# Patient Record
Sex: MALE | Race: WHITE | NOT HISPANIC OR LATINO | Employment: FULL TIME | URBAN - METROPOLITAN AREA
[De-identification: names, ages, dates, MRNs, and addresses within clinical notes are randomized per-mention and may not be internally consistent; named-entity substitution may affect disease eponyms.]

---

## 2021-05-10 ENCOUNTER — TELEPHONE (OUTPATIENT)
Dept: INTERNAL MEDICINE | Facility: CLINIC | Age: 61
End: 2021-05-10

## 2021-05-10 ENCOUNTER — OFFICE VISIT (OUTPATIENT)
Dept: INTERNAL MEDICINE | Facility: CLINIC | Age: 61
End: 2021-05-10
Payer: COMMERCIAL

## 2021-05-10 VITALS
RESPIRATION RATE: 16 BRPM | HEART RATE: 68 BPM | HEIGHT: 66 IN | SYSTOLIC BLOOD PRESSURE: 110 MMHG | BODY MASS INDEX: 35.2 KG/M2 | TEMPERATURE: 98.3 F | OXYGEN SATURATION: 98 % | WEIGHT: 219 LBS | DIASTOLIC BLOOD PRESSURE: 66 MMHG

## 2021-05-10 DIAGNOSIS — I25.10 CORONARY ARTERY DISEASE INVOLVING NATIVE HEART WITHOUT ANGINA PECTORIS, UNSPECIFIED VESSEL OR LESION TYPE: ICD-10-CM

## 2021-05-10 DIAGNOSIS — E11.9 TYPE 2 DIABETES MELLITUS WITHOUT COMPLICATION, WITHOUT LONG-TERM CURRENT USE OF INSULIN (H): Primary | ICD-10-CM

## 2021-05-10 DIAGNOSIS — Z95.0 CARDIAC PACEMAKER IN SITU: ICD-10-CM

## 2021-05-10 DIAGNOSIS — E78.5 HYPERLIPIDEMIA LDL GOAL <100: ICD-10-CM

## 2021-05-10 DIAGNOSIS — I44.2 COMPLETE HEART BLOCK (H): ICD-10-CM

## 2021-05-10 DIAGNOSIS — Z12.5 SCREENING FOR PROSTATE CANCER: ICD-10-CM

## 2021-05-10 DIAGNOSIS — E66.01 MORBID OBESITY (H): ICD-10-CM

## 2021-05-10 DIAGNOSIS — G47.33 OSA (OBSTRUCTIVE SLEEP APNEA): ICD-10-CM

## 2021-05-10 DIAGNOSIS — I10 HTN, GOAL BELOW 140/80: ICD-10-CM

## 2021-05-10 DIAGNOSIS — Z98.84 GASTRIC BYPASS STATUS FOR OBESITY: ICD-10-CM

## 2021-05-10 DIAGNOSIS — K90.0 CELIAC DISEASE: ICD-10-CM

## 2021-05-10 LAB
ALBUMIN SERPL-MCNC: 3.9 G/DL (ref 3.4–5)
ALP SERPL-CCNC: 90 U/L (ref 40–150)
ALT SERPL W P-5'-P-CCNC: 27 U/L (ref 0–70)
ANION GAP SERPL CALCULATED.3IONS-SCNC: 1 MMOL/L (ref 3–14)
AST SERPL W P-5'-P-CCNC: 18 U/L (ref 0–45)
BILIRUB SERPL-MCNC: 1.6 MG/DL (ref 0.2–1.3)
BUN SERPL-MCNC: 15 MG/DL (ref 7–30)
CALCIUM SERPL-MCNC: 8.5 MG/DL (ref 8.5–10.1)
CHLORIDE SERPL-SCNC: 103 MMOL/L (ref 94–109)
CHOLEST SERPL-MCNC: 150 MG/DL
CO2 SERPL-SCNC: 34 MMOL/L (ref 20–32)
CREAT SERPL-MCNC: 0.76 MG/DL (ref 0.66–1.25)
CREAT UR-MCNC: 44 MG/DL
ERYTHROCYTE [DISTWIDTH] IN BLOOD BY AUTOMATED COUNT: 13.9 % (ref 10–15)
FERRITIN SERPL-MCNC: 48 NG/ML (ref 26–388)
FOLATE SERPL-MCNC: 89.4 NG/ML
GFR SERPL CREATININE-BSD FRML MDRD: >90 ML/MIN/{1.73_M2}
GLUCOSE SERPL-MCNC: 190 MG/DL (ref 70–99)
HBA1C MFR BLD: 7.8 % (ref 0–5.6)
HCT VFR BLD AUTO: 45.2 % (ref 40–53)
HDLC SERPL-MCNC: 35 MG/DL
HGB BLD-MCNC: 14.7 G/DL (ref 13.3–17.7)
IRON SATN MFR SERPL: 35 % (ref 15–46)
IRON SERPL-MCNC: 124 UG/DL (ref 35–180)
LDLC SERPL CALC-MCNC: 92 MG/DL
MCH RBC QN AUTO: 29.6 PG (ref 26.5–33)
MCHC RBC AUTO-ENTMCNC: 32.5 G/DL (ref 31.5–36.5)
MCV RBC AUTO: 91 FL (ref 78–100)
MICROALBUMIN UR-MCNC: <5 MG/L
MICROALBUMIN/CREAT UR: NORMAL MG/G CR (ref 0–17)
NONHDLC SERPL-MCNC: 115 MG/DL
PLATELET # BLD AUTO: 167 10E9/L (ref 150–450)
POTASSIUM SERPL-SCNC: 4.4 MMOL/L (ref 3.4–5.3)
PROT SERPL-MCNC: 7.5 G/DL (ref 6.8–8.8)
PSA SERPL-ACNC: 0.56 UG/L (ref 0–4)
PTH-INTACT SERPL-MCNC: 123 PG/ML (ref 18–80)
RBC # BLD AUTO: 4.96 10E12/L (ref 4.4–5.9)
SODIUM SERPL-SCNC: 138 MMOL/L (ref 133–144)
TIBC SERPL-MCNC: 356 UG/DL (ref 240–430)
TRIGL SERPL-MCNC: 114 MG/DL
TSH SERPL DL<=0.005 MIU/L-ACNC: 3.97 MU/L (ref 0.4–4)
VIT B12 SERPL-MCNC: 386 PG/ML (ref 193–986)
WBC # BLD AUTO: 7 10E9/L (ref 4–11)

## 2021-05-10 PROCEDURE — 82607 VITAMIN B-12: CPT | Performed by: INTERNAL MEDICINE

## 2021-05-10 PROCEDURE — 83540 ASSAY OF IRON: CPT | Performed by: INTERNAL MEDICINE

## 2021-05-10 PROCEDURE — 99204 OFFICE O/P NEW MOD 45 MIN: CPT | Performed by: INTERNAL MEDICINE

## 2021-05-10 PROCEDURE — 82043 UR ALBUMIN QUANTITATIVE: CPT | Performed by: INTERNAL MEDICINE

## 2021-05-10 PROCEDURE — 82306 VITAMIN D 25 HYDROXY: CPT | Performed by: INTERNAL MEDICINE

## 2021-05-10 PROCEDURE — 83970 ASSAY OF PARATHORMONE: CPT | Performed by: INTERNAL MEDICINE

## 2021-05-10 PROCEDURE — 84425 ASSAY OF VITAMIN B-1: CPT | Mod: 90 | Performed by: INTERNAL MEDICINE

## 2021-05-10 PROCEDURE — 80053 COMPREHEN METABOLIC PANEL: CPT | Performed by: INTERNAL MEDICINE

## 2021-05-10 PROCEDURE — 82746 ASSAY OF FOLIC ACID SERUM: CPT | Performed by: INTERNAL MEDICINE

## 2021-05-10 PROCEDURE — 83550 IRON BINDING TEST: CPT | Performed by: INTERNAL MEDICINE

## 2021-05-10 PROCEDURE — 82728 ASSAY OF FERRITIN: CPT | Performed by: INTERNAL MEDICINE

## 2021-05-10 PROCEDURE — 80061 LIPID PANEL: CPT | Performed by: INTERNAL MEDICINE

## 2021-05-10 PROCEDURE — 85027 COMPLETE CBC AUTOMATED: CPT | Performed by: INTERNAL MEDICINE

## 2021-05-10 PROCEDURE — G0103 PSA SCREENING: HCPCS | Performed by: INTERNAL MEDICINE

## 2021-05-10 PROCEDURE — 83036 HEMOGLOBIN GLYCOSYLATED A1C: CPT | Performed by: INTERNAL MEDICINE

## 2021-05-10 PROCEDURE — 99000 SPECIMEN HANDLING OFFICE-LAB: CPT | Performed by: INTERNAL MEDICINE

## 2021-05-10 PROCEDURE — 84443 ASSAY THYROID STIM HORMONE: CPT | Performed by: INTERNAL MEDICINE

## 2021-05-10 PROCEDURE — 36415 COLL VENOUS BLD VENIPUNCTURE: CPT | Performed by: INTERNAL MEDICINE

## 2021-05-10 RX ORDER — METOPROLOL SUCCINATE 25 MG/1
50 TABLET, EXTENDED RELEASE ORAL DAILY
Qty: 45 TABLET | Refills: 0 | Status: SHIPPED | OUTPATIENT
Start: 2021-05-10 | End: 2021-05-10

## 2021-05-10 RX ORDER — ATORVASTATIN CALCIUM 20 MG/1
20 TABLET, FILM COATED ORAL DAILY
COMMUNITY
Start: 2021-02-07 | End: 2021-05-10

## 2021-05-10 RX ORDER — LISINOPRIL 20 MG/1
20 TABLET ORAL DAILY
COMMUNITY
Start: 2021-01-10 | End: 2021-05-17

## 2021-05-10 RX ORDER — METOPROLOL SUCCINATE 25 MG/1
12.5 TABLET, EXTENDED RELEASE ORAL DAILY
Qty: 45 TABLET | Refills: 0 | Status: SHIPPED | OUTPATIENT
Start: 2021-05-10 | End: 2021-07-28

## 2021-05-10 RX ORDER — ATORVASTATIN CALCIUM 20 MG/1
20 TABLET, FILM COATED ORAL DAILY
Qty: 90 TABLET | Refills: 0 | Status: SHIPPED | OUTPATIENT
Start: 2021-05-10 | End: 2021-05-17

## 2021-05-10 RX ORDER — FERROUS SULFATE 325(65) MG
TABLET ORAL
COMMUNITY
Start: 2021-04-28 | End: 2021-07-28

## 2021-05-10 RX ORDER — METOPROLOL SUCCINATE 25 MG/1
50 TABLET, EXTENDED RELEASE ORAL DAILY
COMMUNITY
Start: 2019-09-23 | End: 2021-05-10

## 2021-05-10 RX ORDER — FUROSEMIDE 20 MG
20 TABLET ORAL
COMMUNITY
Start: 2020-09-18 | End: 2021-05-10

## 2021-05-10 RX ORDER — FOLIC ACID 1 MG/1
TABLET ORAL
COMMUNITY
Start: 2020-11-15 | End: 2021-05-25

## 2021-05-10 RX ORDER — LISINOPRIL 10 MG/1
10 TABLET ORAL DAILY
Qty: 90 TABLET | Refills: 0 | Status: SHIPPED | OUTPATIENT
Start: 2021-05-10 | End: 2021-08-06

## 2021-05-10 RX ORDER — FUROSEMIDE 20 MG
20 TABLET ORAL DAILY
Qty: 90 TABLET | Refills: 0 | Status: SHIPPED | OUTPATIENT
Start: 2021-05-10 | End: 2021-08-06

## 2021-05-10 SDOH — HEALTH STABILITY: MENTAL HEALTH: HOW OFTEN DO YOU HAVE 6 OR MORE DRINKS ON ONE OCCASION?: NEVER

## 2021-05-10 SDOH — HEALTH STABILITY: MENTAL HEALTH: HOW MANY STANDARD DRINKS CONTAINING ALCOHOL DO YOU HAVE ON A TYPICAL DAY?: NOT ASKED

## 2021-05-10 SDOH — HEALTH STABILITY: MENTAL HEALTH: HOW OFTEN DO YOU HAVE A DRINK CONTAINING ALCOHOL?: NEVER

## 2021-05-10 ASSESSMENT — ANXIETY QUESTIONNAIRES
2. NOT BEING ABLE TO STOP OR CONTROL WORRYING: NOT AT ALL
3. WORRYING TOO MUCH ABOUT DIFFERENT THINGS: NOT AT ALL
7. FEELING AFRAID AS IF SOMETHING AWFUL MIGHT HAPPEN: NOT AT ALL
1. FEELING NERVOUS, ANXIOUS, OR ON EDGE: NOT AT ALL
6. BECOMING EASILY ANNOYED OR IRRITABLE: SEVERAL DAYS
5. BEING SO RESTLESS THAT IT IS HARD TO SIT STILL: NOT AT ALL
GAD7 TOTAL SCORE: 1

## 2021-05-10 ASSESSMENT — PATIENT HEALTH QUESTIONNAIRE - PHQ9
5. POOR APPETITE OR OVEREATING: NOT AT ALL
SUM OF ALL RESPONSES TO PHQ QUESTIONS 1-9: 0

## 2021-05-10 ASSESSMENT — MIFFLIN-ST. JEOR: SCORE: 1750.1

## 2021-05-10 NOTE — TELEPHONE ENCOUNTER
Pharmacy calls for clarification on the directions for the Metoprolol. The RX sent over has two different sets of directions. Please correct and send new RX to the pharmacy.

## 2021-05-10 NOTE — PROGRESS NOTES
Patient's instructions / PLAN:                                                        Plan:  1.  Labs today - [address]   2. [facility] --  referral   3. Cardiology referral   4. Continue same meds, same doses for now   5. Hold the furosemide for 2 weeks before your appointment with cardiology and discuss then need for it  6. Please make a lab appointment for non fasting labs in 5 months for diabetes recheck ( or sooner, depending on the results)  7. Please make an appointment few days after the labs to discuss about the results.       ASSESSMENT & PLAN:                                                      (E11.9) DM 2, no insulin (H)  (primary encounter diagnosis)  Comment: Controlled    Plan: Comprehensive metabolic panel, CBC with         platelets, Lipid panel reflex to direct LDL         Fasting, Albumin Random Urine Quantitative with        Creat Ratio, TSH with free T4 reflex,         Hemoglobin A1c, Hemoglobin A1c, Comprehensive         metabolic panel            (Z98.84) Gastric bypass status for obesity - 2015  Comment:   Plan: Ferritin, Folate, Iron and iron binding         capacity, Parathyroid Hormone Intact, Vitamin         B1 whole blood, Vitamin B12, Vitamin D         Deficiency            (E78.5) Hyperlipidemia LDL goal <100  Comment: Controlled    Plan: Comprehensive metabolic panel, CBC with         platelets, Lipid panel reflex to direct LDL         Fasting, TSH with free T4 reflex, atorvastatin         (LIPITOR) 20 MG tablet            (I25.10) CAD, 2 stents 1999, 2018  Comment: stable   Plan: Comprehensive metabolic panel, CBC with         platelets, Lipid panel reflex to direct LDL         Fasting, TSH with free T4 reflex, Hemoglobin         A1c, furosemide (LASIX) 20 MG tablet,         metoprolol succinate ER (TOPROL-XL) 25 MG 24 hr        tablet, lisinopril (ZESTRIL) 10 MG tablet,         CARDIOLOGY EVAL ADULT REFERRAL            (Z95.0) Cardiac pacemaker in situ  Comment:   Plan:  "    (K90.0) Celiac disease  Comment:   Plan:     (G47.33) ZULEIKA (obstructive sleep apnea)  Comment:   Plan: SLEEP EVALUATION & MANAGEMENT REFERRAL - [facility]          [phone] (Age 18 and up)            (I44.2) Complete heart block (H) -- s/p PPM 2018  Comment:   Plan:     (I10) HTN, goal below 140/80  Comment: Controlled    Plan: Comprehensive metabolic panel, CBC with         platelets, Lipid panel reflex to direct LDL         Fasting, Albumin Random Urine Quantitative with        Creat Ratio, TSH with free T4 reflex,         lisinopril (ZESTRIL) 10 MG tablet               Chief Complaint:                                                      estab care      SUBJECTIVE:                                                    History of present illness     Nurse note:             ROS:                                                      ROS: negative for fever, chills, cough, wheezes, chest pain, shortness of breath, vomiting, abdominal pain, leg swelling       OBJECTIVE:                                                    Physical Exam :    Blood pressure 110/66, pulse 68, temperature 98.3  F (36.8  C), temperature source Oral, resp. rate 16, height 1.683 m (5' 6.25\"), weight 99.3 kg (219 lb), SpO2 98 %.   NAD, appears comfortable  Skin: no rashes   Neck: supple, no JVD, No thyroidmegaly. Lymph nodes nonpalpable cervical and supraclavicular.  Chest: clear to auscultation bilaterally, good respiratory effort  Heart: S1 S2, RRR, no mgr appreciated  Abdomen: soft, not tender, no hepatosplenomegaly or masses appreciated, no abdominal bruit, present bowel sounds  Extremities: no edema,   Neurologic: A, Ox3, no focal signs appreciated    Past Medical History:   Diagnosis Date     CAD, 2 stents 1999, 2018 5/10/2021     Cardiac pacemaker in situ 5/10/2021     Celiac disease 5/10/2021     Complete heart block (H) -- s/p PPM 2018 5/10/2021     DM 2, no insulin (H) 5/10/2021     Gastric " bypass status for obesity - 2015 5/10/2021     HTN, goal below 140/80 5/10/2021     Hyperlipidemia LDL goal <100 5/10/2021     ZULEIKA (obstructive sleep apnea) 5/10/2021     Past Surgical History:   Procedure Laterality Date     DISTAL BICEPS TENDON REPAIR Left      LAPAROSCOPIC GASTRIC BYPASS N/A 2015     LEG SURGERY Right     muscle surgery     UMBILICAL HERNIA REPAIR N/A      WRIST SURGERY Right         Meds: reviewed  Current Outpatient Medications   Medication Sig Dispense Refill     aspirin (ASA) 81 MG EC tablet TAKE ONE TABLET BY MOUTH EVERY DAY FOR HEART DISEASE  **DO NOT CHEW** FOR HEART DISEASE  **DO NOT CHEW**       atorvastatin (LIPITOR) 20 MG tablet Take 20 mg by mouth daily       ferrous sulfate (FEROSUL) 325 (65 Fe) MG tablet TAKE ONE TABLET BY MOUTH EVERY DAY       folic acid (FOLVITE) 1 MG tablet TAKE ONE TABLET BY MOUTH EVERY DAY       furosemide (LASIX) 20 MG tablet Take 20 mg by mouth       lisinopril (ZESTRIL) 20 MG tablet Take 20 mg by mouth daily Taking 1/2 tab daily =10mg       metoprolol succinate ER (TOPROL-XL) 25 MG 24 hr tablet Take 50 mg by mouth daily Taking 1/2 tab daily =25mg         Soc Hx: reviewed  Fam Hx: reviewed          Candis Jose MD  Internal Medicine

## 2021-05-10 NOTE — PATIENT INSTRUCTIONS
Plan:  1.  Labs today - [address]   2. [facility] Ctr --  referral   3. Cardiology referral   4. Continue same meds, same doses for now   5. Hold the furosemide for 2 weeks before your appointment with cardiology and discuss then need for it  6. Please make a lab appointment for non fasting labs in 5 months for diabetes recheck ( or sooner, depending on the results)  7. Please make an appointment few days after the labs to discuss about the results.

## 2021-05-11 LAB — DEPRECATED CALCIDIOL+CALCIFEROL SERPL-MC: 27 UG/L (ref 20–75)

## 2021-05-11 ASSESSMENT — ANXIETY QUESTIONNAIRES: GAD7 TOTAL SCORE: 1

## 2021-05-13 LAB — VIT B1 BLD-MCNC: 93 NMOL/L (ref 70–180)

## 2021-05-17 ENCOUNTER — TELEPHONE (OUTPATIENT)
Dept: CARDIOLOGY | Facility: CLINIC | Age: 61
End: 2021-05-17

## 2021-05-17 ENCOUNTER — OFFICE VISIT (OUTPATIENT)
Dept: CARDIOLOGY | Facility: CLINIC | Age: 61
End: 2021-05-17
Attending: INTERNAL MEDICINE
Payer: COMMERCIAL

## 2021-05-17 VITALS
HEART RATE: 58 BPM | DIASTOLIC BLOOD PRESSURE: 70 MMHG | WEIGHT: 224 LBS | HEIGHT: 66 IN | OXYGEN SATURATION: 98 % | BODY MASS INDEX: 36 KG/M2 | SYSTOLIC BLOOD PRESSURE: 119 MMHG

## 2021-05-17 DIAGNOSIS — I25.10 CORONARY ARTERY DISEASE INVOLVING NATIVE HEART WITHOUT ANGINA PECTORIS, UNSPECIFIED VESSEL OR LESION TYPE: ICD-10-CM

## 2021-05-17 DIAGNOSIS — E78.5 HYPERLIPIDEMIA LDL GOAL <100: ICD-10-CM

## 2021-05-17 PROCEDURE — 93000 ELECTROCARDIOGRAM COMPLETE: CPT | Performed by: INTERNAL MEDICINE

## 2021-05-17 PROCEDURE — 99205 OFFICE O/P NEW HI 60 MIN: CPT | Performed by: INTERNAL MEDICINE

## 2021-05-17 RX ORDER — NITROGLYCERIN 0.4 MG/1
TABLET SUBLINGUAL
Qty: 10 TABLET | Refills: 3 | Status: SHIPPED | OUTPATIENT
Start: 2021-05-17 | End: 2022-09-22

## 2021-05-17 RX ORDER — ATORVASTATIN CALCIUM 40 MG/1
40 TABLET, FILM COATED ORAL DAILY
Qty: 90 TABLET | Refills: 3 | Status: SHIPPED | OUTPATIENT
Start: 2021-05-17 | End: 2021-08-26

## 2021-05-17 ASSESSMENT — MIFFLIN-ST. JEOR: SCORE: 1772.78

## 2021-05-17 NOTE — PROGRESS NOTES
CARDIOLOGY CLINIC CONSULTATION    REASON FOR CONSULT: Coronary artery disease    PRIMARY CARE PHYSICIAN:  Candis Altamirano    HISTORY OF PRESENT ILLNESS:  This a very pleasant 60-year-old gentleman who used to follow-up with [facility] and is now is transferring care to therapy.  This is the first time he see me.  He has following several medical problems    1. Coronary artery disease with stenting in 1999  2. Presentation in January 2018 at [place] with Holter monitor showing high-grade AV block status post dual-chamber pacemaker  3. Presentation in July 2018 with non-ST elevation MI noted to have distal circumflex lesion that was stented and a moderate LAD lesion left to medical management and mild left main disease  4. Mild heart failure with preserved EF last echocardiogram in August 2020 showing mild LVH now takes as needed Lasix  5. Severe sleep apnea sees pulmonary  6. Obesity hypertension diabetes hyperlipidemia and family history of coronary disease    The patient wants to transfer care as he moved to [place].  He is seeing me at [facility] today.  Denies any anginal symptoms no syncope presyncope.  NYHA class I.  Overall feels well.  Blood pressure heart rate are stable.  Complains of mild stiffness in his bilateral fingers but no muscle aches.    PAST MEDICAL HISTORY:  Past Medical History:   Diagnosis Date     CAD, 2 stents 1999, 2018 5/10/2021     Cardiac pacemaker in situ 5/10/2021     Celiac disease 5/10/2021     Complete heart block (H) -- s/p PPM 2018 5/10/2021     DM 2, no insulin (H) 5/10/2021     Gastric bypass status for obesity - 2015 5/10/2021     HTN, goal below 140/80 5/10/2021     Hyperlipidemia LDL goal <100 5/10/2021     ZULEIKA (obstructive sleep apnea) 5/10/2021       MEDICATIONS:  Current Outpatient Medications   Medication     aspirin (ASA) 81 MG EC tablet     atorvastatin (LIPITOR) 40 MG tablet     ferrous sulfate (FEROSUL) 325 (65 Fe) MG tablet     folic acid (FOLVITE) 1  MG tablet     furosemide (LASIX) 20 MG tablet     lisinopril (ZESTRIL) 10 MG tablet     metoprolol succinate ER (TOPROL-XL) 25 MG 24 hr tablet     nitroGLYcerin (NITROSTAT) 0.4 MG sublingual tablet     No current facility-administered medications for this visit.        ALLERGIES:  No Known Allergies    SOCIAL HISTORY:  I have reviewed this patient's social history and updated it with pertinent information if needed. Sumit Park  reports that he has never smoked. He has never used smokeless tobacco. He reports that he does not drink alcohol or use drugs.    FAMILY HISTORY:  I have reviewed this patient's family history and updated it with pertinent information if needed.   Family History   Problem Relation Age of Onset     Breast Cancer Mother 58     Heart Disease Father 71     Lymphoma Brother        REVIEW OF SYSTEMS:  Skin:  Positive for rash   Eyes:  Positive for glasses  ENT:  Negative    Respiratory:  Positive for sleep apnea  Cardiovascular:  Negative    Gastroenterology: Negative    Genitourinary:  Negative    Musculoskeletal:  Positive for arthritis  Neurologic:  Negative    Psychiatric:  Negative    Heme/Lymph/Imm:  Negative    Endocrine:  Positive for diabetes      PHYSICAL EXAM:      BP: 119/70 Pulse: 58     SpO2: 98 %      Vital Signs with Ranges  Pulse:  [58] 58  BP: (119)/(70) 119/70  SpO2:  [98 %] 98 %  224 lbs 0 oz    Constitutional: awake, alert, no distress  Respiratory: Clear to auscultation  Cardiovascular: Normal heart sounds  GI: nondistended obese  Neuropsychiatric: appropriate affact    DATA:  Labs: Pertinent cardiac labs reviewed    Echocardiogram: From August 2020 showing normal LV and RV function.  Mild left ventricular hypertrophy    EKG: Ventricular paced rhythm    ASSESSMENT:  Stable coronary disease with stents in 1999 to unclear territory and to the distal circumflex and July 2018  Pacemaker implantation for heart block  Mild heart failure with preserved  EF    RECOMMENDATIONS:  Overall patient is doing well from a cardiovascular standpoint without any symptoms.  I recommend the following  1. In regards to coronary disease, he is without any symptoms.  His last LDL was 92.  Recommend increasing atorvastatin to 40 mg daily and repeat lipid panel in the next 1 year.  He is without angina.  If he has new symptoms, further work-up could be recommended but at this time strong secondary prevention medical management.  2. Recommend discussing with PCP and considering Jardiance.  3. In regards to his mild heart failure with preserved EF, volume status exam today is normal.  He has not taken Lasix in the last 10 days.  I told him to discontinue and take Lasix as needed.  Last echocardiogram in July 2020 showed normal EF.  4. Enroll in device clinic.  5. Weight loss healthy diet exercise.    See me back in 1 year with an echocardiogram.  Call us with any change in clinical status or new symptoms.    MORIS Mitchell, [facility]  Cardiology - [facility]  May 17, 2021

## 2021-05-17 NOTE — LETTER
5/17/2021    Candis Altamirano MD  [address] [facility] [address]    RE: Sumit Park       Dear Colleague,    I had the pleasure of seeing Sumit Park in the [facility].    CARDIOLOGY CLINIC CONSULTATION    REASON FOR CONSULT: Coronary artery disease    PRIMARY CARE PHYSICIAN:  Candis Altamirano    HISTORY OF PRESENT ILLNESS:  This a very pleasant 60-year-old gentleman who used to follow-up with [facility] and is now is transferring care to therapy.  This is the first time he see me.  He has following several medical problems    1. Coronary artery disease with stenting in 1999  2. Presentation in January 2018 at [place] with Holter monitor showing high-grade AV block status post dual-chamber pacemaker  3. Presentation in July 2018 with non-ST elevation MI noted to have distal circumflex lesion that was stented and a moderate LAD lesion left to medical management and mild left main disease  4. Mild heart failure with preserved EF last echocardiogram in August 2020 showing mild LVH now takes as needed Lasix  5. Severe sleep apnea sees pulmonary  6. Obesity hypertension diabetes hyperlipidemia and family history of coronary disease    The patient wants to transfer care as he moved to [place].  He is seeing me at [facility] today.  Denies any anginal symptoms no syncope presyncope.  NYHA class I.  Overall feels well.  Blood pressure heart rate are stable.  Complains of mild stiffness in his bilateral fingers but no muscle aches.    PAST MEDICAL HISTORY:  Past Medical History:   Diagnosis Date     CAD, 2 stents 1999, 2018 5/10/2021     Cardiac pacemaker in situ 5/10/2021     Celiac disease 5/10/2021     Complete heart block (H) -- s/p PPM 2018 5/10/2021     DM 2, no insulin (H) 5/10/2021     Gastric bypass status for obesity - 2015 5/10/2021     HTN, goal below 140/80 5/10/2021     Hyperlipidemia LDL goal <100  5/10/2021     ZULEIKA (obstructive sleep apnea) 5/10/2021       MEDICATIONS:  Current Outpatient Medications   Medication     aspirin (ASA) 81 MG EC tablet     atorvastatin (LIPITOR) 40 MG tablet     ferrous sulfate (FEROSUL) 325 (65 Fe) MG tablet     folic acid (FOLVITE) 1 MG tablet     furosemide (LASIX) 20 MG tablet     lisinopril (ZESTRIL) 10 MG tablet     metoprolol succinate ER (TOPROL-XL) 25 MG 24 hr tablet     nitroGLYcerin (NITROSTAT) 0.4 MG sublingual tablet     No current facility-administered medications for this visit.        ALLERGIES:  No Known Allergies    SOCIAL HISTORY:  I have reviewed this patient's social history and updated it with pertinent information if needed. Sumit Park  reports that he has never smoked. He has never used smokeless tobacco. He reports that he does not drink alcohol or use drugs.    FAMILY HISTORY:  I have reviewed this patient's family history and updated it with pertinent information if needed.   Family History   Problem Relation Age of Onset     Breast Cancer Mother 58     Heart Disease Father 71     Lymphoma Brother        REVIEW OF SYSTEMS:  Skin:  Positive for rash   Eyes:  Positive for glasses  ENT:  Negative    Respiratory:  Positive for sleep apnea  Cardiovascular:  Negative    Gastroenterology: Negative    Genitourinary:  Negative    Musculoskeletal:  Positive for arthritis  Neurologic:  Negative    Psychiatric:  Negative    Heme/Lymph/Imm:  Negative    Endocrine:  Positive for diabetes      PHYSICAL EXAM:      BP: 119/70 Pulse: 58     SpO2: 98 %      Vital Signs with Ranges  Pulse:  [58] 58  BP: (119)/(70) 119/70  SpO2:  [98 %] 98 %  224 lbs 0 oz    Constitutional: awake, alert, no distress  Respiratory: Clear to auscultation  Cardiovascular: Normal heart sounds  GI: nondistended obese  Neuropsychiatric: appropriate affact    DATA:  Labs: Pertinent cardiac labs reviewed    Echocardiogram: From August 2020 showing normal LV and RV function.  Mild left  ventricular hypertrophy    EKG: Ventricular paced rhythm    ASSESSMENT:  Stable coronary disease with stents in 1999 to unclear territory and to the distal circumflex and July 2018  Pacemaker implantation for heart block  Mild heart failure with preserved EF    RECOMMENDATIONS:  Overall patient is doing well from a cardiovascular standpoint without any symptoms.  I recommend the following  1. In regards to coronary disease, he is without any symptoms.  His last LDL was 92.  Recommend increasing atorvastatin to 40 mg daily and repeat lipid panel in the next 1 year.  He is without angina.  If he has new symptoms, further work-up could be recommended but at this time strong secondary prevention medical management.  2. Recommend discussing with PCP and considering Jardiance.  3. In regards to his mild heart failure with preserved EF, volume status exam today is normal.  He has not taken Lasix in the last 10 days.  I told him to discontinue and take Lasix as needed.  Last echocardiogram in July 2020 showed normal EF.  4. Enroll in device clinic.  5. Weight loss healthy diet exercise.    See me back in 1 year with an echocardiogram.  Call us with any change in clinical status or new symptoms.    MORIS Mitchell, [facility]  Cardiology - [facility]  May 17, 2021    cc:   Candis Altamirano MD  [address] [place] [address]

## 2021-05-17 NOTE — TELEPHONE ENCOUNTER
Patient called with request to transfer device care from [facility]. Patient had appointment with Dr. Rolle today.     Patient has a dual chamber [employer] pacemaker. Will start transfer process with patients current device clinic. Left patient a detailed message at ask that he return call with any further questions or concerns.

## 2021-05-18 ENCOUNTER — MYC MEDICAL ADVICE (OUTPATIENT)
Dept: INTERNAL MEDICINE | Facility: CLINIC | Age: 61
End: 2021-05-18

## 2021-05-18 DIAGNOSIS — E21.3 HYPERPARATHYROIDISM (H): Primary | ICD-10-CM

## 2021-05-25 ENCOUNTER — MYC MEDICAL ADVICE (OUTPATIENT)
Dept: INTERNAL MEDICINE | Facility: CLINIC | Age: 61
End: 2021-05-25

## 2021-05-25 DIAGNOSIS — Z98.84 GASTRIC BYPASS STATUS FOR OBESITY: Primary | ICD-10-CM

## 2021-05-25 NOTE — TELEPHONE ENCOUNTER
Patient requesting refill of Folic Acid, listed as historical in Epic.   Routing refill request to provider for review/approval because:  Medication is reported/historical

## 2021-05-26 RX ORDER — FOLIC ACID 1 MG/1
1000 TABLET ORAL DAILY
Qty: 30 TABLET | Refills: 11 | Status: SHIPPED | OUTPATIENT
Start: 2021-05-26 | End: 2022-05-31

## 2021-06-02 ENCOUNTER — ANCILLARY PROCEDURE (OUTPATIENT)
Dept: CARDIOLOGY | Facility: CLINIC | Age: 61
End: 2021-06-02
Attending: INTERNAL MEDICINE
Payer: COMMERCIAL

## 2021-06-02 DIAGNOSIS — I25.10 CORONARY ARTERY DISEASE INVOLVING NATIVE HEART WITHOUT ANGINA PECTORIS, UNSPECIFIED VESSEL OR LESION TYPE: ICD-10-CM

## 2021-06-02 DIAGNOSIS — E78.5 HYPERLIPIDEMIA LDL GOAL <100: ICD-10-CM

## 2021-06-08 ENCOUNTER — VIRTUAL VISIT (OUTPATIENT)
Dept: SLEEP MEDICINE | Facility: CLINIC | Age: 61
End: 2021-06-08
Attending: INTERNAL MEDICINE
Payer: COMMERCIAL

## 2021-06-08 DIAGNOSIS — G47.33 OSA (OBSTRUCTIVE SLEEP APNEA): Primary | ICD-10-CM

## 2021-06-08 DIAGNOSIS — I10 HTN, GOAL BELOW 140/80: ICD-10-CM

## 2021-06-08 DIAGNOSIS — E66.01 MORBID OBESITY (H): ICD-10-CM

## 2021-06-08 PROCEDURE — 99204 OFFICE O/P NEW MOD 45 MIN: CPT | Mod: TEL | Performed by: PHYSICIAN ASSISTANT

## 2021-06-08 NOTE — PROGRESS NOTES
"Sumit Park is a 60 year old male being evaluated via a billable telephone visit.     \"This telephone visit will be conducted via a call between you and your physician/provider. We have found that certain health care needs can be provided without the need for an in-person visit or physical exam.  This service lets us provide the care you need with a telephone conversation.  If a prescription is necessary we can send it directly to your pharmacy.  If lab work is needed we can place an order for that and you can then stop by our lab to have the test done at a later time.\"    Telephone visits are billed at different rates depending on your insurance coverage.  Please reach out to your insurance provider with any questions.    Patient has given verbal consent for a Telephone visit? Yes    What telephone number would you like your provider to contact at at:  [phone]     How would you like to obtain your AVS? MyChart     Telephone Visit Details:     Telephone Visit Start Time: 12:44PM    [facility]   Outpatient Sleep Medicine Consultation  Jun 8, 2021       Name: Sumit Park MRN# [id number]   Age: 60 year old YOB: 1960     Date of Consultation: Jun 8, 2021   Consultation is requested by: Candis Altamirano MD  [address] [place] [address] Candis Jose-*  Primary care provider: Candis Altamirano         Assessment and Plan:   1. ZULEIKA (obstructive sleep apnea)  2. HTN, goal below 140/80  3. Morbid obesity (H)    Patient presents to clinic today for evaluation of obstructive sleep apnea.  Patient was originally diagnosed with obstructive sleep apnea in 2008 with an AHI of 122/h.  He was treated with CPAP for a number years but ultimately discontinued ~2 years ago because he did not keep up with the cleaning of his CPAP and found it to be cumbersome.  He does not believe that he needs to be on CPAP anymore following 135 pound " weight loss status post gastric bypass in 2015 and almost complete resolution of symptoms.  However, if he does still have significant sleep apnea he would be interested in re-starting treatment for the cardiovascular benefits.  We have agreed to pursue an updated polysomnogram today to determine current sleep apnea severity following his significant weight loss to see if treatment is still required.  Orders were placed today.  - Comprehensive Sleep Study; Future    Follow up 1-2 weeks following his study for review of results and to expedite care. Educational materials provided in instructions.       Chief Complaint / Reason for Sleep Consult:     Chief Complaint   Patient presents with     Sleep Apnea          History of Present Illness:     Sumit Park is a 60 year old male who presents to the clinic for evaluation of his previously diagnosed severe obstructive sleep apnea. Other past medical history significant for type 2 diabetes mellitus, obesity status post gastric bypass 2015, hyperlipidemia, CAD, celiac disease, heart block w/pacemaker, hypertension, mild heart failure with preserved ejection fraction.    Per care everywhere previously seen at [facility]. Visit note 1/17/2019 mentions sleep study 2/12/2008 at [facility] showed /hr. At that time was on autoCPAP [id number]. He stopped using CPAP approximately 2 years ago because he didn't like to keep up with the cleaning. Reports he never cleaned his machine and got bronchitis 6 times in one year. Once he stopped CPAP has not had any additional episodes of bronchitis.     Lost 135lbs following gastric bypass in 2015. Interested in repeat sleep study to determine of sleep apnea has resolved. If sleep apnea is still present would re-start treatment for cardiovascular benefits but doesn't recall feeling any more energized/better on PAP therapy in comparison to off of it.     Does still snore but not as loud as it used to be.  "Snoring \"slightly\" bothers wife. No witnessed apneas. No gasp/choking arousals. Sleeps on right side primarily. Denies nocturia.  Denies nocturnal GERD. Denies morning headaches.  Does admit to morning dry mouth.  Occasional morning nasal/sinus congestion.     On weekdays, goes to bed at 10:00PM and awakens for the day at 5:00AM. On weekends, goes to bed at 10-11:00PM and awakens at 6-7:00AM. Estimates sleep latency to be \"not very long at all\", denies difficulty falling asleep. Awakens 2 times per night for unknown reasons, returns to sleep easily.    No planned daytime napping. Rare inadvertent dozing with TV in a dark room. Denies any history of falling asleep or dozing while driving. No accidents or near accidents. Patient was counseled on the importance of driving while alert, to pull over if drowsy, or nap before getting into the vehicle if sleepy.    No other sleep concerns today. Patient denies typical restless legs syndrome symptoms. Denies kicking/leg movements in sleep. No dream enactment behavior. No somniloquy.  No somnambulism.  No nightmares or night terrors. Does report bruxism, no guard.     SCALES       INSOMNIA:  Insomnia severity score: 4/28   absence of insomnia (0-7); sub-threshold insomnia (8-14); moderate insomnia (15-21); and severe insomnia (22-28)       SLEEPINESS: [place] sleepiness scale: 7/24 [normal < 11]          Medications:     Current Outpatient Medications   Medication Sig     aspirin (ASA) 81 MG EC tablet TAKE ONE TABLET BY MOUTH EVERY DAY FOR HEART DISEASE  **DO NOT CHEW** FOR HEART DISEASE  **DO NOT CHEW**     atorvastatin (LIPITOR) 40 MG tablet Take 1 tablet (40 mg) by mouth daily     ferrous sulfate (FEROSUL) 325 (65 Fe) MG tablet TAKE ONE TABLET BY MOUTH EVERY DAY     folic acid (FOLVITE) 1 MG tablet Take 1 tablet (1,000 mcg) by mouth daily     lisinopril (ZESTRIL) 10 MG tablet Take 1 tablet (10 mg) by mouth daily     metFORMIN (GLUCOPHAGE) 500 MG tablet Take 500 mg by mouth " 2 times daily (with meals)     metoprolol succinate ER (TOPROL-XL) 25 MG 24 hr tablet Take 0.5 tablets (12.5 mg) by mouth daily     nitroGLYcerin (NITROSTAT) 0.4 MG sublingual tablet For chest pain place 1 tablet under the tongue every 5 minutes for 3 doses. If symptoms persist 5 minutes after 1st dose call 911.     furosemide (LASIX) 20 MG tablet Take 1 tablet (20 mg) by mouth daily (Patient not taking: Reported on 6/8/2021)     No current facility-administered medications for this visit.              Allergies:     No Known Allergies         Past Medical History:     Past Medical History:   Diagnosis Date     CAD, 2 stents 1999, 2018 5/10/2021     Cardiac pacemaker in situ 5/10/2021     Celiac disease 5/10/2021     Complete heart block (H) -- s/p PPM 2018 5/10/2021     DM 2, no insulin (H) 5/10/2021     Gastric bypass status for obesity - 2015 5/10/2021     HTN, goal below 140/80 5/10/2021     Hyperlipidemia LDL goal <100 5/10/2021     ZULEIKA (obstructive sleep apnea) 5/10/2021             Past Surgical History:    Previous upper airway surgery : denies   Past Surgical History:   Procedure Laterality Date     DISTAL BICEPS TENDON REPAIR Left      LAPAROSCOPIC GASTRIC BYPASS N/A 2015     LEG SURGERY Right     muscle surgery     UMBILICAL HERNIA REPAIR N/A      WRIST SURGERY Right             Social History:     Social History     Tobacco Use     Smoking status: Never Smoker     Smokeless tobacco: Never Used   Substance Use Topics     Alcohol use: Never     Frequency: Never     Binge frequency: Never     Chemical History:  Alcohol use: Denies      Tobacco use: Denies   Illicit substances: Denies   Caffeine intake: Drinks 1 large cup of coffee per day and iced tea, can drink iced tea before bed.         Family History:     Family History   Problem Relation Age of Onset     Breast Cancer Mother 58     Heart Disease Father 71     Lymphoma Brother       Sleep Family Hx: Patient denies any known family history of sleep  "apnea, restless legs syndrome, narcolepsy or other sleep disorders.          Review of Systems:   A complete 10 point review of systems was negative other than HPI          Physical Examination:   There were no vitals taken for this visit.  Vitals - Patient Reported 6/8/2021   Height (Patient Reported) 5' 6\"   Weight (Patient Reported) 220 lb   BMI (Based on Pt Reported Ht/Wt) 35.51 kg/m2   General: Cooperative and pleasant. In no apparent distress.  Pulmonary:  Able to speak easily in full sentences. No cough or wheeze.   Neurologic: Alert, oriented x3.   Psychiatric: Mood euthymic. Affect congruent with full range and intensity.          Data: All pertinent previous laboratory data reviewed     No results found for: PH, PHARTERIAL, PO2, [id number], SAT, PCO2, HCO3, BASEEXCESS, MARLY, BEB  Lab Results   Component Value Date    TSH 3.97 05/10/2021     Lab Results   Component Value Date     (H) 05/10/2021     Lab Results   Component Value Date    HGB 14.7 05/10/2021     Lab Results   Component Value Date    BUN 15 05/10/2021    CR 0.76 05/10/2021     Lab Results   Component Value Date    AST 18 05/10/2021    ALT 27 05/10/2021    ALKPHOS 90 05/10/2021    BILITOTAL 1.6 (H) 05/10/2021     No results found for: UAMP, UBARB, BENZODIAZEUR, UCANN, UCOC, OPIT, UPCP    Echocardiogram 8/17/2020:  Technically difficult study.  Normal left ventricular chamber size. Mildly increased left ventricular wall thickness. Estimated left ventricular ejection fraction is 55%.  Regional wall motion analysis has reduced accuracy due to suboptimal endocardial border definition.  Normal right ventricular chamber size. Normal right ventricular systolic function. Normal TAPSE consistent with normal right  ventricular longitudinal systolic function. Right ventricular systolic pressure cannot be estimated due to inability to detect peak  tricuspid regurgitation Doppler velocity.  Estimated EF: 55%    Copy to: Candis Altamirano " Verito Salas PA-C  Jun 8, 2021     [facility]  [address] , [place] [address]     [facility]  [address] [place] [id number]    Chart documentation was completed, in part, with [employer] voice-recognition software. Even though reviewed, some grammatical, spelling, and word errors may remain.    Telephone Visit End Time:1:10PM    52 minutes spent on day of encounter doing chart review, history and exam, documentation, and further activities as noted above

## 2021-06-08 NOTE — PATIENT INSTRUCTIONS
"MY TREATMENT INFORMATION FOR SLEEP APNEA-  [place]    PROVIDER: Pamela Salas PA-C    Am I having a sleep study at a sleep center?  --->Due to insurance clearance delays, you will be contacted within 2-4 weeks to schedule      Frequently asked questions:  1. What is Obstructive Sleep Apnea (ZULEIKA)? ZULEIKA is the most common type of sleep apnea. Apnea means, \"without breath.\"  Apnea is most often caused by narrowing or collapse of the upper airway as muscles relax during sleep.   Almost everyone has occasional apneas. Most people with sleep apnea have had brief interruptions at night frequently for many years.  The severity of sleep apnea is related to how frequent and severe the events are.   2. What are the consequences of ZULEIKA? Symptoms include: feeling sleepy during the day, snoring loudly, gasping or stopping of breathing, trouble sleeping, and occasionally morning headaches or heartburn at night.  Sleepiness can be serious and even increase the risk of falling asleep while driving. Other health consequences may include development of high blood pressure and other cardiovascular disease in persons who are susceptible. Untreated ZULEIKA  can contribute to heart disease, stroke and diabetes.   3. What are the treatment options? In most situations, sleep apnea is a lifelong disease that must be managed with daily therapy. Medications are not effective for sleep apnea and surgery is generally not considered until other therapies have been tried. Your treatment is your choice . Continuous Positive Airway (CPAP) works right away and is the therapy that is effective in nearly everyone. An oral device to hold your jaw forward is usually the next most reliable option. Other options include postioning devices (to keep you off your back), weight loss, and surgery including a tongue pacing device. There is more detail about some of these options below.  4. Are my sleep studies covered by insurance? Although we will " request verification of coverage, we advise you also check in advance of the study to ensure there is coverage.    Important tips for those choosing CPAP and similar devices   Know your equipment:  CPAP is continuous positive airway pressure that prevents obstructive sleep apnea by keeping the throat from collapsing while you are sleeping. In most cases, the device is  smart  and can slowly self-adjusts if your throat collapses and keeps a record every day of how well you are treated-this information is available to you and your care team.  BPAP is bilevel positive airway pressure that keeps your throat open and also assists each breath with a pressure boost to maintain adequate breathing.  Special kinds of BPAP are used in patients who have inadequate breathing from lung or heart disease. In most cases, the device is  smart  and can slowly self-adjusts to assist breathing. Like CPAP, the device keeps a record of how well you are treated.  Your mask is your connection to the device. You get to choose what feels most comfortable and the staff will help to make sure if fits. Here: are some examples of the different masks that are available:       Key points to remember on your journey with sleep apnea:  1. Sleep study.  PAP devices often need to be adjusted during a sleep study to show that they are effective and adjusted right.  2. Good tips to remember: Try wearing just the mask during a quiet time during the day so your body adapts to wearing it. A humidifier is recommended for comfort in most cases to prevent drying of your nose and throat. Allergy medication from your provider may help you if you are having nasal congestion.  3. Getting settled-in. It takes more than one night for most of us to get used to wearing a mask. Try wearing just the mask during a quiet time during the day so your body adapts to wearing it. A humidifier is recommended for comfort in most cases. Our team will work with you carefully on  the first day and will be in contact within 4 days and again at 2 and 4 weeks for advice and remote device adjustments. Your therapy is evaluated by the device each day.   4. Use it every night. The more you are able to sleep naturally for 7-8 hours, the more likely you will have good sleep and to prevent health risks or symptoms from sleep apnea. Even if you use it 4 hours it helps. Occasionally all of us are unable to use a medical therapy, in sleep apnea, it is not dangerous to miss one night.   5. Communicate. Call our skilled team on the number provided on the first day if your visit for problems that make it difficult to wear the device. Over 2 out of 3 patients can learn to wear the device long-term with help from our team. Remember to call our team or your sleep providers if you are unable to wear the device as we may have other solutions for those who cannot adapt to mask CPAP therapy. It is recommended that you sleep your sleep provider within the first 3 months and yearly after that if you are not having problems.   6. Use it for your health. We encourage use of CPAP masks during daytime quiet periods to allow your face and brain to adapt to the sensation of CPAP so that it will be a more natural sensation to awaken to at night or during naps. This can be very useful during the first few weeks or months of adapting to CPAP though it does not help medically to wear CPAP during wakefulness and  should not be used as a strategy just to meet guidelines.  7. Take care of your equipment. Make sure you clean your mask and tubing using directions every day and that your filter and mask are replaced as recommended or if they are not working.     BESIDES CPAP, WHAT OTHER THERAPIES ARE THERE?    Positioning Device  Positioning devices are generally used when sleep apnea is mild and only occurs on your back.This example shows a pillow that straps around the waist. It may be appropriate for those whose sleep study  shows milder sleep apnea that occurs primarily when lying flat on one's back. Preliminary studies have shown benefit but effectiveness at home may need to be verified by a home sleep test. These devices are generally not covered by medical insurance.  Examples of devices that maintain sleeping on the back to prevent snoring and mild sleep apnea.    Belt type body positioner  http://Serus.Booker/    Electronic reminder  http://nightshifttherapy.com/  http://www.IMayGou.Booker.au/      Oral Appliance  What is oral appliance therapy?  An oral appliance device fits on your teeth at night like a retainer used after having braces. The device is made by a specialized dentist and requires several visits over 1-2 months before a manufactured device is made to fit your teeth and is adjusted to prevent your sleep apnea. Once an oral device is working properly, snoring should be improved. A home sleep test may be recommended at that time if to determine whether the sleep apnea is adequately treated.       Some things to remember:  -Oral devices are often, but not always, covered by your medical insurance. Be sure to check with your insurance provider.   -If you are referred for oral therapy, you will be given a list of specialized dentists to consider or you may choose to visit the Web site of the American Academy of Dental Sleep Medicine  -Oral devices are less likely to work if you have severe sleep apnea or are extremely overweight.     More detailed information  An oral appliance is a small acrylic device that fits over the upper and lower teeth  (similar to a retainer or a mouth guard). This device slightly moves jaw forward, which moves the base of the tongue forward, opens the airway, improves breathing for effective treat snoring and obstructive sleep apnea in perhaps 7 out of 10 people .  The best working devices are custom-made by a dental device  after a mold is made of the teeth 1, 2, 3.  When is an oral  appliance indicated?  Oral appliance therapy is recommended as a first-line treatment for patients with primary snoring, mild sleep apnea, and for patients with moderate sleep apnea who prefer appliance therapy to use of CPAP4, 5. Severity of sleep apnea is determined by sleep testing and is based on the number of respiratory events per hour of sleep.   How successful is oral appliance therapy?  The success rate of oral appliance therapy in patients with mild sleep apnea is 75-80% while in patients with moderate sleep apnea it is 50-70%. The chance of success in patients with severe sleep apnea is 40-50%. The research also shows that oral appliances have a beneficial effect on the cardiovascular health of ZULEIKA patients at the same magnitude as CPAP therapy7.  Oral appliances should be a second-line treatment in cases of severe sleep apnea, but if not completely successful then a combination therapy utilizing CPAP plus oral appliance therapy may be effective. Oral appliances tend to be effective in a broad range of patients although studies show that the patients who have the highest success are females, younger patients, those with milder disease, and less severe obesity. 3, 6.   Finding a dentist that practices dental sleep medicine  Specific training is available through the American Academy of Dental Sleep Medicine for dentists interested in working in the field of sleep. To find a dentist who is educated in the field of sleep and the use of oral appliances, near you, visit the Web site of the American Academy of Dental Sleep Medicine.    References  1. Solange et al. Objectively measured vs self-reported compliance during oral appliance therapy for sleep-disordered breathing. Chest 2013; 144(5): 3666-5677.  2. Gretchen et al. Objective measurement of compliance during oral appliance therapy for sleep-disordered breathing. Thorax 2013; 68(1): 91-96.  3. Himanshu et al. Mandibular advancement devices in  620 men and women with ZULEIKA and snoring: tolerability and predictors of treatment success. Chest 2004; 125: 7366-2880.  4. Hasmukh et al. Oral appliances for snoring and ZULEIKA: a review. Sleep 2006; 29: 244-262.  5. Allan et al. Oral appliance treatment for ZULEIKA: an update. J Clin Sleep Med 2014; 10(2): 215-227.  6. Tiffanie et al. Predictors of OSAH treatment outcome. J Dent Res 2007; 86: 8988-7924.      Weight Loss:    Weight loss is a long-term strategy that may improve sleep apnea in some patients.    Weight management is a personal decision and the decision should be based on your interest and the potential benefits.  If you are interested in exploring weight loss strategies, the following discussion covers the impact on weight loss on sleep apnea and the approaches that may be successful.    Being overweight does not necessarily mean you will have health consequences.  Those who have BMI over 35 or over 27 with existing medical conditions carries greater risk.   Weight loss decreases severity of sleep apnea in most people with obesity. For those with mild obesity who have developed snoring with weight gain, even 15-30 pound weight loss can improve and occasionally eliminate sleep apnea.  Structured and life-long dietary and health habits are necessary to lose weight and keep healthier weight levels.     Though there may be significant health benefits from weight loss, long-term weight loss is very difficult to achieve- studies show success with dietary management in less than 10% of people. In addition, substantial weight loss may require years of dietary control and may be difficult if patients have severe obesity. In these cases, surgical management may be considered.  Finally, older individuals who have tolerated obesity without health complications may be less likely to benefit from weight loss strategies.        Your BMI is There is no height or weight on file to calculate BMI.  Weight management is a  personal decision.  If you are interested in exploring weight loss strategies, the following discussion covers the approaches that may be successful. Body mass index (BMI) is one way to tell whether you are at a healthy weight, overweight, or obese. It measures your weight in relation to your height.  A BMI of 18.5 to 24.9 is in the healthy range. A person with a BMI of 25 to 29.9 is considered overweight, and someone with a BMI of 30 or greater is considered obese. More than two-thirds of American adults are considered overweight or obese.  Being overweight or obese increases the risk for further weight gain. Excess weight may lead to heart disease and diabetes.  Creating and following plans for healthy eating and physical activity may help you improve your health.  Weight control is part of healthy lifestyle and includes exercise, emotional health, and healthy eating habits. Careful eating habits lifelong are the mainstay of weight control. Though there are significant health benefits from weight loss, long-term weight loss with diet alone may be very difficult to achieve- studies show long-term success with dietary management in less than 10% of people. Attaining a healthy weight may be especially difficult to achieve in those with severe obesity. In some cases, medications, devices and surgical management might be considered.  What can you do?  If you are overweight or obese and are interested in methods for weight loss, you should discuss this with your provider.     Consider reducing daily calorie intake by 500 calories.     Keep a food journal.     Avoiding skipping meals, consider cutting portions instead.    Diet combined with exercise helps maintain muscle while optimizing fat loss. Strength training is particularly important for building and maintaining muscle mass. Exercise helps reduce stress, increase energy, and improves fitness. Increasing exercise without diet control, however, may not burn enough  calories to loose weight.       Start walking three days a week 10-20 minutes at a time    Work towards walking thirty minutes five days a week     Eventually, increase the speed of your walking for 1-2 minutes at time    In addition, we recommend that you review healthy lifestyles and methods for weight loss available through the National Institutes of Health patient information sites:  http://win.niddk.nih.gov/publications/index.htm    And look into health and wellness programs that may be available through your health insurance provider, employer, local community center, or sandra club.    Weight management plan: Patient was referred to their PCP to discuss a diet and exercise plan.      Surgery:    Surgery for obstructive sleep apnea is considered generally only when other therapies fail to work. Surgery may be discussed with you if you are having a difficult time tolerating CPAP and or when there is an abnormal structure that requires surgical correction.  Nose and throat surgeries often enlarge the airway to prevent collapse.  Most of these surgeries create pain for 1-2 weeks and up to half of the most common surgeries are not effective throughout life.  You should carefully discuss the benefits and drawbacks to surgery with your sleep provider and surgeon to determine if it is the best solution for you.   More information  Surgery for ZULEIKA is directed at areas that are responsible for narrowing or complete obstruction of the airway during sleep.  There are a wide range of procedures available to enlarge and/or stabilize the airway to prevent blockage of breathing in the three major areas where it can occur: the palate, tongue, and nasal regions.  Successful surgical treatment depends on the accurate identification of the factors responsible for obstructive sleep apnea in each person.  A personalized approach is required because there is no single treatment that works well for everyone.  Because of anatomic  variation, consultation with an examination by a sleep surgeon is a critical first step in determining what surgical options are best for each patient.  In some cases, examination during sedation may be recommended in order to guide the selection of procedures.  Patients will be counseled about risks and benefits as well as the typical recovery course after surgery. Surgery is typically not a cure for a person s ZULEIKA.  However, surgery will often significantly improve one s ZULEIKA severity (termed  success rate ).  Even in the absence of a cure, surgery will decrease the cardiovascular risk associated with OSA7; improve overall quality of life8 (sleepiness, functionality, sleep quality, etc).      Palate Procedures:  Patients with ZULEIKA often have narrowing of their airway in the region of their tonsils and uvula.  The goals of palate procedures are to widen the airway in this region as well as to help the tissues resist collapse.  Modern palate procedure techniques focus on tissue conservation and soft tissue rearrangement, rather than tissue removal.  Often the uvula is preserved in this procedure. Residual sleep apnea is common in patient after pharyngoplasty with an average reduction in sleep apnea events of 33%2.      Tongue Procedures:  ExamWhile patients are awake, the muscles that surround the throat are active and keep this region open for breathing. These muscles relax during sleep, allowing the tongue and other structures to collapse and block breathing.  There are several different tongue procedures available.  Selection of a tongue base procedure depends on characteristics seen on physical exam.  Generally, procedures are aimed at removing bulky tissues in this area or preventing the back of the tongue from falling back during sleep.  Success rates for tongue surgery range from 50-62%3.    Hypoglossal Nerve Stimulation:  Hypoglossal nerve stimulation has recently received approval from the [place] Food  and Drug Administration for the treatment of obstructive sleep apnea.  This is based on research showing that the system was safe and effective in treating sleep apnea6.  Results showed that the median AHI score decreased 68%, from 29.3 to 9.0. This therapy uses an implant system that senses breathing patterns and delivers mild stimulation to airway muscles, which keeps the airway open during sleep.  The system consists of three fully implanted components: a small generator (similar in size to a pacemaker), a breathing sensor, and a stimulation lead.  Using a small handheld remote, a patient turns the therapy on before bed and off upon awakening.    Candidates for this device must be greater than 22 years of age, have moderate to severe ZULEIKA (AHI between 20-65), BMI less than 32, have tried CPAP/oral appliance without success, and have appropriate upper airway anatomy (determined by a sleep endoscopy performed by Dr. Aponte).    Hypoglossal Nerve Stimulation Pathway:    The sleep surgeon s office will work with the patient through the insurance prior-authorization process (including communications and appeals).    Nasal Procedures:  Nasal obstruction can interfere with nasal breathing during the day and night.  Studies have shown that relief of nasal obstruction can improve the ability of some patients to tolerate positive airway pressure therapy for obstructive sleep apnea1.  Treatment options include medications such as nasal saline, topical corticosteroid and antihistamine sprays, and oral medications such as antihistamines or decongestants. Non-surgical treatments can include external nasal dilators for selected patients. If these are not successful by themselves, surgery can improve the nasal airway either alone or in combination with these other options.      Combination Procedures:  Combination of surgical procedures and other treatments may be recommended, particularly if patients have more than one area of  narrowing or persistent positional disease.  The success rate of combination surgery ranges from 66-80%2,3.    References  1. Valdo FORMAN. The Role of the Nose in Snoring and Obstructive Sleep Apnoea: An Update.  Eur Arch Otorhinolaryngol. 2011; 268: 1365-73.  2.  Felton SM; Rio JA; Lavonne JR; Pallanch JF; Dominick MB; Rico SG; Miriam LONDON. Surgical modifications of the upper airway for obstructive sleep apnea in adults: a systematic review and meta-analysis. SLEEP 2010;33(10):7368-7990. Diana BACON. Hypopharyngeal surgery in obstructive sleep apnea: an evidence-based medicine review.  Arch Otolaryngol Head Neck Surg. 2006 Feb;132(2):206-13.  3. Tristen YH1, Mali Y, Agustín GREGORIA. The efficacy of anatomically based multilevel surgery for obstructive sleep apnea. Otolaryngol Head Neck Surg. 2003 Oct;129(4):327-35.  4. Diana BACON, Goldberg A. Hypopharyngeal Surgery in Obstructive Sleep Apnea: An Evidence-Based Medicine Review. Arch Otolaryngol Head Neck Surg. 2006 Feb;132(2):206-13.  5. Keshav PJ et al. Upper-Airway Stimulation for Obstructive Sleep Apnea.  N Engl J Med. 2014 Jan 9;370(2):139-49.  6. Gricelda Y et al. Increased Incidence of Cardiovascular Disease in Middle-aged Men with Obstructive Sleep Apnea. Am J Respir Crit Care Med; 2002 166: 159-165  7. Meza EM et al. Studying Life Effects and Effectiveness of Palatopharyngoplasty (SLEEP) study: Subjective Outcomes of Isolated Uvulopalatopharyngoplasty. Otolaryngol Head Neck Surg. 2011; 144: 623-631.    Drive Safe... Drive Alive     Sleep health profoundly affects your health, mood, and your safety.  Thirty three percent of the population (one in three of us) is not getting enough sleep and many have a sleep disorder. Not getting enough sleep or having an untreated / undertreated sleep condition may make us sleepy without even knowing it. In fact, our driving could be dramatically impaired due to our sleep health. As your provider, here are some things I would like  you to know about driving:     Here are some warning signs for impairment and dangerous drowsy driving:              -Having been awake more than 16 hours               -Looking tired               -Eyelid drooping              -Head nodding (it could be too late at this point)              -Driving for more than 30 minutes     Some things you could do to make the driving safer if you are experiencing some drowsiness:              -Stop driving and rest              -Call for transportation              -Make sure your sleep disorder is adequately treated     Some things that have been shown NOT to work when experiencing drowsiness while driving:              -Turning on the radio              -Opening windows              -Eating any  distracting  /  entertaining  foods (e.g., sunflower seeds, candy, or any other)              -Talking on the phone      Your decision may not only impact your life, but also the life of others. Please, remember to drive safe for yourself and all of us.

## 2021-06-10 ENCOUNTER — TELEPHONE (OUTPATIENT)
Dept: INTERNAL MEDICINE | Facility: CLINIC | Age: 61
End: 2021-06-10

## 2021-06-10 DIAGNOSIS — E11.9 TYPE 2 DIABETES MELLITUS WITHOUT COMPLICATION, WITHOUT LONG-TERM CURRENT USE OF INSULIN (H): ICD-10-CM

## 2021-06-10 DIAGNOSIS — E21.3 HYPERPARATHYROIDISM (H): Primary | ICD-10-CM

## 2021-06-10 NOTE — TELEPHONE ENCOUNTER
Called patient to reschedule.  Patient had lab work in May.  Per patient he would prefer to have new lab work drawn some time in August and then follow up.  Patient states he will schedule thru MyChart.  Please enter new orders.

## 2021-06-25 ENCOUNTER — MYC MEDICAL ADVICE (OUTPATIENT)
Dept: INTERNAL MEDICINE | Facility: CLINIC | Age: 61
End: 2021-06-25

## 2021-06-27 ENCOUNTER — HEALTH MAINTENANCE LETTER (OUTPATIENT)
Age: 61
End: 2021-06-27

## 2021-06-28 ENCOUNTER — TELEPHONE (OUTPATIENT)
Dept: SLEEP MEDICINE | Facility: CLINIC | Age: 61
End: 2021-06-28

## 2021-07-15 ENCOUNTER — MYC MEDICAL ADVICE (OUTPATIENT)
Dept: INTERNAL MEDICINE | Facility: CLINIC | Age: 61
End: 2021-07-15

## 2021-07-15 DIAGNOSIS — E11.9 TYPE 2 DIABETES MELLITUS WITHOUT COMPLICATION, WITHOUT LONG-TERM CURRENT USE OF INSULIN (H): Primary | ICD-10-CM

## 2021-07-28 ENCOUNTER — MYC MEDICAL ADVICE (OUTPATIENT)
Dept: INTERNAL MEDICINE | Facility: CLINIC | Age: 61
End: 2021-07-28

## 2021-07-28 DIAGNOSIS — Z98.84 GASTRIC BYPASS STATUS FOR OBESITY: Primary | ICD-10-CM

## 2021-07-28 NOTE — TELEPHONE ENCOUNTER
Patient requesting refill of Ferrous Sulfate.  Routing refill request to provider for review/approval because:  Medication is reported/historical

## 2021-07-29 RX ORDER — FERROUS SULFATE 325(65) MG
325 TABLET ORAL DAILY
Qty: 90 TABLET | Refills: 0 | Status: SHIPPED | OUTPATIENT
Start: 2021-07-29 | End: 2021-10-25

## 2021-08-06 DIAGNOSIS — I10 HTN, GOAL BELOW 140/80: ICD-10-CM

## 2021-08-06 DIAGNOSIS — E78.5 HYPERLIPIDEMIA LDL GOAL <100: ICD-10-CM

## 2021-08-06 DIAGNOSIS — I25.10 CORONARY ARTERY DISEASE INVOLVING NATIVE HEART WITHOUT ANGINA PECTORIS, UNSPECIFIED VESSEL OR LESION TYPE: ICD-10-CM

## 2021-08-06 RX ORDER — FUROSEMIDE 20 MG
20 TABLET ORAL DAILY
Qty: 90 TABLET | Refills: 1 | Status: SHIPPED | OUTPATIENT
Start: 2021-08-06 | End: 2022-01-27

## 2021-08-06 RX ORDER — LISINOPRIL 10 MG/1
10 TABLET ORAL DAILY
Qty: 90 TABLET | Refills: 1 | Status: SHIPPED | OUTPATIENT
Start: 2021-08-06 | End: 2021-11-12 | Stop reason: DRUGHIGH

## 2021-08-06 NOTE — TELEPHONE ENCOUNTER
"Requested Prescriptions   Pending Prescriptions Disp Refills     atorvastatin (LIPITOR) 20 MG tablet [Pharmacy Med Name: Atorvastatin Calcium Oral Tablet 20 MG] 90 tablet 0     Sig: Take 1 tablet (20 mg) by mouth daily.       Statins Protocol Passed - 8/6/2021  2:01 AM        Passed - LDL on file in past 12 months     Recent Labs   Lab Test 05/10/21  0803   LDL 92             Passed - No abnormal creatine kinase in past 12 months     No lab results found.             Passed - Recent (12 mo) or future (30 days) visit within the authorizing provider's specialty     Patient has had an office visit with the authorizing provider or a provider within the authorizing providers department within the previous 12 mos or has a future within next 30 days. See \"Patient Info\" tab in inbasket, or \"Choose Columns\" in Meds & Orders section of the refill encounter.              Passed - Medication is active on med list        Passed - Patient is age 18 or older           furosemide (LASIX) 20 MG tablet [Pharmacy Med Name: Furosemide Oral Tablet 20 MG] 90 tablet 0     Sig: Take 1 tablet (20 mg) by mouth daily.       Diuretics (Including Combos) Protocol Passed - 8/6/2021  2:01 AM        Passed - Blood pressure under 140/90 in past 12 months     BP Readings from Last 3 Encounters:   05/17/21 119/70   05/10/21 110/66                 Passed - Recent (12 mo) or future (30 days) visit within the authorizing provider's specialty     Patient has had an office visit with the authorizing provider or a provider within the authorizing providers department within the previous 12 mos or has a future within next 30 days. See \"Patient Info\" tab in inbasket, or \"Choose Columns\" in Meds & Orders section of the refill encounter.              Passed - Medication is active on med list        Passed - Patient is age 18 or older        Passed - Normal serum creatinine on file in past 12 months     Recent Labs   Lab Test 05/10/21  0803   CR 0.76             " " Passed - Normal serum potassium on file in past 12 months     Recent Labs   Lab Test 05/10/21  0803   POTASSIUM 4.4                    Passed - Normal serum sodium on file in past 12 months     Recent Labs   Lab Test 05/10/21  0803                    lisinopril (ZESTRIL) 10 MG tablet [Pharmacy Med Name: Lisinopril Oral Tablet 10 MG] 90 tablet 0     Sig: Take 1 tablet (10 mg) by mouth daily       ACE Inhibitors (Including Combos) Protocol Passed - 8/6/2021  2:01 AM        Passed - Blood pressure under 140/90 in past 12 months     BP Readings from Last 3 Encounters:   05/17/21 119/70   05/10/21 110/66                 Passed - Recent (12 mo) or future (30 days) visit within the authorizing provider's specialty     Patient has had an office visit with the authorizing provider or a provider within the authorizing providers department within the previous 12 mos or has a future within next 30 days. See \"Patient Info\" tab in inbasket, or \"Choose Columns\" in Meds & Orders section of the refill encounter.              Passed - Medication is active on med list        Passed - Patient is age 18 or older        Passed - Normal serum creatinine on file in past 12 months     Recent Labs   Lab Test 05/10/21  0803   CR 0.76       Ok to refill medication if creatinine is low          Passed - Normal serum potassium on file in past 12 months     Recent Labs   Lab Test 05/10/21  0803   POTASSIUM 4.4                  "

## 2021-08-06 NOTE — TELEPHONE ENCOUNTER
Atorvastatin  Routing refill request to provider for review/approval because:  Drug not active on patient's medication list    Lisinopril & furosemide  Prescription approved per [facility] Refill Protocol.

## 2021-08-09 RX ORDER — ATORVASTATIN CALCIUM 20 MG/1
20 TABLET, FILM COATED ORAL DAILY
Qty: 30 TABLET | Refills: 0 | Status: SHIPPED | OUTPATIENT
Start: 2021-08-09 | End: 2021-08-26

## 2021-08-12 ENCOUNTER — LAB (OUTPATIENT)
Dept: LAB | Facility: CLINIC | Age: 61
End: 2021-08-12
Payer: COMMERCIAL

## 2021-08-12 DIAGNOSIS — E21.3 HYPERPARATHYROIDISM (H): ICD-10-CM

## 2021-08-12 DIAGNOSIS — E78.5 HYPERLIPIDEMIA LDL GOAL <100: ICD-10-CM

## 2021-08-12 DIAGNOSIS — E11.9 TYPE 2 DIABETES MELLITUS WITHOUT COMPLICATION, WITHOUT LONG-TERM CURRENT USE OF INSULIN (H): ICD-10-CM

## 2021-08-12 DIAGNOSIS — I25.10 CORONARY ARTERY DISEASE INVOLVING NATIVE HEART WITHOUT ANGINA PECTORIS, UNSPECIFIED VESSEL OR LESION TYPE: ICD-10-CM

## 2021-08-12 LAB
ALBUMIN SERPL-MCNC: 3.8 G/DL (ref 3.4–5)
ALP SERPL-CCNC: 80 U/L (ref 40–150)
ALT SERPL W P-5'-P-CCNC: 34 U/L (ref 0–70)
ANION GAP SERPL CALCULATED.3IONS-SCNC: 5 MMOL/L (ref 3–14)
AST SERPL W P-5'-P-CCNC: 16 U/L (ref 0–45)
BILIRUB SERPL-MCNC: 1.5 MG/DL (ref 0.2–1.3)
BUN SERPL-MCNC: 12 MG/DL (ref 7–30)
CALCIUM SERPL-MCNC: 8.8 MG/DL (ref 8.5–10.1)
CHLORIDE BLD-SCNC: 102 MMOL/L (ref 94–109)
CHOLEST SERPL-MCNC: 120 MG/DL
CO2 SERPL-SCNC: 30 MMOL/L (ref 20–32)
CREAT SERPL-MCNC: 0.76 MG/DL (ref 0.66–1.25)
DEPRECATED CALCIDIOL+CALCIFEROL SERPL-MC: 28 UG/L (ref 20–75)
FASTING STATUS PATIENT QL REPORTED: YES
GFR SERPL CREATININE-BSD FRML MDRD: >90 ML/MIN/1.73M2
GLUCOSE BLD-MCNC: 169 MG/DL (ref 70–99)
HBA1C MFR BLD: 7.2 % (ref 0–5.6)
HDLC SERPL-MCNC: 34 MG/DL
LDLC SERPL CALC-MCNC: 66 MG/DL
NONHDLC SERPL-MCNC: 86 MG/DL
POTASSIUM BLD-SCNC: 4.1 MMOL/L (ref 3.4–5.3)
PROT SERPL-MCNC: 7 G/DL (ref 6.8–8.8)
PTH-INTACT SERPL-MCNC: 66 PG/ML (ref 18–80)
SODIUM SERPL-SCNC: 137 MMOL/L (ref 133–144)
TRIGL SERPL-MCNC: 101 MG/DL

## 2021-08-12 PROCEDURE — 83036 HEMOGLOBIN GLYCOSYLATED A1C: CPT

## 2021-08-12 PROCEDURE — 83970 ASSAY OF PARATHORMONE: CPT

## 2021-08-12 PROCEDURE — 36415 COLL VENOUS BLD VENIPUNCTURE: CPT

## 2021-08-12 PROCEDURE — 82306 VITAMIN D 25 HYDROXY: CPT

## 2021-08-12 PROCEDURE — 80061 LIPID PANEL: CPT

## 2021-08-12 PROCEDURE — 80053 COMPREHEN METABOLIC PANEL: CPT

## 2021-08-25 ENCOUNTER — THERAPY VISIT (OUTPATIENT)
Dept: SLEEP MEDICINE | Facility: CLINIC | Age: 61
End: 2021-08-25
Payer: COMMERCIAL

## 2021-08-25 DIAGNOSIS — E66.01 MORBID OBESITY (H): ICD-10-CM

## 2021-08-25 DIAGNOSIS — G47.33 OSA (OBSTRUCTIVE SLEEP APNEA): ICD-10-CM

## 2021-08-25 DIAGNOSIS — I10 HTN, GOAL BELOW 140/80: ICD-10-CM

## 2021-08-25 PROCEDURE — 95810 POLYSOM 6/> YRS 4/> PARAM: CPT | Performed by: INTERNAL MEDICINE

## 2021-08-26 ENCOUNTER — OFFICE VISIT (OUTPATIENT)
Dept: INTERNAL MEDICINE | Facility: CLINIC | Age: 61
End: 2021-08-26
Payer: COMMERCIAL

## 2021-08-26 VITALS
HEIGHT: 66 IN | SYSTOLIC BLOOD PRESSURE: 144 MMHG | DIASTOLIC BLOOD PRESSURE: 87 MMHG | HEART RATE: 88 BPM | BODY MASS INDEX: 35.63 KG/M2 | TEMPERATURE: 97.5 F | WEIGHT: 221.7 LBS | RESPIRATION RATE: 18 BRPM | OXYGEN SATURATION: 99 %

## 2021-08-26 DIAGNOSIS — E55.9 VITAMIN D DEFICIENCY: ICD-10-CM

## 2021-08-26 DIAGNOSIS — I10 HTN, GOAL BELOW 140/80: ICD-10-CM

## 2021-08-26 DIAGNOSIS — E11.9 TYPE 2 DIABETES MELLITUS WITHOUT COMPLICATION, WITHOUT LONG-TERM CURRENT USE OF INSULIN (H): Primary | ICD-10-CM

## 2021-08-26 DIAGNOSIS — E21.3 HYPERPARATHYROIDISM (H): ICD-10-CM

## 2021-08-26 DIAGNOSIS — Z98.84 GASTRIC BYPASS STATUS FOR OBESITY: ICD-10-CM

## 2021-08-26 DIAGNOSIS — E78.5 HYPERLIPIDEMIA LDL GOAL <100: ICD-10-CM

## 2021-08-26 DIAGNOSIS — I25.10 CORONARY ARTERY DISEASE INVOLVING NATIVE HEART WITHOUT ANGINA PECTORIS, UNSPECIFIED VESSEL OR LESION TYPE: ICD-10-CM

## 2021-08-26 PROBLEM — I44.1 SECOND DEGREE HEART BLOCK: Status: ACTIVE | Noted: 2017-11-06

## 2021-08-26 PROBLEM — I21.4 NSTEMI (NON-ST ELEVATED MYOCARDIAL INFARCTION) (H): Status: ACTIVE | Noted: 2018-07-16

## 2021-08-26 PROBLEM — B35.1 TINEA OF NAIL: Status: ACTIVE | Noted: 2017-10-19

## 2021-08-26 PROBLEM — I47.29 NON-SUSTAINED VENTRICULAR TACHYCARDIA (H): Status: ACTIVE | Noted: 2019-03-07

## 2021-08-26 PROBLEM — Z95.0 S/P CARDIAC PACEMAKER PROCEDURE: Status: ACTIVE | Noted: 2018-01-16

## 2021-08-26 PROBLEM — R00.1 BRADYCARDIA: Status: ACTIVE | Noted: 2017-11-06

## 2021-08-26 PROBLEM — K31.83 ACHLORHYDRIA: Status: ACTIVE | Noted: 2018-06-28

## 2021-08-26 PROCEDURE — 99207 PR FOOT EXAM NO CHARGE: CPT | Performed by: INTERNAL MEDICINE

## 2021-08-26 PROCEDURE — 99214 OFFICE O/P EST MOD 30 MIN: CPT | Performed by: INTERNAL MEDICINE

## 2021-08-26 RX ORDER — GLUCOSAMINE HCL/CHONDROITIN SU 500-400 MG
CAPSULE ORAL
Qty: 100 EACH | Refills: 1 | Status: SHIPPED | OUTPATIENT
Start: 2021-08-26

## 2021-08-26 RX ORDER — ATORVASTATIN CALCIUM 20 MG/1
20 TABLET, FILM COATED ORAL DAILY
Qty: 90 TABLET | Refills: 0 | Status: SHIPPED | OUTPATIENT
Start: 2021-08-26 | End: 2021-09-10

## 2021-08-26 RX ORDER — LANCETS
EACH MISCELLANEOUS
Qty: 100 EACH | Refills: 1 | Status: SHIPPED | OUTPATIENT
Start: 2021-08-26

## 2021-08-26 RX ORDER — LISINOPRIL 20 MG/1
20 TABLET ORAL DAILY
Qty: 90 TABLET | Refills: 0 | Status: SHIPPED | OUTPATIENT
Start: 2021-08-26 | End: 2021-11-12

## 2021-08-26 ASSESSMENT — MIFFLIN-ST. JEOR: SCORE: 1757.34

## 2021-08-26 NOTE — PATIENT INSTRUCTIONS
Plan:  1. Increase Lisinopril to 20 mg daily  2. Increase Metformin to 500 mg 3 times a day.   3. Lamisil cream daily to both feet   4. Continue the other meds, same doses for now.  5. Please make a lab appointment for non fasting labs after Nov 11   6. Please make an appointment few days later for ANNUAL EXAM  7. Be aware that sometimes it takes more than 3-4 weeks to find an appointment.   It would be advisable to schedule the appointment far in advance so you get get the care and the refills in time.

## 2021-08-26 NOTE — PROGRESS NOTES
Dr Jose's note      Patient's instructions / PLAN:                                                        Plan:  1. Increase Lisinopril to 20 mg daily  2. Increase Metformin to 500 mg 3 times a day.   3. Lamisil cream daily to both feet   4. Continue the other meds, same doses for now.  5. Please make a lab appointment for non fasting labs after Nov 11   6. Please make an appointment few days later for ANNUAL EXAM        ASSESSMENT & PLAN:                                                      (E11.9) Type 2 diabetes mellitus without complication, without long-term current use of insulin (H)  (primary encounter diagnosis)  Comment: better A1c, gial < 7  Plan: FOOT EXAM, metFORMIN (GLUCOPHAGE) 500 MG         tablet, blood glucose monitoring (NO BRAND         SPECIFIED) meter device kit, blood glucose (NO         BRAND SPECIFIED) test strip, blood glucose         calibration (NO BRAND SPECIFIED) solution, thin        (NO BRAND SPECIFIED) lancets, alcohol swab prep        pads            (I10) HTN, goal below 140/80  Comment: Not controlled   Plan: Comprehensive metabolic panel, lisinopril         (ZESTRIL) 20 MG tablet            (E78.5) Hyperlipidemia LDL goal <100  Comment: Controlled    Plan: atorvastatin (LIPITOR) 20 MG tablet            (I25.10) Coronary artery disease involving native heart without angina pectoris, unspecified vessel or lesion type  Comment: stable   Plan: f/u w cardio     (Z98.84) Gastric bypass status for obesity  Comment:   Plan: Hemoglobin A1c, Comprehensive metabolic panel            (E21.3) Hyperparathyroidism (H)  Comment: resolved   Plan: cont Vit D    (E55.9) Vitamin D deficiency  Comment:   Plan: Vitamin D Deficiency               Chief complaint:                                                      Follow up chronic medical problems      SUBJECTIVE:                                                    History of present illness:    DM  -- he increased Metformin to 1 tab bide about 6  "weeks ago     HTN  -- noticed increased BP after salty food ( pizza)        Hyperlipidemia Follow-Up      Are you regularly taking any medication or supplement to lower your cholesterol?   Yes- Atorvastatin    Are you having muscle aches or other side effects that you think could be caused by your cholesterol lowering medication?  No    Hypertension Follow-up      Do you check your blood pressure regularly outside of the clinic? Yes     Are you following a low salt diet? No    Are your blood pressures ever more than 140 on the top number (systolic) OR more   than 90 on the bottom number (diastolic), for example 140/90? Yes      How many servings of fruits and vegetables do you eat daily?  0-1    On average, how many sweetened beverages do you drink each day (Examples: soda, juice, sweet tea, etc.  Do NOT count diet or artificially sweetened beverages)?   2    How many days per week do you exercise enough to make your heart beat faster? 4    How many minutes a day do you exercise enough to make your heart beat faster? 20 - 29    How many days per week do you miss taking your medication? 0      Review of Systems:                                                      ROS: negative for fever, chills, cough, wheezes, chest pain, shortness of breath, vomiting, abdominal pain, leg swelling       OBJECTIVE:             Physical exam:  Blood pressure (!) 144/87, pulse 88, temperature 97.5  F (36.4  C), temperature source Oral, resp. rate 18, height 1.683 m (5' 6.25\"), weight 100.6 kg (221 lb 11.2 oz), SpO2 99 %.     NAD, appears comfortable  Skin: no rashes   Neck: supple, no JVD,  No thyroidmegaly. Lymph nodes nonpalpable cervical and supraclavicular.  Chest: clear to auscultation bilaterally, good respiratory effort  Heart: S1 S2, RRR, no mgr appreciated  Abdomen: soft, not tender,  Extremities: no edema, clubbing, cyanosis. Palpable pedal pulses bilaterally, Monofilament skin sensation is intact, present tinea " pedis  Neurologic: A, Ox3, no focal signs appreciated    PMHx: reviewed  Past Medical History:   Diagnosis Date     CAD, 2 stents 1999, 2018 5/10/2021     Cardiac pacemaker in situ 5/10/2021     Celiac disease 5/10/2021     Complete heart block (H) -- s/p PPM 2018 5/10/2021     DM 2, no insulin (H) 5/10/2021     Gastric bypass status for obesity - 2015 5/10/2021     HTN, goal below 140/80 5/10/2021     Hyperlipidemia LDL goal <100 5/10/2021     ZULEIKA (obstructive sleep apnea) 5/10/2021      PSHx: reviewed  Past Surgical History:   Procedure Laterality Date     ANGIOPLASTY       COLONOSCOPY N/A 1/13/2015    Procedure: COLONOSCOPY;  Surgeon: Juni Craig MD;  Location: [facility];  Service:      DISTAL BICEPS TENDON REPAIR Left      ESOPHAGOSCOPY, GASTROSCOPY, DUODENOSCOPY (EGD), COMBINED N/A 1/13/2015    Procedure: UPPER ENDOSCOPY;  Surgeon: Juni Craig MD;  Location: [facility];  Service:      HERNIORRHAPHY INCISIONAL (LOCATION)      incarcerated     LAPAROSCOPIC GASTRIC BYPASS N/A 2015     LEG SURGERY Right     muscle surgery     OTHER SURGICAL HISTORY      forearm / wrist surgery     REPAIR TENDON BICEPS DISTAL UPPER EXTREMITY       UMBILICAL HERNIA REPAIR N/A      WRIST SURGERY Right         Meds: reviewed  Current Outpatient Medications   Medication Sig Dispense Refill     aspirin (ASA) 81 MG EC tablet TAKE ONE TABLET BY MOUTH EVERY DAY FOR HEART DISEASE  **DO NOT CHEW** FOR HEART DISEASE  **DO NOT CHEW** 100 tablet 0     atorvastatin (LIPITOR) 20 MG tablet Take 1 tablet (20 mg) by mouth daily. 30 tablet 0     ferrous sulfate (FEROSUL) 325 (65 Fe) MG tablet Take 1 tablet (325 mg) by mouth daily 90 tablet 0     folic acid (FOLVITE) 1 MG tablet Take 1 tablet (1,000 mcg) by mouth daily 30 tablet 11     furosemide (LASIX) 20 MG tablet Take 1 tablet (20 mg) by mouth daily. 90 tablet 1     lisinopril (ZESTRIL) 10 MG tablet Take 1 tablet (10 mg) by mouth daily 90 tablet 1     metFORMIN  (GLUCOPHAGE) 500 MG tablet Take 500 mg by mouth 2 times daily (with meals)       metoprolol succinate ER (TOPROL-XL) 25 MG 24 hr tablet Take 0.5 tablets (12.5 mg) by mouth daily 45 tablet 3     nitroGLYcerin (NITROSTAT) 0.4 MG sublingual tablet For chest pain place 1 tablet under the tongue every 5 minutes for 3 doses. If symptoms persist 5 minutes after 1st dose call 911. 10 tablet 3       Soc Hx: reviewed  Fam Hx: reviewed          Candis Jose MD  Internal Medicine

## 2021-08-30 LAB — SLPCOMP: NORMAL

## 2021-08-30 NOTE — PROCEDURES
" SLEEP STUDY INTERPRETATION  DIAGNOSTIC POLYSOMNOGRAPHY REPORT      Patient: SHARMAINE MORRISSEY  YOB: 1960  Study Date: 8/25/2021  MRN: [id number]  Referring Provider: Candis Jose MD  Ordering Provider: Maritza Simmons PA-C    Indications for Polysomnography: The patient is a 61 year old Male who is 5' 6\" and weighs 220.0 lbs. His BMI is 35.8, [place] sleepiness scale - and neck circumference is - cm. Relevant medical history includes type 2 diabetes mellitus, status post gastric bypass 2015, hyperlipidemia, CAD, celiac disease, heart block w/pacemaker, hypertension, mild heart failure with preserved ejection fraction. A diagnostic polysomnogram was performed to evaluate for sleep apnea.    Polysomnogram Data: A full night polysomnogram recorded the standard physiologic parameters including EEG, EOG, EMG, ECG, nasal and oral airflow. Respiratory parameters of chest and abdominal movements were recorded with respiratory inductance plethysmography. Oxygen saturation was recorded by pulse oximetry. Hypopnea scoring rule used: 1B 4%.    Sleep Architecture:   The total recording time of the polysomnogram was 469.9 minutes. The total sleep time was 434.0 minutes. Sleep latency was decreased at 5.9 minutes without the use of a sleep aid. REM latency was 116.5 minutes. Arousal index was normal at 10.1 arousals per hour. Sleep efficiency was normal at 92.4%. Wake after sleep onset was 28.5 minutes. The patient spent 3.3% of total sleep time in Stage N1, 53.7% in Stage N2, 14.7% in Stage N3, and 28.2% in REM. Time in REM supine was 122.5 minutes.    Respiration: Moderate obstructive sleep apnea.     Events ? The polysomnogram revealed a presence of 25 obstructive, 2 central, and - mixed apneas resulting in an apnea index of 3.7 events per hour. There were 130 obstructive hypopneas and - central hypopneas resulting in an obstructive hypopnea index of 18.0 and central hypopnea index of - events " per hour. The combined apnea/hypopnea index was 21.7 events per hour (central apnea/hypopnea index was 0.3 events per hour). The REM AHI was 52.4 events per hour. The supine AHI was 21.7 events per hour. The RERA index was 1.4 events per hour.  The RDI was 23.1 events per hour.    Snoring - was reported as mild.    Respiratory rate and pattern - was notable for normal respiratory rate and pattern.    Sustained Sleep Associated Hypoventilation - Transcutaneous carbon dioxide monitoring was not used; however significant hypoventilation was not suggested by oximetry.    Sleep Associated Hypoxemia - (Greater than 5 minutes O2 sat at or below 88%) was not present. Baseline oxygen saturation was 94.8%. Lowest oxygen saturation was 83.9%. Time spent less than or equal to 88% was 1.8 minutes. Time spent less than or equal to 89% was 3.8 minutes.    Movement Activity: negative for movement abnormalities.     Periodic Limb Activity - There were 23 PLMs during the entire study. The PLM index was 3.2 movements per hour. The PLM Arousal Index was - per hour.    REM EMG Activity - Excessive transient/sustained muscle activity was not present.    Nocturnal Behavior - Abnormal sleep related behaviors were not noted during/arising out of NREM / REM sleep.     Bruxism - None apparent.    Cardiac Summary: Paced rhythm.   The average pulse rate was 51.2 bpm. The minimum pulse rate was 47.8 bpm while the maximum pulse rate was 71.0 bpm.      Assessment:     Moderate obstructive sleep apnea and associated oxygen desaturations, Sleep apnea events were predominantly REM sleep related.     Recommendations:    CPAP therapy is the treatment of choice for moderate obstructive sleep apnea. Treatment could be empirically initiated with Auto?titrating PAP therapy with a range of 5 to 15 cmH2O. Recommend clinical follow up with sleep management team.    If CPAP is not tolerated, patient may be a candidate for dental appliance through referral to  Sleep Dentistry for the treatment of obstructive sleep apnea.    If devices are not acceptable or effective, patient may benefit from evaluation of possible surgical options. If he is interested, would recommend referral to specialized ENT-Sleep provider.    Weight management (if BMI > 30).    Diagnostic Codes:   Obstructive Sleep Apnea G47.33  Repetitive Intrusions Into Sleep F51.8    8/25/2021 [facility] Study (220.0 lbs) - AHI 21.7, RDI 23.1, Supine AHI 21.7, REM AHI 52.4, Low O2 83.9%, Time Spent ?88% 1.8 minutes / Time Spent ?89% 3.8 minutes.      _____________________________________   Electronically Signed By: Winston Jeter MD 08/29/2021

## 2021-09-07 ENCOUNTER — ANCILLARY PROCEDURE (OUTPATIENT)
Dept: CARDIOLOGY | Facility: CLINIC | Age: 61
End: 2021-09-07
Attending: INTERNAL MEDICINE
Payer: COMMERCIAL

## 2021-09-07 DIAGNOSIS — Z95.0 PACEMAKER: ICD-10-CM

## 2021-09-07 LAB
MDC_IDC_LEAD_IMPLANT_DT: NORMAL
MDC_IDC_LEAD_IMPLANT_DT: NORMAL
MDC_IDC_LEAD_LOCATION: NORMAL
MDC_IDC_LEAD_LOCATION: NORMAL
MDC_IDC_LEAD_LOCATION_DETAIL_1: NORMAL
MDC_IDC_LEAD_LOCATION_DETAIL_1: NORMAL
MDC_IDC_LEAD_MFG: NORMAL
MDC_IDC_LEAD_MFG: NORMAL
MDC_IDC_LEAD_MODEL: NORMAL
MDC_IDC_LEAD_MODEL: NORMAL
MDC_IDC_LEAD_POLARITY_TYPE: NORMAL
MDC_IDC_LEAD_POLARITY_TYPE: NORMAL
MDC_IDC_LEAD_SERIAL: NORMAL
MDC_IDC_LEAD_SERIAL: NORMAL
MDC_IDC_MSMT_BATTERY_DTM: NORMAL
MDC_IDC_MSMT_BATTERY_REMAINING_LONGEVITY: 112 MO
MDC_IDC_MSMT_BATTERY_RRT_TRIGGER: 2.62
MDC_IDC_MSMT_BATTERY_STATUS: NORMAL
MDC_IDC_MSMT_BATTERY_VOLTAGE: 3.01 V
MDC_IDC_MSMT_LEADCHNL_RA_IMPEDANCE_VALUE: 304 OHM
MDC_IDC_MSMT_LEADCHNL_RA_IMPEDANCE_VALUE: 361 OHM
MDC_IDC_MSMT_LEADCHNL_RA_PACING_THRESHOLD_AMPLITUDE: 0.5 V
MDC_IDC_MSMT_LEADCHNL_RA_PACING_THRESHOLD_PULSEWIDTH: 0.4 MS
MDC_IDC_MSMT_LEADCHNL_RA_SENSING_INTR_AMPL: 2.5 MV
MDC_IDC_MSMT_LEADCHNL_RA_SENSING_INTR_AMPL: 2.5 MV
MDC_IDC_MSMT_LEADCHNL_RV_IMPEDANCE_VALUE: 323 OHM
MDC_IDC_MSMT_LEADCHNL_RV_IMPEDANCE_VALUE: 418 OHM
MDC_IDC_MSMT_LEADCHNL_RV_PACING_THRESHOLD_AMPLITUDE: 0.75 V
MDC_IDC_MSMT_LEADCHNL_RV_PACING_THRESHOLD_PULSEWIDTH: 0.4 MS
MDC_IDC_MSMT_LEADCHNL_RV_SENSING_INTR_AMPL: 11.88 MV
MDC_IDC_MSMT_LEADCHNL_RV_SENSING_INTR_AMPL: 11.88 MV
MDC_IDC_PG_IMPLANT_DTM: NORMAL
MDC_IDC_PG_MFG: NORMAL
MDC_IDC_PG_MODEL: NORMAL
MDC_IDC_PG_SERIAL: NORMAL
MDC_IDC_PG_TYPE: NORMAL
MDC_IDC_SESS_CLINIC_NAME: NORMAL
MDC_IDC_SESS_DTM: NORMAL
MDC_IDC_SESS_TYPE: NORMAL
MDC_IDC_SET_BRADY_AT_MODE_SWITCH_RATE: 150 {BEATS}/MIN
MDC_IDC_SET_BRADY_HYSTRATE: NORMAL
MDC_IDC_SET_BRADY_LOWRATE: 50 {BEATS}/MIN
MDC_IDC_SET_BRADY_MAX_SENSOR_RATE: 130 {BEATS}/MIN
MDC_IDC_SET_BRADY_MAX_TRACKING_RATE: 130 {BEATS}/MIN
MDC_IDC_SET_BRADY_MODE: NORMAL
MDC_IDC_SET_BRADY_PAV_DELAY_LOW: 180 MS
MDC_IDC_SET_BRADY_SAV_DELAY_LOW: 150 MS
MDC_IDC_SET_LEADCHNL_RA_PACING_AMPLITUDE: 1.5 V
MDC_IDC_SET_LEADCHNL_RA_PACING_ANODE_ELECTRODE_1: NORMAL
MDC_IDC_SET_LEADCHNL_RA_PACING_ANODE_LOCATION_1: NORMAL
MDC_IDC_SET_LEADCHNL_RA_PACING_CAPTURE_MODE: NORMAL
MDC_IDC_SET_LEADCHNL_RA_PACING_CATHODE_ELECTRODE_1: NORMAL
MDC_IDC_SET_LEADCHNL_RA_PACING_CATHODE_LOCATION_1: NORMAL
MDC_IDC_SET_LEADCHNL_RA_PACING_POLARITY: NORMAL
MDC_IDC_SET_LEADCHNL_RA_PACING_PULSEWIDTH: 0.4 MS
MDC_IDC_SET_LEADCHNL_RA_SENSING_ANODE_ELECTRODE_1: NORMAL
MDC_IDC_SET_LEADCHNL_RA_SENSING_ANODE_LOCATION_1: NORMAL
MDC_IDC_SET_LEADCHNL_RA_SENSING_CATHODE_ELECTRODE_1: NORMAL
MDC_IDC_SET_LEADCHNL_RA_SENSING_CATHODE_LOCATION_1: NORMAL
MDC_IDC_SET_LEADCHNL_RA_SENSING_POLARITY: NORMAL
MDC_IDC_SET_LEADCHNL_RA_SENSING_SENSITIVITY: 0.3 MV
MDC_IDC_SET_LEADCHNL_RV_PACING_AMPLITUDE: 1.5 V
MDC_IDC_SET_LEADCHNL_RV_PACING_ANODE_ELECTRODE_1: NORMAL
MDC_IDC_SET_LEADCHNL_RV_PACING_ANODE_LOCATION_1: NORMAL
MDC_IDC_SET_LEADCHNL_RV_PACING_CAPTURE_MODE: NORMAL
MDC_IDC_SET_LEADCHNL_RV_PACING_CATHODE_ELECTRODE_1: NORMAL
MDC_IDC_SET_LEADCHNL_RV_PACING_CATHODE_LOCATION_1: NORMAL
MDC_IDC_SET_LEADCHNL_RV_PACING_POLARITY: NORMAL
MDC_IDC_SET_LEADCHNL_RV_PACING_PULSEWIDTH: 0.4 MS
MDC_IDC_SET_LEADCHNL_RV_SENSING_ANODE_ELECTRODE_1: NORMAL
MDC_IDC_SET_LEADCHNL_RV_SENSING_ANODE_LOCATION_1: NORMAL
MDC_IDC_SET_LEADCHNL_RV_SENSING_CATHODE_ELECTRODE_1: NORMAL
MDC_IDC_SET_LEADCHNL_RV_SENSING_CATHODE_LOCATION_1: NORMAL
MDC_IDC_SET_LEADCHNL_RV_SENSING_POLARITY: NORMAL
MDC_IDC_SET_LEADCHNL_RV_SENSING_SENSITIVITY: 0.9 MV
MDC_IDC_SET_ZONE_DETECTION_INTERVAL: 370 MS
MDC_IDC_SET_ZONE_DETECTION_INTERVAL: 400 MS
MDC_IDC_SET_ZONE_TYPE: NORMAL
MDC_IDC_STAT_AT_BURDEN_PERCENT: 0 %
MDC_IDC_STAT_AT_DTM_END: NORMAL
MDC_IDC_STAT_AT_DTM_START: NORMAL
MDC_IDC_STAT_BRADY_AP_VP_PERCENT: 38.74 %
MDC_IDC_STAT_BRADY_AP_VS_PERCENT: 1.54 %
MDC_IDC_STAT_BRADY_AS_VP_PERCENT: 50.74 %
MDC_IDC_STAT_BRADY_AS_VS_PERCENT: 8.97 %
MDC_IDC_STAT_BRADY_DTM_END: NORMAL
MDC_IDC_STAT_BRADY_DTM_START: NORMAL
MDC_IDC_STAT_BRADY_RA_PERCENT_PACED: 40.19 %
MDC_IDC_STAT_BRADY_RV_PERCENT_PACED: 89.48 %
MDC_IDC_STAT_EPISODE_RECENT_COUNT: 0
MDC_IDC_STAT_EPISODE_RECENT_COUNT_DTM_END: NORMAL
MDC_IDC_STAT_EPISODE_RECENT_COUNT_DTM_START: NORMAL
MDC_IDC_STAT_EPISODE_TOTAL_COUNT: 0
MDC_IDC_STAT_EPISODE_TOTAL_COUNT: 0
MDC_IDC_STAT_EPISODE_TOTAL_COUNT: 2
MDC_IDC_STAT_EPISODE_TOTAL_COUNT: 2
MDC_IDC_STAT_EPISODE_TOTAL_COUNT: 76
MDC_IDC_STAT_EPISODE_TOTAL_COUNT_DTM_END: NORMAL
MDC_IDC_STAT_EPISODE_TOTAL_COUNT_DTM_START: NORMAL
MDC_IDC_STAT_EPISODE_TYPE: NORMAL

## 2021-09-07 PROCEDURE — 93296 REM INTERROG EVL PM/IDS: CPT | Performed by: INTERNAL MEDICINE

## 2021-09-07 PROCEDURE — 93294 REM INTERROG EVL PM/LDLS PM: CPT | Performed by: INTERNAL MEDICINE

## 2021-09-07 NOTE — PROGRESS NOTES
Sumit Park is a 61 year old male being evaluated via a billable telephone visit.     Patient has given verbal consent for  a Telephone visit? Yes    What telephone number would you like your provider to contact at: [phone]    How would you like to obtain your AVS? Mail a copy    Telephone Visit Details:     Telephone Visit Start Time: 12:34PM    Telephone Visit End Time:12:45 PM      [facility]   Outpatient Sleep Medicine  Sep 8, 2021       Name: Sumit Park MRN# [id number]   Age: 61 year old YOB: 1960            Assessment and Plan:   1. ZULEIKA (obstructive sleep apnea)    During this visit, we reviewed the summary of the study including stage, position, event distribution, oximetry and heart rate. Sleep architecure revealed all sleep stages present without significant disruption. Moderate obstructive sleep apnea with associated oxygen desaturations were seen, with predominance of ZULEIKA events in REM sleep - AHI 21.7, RDI 23.1, Supine AHI 21.7, REM AHI 52.4, Low O2 83.9%, Time Spent ?88% 1.8 minutes / Time Spent ?89% 3.8 minutes. No sustained hypoxemia. No abnormal movements or behaviors. Paced rhythm on cardiac monitoring.     Discussed re-starting CPAP is best treatment for moderate ZULEIKA, however, oral appliance could also be considered. Patient not interested in re-startng CPAP, did not keep up with cleaning and believes contiributed to recurrent bronchitis. Much more interested in oral appliance therapy. Referral placed today to sleep dentistry.    - SLEEP DENTAL REFERRAL; Future    Follow-up per sleep dentistry recommendations. May be a good idea to complete home sleep apnea testing with oral appliance in place to determine adequacy of treatment.        Chief Complaint      Chief Complaint   Patient presents with     Sleep Study          History of Present Illness:   Sumit Park is a 61 year old male who presents to the clinic for results of recent sleep  study completed on 8/25/2021. Relevant medical history includes type 2 diabetes mellitus, status post gastric bypass 2015, hyperlipidemia, CAD, celiac disease, heart block w/pacemaker, hypertension, mild heart failure with preserved ejection fraction. A diagnostic polysomnogram was performed to evaluate for current sleep apnea severity. Patient previosuly diagnosed with very severe ZULEIKA (/hr) in 2008 pre-gastric bypass.     Reviewed results of sleep study with patient as follows:  Sleep Architecture:   The total recording time of the polysomnogram was 469.9 minutes. The total sleep time was 434.0 minutes. Sleep latency was decreased at 5.9 minutes without the use of a sleep aid. REM latency was 116.5 minutes. Arousal index was normal at 10.1 arousals per hour. Sleep efficiency was normal at 92.4%. Wake after sleep onset was 28.5 minutes. The patient spent 3.3% of total sleep time in Stage N1, 53.7% in Stage N2, 14.7% in Stage N3, and 28.2% in REM. Time in REM supine was 122.5 minutes.     Respiration: Moderate obstructive sleep apnea.     Events ? The polysomnogram revealed a presence of 25 obstructive, 2 central, and - mixed apneas resulting in an apnea index of 3.7 events per hour. There were 130 obstructive hypopneas and - central hypopneas resulting in an obstructive hypopnea index of 18.0 and central hypopnea index of - events per hour. The combined apnea/hypopnea index was 21.7 events per hour (central apnea/hypopnea index was 0.3 events per hour). The REM AHI was 52.4 events per hour. The supine AHI was 21.7 events per hour. The RERA index was 1.4 events per hour.  The RDI was 23.1 events per hour.    Snoring - was reported as mild.    Respiratory rate and pattern - was notable for normal respiratory rate and pattern.    Sustained Sleep Associated Hypoventilation - Transcutaneous carbon dioxide monitoring was not used; however significant hypoventilation was not suggested by oximetry.    Sleep  Associated Hypoxemia - (Greater than 5 minutes O2 sat at or below 88%) was not present. Baseline oxygen saturation was 94.8%. Lowest oxygen saturation was 83.9%. Time spent less than or equal to 88% was 1.8 minutes. Time spent less than or equal to 89% was 3.8 minutes.     Movement Activity: negative for movement abnormalities.     Periodic Limb Activity - There were 23 PLMs during the entire study. The PLM index was 3.2 movements per hour. The PLM Arousal Index was - per hour.    REM EMG Activity - Excessive transient/sustained muscle activity was not present.    Nocturnal Behavior - Abnormal sleep related behaviors were not noted during/arising out of NREM / REM sleep.     Bruxism - None apparent.     Cardiac Summary: Paced rhythm.   The average pulse rate was 51.2 bpm. The minimum pulse rate was 47.8 bpm while the maximum pulse rate was 71.0 bpm.        Past medical/surgical history, family history, social history, medications and allergies were reviewed.           Physical Examination:   There were no vitals taken for this visit.  General: Cooperative and pleasant. In no apparent distress.  Pulmonary:  Able to speak easily in full sentences. No cough or wheeze.   Neurologic: Alert, oriented x3.   Psychiatric: Mood euthymic. Affect congruent with full range and intensity.    CC:  Candis Altamirano PA-C  Sep 8, 2021     [facility]  [address] [place] [address]     [facility]  [address] [address]    Chart documentation was completed, in part, with [employer] voice-recognition software. Even though reviewed, some grammatical, spelling, and word errors may remain.      21 minutes spent on day of encounter doing chart review, history and exam, documentation, and further activities as noted above

## 2021-09-08 ENCOUNTER — VIRTUAL VISIT (OUTPATIENT)
Dept: SLEEP MEDICINE | Facility: CLINIC | Age: 61
End: 2021-09-08
Payer: COMMERCIAL

## 2021-09-08 DIAGNOSIS — G47.33 OSA (OBSTRUCTIVE SLEEP APNEA): Primary | ICD-10-CM

## 2021-09-08 PROCEDURE — 99213 OFFICE O/P EST LOW 20 MIN: CPT | Mod: TEL | Performed by: PHYSICIAN ASSISTANT

## 2021-09-08 NOTE — PATIENT INSTRUCTIONS
" SLEEP STUDY INTERPRETATION  DIAGNOSTIC POLYSOMNOGRAPHY REPORT        Patient: SHARMAINE MORRISSEY  YOB: 1960  Study Date: 8/25/2021  MRN: [id number]  Referring Provider: Candis Jose MD  Ordering Provider: Maritza Simmons PA-C     Indications for Polysomnography: The patient is a 61 year old Male who is 5' 6\" and weighs 220.0 lbs. His BMI is 35.8, Ozan sleepiness scale - and neck circumference is - cm. Relevant medical history includes type 2 diabetes mellitus, status post gastric bypass 2015, hyperlipidemia, CAD, celiac disease, heart block w/pacemaker, hypertension, mild heart failure with preserved ejection fraction. A diagnostic polysomnogram was performed to evaluate for sleep apnea.     Polysomnogram Data: A full night polysomnogram recorded the standard physiologic parameters including EEG, EOG, EMG, ECG, nasal and oral airflow. Respiratory parameters of chest and abdominal movements were recorded with respiratory inductance plethysmography. Oxygen saturation was recorded by pulse oximetry. Hypopnea scoring rule used: 1B 4%.     Sleep Architecture:   The total recording time of the polysomnogram was 469.9 minutes. The total sleep time was 434.0 minutes. Sleep latency was decreased at 5.9 minutes without the use of a sleep aid. REM latency was 116.5 minutes. Arousal index was normal at 10.1 arousals per hour. Sleep efficiency was normal at 92.4%. Wake after sleep onset was 28.5 minutes. The patient spent 3.3% of total sleep time in Stage N1, 53.7% in Stage N2, 14.7% in Stage N3, and 28.2% in REM. Time in REM supine was 122.5 minutes.     Respiration: Moderate obstructive sleep apnea.     Events ? The polysomnogram revealed a presence of 25 obstructive, 2 central, and - mixed apneas resulting in an apnea index of 3.7 events per hour. There were 130 obstructive hypopneas and - central hypopneas resulting in an obstructive hypopnea index of 18.0 and central hypopnea index of - " events per hour. The combined apnea/hypopnea index was 21.7 events per hour (central apnea/hypopnea index was 0.3 events per hour). The REM AHI was 52.4 events per hour. The supine AHI was 21.7 events per hour. The RERA index was 1.4 events per hour.  The RDI was 23.1 events per hour.    Snoring - was reported as mild.    Respiratory rate and pattern - was notable for normal respiratory rate and pattern.    Sustained Sleep Associated Hypoventilation - Transcutaneous carbon dioxide monitoring was not used; however significant hypoventilation was not suggested by oximetry.    Sleep Associated Hypoxemia - (Greater than 5 minutes O2 sat at or below 88%) was not present. Baseline oxygen saturation was 94.8%. Lowest oxygen saturation was 83.9%. Time spent less than or equal to 88% was 1.8 minutes. Time spent less than or equal to 89% was 3.8 minutes.     Movement Activity: negative for movement abnormalities.     Periodic Limb Activity - There were 23 PLMs during the entire study. The PLM index was 3.2 movements per hour. The PLM Arousal Index was - per hour.    REM EMG Activity - Excessive transient/sustained muscle activity was not present.    Nocturnal Behavior - Abnormal sleep related behaviors were not noted during/arising out of NREM / REM sleep.     Bruxism - None apparent.     Cardiac Summary: Paced rhythm.   The average pulse rate was 51.2 bpm. The minimum pulse rate was 47.8 bpm while the maximum pulse rate was 71.0 bpm.       Assessment:     Moderate obstructive sleep apnea and associated oxygen desaturations, Sleep apnea events were predominantly REM sleep related.      Recommendations:    CPAP therapy is the treatment of choice for moderate obstructive sleep apnea. Treatment could be empirically initiated with Auto?titrating PAP therapy with a range of 5 to 15 cmH2O. Recommend clinical follow up with sleep management team.    If CPAP is not tolerated, patient may be a candidate for dental appliance through  referral to Sleep Dentistry for the treatment of obstructive sleep apnea.    If devices are not acceptable or effective, patient may benefit from evaluation of possible surgical options. If he is interested, would recommend referral to specialized ENT-Sleep provider.    Weight management (if BMI > 30).     Diagnostic Codes:   Obstructive Sleep Apnea G47.33  Repetitive Intrusions Into Sleep F51.8     8/25/2021 [facility] Sleep Study (220.0 lbs) - AHI 21.7, RDI 23.1, Supine AHI 21.7, REM AHI 52.4, Low O2 83.9%, Time Spent ?88% 1.8 minutes / Time Spent ?89% 3.8 minutes.        _____________________________________   Electronically Signed By: Winston Jeter MD 08/29/2021                  Oral Appliance  What is oral appliance therapy?  An oral appliance device fits on your teeth at night like a retainer used after having braces. The device is made by a specialized dentist and requires several visits over 1-2 months before a manufactured device is made to fit your teeth and is adjusted to prevent your sleep apnea. Once an oral device is working properly, snoring should be improved. A home sleep test may be recommended at that time if to determine whether the sleep apnea is adequately treated.       Some things to remember:  -Oral devices are often, but not always, covered by your medical insurance. Be sure to check with your insurance provider.   -If you are referred for oral therapy, you will be given a list of specialized dentists to consider or you may choose to visit the Web site of the American Academy of Dental Sleep Medicine  -Oral devices are less likely to work if you have severe sleep apnea or are extremely overweight.     More detailed information  An oral appliance is a small acrylic device that fits over the upper and lower teeth  (similar to a retainer or a mouth guard). This device slightly moves jaw forward, which moves the base of the tongue forward, opens the airway, improves breathing for  effective treat snoring and obstructive sleep apnea in perhaps 7 out of 10 people .  The best working devices are custom-made by a dental device  after a mold is made of the teeth 1, 2, 3.  When is an oral appliance indicated?  Oral appliance therapy is recommended as a first-line treatment for patients with primary snoring, mild sleep apnea, and for patients with moderate sleep apnea who prefer appliance therapy to use of CPAP4, 5. Severity of sleep apnea is determined by sleep testing and is based on the number of respiratory events per hour of sleep.   How successful is oral appliance therapy?  The success rate of oral appliance therapy in patients with mild sleep apnea is 75-80% while in patients with moderate sleep apnea it is 50-70%. The chance of success in patients with severe sleep apnea is 40-50%. The research also shows that oral appliances have a beneficial effect on the cardiovascular health of ZULEIKA patients at the same magnitude as CPAP therapy7.  Oral appliances should be a second-line treatment in cases of severe sleep apnea, but if not completely successful then a combination therapy utilizing CPAP plus oral appliance therapy may be effective. Oral appliances tend to be effective in a broad range of patients although studies show that the patients who have the highest success are females, younger patients, those with milder disease, and less severe obesity. 3, 6.   Finding a dentist that practices dental sleep medicine  Specific training is available through the American Academy of Dental Sleep Medicine for dentists interested in working in the field of sleep. To find a dentist who is educated in the field of sleep and the use of oral appliances, near you, visit the Web site of the American Academy of Dental Sleep Medicine.    References  1. Solange et al. Objectively measured vs self-reported compliance during oral appliance therapy for sleep-disordered breathing. Chest 2013; 144(5):  1247-8421.  2. Gretchen et al. Objective measurement of compliance during oral appliance therapy for sleep-disordered breathing. Thorax 2013; 68(1): 91-96.  3. Himanshu et al. Mandibular advancement devices in 620 men and women with ZULEIKA and snoring: tolerability and predictors of treatment success. Chest 2004; 125: 7798-1445.  4. Hasmukh, et al. Oral appliances for snoring and ZULEIKA: a review. Sleep 2006; 29: 244-262.  5. Allan et al. Oral appliance treatment for ZULEIKA: an update. J Clin Sleep Med 2014; 10(2): 215-227.  6. Tiffanie et al. Predictors of OSAH treatment outcome. J Dent Res 2007; 86: 3241-0107.    Your BMI is There is no height or weight on file to calculate BMI.  Weight management is a personal decision.  If you are interested in exploring weight loss strategies, the following discussion covers the approaches that may be successful. Body mass index (BMI) is one way to tell whether you are at a healthy weight, overweight, or obese. It measures your weight in relation to your height.  A BMI of 18.5 to 24.9 is in the healthy range. A person with a BMI of 25 to 29.9 is considered overweight, and someone with a BMI of 30 or greater is considered obese. More than two-thirds of American adults are considered overweight or obese.  Being overweight or obese increases the risk for further weight gain. Excess weight may lead to heart disease and diabetes.  Creating and following plans for healthy eating and physical activity may help you improve your health.  Weight control is part of healthy lifestyle and includes exercise, emotional health, and healthy eating habits. Careful eating habits lifelong are the mainstay of weight control. Though there are significant health benefits from weight loss, long-term weight loss with diet alone may be very difficult to achieve- studies show long-term success with dietary management in less than 10% of people. Attaining a healthy weight may be especially difficult to  achieve in those with severe obesity. In some cases, medications, devices and surgical management might be considered.  What can you do?  If you are overweight or obese and are interested in methods for weight loss, you should discuss this with your provider.     Consider reducing daily calorie intake by 500 calories.     Keep a food journal.     Avoiding skipping meals, consider cutting portions instead.    Diet combined with exercise helps maintain muscle while optimizing fat loss. Strength training is particularly important for building and maintaining muscle mass. Exercise helps reduce stress, increase energy, and improves fitness. Increasing exercise without diet control, however, may not burn enough calories to loose weight.       Start walking three days a week 10-20 minutes at a time    Work towards walking thirty minutes five days a week     Eventually, increase the speed of your walking for 1-2 minutes at time    In addition, we recommend that you review healthy lifestyles and methods for weight loss available through the National Institutes of Health patient information sites:  http://win.niddk.nih.gov/publications/index.htm    And look into health and wellness programs that may be available through your health insurance provider, employer, local community center, or sandra club.    Weight management plan: Patient was referred to their PCP to discuss a diet and exercise plan.  Drive Safe... Drive Alive     Sleep health profoundly affects your health, mood, and your safety.  Thirty three percent of the population (one in three of us) is not getting enough sleep and many have a sleep disorder. Not getting enough sleep or having an untreated / undertreated sleep condition may make us sleepy without even knowing it. In fact, our driving could be dramatically impaired due to our sleep health. As your provider, here are some things I would like you to know about driving:     Here are some warning signs for  impairment and dangerous drowsy driving:              -Having been awake more than 16 hours               -Looking tired               -Eyelid drooping              -Head nodding (it could be too late at this point)              -Driving for more than 30 minutes     Some things you could do to make the driving safer if you are experiencing some drowsiness:              -Stop driving and rest              -Call for transportation              -Make sure your sleep disorder is adequately treated     Some things that have been shown NOT to work when experiencing drowsiness while driving:              -Turning on the radio              -Opening windows              -Eating any  distracting  /  entertaining  foods (e.g., sunflower seeds, candy, or any other)              -Talking on the phone      Your decision may not only impact your life, but also the life of others. Please, remember to drive safe for yourself and all of us.

## 2021-09-09 NOTE — PROGRESS NOTES
Dental referral and sleep study report have been mailed to patients home address today.     AVS has been mailed to patients home address September 9, 2021      Aisha COLON CMA Sleep Medicine

## 2021-10-04 ENCOUNTER — TRANSFERRED RECORDS (OUTPATIENT)
Dept: HEALTH INFORMATION MANAGEMENT | Facility: CLINIC | Age: 61
End: 2021-10-04

## 2021-10-17 ENCOUNTER — HEALTH MAINTENANCE LETTER (OUTPATIENT)
Age: 61
End: 2021-10-17

## 2021-10-24 DIAGNOSIS — Z98.84 GASTRIC BYPASS STATUS FOR OBESITY: ICD-10-CM

## 2021-10-25 RX ORDER — FERROUS SULFATE 325(65) MG
TABLET ORAL
Qty: 90 TABLET | Refills: 0 | Status: SHIPPED | OUTPATIENT
Start: 2021-10-25 | End: 2022-01-27

## 2021-11-05 ENCOUNTER — NURSE TRIAGE (OUTPATIENT)
Dept: INTERNAL MEDICINE | Facility: CLINIC | Age: 61
End: 2021-11-05

## 2021-11-05 ENCOUNTER — LAB (OUTPATIENT)
Dept: LAB | Facility: CLINIC | Age: 61
End: 2021-11-05
Payer: COMMERCIAL

## 2021-11-05 ENCOUNTER — OFFICE VISIT (OUTPATIENT)
Dept: INTERNAL MEDICINE | Facility: CLINIC | Age: 61
End: 2021-11-05
Payer: COMMERCIAL

## 2021-11-05 ENCOUNTER — ALLIED HEALTH/NURSE VISIT (OUTPATIENT)
Dept: INTERNAL MEDICINE | Facility: CLINIC | Age: 61
End: 2021-11-05
Payer: COMMERCIAL

## 2021-11-05 VITALS
WEIGHT: 223 LBS | DIASTOLIC BLOOD PRESSURE: 72 MMHG | BODY MASS INDEX: 35.72 KG/M2 | TEMPERATURE: 97.6 F | RESPIRATION RATE: 18 BRPM | SYSTOLIC BLOOD PRESSURE: 138 MMHG | HEART RATE: 70 BPM | OXYGEN SATURATION: 98 %

## 2021-11-05 VITALS — SYSTOLIC BLOOD PRESSURE: 140 MMHG | DIASTOLIC BLOOD PRESSURE: 80 MMHG

## 2021-11-05 DIAGNOSIS — E55.9 VITAMIN D DEFICIENCY: ICD-10-CM

## 2021-11-05 DIAGNOSIS — I10 HTN, GOAL BELOW 140/80: Primary | ICD-10-CM

## 2021-11-05 DIAGNOSIS — Z98.84 GASTRIC BYPASS STATUS FOR OBESITY: ICD-10-CM

## 2021-11-05 DIAGNOSIS — I10 HTN, GOAL BELOW 140/80: ICD-10-CM

## 2021-11-05 DIAGNOSIS — M54.6 ACUTE RIGHT-SIDED THORACIC BACK PAIN: Primary | ICD-10-CM

## 2021-11-05 LAB
ALBUMIN SERPL-MCNC: 3.6 G/DL (ref 3.4–5)
ALP SERPL-CCNC: 77 U/L (ref 40–150)
ALT SERPL W P-5'-P-CCNC: 48 U/L (ref 0–70)
ANION GAP SERPL CALCULATED.3IONS-SCNC: 6 MMOL/L (ref 3–14)
AST SERPL W P-5'-P-CCNC: 23 U/L (ref 0–45)
BILIRUB SERPL-MCNC: 1.5 MG/DL (ref 0.2–1.3)
BUN SERPL-MCNC: 11 MG/DL (ref 7–30)
CALCIUM SERPL-MCNC: 8.7 MG/DL (ref 8.5–10.1)
CHLORIDE BLD-SCNC: 103 MMOL/L (ref 94–109)
CO2 SERPL-SCNC: 27 MMOL/L (ref 20–32)
CREAT SERPL-MCNC: 0.72 MG/DL (ref 0.66–1.25)
DEPRECATED CALCIDIOL+CALCIFEROL SERPL-MC: 31 UG/L (ref 20–75)
GFR SERPL CREATININE-BSD FRML MDRD: >90 ML/MIN/1.73M2
GLUCOSE BLD-MCNC: 134 MG/DL (ref 70–99)
HBA1C MFR BLD: 7.4 % (ref 0–5.6)
POTASSIUM BLD-SCNC: 4.3 MMOL/L (ref 3.4–5.3)
PROT SERPL-MCNC: 7 G/DL (ref 6.8–8.8)
SODIUM SERPL-SCNC: 136 MMOL/L (ref 133–144)

## 2021-11-05 PROCEDURE — 82306 VITAMIN D 25 HYDROXY: CPT

## 2021-11-05 PROCEDURE — 99207 PR NO CHARGE NURSE ONLY: CPT

## 2021-11-05 PROCEDURE — 99213 OFFICE O/P EST LOW 20 MIN: CPT | Performed by: INTERNAL MEDICINE

## 2021-11-05 PROCEDURE — 80053 COMPREHEN METABOLIC PANEL: CPT

## 2021-11-05 PROCEDURE — 83036 HEMOGLOBIN GLYCOSYLATED A1C: CPT

## 2021-11-05 PROCEDURE — 36415 COLL VENOUS BLD VENIPUNCTURE: CPT

## 2021-11-05 RX ORDER — CYCLOBENZAPRINE HCL 5 MG
5 TABLET ORAL 3 TIMES DAILY PRN
Qty: 30 TABLET | Refills: 1 | Status: SHIPPED | OUTPATIENT
Start: 2021-11-05 | End: 2022-01-31

## 2021-11-05 RX ORDER — PREDNISONE 20 MG/1
TABLET ORAL
Qty: 12 TABLET | Refills: 0 | Status: SHIPPED | OUTPATIENT
Start: 2021-11-05 | End: 2021-11-13

## 2021-11-05 NOTE — TELEPHONE ENCOUNTER
Walking sometimes makes it worse.   BP in clinic today is 140/80.   Headache earlier this week causing pain into his ear, pain is gone today.     Nurse Triage SBAR    Is this a 2nd Level Triage? YES, LICENSED PRACTITIONER REVIEW IS REQUIRED    Situation: back pain, elevated BP at home    Background: BP at home has been elevated, today it was 185/100. Back pain started a few weeks ago, he is unsure if it is related to his heart or not.    Assessment: BP check in Clinic today is 140/80, took his BP medications about 90 minutes ago. He did have a headache earlier this week that was radiating to his left ear, but this is gone today. He does have history of heart attack, had stent placed and told there was another artery 60% occluded at that time.     Middle right back pain started after doing a lot of twisting at work 2 weeks ago. Walking sometimes makes pain worse. Pain is mild and does not radiate, no numbness or tingling present. Has not tried any treatment, but did not notice any difference after taking Tylenol for headache.    (See information below for more triage details.)    Recommendation: Schedule an appointment, will discuss with PCP.    Protocol Recommended Disposition: Urgent office follow-up appointment      Discussed with Dr. Jose, she is not able to see patient today for appointment, if unable to find open appointment with another provider, he can go to Urgent Care for evaluation.     Nurse Triage Follow Up:   Informed of providers recommendations.  Patient verbalized understanding and agrees with the plan. No appointments available in [place], scheduled at [facility] this afternoon.     Appointments in Next Year    Nov 05, 2021  7:30 AM  LAB with [facility] LAB  [facility] ([facility] ) [phone]   Nov 05, 2021  8:45 AM  Nurse Only with AMEENA CROCKER MA/LPN  [facility] ([facility] )  [phone]   Nov 05, 2021  1:40 PM  SHORT with Marcia Ayers MD  [facility] ([facility] ) [phone]   Nov 12, 2021  2:30 PM  PHYSICAL with Candis Altamirano MD  [facility] ([facility] ) [phone]   Dec 13, 2021 12:00 AM  CARDIAC DEVICE CHECK - REMOTE with PEREZ TECH1  [facility] ([facility] ) [phone]         Franca Mukherjee RN  [facility]     Reason for Disposition    Age > 50 and no history of prior similar back pain    Additional Information    Negative: Passed out (i.e., fainted, collapsed and was not responding)    Negative: Shock suspected (e.g., cold/pale/clammy skin, too weak to stand, low BP, rapid pulse)    Negative: Sounds like a life-threatening emergency to the triager    Negative: Major injury to the back (e.g., MVA, fall > 10 feet or 3 meters, penetrating injury, etc.)    Negative: Pain in the upper back over the ribs (rib cage) that radiates (travels) into the chest    Negative: Pain in the upper back over the ribs (rib cage) and worsened by coughing (or clearly increases with breathing)    Negative: SEVERE back pain of sudden onset and age > 60    Negative: SEVERE abdominal pain (e.g., excruciating)    Negative: Abdominal pain and age > 60    Negative: Unable to urinate (or only a few drops) and bladder feels very full    Negative: Loss of bladder or bowel control (urine or bowel incontinence; wetting self, leaking stool) of new onset    Negative: Numbness (loss of sensation) in groin or rectal area    Negative: Pain radiates into groin, scrotum    Negative: Blood in urine (red, pink, or tea-colored)    Negative: Vomiting and pain over lower ribs of back (i.e., flank - kidney area)    Negative: Weakness of a leg or foot (e.g., unable to bear weight, dragging foot)     "Negative: Patient sounds very sick or weak to the triager    Negative: SEVERE back pain (e.g., excruciating, unable to do any normal activities) and not improved after pain medicine and CARE ADVICE    Negative: Numbness in an arm or hand (i.e., loss of sensation) and upper back pain    Negative: Numbness in a leg or foot (i.e., loss of sensation)    Negative: High-risk adult (e.g., history of cancer, history of HIV, or history of IV drug abuse)    Negative: Painful rash with multiple small blisters grouped together (i.e., dermatomal distribution or 'band' or 'stripe')    Negative: Pain radiates into the thigh or further down the leg, and in both legs    Answer Assessment - Initial Assessment Questions  1. ONSET: \"When did the pain begin?\"       2 weeks ago  2. LOCATION: \"Where does it hurt?\" (upper, mid or lower back)      Middle right back  3. SEVERITY: \"How bad is the pain?\"  (e.g., Scale 1-10; mild, moderate, or severe)    - MILD (1-3): doesn't interfere with normal activities     - MODERATE (4-7): interferes with normal activities or awakens from sleep     - SEVERE (8-10): excruciating pain, unable to do any normal activities       Mild - 1-2  4. PATTERN: \"Is the pain constant?\" (e.g., yes, no; constant, intermittent)       constant  5. RADIATION: \"Does the pain shoot into your legs or elsewhere?\"      no  6. CAUSE:  \"What do you think is causing the back pain?\"       Started after work-spent several hours in a twisted position  7. BACK OVERUSE:  \"Any recent lifting of heavy objects, strenuous work or exercise?\"      Yes - work  8. MEDICATIONS: \"What have you taken so far for the pain?\" (e.g., nothing, acetaminophen, NSAIDS)      No, took Tylenol for headache, but no change in back pain  9. NEUROLOGIC SYMPTOMS: \"Do you have any weakness, numbness, or problems with bowel/bladder control?\"      no  10. OTHER SYMPTOMS: \"Do you have any other symptoms?\" (e.g., fever, abdominal pain, burning with urination, blood in " "urine)        Headache yesterday, gone today  11. PREGNANCY: \"Is there any chance you are pregnant?\" (e.g., yes, no; LMP)        n/a    Protocols used: BACK PAIN-A-OH      "

## 2021-11-05 NOTE — PATIENT INSTRUCTIONS
Prednisone - two tabs (taken together) once daily x 4 days then one tab daily x 4 days.    Flexeril 3x/day as needed for muscle tightness/spasm.    Use heating pad for additional pain relief.

## 2021-11-05 NOTE — NURSING NOTE
Here for B/P check 140/80 Patient states having right mid upper back pain and has had 2 previous cardiac events and concerned --  Spoke with triage and they will assess Patient .      SALIMA Cunningham LPN

## 2021-11-05 NOTE — LETTER
11/05/21      Sumit Park  1960  [address]  [place] [address]        To whom it may concern,    Please excuse Mr. Park from work today. He will be needing time to rest and recuperate from an illness that I am seeing him for. He may return to work on Monday (November 8th) without restrictions.    Please contact me with any question or concerns.        Marcia Ayers MD   [address] [address]  T: [phone], F: [phone]

## 2021-11-05 NOTE — PROGRESS NOTES
ASSESSMENT/PLAN                                                      (M54.6) Acute right-sided thoracic back pain  (primary encounter diagnosis)  Comment: suspect muscle strain/spasm.  Plan: oral steroid taper and Flexeril prescribed; patient encouraged to use heat application as needed for additional pain relief; patient should anticipate elevated blood sugars while on prednisone; if symptoms worsen, change, or do not improve, patient to contact MD.      Marcia Ayers MD   [address] [address]  T: [phone], F: [phone]    SUBJECTIVE                                                      Sumit Park is a very pleasant 61 year old male who presents with back pain:    Started ~2 weeks ago after performing a repetitive and prolonged job that required specific upper body and right upper extremity positioning. Patient has been having focal pain just beneath his scapula ever since. No improvement with Tylenol. Avoids NSAIDs due to history of gastric bypass.  Some improvement with warm showers.    PMH significant for NIDDM.    OBJECTIVE                                                      /72 (BP Location: Left arm, Patient Position: Chair, Cuff Size: Adult Large)   Pulse 70   Temp 97.6  F (36.4  C) (Temporal)   Resp 18   Wt 101.2 kg (223 lb)   SpO2 98%   BMI 35.72 kg/m    Constitutional: well-appearing  Thoracic area: no deformity, redness, swelling, or skin changes; focal tenderness to palpation inferior to scapula    ---    (Note documentation was completed, in part, with [employer] voice-recognition software. Documentation was reviewed, but some grammatical, spelling, and word errors may remain.)

## 2021-11-12 ENCOUNTER — OFFICE VISIT (OUTPATIENT)
Dept: INTERNAL MEDICINE | Facility: CLINIC | Age: 61
End: 2021-11-12
Payer: COMMERCIAL

## 2021-11-12 VITALS
RESPIRATION RATE: 18 BRPM | OXYGEN SATURATION: 98 % | DIASTOLIC BLOOD PRESSURE: 76 MMHG | HEIGHT: 66 IN | SYSTOLIC BLOOD PRESSURE: 138 MMHG | HEART RATE: 92 BPM | BODY MASS INDEX: 35.41 KG/M2 | TEMPERATURE: 97 F | WEIGHT: 220.3 LBS

## 2021-11-12 DIAGNOSIS — I10 HTN, GOAL BELOW 140/80: ICD-10-CM

## 2021-11-12 DIAGNOSIS — I44.2 COMPLETE HEART BLOCK (H): ICD-10-CM

## 2021-11-12 DIAGNOSIS — E11.9 TYPE 2 DIABETES MELLITUS WITHOUT COMPLICATION, WITHOUT LONG-TERM CURRENT USE OF INSULIN (H): ICD-10-CM

## 2021-11-12 DIAGNOSIS — I25.10 CORONARY ARTERY DISEASE INVOLVING NATIVE HEART WITHOUT ANGINA PECTORIS, UNSPECIFIED VESSEL OR LESION TYPE: ICD-10-CM

## 2021-11-12 DIAGNOSIS — Z00.00 ROUTINE GENERAL MEDICAL EXAMINATION AT A HEALTH CARE FACILITY: Primary | ICD-10-CM

## 2021-11-12 DIAGNOSIS — Z98.84 GASTRIC BYPASS STATUS FOR OBESITY: ICD-10-CM

## 2021-11-12 PROCEDURE — 99396 PREV VISIT EST AGE 40-64: CPT | Performed by: INTERNAL MEDICINE

## 2021-11-12 PROCEDURE — 99214 OFFICE O/P EST MOD 30 MIN: CPT | Mod: 25 | Performed by: INTERNAL MEDICINE

## 2021-11-12 RX ORDER — LISINOPRIL 20 MG/1
20 TABLET ORAL 2 TIMES DAILY
Qty: 180 TABLET | Refills: 1 | Status: SHIPPED | OUTPATIENT
Start: 2021-11-12 | End: 2022-02-18

## 2021-11-12 ASSESSMENT — ENCOUNTER SYMPTOMS
PARESTHESIAS: 0
SHORTNESS OF BREATH: 0
NAUSEA: 0
NERVOUS/ANXIOUS: 0
DIARRHEA: 0
PALPITATIONS: 0
ARTHRALGIAS: 0
CONSTIPATION: 0
HEMATOCHEZIA: 0
HEMATURIA: 0
DYSURIA: 0
DIZZINESS: 0
FEVER: 0
HEADACHES: 0
ABDOMINAL PAIN: 0
FREQUENCY: 0
EYE PAIN: 0
MYALGIAS: 1
SORE THROAT: 0
CHILLS: 0
JOINT SWELLING: 0
HEARTBURN: 0
COUGH: 0
WEAKNESS: 0

## 2021-11-12 ASSESSMENT — MIFFLIN-ST. JEOR: SCORE: 1750.99

## 2021-11-12 NOTE — PROGRESS NOTES
Dr Jose's note    Patient's instructions / PLAN:                                                        Plan:  1. Increase Lisinopril to 20 mg twice a day.   2. Increase Metformin to 1000 mg twice a day  3. Avoid candies   4. Please make a lab appointment for non fasting labs  In 3 months  5. Please make an appointment few days after the labs to discuss about the results.       ASSESSMENT & PLAN:                                                      (Z00.00) Routine general medical examination at a health care facility  (primary encounter diagnosis)  Comment:   Plan:     (I10) HTN, goal below 140/80  Comment: Not controlled   Plan: lisinopril (ZESTRIL) 20 MG tablet            (E11.9) DM 2 no insulin (H)  Comment: Not controlled   Plan: Hemoglobin A1c, metFORMIN (GLUCOPHAGE) 500 MG         tablet        as above     (I25.10) CAD, 2 stents 1999, 2018  Comment: stable   Plan: Continue same meds, same doses for now     (I44.2) Complete heart block (H) -- s/p PPM 2018  Comment:   Plan:     (Z98.84) Gastric bypass status for obesity - 2015  Comment:   Plan: obs        Chief Complaint:                                                      Annual exam  Follow up chronic medical problems      SUBJECTIVE:                                                    History of present illness     We reviewed the chronic medical problems as above.   I reviewed the recent tests results in Epic     HTN:   -- home BP 140s/80s     DM  -- a1c -- little higher. He admits he had a lot of candies recently       LOV:    Plan:  1. Increase Lisinopril to 20 mg daily  2. Increase Metformin to 500 mg 3 times a day.   3. Lamisil cream daily to both feet   4. Continue the other meds, same doses for now.  5. Please make a lab appointment for non fasting labs after Nov 11   6. Please make an appointment few days later for ANNUAL EXAM    ROS:     See below        PMHx: - reviewed  Past Medical History:   Diagnosis Date     CAD, 2 stents 1999, 2018  5/10/2021     Cardiac pacemaker in situ 5/10/2021     Celiac disease 5/10/2021     Complete heart block (H) -- s/p PPM 2018 5/10/2021     DM 2, no insulin (H) 5/10/2021     Gastric bypass status for obesity - 2015 5/10/2021     HTN, goal below 140/80 5/10/2021     Hyperlipidemia LDL goal <100 5/10/2021     ZULEIKA (obstructive sleep apnea) 5/10/2021         PSHx: reviewed  Past Surgical History:   Procedure Laterality Date     ANGIOPLASTY       COLONOSCOPY N/A 1/13/2015    Procedure: COLONOSCOPY;  Surgeon: Juni Craig MD;  Location: [facility] OR;  Service:      DISTAL BICEPS TENDON REPAIR Left      ESOPHAGOSCOPY, GASTROSCOPY, DUODENOSCOPY (EGD), COMBINED N/A 1/13/2015    Procedure: UPPER ENDOSCOPY;  Surgeon: Juni Craig MD;  Location: [facility];  Service:      HERNIORRHAPHY INCISIONAL (LOCATION)      incarcerated     LAPAROSCOPIC GASTRIC BYPASS N/A 2015     LEG SURGERY Right     muscle surgery     OTHER SURGICAL HISTORY      forearm / wrist surgery     REPAIR TENDON BICEPS DISTAL UPPER EXTREMITY       UMBILICAL HERNIA REPAIR N/A      WRIST SURGERY Right         Soc Hx: No daily alcohol, no smoking  Social History     Socioeconomic History     Marital status:      Spouse name: Not on file     Number of children: Not on file     Years of education: Not on file     Highest education level: Not on file   Occupational History     Not on file   Tobacco Use     Smoking status: Never Smoker     Smokeless tobacco: Never Used   Vaping Use     Vaping Use: Never used   Substance and Sexual Activity     Alcohol use: Never     Drug use: Never     Sexual activity: Yes     Partners: Female   Other Topics Concern     Not on file   Social History Narrative     Not on file     Social Determinants of Health     Financial Resource Strain: Not on file   Food Insecurity: Not on file   Transportation Needs: Not on file   Physical Activity: Not on file   Stress: Not on file   Social Connections: Not on file    Intimate Partner Violence: Not on file   Housing Stability: Not on file        Fam Hx: reviewed  Family History   Problem Relation Age of Onset     Breast Cancer Mother 58     Heart Disease Father 71     Lymphoma Brother          Screening: reviewed    All: reviewed    Meds: reviewed  Current Outpatient Medications   Medication Sig Dispense Refill     alcohol swab prep pads Use to swab area of injection/joe as directed. 100 each 1     aspirin (ASA) 81 MG EC tablet TAKE ONE TABLET BY MOUTH EVERY DAY FOR HEART DISEASE  **DO NOT CHEW** FOR HEART DISEASE  **DO NOT CHEW** 100 tablet 0     atorvastatin (LIPITOR) 20 MG tablet Take 2 tablets (40 mg) by mouth daily 90 tablet 0     blood glucose (NO BRAND SPECIFIED) test strip Use to test blood sugar 1 times daily or as directed. 100 strip 1     blood glucose calibration (NO BRAND SPECIFIED) solution Use to calibrate blood glucose monitor as needed as directed. 1 each 1     blood glucose monitoring (NO BRAND SPECIFIED) meter device kit Use to test blood sugar 1 times daily or as directed. 1 kit 0     cyclobenzaprine (FLEXERIL) 5 MG tablet Take 1 tablet (5 mg) by mouth 3 times daily as needed for muscle spasms 30 tablet 1     FEROSUL 325 (65 Fe) MG tablet Take 1 tablet (325 mg) by mouth daily 90 tablet 0     folic acid (FOLVITE) 1 MG tablet Take 1 tablet (1,000 mcg) by mouth daily 30 tablet 11     furosemide (LASIX) 20 MG tablet Take 1 tablet (20 mg) by mouth daily. 90 tablet 1     lisinopril (ZESTRIL) 10 MG tablet Take 1 tablet (10 mg) by mouth daily 90 tablet 1     lisinopril (ZESTRIL) 20 MG tablet Take 1 tablet (20 mg) by mouth daily 90 tablet 0     metFORMIN (GLUCOPHAGE) 500 MG tablet Take 1 tablet (500 mg) by mouth 3 times daily (with meals) 270 tablet 0     metoprolol succinate ER (TOPROL-XL) 25 MG 24 hr tablet Take 0.5 tablets (12.5 mg) by mouth daily 45 tablet 3     nitroGLYcerin (NITROSTAT) 0.4 MG sublingual tablet For chest pain place 1 tablet under the tongue  "every 5 minutes for 3 doses. If symptoms persist 5 minutes after 1st dose call 911. 10 tablet 3     predniSONE (DELTASONE) 20 MG tablet Take 2 tablets (40 mg) by mouth daily for 4 days, THEN 1 tablet (20 mg) daily for 4 days. 12 tablet 0     thin (NO BRAND SPECIFIED) lancets Use with lanceting device. 100 each 1           OBJECTIVE:                                                    Physical Exam :     Blood pressure 138/76, pulse 92, temperature 97  F (36.1  C), temperature source Tympanic, resp. rate 18, height 1.683 m (5' 6.25\"), weight 99.9 kg (220 lb 4.8 oz), SpO2 98 %.       NAD, appears comfortable  Skin clear, no rashes  Neck: supple, no JVD,  no thyroidmegaly  Lymph nodes non palpable in the cervical, supraclavicular axillaries,   Chest: clear to auscultation with good respiratory effort  Cardiac: S1S2, RRR, no mgr appreciated  Abdomen: soft, not tender, not distended, audible bowel sound, no hepatosplenomegaly, no palpable masses, no abdominal bruits  Extremities: no cyanosis, clubbing or edema.   Neuro: A, Ox3, no focal signs.    Candis Jose MD  Internal Medicine      SUBJECTIVE:   CC: Sumit Park is an 61 year old male who presents for preventative health visit.       Patient has been advised of split billing requirements and indicates understanding: Yes  Healthy Habits:     Getting at least 3 servings of Calcium per day:  Yes    Bi-annual eye exam:  Yes    Dental care twice a year:  Yes    Sleep apnea or symptoms of sleep apnea:  Sleep apnea    Diet:  Diabetic and Gluten-free/reduced    Frequency of exercise:  None    Taking medications regularly:  Yes    Medication side effects:  None    PHQ-2 Total Score: 0    Additional concerns today:  No          Diabetes Follow-up    How often are you checking your blood sugar? A few times a week  What time of day are you checking your blood sugars (select all that apply)?  Before meals  Have you had any blood sugars above 200?  No  Have you had any blood " sugars below 70?  No    What symptoms do you notice when your blood sugar is low?  None    What concerns do you have today about your diabetes? None     Do you have any of these symptoms? (Select all that apply)  No numbness or tingling in feet.  No redness, sores or blisters on feet.  No complaints of excessive thirst.  No reports of blurry vision.  No significant changes to weight.    Have you had a diabetic eye exam in the last 12 months? No                Hyperlipidemia Follow-Up      Are you regularly taking any medication or supplement to lower your cholesterol?   Yes- atorvastatin    Are you having muscle aches or other side effects that you think could be caused by your cholesterol lowering medication?  No    Hypertension Follow-up      Do you check your blood pressure regularly outside of the clinic? Yes     Are you following a low salt diet? No    Are your blood pressures ever more than 140 on the top number (systolic) OR more   than 90 on the bottom number (diastolic), for example 140/90? Yes    BP Readings from Last 2 Encounters:   11/05/21 138/72   11/05/21 (!) 140/80     Hemoglobin A1C (%)   Date Value   11/05/2021 7.4 (H)   08/12/2021 7.2 (H)   05/10/2021 7.8 (H)     LDL Cholesterol Calculated (mg/dL)   Date Value   08/12/2021 66   05/10/2021 92         Today's PHQ-2 Score:   PHQ-2 ( 1999 Pfizer) 11/12/2021   Q1: Little interest or pleasure in doing things 0   Q2: Feeling down, depressed or hopeless 0   PHQ-2 Score 0   Q1: Little interest or pleasure in doing things Not at all   Q2: Feeling down, depressed or hopeless Not at all   PHQ-2 Score 0       Abuse: Current or Past(Physical, Sexual or Emotional)- No  Do you feel safe in your environment? Yes        Social History     Tobacco Use     Smoking status: Never Smoker     Smokeless tobacco: Never Used   Substance Use Topics     Alcohol use: Never         Alcohol Use 11/12/2021   Prescreen: >3 drinks/day or >7 drinks/week? Not Applicable       Last  "PSA:   PSA   Date Value Ref Range Status   05/10/2021 0.56 0 - 4 ug/L Final     Comment:     Assay Method:  Chemiluminescence using Siemens Vista analyzer       Reviewed orders with patient. Reviewed health maintenance and updated orders accordingly - Yes  Labs reviewed in EPIC    Reviewed and updated as needed this visit by clinical staff  Tobacco  Allergies  Meds   Med Hx  Surg Hx  Fam Hx  Soc Hx       Reviewed and updated as needed this visit by Provider                   Review of Systems   Constitutional: Negative for chills and fever.   HENT: Negative for congestion, ear pain, hearing loss and sore throat.    Eyes: Negative for pain and visual disturbance.   Respiratory: Negative for cough and shortness of breath.    Cardiovascular: Negative for chest pain, palpitations and peripheral edema.   Gastrointestinal: Negative for abdominal pain, constipation, diarrhea, heartburn, hematochezia and nausea.   Genitourinary: Negative for dysuria, frequency, genital sores, hematuria, impotence, penile discharge and urgency.   Musculoskeletal: Positive for myalgias. Negative for arthralgias and joint swelling.   Skin: Negative for rash.   Neurological: Negative for dizziness, weakness, headaches and paresthesias.   Psychiatric/Behavioral: Negative for mood changes. The patient is not nervous/anxious.            Patient has been advised of split billing requirements and indicates understanding: Yes At the check in, at the    COUNSELING:   Reviewed preventive health counseling, as reflected in patient instructions       Regular exercise       Healthy diet/nutrition    Estimated body mass index is 35.72 kg/m  as calculated from the following:    Height as of this encounter: 1.683 m (5' 6.25\").    Weight as of 11/5/21: 101.2 kg (223 lb).         He reports that he has never smoked. He has never used smokeless tobacco.      Counseling Resources:  ATP IV Guidelines  Pooled Cohorts Equation Calculator  FRAX Risk " Assessment  ICSI Preventive Guidelines  Dietary Guidelines for Americans, 2010  USDA's MyPlate  ASA Prophylaxis  Lung CA Screening    Candis Altamirano MD  [facility]

## 2021-11-12 NOTE — PATIENT INSTRUCTIONS
Plan:  1. Increase Lisinopril to 20 mg twice a day.   2. Increase Metformin to 1000 mg twice a day  3. Avoid candies   4. Please make a lab appointment for non fasting labs  In 3 months  5. Please make an appointment few days after the labs to discuss about the results.

## 2021-11-29 ENCOUNTER — TELEPHONE (OUTPATIENT)
Dept: INTERNAL MEDICINE | Facility: CLINIC | Age: 61
End: 2021-11-29

## 2021-11-29 NOTE — TELEPHONE ENCOUNTER
"Call to pt to triage. Pt has persistent cough, heaviness in chest since Sunday. Sometimes he will cough up a a little phlegm. Feels like bronchitis. He had bronchitis 5 times one year.     Symptoms Started on Sunday. 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     Has Chills for 2 days. Has been in bed. Fatigued, feels \"foggy\".   He Needs to get tested for work.   Exposed to COVID on 11/14 and 11/16 at work for sure. Unsure of other exposure.     No fevers, just chills. Has headache. Taking Tylenol.     Uses Cpap, has pacemaker. History of MIs. CAD. Diabetes.     Was on Prednisone recently for back strain.     TC will change his appt to Video Visit. He agrees to having VV and doing the Testing at [facility].     Routed to Dr Matias as MARTIN           Lab Results   Component Value Date    A1C 7.4 11/05/2021    A1C 7.2 08/12/2021    A1C 7.8 05/10/2021       "

## 2021-12-01 ENCOUNTER — LAB (OUTPATIENT)
Dept: URGENT CARE | Facility: URGENT CARE | Age: 61
End: 2021-12-01
Attending: INTERNAL MEDICINE
Payer: COMMERCIAL

## 2021-12-01 DIAGNOSIS — R05.9 COUGH: ICD-10-CM

## 2021-12-01 PROCEDURE — U0005 INFEC AGEN DETEC AMPLI PROBE: HCPCS

## 2021-12-01 PROCEDURE — U0003 INFECTIOUS AGENT DETECTION BY NUCLEIC ACID (DNA OR RNA); SEVERE ACUTE RESPIRATORY SYNDROME CORONAVIRUS 2 (SARS-COV-2) (CORONAVIRUS DISEASE [COVID-19]), AMPLIFIED PROBE TECHNIQUE, MAKING USE OF HIGH THROUGHPUT TECHNOLOGIES AS DESCRIBED BY CMS-2020-01-R: HCPCS

## 2021-12-02 LAB — SARS-COV-2 RNA RESP QL NAA+PROBE: POSITIVE

## 2021-12-15 ENCOUNTER — ANCILLARY PROCEDURE (OUTPATIENT)
Dept: CARDIOLOGY | Facility: CLINIC | Age: 61
End: 2021-12-15
Attending: INTERNAL MEDICINE
Payer: COMMERCIAL

## 2021-12-15 DIAGNOSIS — Z95.0 CARDIAC PACEMAKER IN SITU: ICD-10-CM

## 2021-12-15 LAB
MDC_IDC_EPISODE_DTM: NORMAL
MDC_IDC_EPISODE_DURATION: 15 S
MDC_IDC_EPISODE_ID: 81
MDC_IDC_EPISODE_TYPE: NORMAL
MDC_IDC_LEAD_IMPLANT_DT: NORMAL
MDC_IDC_LEAD_IMPLANT_DT: NORMAL
MDC_IDC_LEAD_LOCATION: NORMAL
MDC_IDC_LEAD_LOCATION: NORMAL
MDC_IDC_LEAD_LOCATION_DETAIL_1: NORMAL
MDC_IDC_LEAD_LOCATION_DETAIL_1: NORMAL
MDC_IDC_LEAD_MFG: NORMAL
MDC_IDC_LEAD_MFG: NORMAL
MDC_IDC_LEAD_MODEL: NORMAL
MDC_IDC_LEAD_MODEL: NORMAL
MDC_IDC_LEAD_POLARITY_TYPE: NORMAL
MDC_IDC_LEAD_POLARITY_TYPE: NORMAL
MDC_IDC_LEAD_SERIAL: NORMAL
MDC_IDC_LEAD_SERIAL: NORMAL
MDC_IDC_MSMT_BATTERY_DTM: NORMAL
MDC_IDC_MSMT_BATTERY_REMAINING_LONGEVITY: 110 MO
MDC_IDC_MSMT_BATTERY_RRT_TRIGGER: 2.62
MDC_IDC_MSMT_BATTERY_STATUS: NORMAL
MDC_IDC_MSMT_BATTERY_VOLTAGE: 3.01 V
MDC_IDC_MSMT_LEADCHNL_RA_IMPEDANCE_VALUE: 323 OHM
MDC_IDC_MSMT_LEADCHNL_RA_IMPEDANCE_VALUE: 380 OHM
MDC_IDC_MSMT_LEADCHNL_RA_PACING_THRESHOLD_AMPLITUDE: 0.38 V
MDC_IDC_MSMT_LEADCHNL_RA_PACING_THRESHOLD_PULSEWIDTH: 0.4 MS
MDC_IDC_MSMT_LEADCHNL_RA_SENSING_INTR_AMPL: 2.75 MV
MDC_IDC_MSMT_LEADCHNL_RA_SENSING_INTR_AMPL: 2.75 MV
MDC_IDC_MSMT_LEADCHNL_RV_IMPEDANCE_VALUE: 399 OHM
MDC_IDC_MSMT_LEADCHNL_RV_IMPEDANCE_VALUE: 475 OHM
MDC_IDC_MSMT_LEADCHNL_RV_PACING_THRESHOLD_AMPLITUDE: 0.5 V
MDC_IDC_MSMT_LEADCHNL_RV_PACING_THRESHOLD_PULSEWIDTH: 0.4 MS
MDC_IDC_MSMT_LEADCHNL_RV_SENSING_INTR_AMPL: 10.62 MV
MDC_IDC_MSMT_LEADCHNL_RV_SENSING_INTR_AMPL: 10.62 MV
MDC_IDC_PG_IMPLANT_DTM: NORMAL
MDC_IDC_PG_MFG: NORMAL
MDC_IDC_PG_MODEL: NORMAL
MDC_IDC_PG_SERIAL: NORMAL
MDC_IDC_PG_TYPE: NORMAL
MDC_IDC_SESS_CLINIC_NAME: NORMAL
MDC_IDC_SESS_DTM: NORMAL
MDC_IDC_SESS_TYPE: NORMAL
MDC_IDC_SET_BRADY_AT_MODE_SWITCH_RATE: 150 {BEATS}/MIN
MDC_IDC_SET_BRADY_HYSTRATE: NORMAL
MDC_IDC_SET_BRADY_LOWRATE: 50 {BEATS}/MIN
MDC_IDC_SET_BRADY_MAX_SENSOR_RATE: 130 {BEATS}/MIN
MDC_IDC_SET_BRADY_MAX_TRACKING_RATE: 130 {BEATS}/MIN
MDC_IDC_SET_BRADY_MODE: NORMAL
MDC_IDC_SET_BRADY_PAV_DELAY_LOW: 180 MS
MDC_IDC_SET_BRADY_SAV_DELAY_LOW: 150 MS
MDC_IDC_SET_LEADCHNL_RA_PACING_AMPLITUDE: 1.5 V
MDC_IDC_SET_LEADCHNL_RA_PACING_ANODE_ELECTRODE_1: NORMAL
MDC_IDC_SET_LEADCHNL_RA_PACING_ANODE_LOCATION_1: NORMAL
MDC_IDC_SET_LEADCHNL_RA_PACING_CAPTURE_MODE: NORMAL
MDC_IDC_SET_LEADCHNL_RA_PACING_CATHODE_ELECTRODE_1: NORMAL
MDC_IDC_SET_LEADCHNL_RA_PACING_CATHODE_LOCATION_1: NORMAL
MDC_IDC_SET_LEADCHNL_RA_PACING_POLARITY: NORMAL
MDC_IDC_SET_LEADCHNL_RA_PACING_PULSEWIDTH: 0.4 MS
MDC_IDC_SET_LEADCHNL_RA_SENSING_ANODE_ELECTRODE_1: NORMAL
MDC_IDC_SET_LEADCHNL_RA_SENSING_ANODE_LOCATION_1: NORMAL
MDC_IDC_SET_LEADCHNL_RA_SENSING_CATHODE_ELECTRODE_1: NORMAL
MDC_IDC_SET_LEADCHNL_RA_SENSING_CATHODE_LOCATION_1: NORMAL
MDC_IDC_SET_LEADCHNL_RA_SENSING_POLARITY: NORMAL
MDC_IDC_SET_LEADCHNL_RA_SENSING_SENSITIVITY: 0.3 MV
MDC_IDC_SET_LEADCHNL_RV_PACING_AMPLITUDE: 1.5 V
MDC_IDC_SET_LEADCHNL_RV_PACING_ANODE_ELECTRODE_1: NORMAL
MDC_IDC_SET_LEADCHNL_RV_PACING_ANODE_LOCATION_1: NORMAL
MDC_IDC_SET_LEADCHNL_RV_PACING_CAPTURE_MODE: NORMAL
MDC_IDC_SET_LEADCHNL_RV_PACING_CATHODE_ELECTRODE_1: NORMAL
MDC_IDC_SET_LEADCHNL_RV_PACING_CATHODE_LOCATION_1: NORMAL
MDC_IDC_SET_LEADCHNL_RV_PACING_POLARITY: NORMAL
MDC_IDC_SET_LEADCHNL_RV_PACING_PULSEWIDTH: 0.4 MS
MDC_IDC_SET_LEADCHNL_RV_SENSING_ANODE_ELECTRODE_1: NORMAL
MDC_IDC_SET_LEADCHNL_RV_SENSING_ANODE_LOCATION_1: NORMAL
MDC_IDC_SET_LEADCHNL_RV_SENSING_CATHODE_ELECTRODE_1: NORMAL
MDC_IDC_SET_LEADCHNL_RV_SENSING_CATHODE_LOCATION_1: NORMAL
MDC_IDC_SET_LEADCHNL_RV_SENSING_POLARITY: NORMAL
MDC_IDC_SET_LEADCHNL_RV_SENSING_SENSITIVITY: 0.9 MV
MDC_IDC_SET_ZONE_DETECTION_INTERVAL: 370 MS
MDC_IDC_SET_ZONE_DETECTION_INTERVAL: 400 MS
MDC_IDC_SET_ZONE_TYPE: NORMAL
MDC_IDC_STAT_AT_BURDEN_PERCENT: 0 %
MDC_IDC_STAT_AT_DTM_END: NORMAL
MDC_IDC_STAT_AT_DTM_START: NORMAL
MDC_IDC_STAT_BRADY_AP_VP_PERCENT: 26.41 %
MDC_IDC_STAT_BRADY_AP_VS_PERCENT: 2.05 %
MDC_IDC_STAT_BRADY_AS_VP_PERCENT: 53.34 %
MDC_IDC_STAT_BRADY_AS_VS_PERCENT: 18.21 %
MDC_IDC_STAT_BRADY_DTM_END: NORMAL
MDC_IDC_STAT_BRADY_DTM_START: NORMAL
MDC_IDC_STAT_BRADY_RA_PERCENT_PACED: 28.39 %
MDC_IDC_STAT_BRADY_RV_PERCENT_PACED: 79.74 %
MDC_IDC_STAT_EPISODE_RECENT_COUNT: 0
MDC_IDC_STAT_EPISODE_RECENT_COUNT: 1
MDC_IDC_STAT_EPISODE_RECENT_COUNT_DTM_END: NORMAL
MDC_IDC_STAT_EPISODE_RECENT_COUNT_DTM_START: NORMAL
MDC_IDC_STAT_EPISODE_TOTAL_COUNT: 0
MDC_IDC_STAT_EPISODE_TOTAL_COUNT: 0
MDC_IDC_STAT_EPISODE_TOTAL_COUNT: 2
MDC_IDC_STAT_EPISODE_TOTAL_COUNT: 2
MDC_IDC_STAT_EPISODE_TOTAL_COUNT: 77
MDC_IDC_STAT_EPISODE_TOTAL_COUNT_DTM_END: NORMAL
MDC_IDC_STAT_EPISODE_TOTAL_COUNT_DTM_START: NORMAL
MDC_IDC_STAT_EPISODE_TYPE: NORMAL

## 2021-12-15 PROCEDURE — 93296 REM INTERROG EVL PM/IDS: CPT | Performed by: INTERNAL MEDICINE

## 2021-12-15 PROCEDURE — 93294 REM INTERROG EVL PM/LDLS PM: CPT | Performed by: INTERNAL MEDICINE

## 2021-12-18 ENCOUNTER — TRANSFERRED RECORDS (OUTPATIENT)
Dept: MULTI SPECIALTY CLINIC | Facility: CLINIC | Age: 61
End: 2021-12-18
Payer: COMMERCIAL

## 2021-12-18 LAB — RETINOPATHY: NORMAL

## 2021-12-20 ENCOUNTER — MYC MEDICAL ADVICE (OUTPATIENT)
Dept: INTERNAL MEDICINE | Facility: CLINIC | Age: 61
End: 2021-12-20
Payer: COMMERCIAL

## 2021-12-23 ENCOUNTER — OFFICE VISIT (OUTPATIENT)
Dept: INTERNAL MEDICINE | Facility: CLINIC | Age: 61
End: 2021-12-23
Payer: COMMERCIAL

## 2021-12-23 VITALS
OXYGEN SATURATION: 99 % | RESPIRATION RATE: 18 BRPM | BODY MASS INDEX: 34.44 KG/M2 | SYSTOLIC BLOOD PRESSURE: 124 MMHG | DIASTOLIC BLOOD PRESSURE: 72 MMHG | TEMPERATURE: 98.4 F | HEIGHT: 66 IN | HEART RATE: 79 BPM | WEIGHT: 214.3 LBS

## 2021-12-23 DIAGNOSIS — H93.90 EAR LESION: ICD-10-CM

## 2021-12-23 DIAGNOSIS — R42 DIZZINESS: Primary | ICD-10-CM

## 2021-12-23 DIAGNOSIS — I25.10 CORONARY ARTERY DISEASE INVOLVING NATIVE HEART WITHOUT ANGINA PECTORIS, UNSPECIFIED VESSEL OR LESION TYPE: ICD-10-CM

## 2021-12-23 DIAGNOSIS — I44.2 COMPLETE HEART BLOCK (H): ICD-10-CM

## 2021-12-23 DIAGNOSIS — I10 HTN, GOAL BELOW 140/80: ICD-10-CM

## 2021-12-23 DIAGNOSIS — U07.1 INFECTION DUE TO 2019 NOVEL CORONAVIRUS: ICD-10-CM

## 2021-12-23 PROCEDURE — 99214 OFFICE O/P EST MOD 30 MIN: CPT | Performed by: INTERNAL MEDICINE

## 2021-12-23 ASSESSMENT — MIFFLIN-ST. JEOR: SCORE: 1723.78

## 2021-12-23 NOTE — PATIENT INSTRUCTIONS
Plan:  1. Continue same meds, same doses for now   2. ENT referral   [facility] [phone]  [address]

## 2021-12-23 NOTE — PROGRESS NOTES
Patient's instructions / PLAN:                                                        Plan:  1. Continue same meds, same doses for now   2. ENT referral   N: [facility] [phone]  [address] [facility]  [address] [facility]        ASSESSMENT & PLAN:                                                      (R42) Dizziness  (primary encounter diagnosis)  Comment: It might be related with the recent Covid infection.  He is not orthostatic.  No cardiac symptoms.  Plan: Since the symptoms are mild, we will not make referral for physical therapy.  I advised him to drink plenty of water    (H93.90) Ear lesion  Comment: Suspect skin cancer  Plan: Otolaryngology Referral            (I25.10) CAD, 2 stents 1999, 2018  Comment: stable   Plan: Continue same meds, same doses for now     (I44.2) Complete heart block (H) -- s/p PPM 2018  Comment:   Plan:     (I10) HTN, goal below 140/80  Comment: Controlled    Plan: Continue same meds, same doses for now     (U07.1) Infection due to 2019 novel coronavirus - Nov 2021 - He received Monoclonal antibodies  Comment: He feels much better  Plan:        Chief Complaint:                                                      dizziness  R ear     SUBJECTIVE:                                                    History of present illness     Dizziness  -- when he bended over and stood up   -- the room was not spinning  -- no palpitations, no CP    Hx of recent Covid  -- he felt much better 2 days after the monoclonal antibodies     HTN  -- /68 -- not orthostatic today    R ear lesion  -- x few years  -- it doesn't heal  --  Exam: lesion with small central ulcer    ROS:                                                      ROS: negative for fever, chills, cough, wheezes, chest pain, shortness of breath, vomiting, abdominal pain, leg swelling         OBJECTIVE:                                                    Physical Exam :      "Blood pressure 124/72, pulse 79, temperature 98.4  F (36.9  C), temperature source Oral, resp. rate 18, height 1.683 m (5' 6.25\"), weight 97.2 kg (214 lb 4.8 oz), SpO2 99 %.     NAD, appears comfortable  Skin: no rashes   Neck: supple, no JVD, No thyroidmegaly. Lymph nodes nonpalpable cervical and supraclavicular.  Chest: clear to auscultation bilaterally, good respiratory effort  Heart: S1 S2, RRR, no mgr appreciated  Abdomen: soft, not tender, no hepatosplenomegaly or masses appreciated, no abdominal bruit, present bowel sounds  Extremities: no edema,   Neurologic: A, Ox3, no focal signs appreciated.  No dizziness or nystagmus with head changing positions    PMHx: reviewed  Past Medical History:   Diagnosis Date     CAD, 2 stents 1999, 2018 5/10/2021     Cardiac pacemaker in situ 5/10/2021     Celiac disease 5/10/2021     Complete heart block (H) -- s/p PPM 2018 5/10/2021     DM 2, no insulin (H) 5/10/2021     Gastric bypass status for obesity - 2015 5/10/2021     HTN, goal below 140/80 5/10/2021     Hyperlipidemia LDL goal <100 5/10/2021     ZULEIKA (obstructive sleep apnea) 5/10/2021      PSHx: reviewed  Past Surgical History:   Procedure Laterality Date     ANGIOPLASTY       COLONOSCOPY N/A 1/13/2015    Procedure: COLONOSCOPY;  Surgeon: Juni Craig MD;  Location: [facility];  Service:      DISTAL BICEPS TENDON REPAIR Left      ESOPHAGOSCOPY, GASTROSCOPY, DUODENOSCOPY (EGD), COMBINED N/A 1/13/2015    Procedure: UPPER ENDOSCOPY;  Surgeon: Juni Craig MD;  Location: [facility];  Service:      HERNIORRHAPHY INCISIONAL (LOCATION)      incarcerated     LAPAROSCOPIC GASTRIC BYPASS N/A 2015     LEG SURGERY Right     muscle surgery     OTHER SURGICAL HISTORY      forearm / wrist surgery     REPAIR TENDON BICEPS DISTAL UPPER EXTREMITY       UMBILICAL HERNIA REPAIR N/A      WRIST SURGERY Right         Meds: reviewed  Current Outpatient Medications   Medication Sig Dispense Refill     " atorvastatin (LIPITOR) 20 MG tablet Take 2 tablets (40 mg) by mouth daily 90 tablet 0     cyclobenzaprine (FLEXERIL) 5 MG tablet Take 1 tablet (5 mg) by mouth 3 times daily as needed for muscle spasms 30 tablet 1     FEROSUL 325 (65 Fe) MG tablet Take 1 tablet (325 mg) by mouth daily 90 tablet 0     folic acid (FOLVITE) 1 MG tablet Take 1 tablet (1,000 mcg) by mouth daily 30 tablet 11     lisinopril (ZESTRIL) 20 MG tablet Take 1 tablet (20 mg) by mouth 2 times daily 180 tablet 1     metFORMIN (GLUCOPHAGE) 500 MG tablet Take 2 tablets (1,000 mg) by mouth 2 times daily (with meals) 360 tablet 1     metoprolol succinate ER (TOPROL-XL) 25 MG 24 hr tablet Take 0.5 tablets (12.5 mg) by mouth daily 45 tablet 3     nitroGLYcerin (NITROSTAT) 0.4 MG sublingual tablet For chest pain place 1 tablet under the tongue every 5 minutes for 3 doses. If symptoms persist 5 minutes after 1st dose call 911. 10 tablet 3     SM ASPIRIN ADULT LOW STRENGTH 81 MG EC tablet TAKE ONE TABLET BY MOUTH EVERY DAY FOR HEART DISEASE  **DO NOT CHEW** FOR HEART DISEASE  **DO NOT CHEW** 100 tablet 0     Vitamin D3 (CHOLECALCIFEROL) 25 mcg (1000 units) tablet Take by mouth daily       zinc gluconate 50 MG tablet Take 50 mg by mouth daily       alcohol swab prep pads Use to swab area of injection/joe as directed. (Patient not taking: Reported on 12/23/2021) 100 each 1     blood glucose (NO BRAND SPECIFIED) test strip Use to test blood sugar 1 times daily or as directed. (Patient not taking: Reported on 12/23/2021) 100 strip 1     blood glucose calibration (NO BRAND SPECIFIED) solution Use to calibrate blood glucose monitor as needed as directed. (Patient not taking: Reported on 12/23/2021) 1 each 1     blood glucose monitoring (NO BRAND SPECIFIED) meter device kit Use to test blood sugar 1 times daily or as directed. (Patient not taking: Reported on 12/23/2021) 1 kit 0     furosemide (LASIX) 20 MG tablet Take 1 tablet (20 mg) by mouth daily. (Patient  not taking: Reported on 11/29/2021) 90 tablet 1     thin (NO BRAND SPECIFIED) lancets Use with lanceting device. (Patient not taking: Reported on 12/23/2021) 100 each 1       Soc Hx: reviewed  Fam Hx: reviewed          Candis Jose MD  Internal Medicine

## 2021-12-23 NOTE — NURSING NOTE
"BP (!) 147/92 (BP Location: Right arm, Patient Position: Sitting, Cuff Size: Adult Large)   Pulse 79   Temp 98.4  F (36.9  C) (Oral)   Resp 18   Ht 1.683 m (5' 6.25\")   Wt 97.2 kg (214 lb 4.8 oz)   SpO2 99%   BMI 34.33 kg/m      "

## 2022-01-03 ENCOUNTER — TRANSFERRED RECORDS (OUTPATIENT)
Dept: HEALTH INFORMATION MANAGEMENT | Facility: CLINIC | Age: 62
End: 2022-01-03
Payer: COMMERCIAL

## 2022-01-03 PROCEDURE — 88305 TISSUE EXAM BY PATHOLOGIST: CPT | Performed by: PATHOLOGY

## 2022-01-03 PROCEDURE — 88305 TISSUE EXAM BY PATHOLOGIST: CPT | Mod: TC,ORL | Performed by: OTOLARYNGOLOGY

## 2022-01-04 ENCOUNTER — LAB REQUISITION (OUTPATIENT)
Dept: LAB | Facility: CLINIC | Age: 62
End: 2022-01-04
Payer: COMMERCIAL

## 2022-01-04 DIAGNOSIS — H93.8X1 OTHER SPECIFIED DISORDERS OF RIGHT EAR: ICD-10-CM

## 2022-01-05 LAB
PATH REPORT.COMMENTS IMP SPEC: NORMAL
PATH REPORT.COMMENTS IMP SPEC: NORMAL
PATH REPORT.FINAL DX SPEC: NORMAL
PATH REPORT.GROSS SPEC: NORMAL
PATH REPORT.MICROSCOPIC SPEC OTHER STN: NORMAL
PATH REPORT.MICROSCOPIC SPEC OTHER STN: NORMAL
PATH REPORT.RELEVANT HX SPEC: NORMAL
PHOTO IMAGE: NORMAL

## 2022-01-05 PROCEDURE — 88305 TISSUE EXAM BY PATHOLOGIST: CPT | Mod: 26 | Performed by: PATHOLOGY

## 2022-01-06 ENCOUNTER — TRANSFERRED RECORDS (OUTPATIENT)
Dept: HEALTH INFORMATION MANAGEMENT | Facility: CLINIC | Age: 62
End: 2022-01-06
Payer: COMMERCIAL

## 2022-01-10 ENCOUNTER — MYC MEDICAL ADVICE (OUTPATIENT)
Dept: INTERNAL MEDICINE | Facility: CLINIC | Age: 62
End: 2022-01-10
Payer: COMMERCIAL

## 2022-01-11 NOTE — TELEPHONE ENCOUNTER
Patient is under the impression that he is now to get a referral to a different specialist, not ENT regarding the basal cell cancer on his ear.

## 2022-01-11 NOTE — TELEPHONE ENCOUNTER
It depends on dr Dey ( ENT) assessment. He might send the patient to different doctor for surgery or he might decide to do it    We do not have the ENT report. I don't know dr Dey's assessment

## 2022-01-12 NOTE — TELEPHONE ENCOUNTER
Brett message sent with Dr. Jose's below response.     Katherine SOSA RN   [facility]

## 2022-01-26 DIAGNOSIS — I25.10 CORONARY ARTERY DISEASE INVOLVING NATIVE HEART WITHOUT ANGINA PECTORIS, UNSPECIFIED VESSEL OR LESION TYPE: ICD-10-CM

## 2022-01-26 DIAGNOSIS — Z98.84 GASTRIC BYPASS STATUS FOR OBESITY: ICD-10-CM

## 2022-01-27 ENCOUNTER — MYC MEDICAL ADVICE (OUTPATIENT)
Dept: INTERNAL MEDICINE | Facility: CLINIC | Age: 62
End: 2022-01-27
Payer: COMMERCIAL

## 2022-01-27 DIAGNOSIS — C44.212: Primary | ICD-10-CM

## 2022-01-27 RX ORDER — FERROUS SULFATE 325(65) MG
TABLET ORAL
Qty: 90 TABLET | Refills: 0 | Status: SHIPPED | OUTPATIENT
Start: 2022-01-27 | End: 2022-02-01

## 2022-01-27 RX ORDER — FUROSEMIDE 20 MG
20 TABLET ORAL DAILY
Qty: 90 TABLET | Refills: 0 | Status: SHIPPED | OUTPATIENT
Start: 2022-01-27 | End: 2022-05-02

## 2022-01-27 NOTE — TELEPHONE ENCOUNTER
ENT records have now appeared in [facility] as has the pathology report.  MONA Farrell R.N.

## 2022-01-28 DIAGNOSIS — M54.6 ACUTE RIGHT-SIDED THORACIC BACK PAIN: ICD-10-CM

## 2022-01-28 NOTE — TELEPHONE ENCOUNTER
The pathology report shows basal cell carcinoma, a skin cancer that doesn't produce metastasis.   This was only a biopsy and the cancer was not removed completely.  Most likely he will be referred to have more surgery to have it removed.  I also placed a referral in [place] to see dermato     I am not able to offer advice or comments for a different office, he needs to call ENT office

## 2022-01-31 ENCOUNTER — MYC MEDICAL ADVICE (OUTPATIENT)
Dept: INTERNAL MEDICINE | Facility: CLINIC | Age: 62
End: 2022-01-31
Payer: COMMERCIAL

## 2022-01-31 DIAGNOSIS — I25.10 CORONARY ARTERY DISEASE INVOLVING NATIVE HEART WITHOUT ANGINA PECTORIS, UNSPECIFIED VESSEL OR LESION TYPE: Primary | ICD-10-CM

## 2022-01-31 DIAGNOSIS — Z98.84 GASTRIC BYPASS STATUS FOR OBESITY: ICD-10-CM

## 2022-01-31 RX ORDER — CYCLOBENZAPRINE HCL 5 MG
5 TABLET ORAL 3 TIMES DAILY PRN
Qty: 30 TABLET | Refills: 0 | Status: SHIPPED | OUTPATIENT
Start: 2022-01-31 | End: 2022-09-16

## 2022-01-31 NOTE — TELEPHONE ENCOUNTER
Routing refill request to provider for review/approval because:  Drug not on the [facility] refill protocol   Routing to PCP    Mervin Paul RN  [facility] Triage Nurse

## 2022-02-01 ENCOUNTER — TELEPHONE (OUTPATIENT)
Dept: INTERNAL MEDICINE | Facility: CLINIC | Age: 62
End: 2022-02-01
Payer: COMMERCIAL

## 2022-02-01 DIAGNOSIS — E11.9 TYPE 2 DIABETES MELLITUS WITHOUT COMPLICATION, WITHOUT LONG-TERM CURRENT USE OF INSULIN (H): ICD-10-CM

## 2022-02-01 RX ORDER — ASPIRIN 81 MG/1
TABLET, COATED ORAL
Qty: 100 TABLET | Refills: 0 | Status: SHIPPED | OUTPATIENT
Start: 2022-02-01 | End: 2022-05-10

## 2022-02-01 RX ORDER — ATORVASTATIN CALCIUM 40 MG/1
40 TABLET, FILM COATED ORAL DAILY
Qty: 90 TABLET | Refills: 0 | Status: SHIPPED | OUTPATIENT
Start: 2022-02-01 | End: 2022-02-18

## 2022-02-01 RX ORDER — FERROUS SULFATE 325(65) MG
TABLET ORAL
Qty: 90 TABLET | Refills: 0 | Status: SHIPPED | OUTPATIENT
Start: 2022-02-01 | End: 2022-05-02

## 2022-02-01 NOTE — TELEPHONE ENCOUNTER
Patient wants Atorvastatin rx changed to 40 mg daily rather than 2 of the 20 mg tabs. MONA Farrell R.N.

## 2022-02-02 NOTE — TELEPHONE ENCOUNTER
FUTURE VISIT INFORMATION      FUTURE VISIT INFORMATION:    Date: 2.28.22    Time: 3:15    Location: [facility]  REFERRAL INFORMATION:    Referring provider:  Dr. 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 Ewing [facility]   [facility] 1.6.22, 1.3.22  Dr. Wilfred Stanton  Path # [id number] Epic na

## 2022-02-06 ENCOUNTER — HEALTH MAINTENANCE LETTER (OUTPATIENT)
Age: 62
End: 2022-02-06

## 2022-02-11 ENCOUNTER — LAB (OUTPATIENT)
Dept: LAB | Facility: CLINIC | Age: 62
End: 2022-02-11
Payer: COMMERCIAL

## 2022-02-11 DIAGNOSIS — E11.9 TYPE 2 DIABETES MELLITUS WITHOUT COMPLICATION, WITHOUT LONG-TERM CURRENT USE OF INSULIN (H): ICD-10-CM

## 2022-02-11 LAB — HBA1C MFR BLD: 6.7 % (ref 0–5.6)

## 2022-02-11 PROCEDURE — 36415 COLL VENOUS BLD VENIPUNCTURE: CPT

## 2022-02-11 PROCEDURE — 83036 HEMOGLOBIN GLYCOSYLATED A1C: CPT

## 2022-02-18 ENCOUNTER — OFFICE VISIT (OUTPATIENT)
Dept: INTERNAL MEDICINE | Facility: CLINIC | Age: 62
End: 2022-02-18
Payer: COMMERCIAL

## 2022-02-18 VITALS
DIASTOLIC BLOOD PRESSURE: 81 MMHG | RESPIRATION RATE: 18 BRPM | TEMPERATURE: 98.6 F | SYSTOLIC BLOOD PRESSURE: 135 MMHG | HEIGHT: 66 IN | BODY MASS INDEX: 34.28 KG/M2 | WEIGHT: 213.3 LBS | OXYGEN SATURATION: 99 % | HEART RATE: 76 BPM

## 2022-02-18 DIAGNOSIS — I44.2 COMPLETE HEART BLOCK (H): ICD-10-CM

## 2022-02-18 DIAGNOSIS — I25.10 CORONARY ARTERY DISEASE INVOLVING NATIVE HEART WITHOUT ANGINA PECTORIS, UNSPECIFIED VESSEL OR LESION TYPE: ICD-10-CM

## 2022-02-18 DIAGNOSIS — Z11.59 ENCOUNTER FOR HEPATITIS C SCREENING TEST FOR LOW RISK PATIENT: ICD-10-CM

## 2022-02-18 DIAGNOSIS — E11.9 TYPE 2 DIABETES MELLITUS WITHOUT COMPLICATION, WITHOUT LONG-TERM CURRENT USE OF INSULIN (H): ICD-10-CM

## 2022-02-18 DIAGNOSIS — K90.0 CELIAC DISEASE: ICD-10-CM

## 2022-02-18 DIAGNOSIS — I10 HTN, GOAL BELOW 140/80: ICD-10-CM

## 2022-02-18 DIAGNOSIS — I25.10 CORONARY ARTERY DISEASE INVOLVING NATIVE HEART WITHOUT ANGINA PECTORIS, UNSPECIFIED VESSEL OR LESION TYPE: Primary | ICD-10-CM

## 2022-02-18 DIAGNOSIS — Z98.84 GASTRIC BYPASS STATUS FOR OBESITY: ICD-10-CM

## 2022-02-18 PROBLEM — R17 JAUNDICE: Status: ACTIVE | Noted: 2019-01-25

## 2022-02-18 PROBLEM — R19.7 DIARRHEA: Status: ACTIVE | Noted: 2022-02-18

## 2022-02-18 PROBLEM — R17 ELEVATED BILIRUBIN: Status: ACTIVE | Noted: 2022-02-18

## 2022-02-18 PROCEDURE — 99214 OFFICE O/P EST MOD 30 MIN: CPT | Performed by: INTERNAL MEDICINE

## 2022-02-18 RX ORDER — ATORVASTATIN CALCIUM 40 MG/1
40 TABLET, FILM COATED ORAL DAILY
Qty: 90 TABLET | Refills: 1 | Status: SHIPPED | OUTPATIENT
Start: 2022-02-18 | End: 2022-09-22

## 2022-02-18 RX ORDER — LISINOPRIL 20 MG/1
20 TABLET ORAL 2 TIMES DAILY
Qty: 180 TABLET | Refills: 1 | Status: SHIPPED | OUTPATIENT
Start: 2022-02-18 | End: 2022-09-22

## 2022-02-18 RX ORDER — METOPROLOL SUCCINATE 25 MG/1
12.5 TABLET, EXTENDED RELEASE ORAL DAILY
Qty: 45 TABLET | Refills: 1 | Status: SHIPPED | OUTPATIENT
Start: 2022-02-18 | End: 2022-09-22

## 2022-02-18 NOTE — PATIENT INSTRUCTIONS
Plan:  1. Continue same meds, same doses for now   2. Please make a lab appointment for non fasting labs in 3 months - for A1c only  3. Please make a lab appointment for fasting labs in 6 months  4. Please make an appointment few days after the labs for ANNUAL EXAM ( check with insurance if you can come before Nov 12)

## 2022-02-28 ENCOUNTER — PRE VISIT (OUTPATIENT)
Dept: DERMATOLOGY | Facility: CLINIC | Age: 62
End: 2022-02-28

## 2022-02-28 ENCOUNTER — OFFICE VISIT (OUTPATIENT)
Dept: DERMATOLOGY | Facility: CLINIC | Age: 62
End: 2022-02-28
Payer: COMMERCIAL

## 2022-02-28 DIAGNOSIS — C44.211 BASAL CELL CARCINOMA (BCC) OF SKIN OF EAR, UNSPECIFIED LATERALITY: Primary | ICD-10-CM

## 2022-02-28 PROCEDURE — 99213 OFFICE O/P EST LOW 20 MIN: CPT | Mod: GC | Performed by: DERMATOLOGY

## 2022-02-28 ASSESSMENT — PAIN SCALES - GENERAL: PAINLEVEL: NO PAIN (0)

## 2022-02-28 NOTE — NURSING NOTE
Chief Complaint   Patient presents with     Derm Problem     SCC R ear consult     Ernestine DUNN, EMT  Dermatology/Dermatology Surgery  [phone]

## 2022-02-28 NOTE — PATIENT INSTRUCTIONS
Mohs Cutaneous Micrographic Surgery Unit  Jorge Alberto Taylor MD      Pre-Surgical Instruction Sheet    1. Expect to be here majority of the morning.  The Mohs surgical procedure can be an extensive surgery requiring multiple stages. Each stage may take 1-2 hours.    ? You may eat breakfast  ? You may bring snacks or beverages with you  ? Take all normal medications morning of surgery -- unless otherwise indicated by your physician  ? If you have an artificial heart valve, or joint replacement within two years, we will prescribe an antibiotic. Please take one hour prior to surgery.    2. You will need a  to be with you if your surgical site is close to your eyes. You can get swelling/bruising immediately after surgery and will go home with a big bulky bandage that could obstruct your view of driving safely.    3. Wear comfortable clothing -- preferably a button down shirt or a loose fitted shirt. (to avoid pulling over pressure bandage off when you get home) If your surgery site is on your leg, try wearing loose fitted pants. If your surgery site is on your arm, try wearing a short-sleeve shirt.    4. Bring reading material or other items to help pass time. We do have internet access available if you own a laptop, iPad, etc.)    5. Women - do NOT wear make-up. We will be washing it off anyone needing surgery on the face    6. Do NOT stop blood thinning medication unless instructed to do so by your surgeon. This will include: Warfarin, Coumadin, Jantoven, Aspirin, Plavix and Pradaxa. If you are taking Warfarin or Coumadin, please have your INR blood test results sent to our office no more than 7 days prior to your surgery.     7. Tylenol is a good alternative to take for headaches and pain, and can be taken throughout your surgery and postoperative recovery.    8. If your surgery is on your trunk, arms, hands, legs, feet, fingernail or a toenail, please wash the area with Hibiclens, an over-the-counter antiseptic soap,  the night before and morning of your surgery.     If you have any questions, please feel free to contact us.    Phone numbers:  During business hours (M-F 8:00-4:30 p.m.)  [facility]:  [phone] - select option 1 for appt. desk  [phone] - select option 3 for nurse triage line  [facility]:   [phone] - goes straight to call center   ---------------------------------------------------------  Evenings/Weekends/Holidays  Hospital - [phone]   TTY for hearing ypwycshb-[phone]  *Ask  to page dermatologist on-call  Emergency Djpe-[phone]  TTY for hearing impaired- [phone]

## 2022-02-28 NOTE — LETTER
2/28/2022       RE: Sumit Park  [address]  [place] [facility] [address]     Dear Colleague,    Thank you for referring your patient, Sumit Park, to the [facility] DERMATOLOGIC SURGERY CLINIC [place] at [facility]. Please see a copy of my visit note below.    Mohs Micrographic Surgery Consult Note    Feb 28, 2022    Dermatology Problem List:  1. BCC, right ear, s/p shave bx 1/3/2022    Subjective: The patient is a 61 year old man who presents today for Mohs micrographic surgery consultation for a recent diagnosis of skin cancer.    Skin cancer(s): BCC  Location(s): right ear  Associated symptoms: pruritus, tenderness to touch  Onset: within last 1 year    No other associated symptoms, modifying factors, or prior treatments, except when noted above. The patient denies any constitutional symptoms, lymphadenopathy, unintentional weight loss or decreased appetite. No other skin concerns today.    Objective:   Gen: This is a well appearing individual in no acute distress. The patient is alert and oriented x 3.  An exam of the ear and scalp was performed today and visualized the following:  - There is a well-healed biopsy site on the R superior helical rim  - Scattered excoriations on the scalp    Assessment and Plan:     1. Plan for Mohs micrographic surgery for skin cancer(s) above:  - We discussed the nature of the diagnosis/condition above. We discussed the treatment options, including the risks benefits and expectations of these options. We recommend micrographic surgery as the most effective and most tissue sparing option for treatment, and the patient agrees to proceed with this.  The patient is aware of the risks, benefits and expectations of this procedure. The patient will be scheduled for this procedure, if not already done so.  - We anticipate the following closure type: Sliding or lifting flap     The patient was discussed with and  evaluated by attending physician, Jorge Alberto Taylor MD.    Jameson Santoyo MD  Micrographic Surgery and Dermatologic Oncology (MSDO) Fellow    Scribe Disclosure:  I, Andi Piña, am serving as a scribe to document services personally performed by Jorge Alberto Taylor MD based on data collection and the provider's statements to me.     Attending Attestation  I attest that I discussed the case with the Fellow.  I agree with the plan.   I have reviewed the note and edited it as necessary.    Jorge Alberto Taylor M.D.  Professor  Director of Dermatologic Surgery  Department of Dermatology  [facility]

## 2022-02-28 NOTE — PROGRESS NOTES
Mohs Micrographic Surgery Consult Note    Feb 28, 2022    Dermatology Problem List:  1. BCC, right ear, s/p shave bx 1/3/2022    Subjective: The patient is a 61 year old man who presents today for Mohs micrographic surgery consultation for a recent diagnosis of skin cancer.    Skin cancer(s): BCC  Location(s): right ear  Associated symptoms: pruritus, tenderness to touch  Onset: within last 1 year    No other associated symptoms, modifying factors, or prior treatments, except when noted above. The patient denies any constitutional symptoms, lymphadenopathy, unintentional weight loss or decreased appetite. No other skin concerns today.    Objective:   Gen: This is a well appearing individual in no acute distress. The patient is alert and oriented x 3.  An exam of the ear and scalp was performed today and visualized the following:  - There is a well-healed biopsy site on the R superior helical rim  - Scattered excoriations on the scalp    Assessment and Plan:     1. Plan for Mohs micrographic surgery for skin cancer(s) above:  - We discussed the nature of the diagnosis/condition above. We discussed the treatment options, including the risks benefits and expectations of these options. We recommend micrographic surgery as the most effective and most tissue sparing option for treatment, and the patient agrees to proceed with this.  The patient is aware of the risks, benefits and expectations of this procedure. The patient will be scheduled for this procedure, if not already done so.  - We anticipate the following closure type: Sliding or lifting flap     The patient was discussed with and evaluated by attending physician, Jorge Alberto Taylor MD.    Jameson Santoyo MD  Micrographic Surgery and Dermatologic Oncology (MSDO) Fellow    Scribe Disclosure:  Andi LAO, am serving as a scribe to document services personally performed by Jorge Alberto Taylor MD based on data collection and the provider's statements to me.     Attending  Attestation  I attest that I discussed the case with the Fellow.  I agree with the plan.   I have reviewed the note and edited it as necessary.    Jorge Alberto Taylor M.D.  Professor  Director of Dermatologic Surgery  Department of Dermatology  [facility]

## 2022-03-16 ENCOUNTER — ANCILLARY PROCEDURE (OUTPATIENT)
Dept: CARDIOLOGY | Facility: CLINIC | Age: 62
End: 2022-03-16
Attending: INTERNAL MEDICINE
Payer: COMMERCIAL

## 2022-03-16 DIAGNOSIS — Z95.0 CARDIAC PACEMAKER IN SITU: Primary | ICD-10-CM

## 2022-03-16 DIAGNOSIS — Z95.0 CARDIAC PACEMAKER IN SITU: ICD-10-CM

## 2022-03-16 PROCEDURE — 93296 REM INTERROG EVL PM/IDS: CPT | Performed by: INTERNAL MEDICINE

## 2022-03-16 PROCEDURE — 93294 REM INTERROG EVL PM/LDLS PM: CPT | Performed by: INTERNAL MEDICINE

## 2022-03-17 LAB
MDC_IDC_LEAD_IMPLANT_DT: NORMAL
MDC_IDC_LEAD_IMPLANT_DT: NORMAL
MDC_IDC_LEAD_LOCATION: NORMAL
MDC_IDC_LEAD_LOCATION: NORMAL
MDC_IDC_LEAD_LOCATION_DETAIL_1: NORMAL
MDC_IDC_LEAD_LOCATION_DETAIL_1: NORMAL
MDC_IDC_LEAD_MFG: NORMAL
MDC_IDC_LEAD_MFG: NORMAL
MDC_IDC_LEAD_MODEL: NORMAL
MDC_IDC_LEAD_MODEL: NORMAL
MDC_IDC_LEAD_POLARITY_TYPE: NORMAL
MDC_IDC_LEAD_POLARITY_TYPE: NORMAL
MDC_IDC_LEAD_SERIAL: NORMAL
MDC_IDC_LEAD_SERIAL: NORMAL
MDC_IDC_MSMT_BATTERY_DTM: NORMAL
MDC_IDC_MSMT_BATTERY_REMAINING_LONGEVITY: 106 MO
MDC_IDC_MSMT_BATTERY_RRT_TRIGGER: 2.62
MDC_IDC_MSMT_BATTERY_STATUS: NORMAL
MDC_IDC_MSMT_BATTERY_VOLTAGE: 3 V
MDC_IDC_MSMT_LEADCHNL_RA_IMPEDANCE_VALUE: 304 OHM
MDC_IDC_MSMT_LEADCHNL_RA_IMPEDANCE_VALUE: 361 OHM
MDC_IDC_MSMT_LEADCHNL_RA_PACING_THRESHOLD_AMPLITUDE: 0.38 V
MDC_IDC_MSMT_LEADCHNL_RA_PACING_THRESHOLD_PULSEWIDTH: 0.4 MS
MDC_IDC_MSMT_LEADCHNL_RA_SENSING_INTR_AMPL: 2.88 MV
MDC_IDC_MSMT_LEADCHNL_RA_SENSING_INTR_AMPL: 2.88 MV
MDC_IDC_MSMT_LEADCHNL_RV_IMPEDANCE_VALUE: 342 OHM
MDC_IDC_MSMT_LEADCHNL_RV_IMPEDANCE_VALUE: 418 OHM
MDC_IDC_MSMT_LEADCHNL_RV_PACING_THRESHOLD_AMPLITUDE: 0.75 V
MDC_IDC_MSMT_LEADCHNL_RV_PACING_THRESHOLD_PULSEWIDTH: 0.4 MS
MDC_IDC_MSMT_LEADCHNL_RV_SENSING_INTR_AMPL: 14 MV
MDC_IDC_MSMT_LEADCHNL_RV_SENSING_INTR_AMPL: 14 MV
MDC_IDC_PG_IMPLANT_DTM: NORMAL
MDC_IDC_PG_MFG: NORMAL
MDC_IDC_PG_MODEL: NORMAL
MDC_IDC_PG_SERIAL: NORMAL
MDC_IDC_PG_TYPE: NORMAL
MDC_IDC_SESS_CLINIC_NAME: NORMAL
MDC_IDC_SESS_DTM: NORMAL
MDC_IDC_SESS_TYPE: NORMAL
MDC_IDC_SET_BRADY_AT_MODE_SWITCH_RATE: 150 {BEATS}/MIN
MDC_IDC_SET_BRADY_HYSTRATE: NORMAL
MDC_IDC_SET_BRADY_LOWRATE: 50 {BEATS}/MIN
MDC_IDC_SET_BRADY_MAX_SENSOR_RATE: 130 {BEATS}/MIN
MDC_IDC_SET_BRADY_MAX_TRACKING_RATE: 130 {BEATS}/MIN
MDC_IDC_SET_BRADY_MODE: NORMAL
MDC_IDC_SET_BRADY_PAV_DELAY_LOW: 180 MS
MDC_IDC_SET_BRADY_SAV_DELAY_LOW: 150 MS
MDC_IDC_SET_LEADCHNL_RA_PACING_AMPLITUDE: 1.5 V
MDC_IDC_SET_LEADCHNL_RA_PACING_ANODE_ELECTRODE_1: NORMAL
MDC_IDC_SET_LEADCHNL_RA_PACING_ANODE_LOCATION_1: NORMAL
MDC_IDC_SET_LEADCHNL_RA_PACING_CAPTURE_MODE: NORMAL
MDC_IDC_SET_LEADCHNL_RA_PACING_CATHODE_ELECTRODE_1: NORMAL
MDC_IDC_SET_LEADCHNL_RA_PACING_CATHODE_LOCATION_1: NORMAL
MDC_IDC_SET_LEADCHNL_RA_PACING_POLARITY: NORMAL
MDC_IDC_SET_LEADCHNL_RA_PACING_PULSEWIDTH: 0.4 MS
MDC_IDC_SET_LEADCHNL_RA_SENSING_ANODE_ELECTRODE_1: NORMAL
MDC_IDC_SET_LEADCHNL_RA_SENSING_ANODE_LOCATION_1: NORMAL
MDC_IDC_SET_LEADCHNL_RA_SENSING_CATHODE_ELECTRODE_1: NORMAL
MDC_IDC_SET_LEADCHNL_RA_SENSING_CATHODE_LOCATION_1: NORMAL
MDC_IDC_SET_LEADCHNL_RA_SENSING_POLARITY: NORMAL
MDC_IDC_SET_LEADCHNL_RA_SENSING_SENSITIVITY: 0.3 MV
MDC_IDC_SET_LEADCHNL_RV_PACING_AMPLITUDE: 1.5 V
MDC_IDC_SET_LEADCHNL_RV_PACING_ANODE_ELECTRODE_1: NORMAL
MDC_IDC_SET_LEADCHNL_RV_PACING_ANODE_LOCATION_1: NORMAL
MDC_IDC_SET_LEADCHNL_RV_PACING_CAPTURE_MODE: NORMAL
MDC_IDC_SET_LEADCHNL_RV_PACING_CATHODE_ELECTRODE_1: NORMAL
MDC_IDC_SET_LEADCHNL_RV_PACING_CATHODE_LOCATION_1: NORMAL
MDC_IDC_SET_LEADCHNL_RV_PACING_POLARITY: NORMAL
MDC_IDC_SET_LEADCHNL_RV_PACING_PULSEWIDTH: 0.4 MS
MDC_IDC_SET_LEADCHNL_RV_SENSING_ANODE_ELECTRODE_1: NORMAL
MDC_IDC_SET_LEADCHNL_RV_SENSING_ANODE_LOCATION_1: NORMAL
MDC_IDC_SET_LEADCHNL_RV_SENSING_CATHODE_ELECTRODE_1: NORMAL
MDC_IDC_SET_LEADCHNL_RV_SENSING_CATHODE_LOCATION_1: NORMAL
MDC_IDC_SET_LEADCHNL_RV_SENSING_POLARITY: NORMAL
MDC_IDC_SET_LEADCHNL_RV_SENSING_SENSITIVITY: 0.9 MV
MDC_IDC_SET_ZONE_DETECTION_INTERVAL: 370 MS
MDC_IDC_SET_ZONE_DETECTION_INTERVAL: 400 MS
MDC_IDC_SET_ZONE_TYPE: NORMAL
MDC_IDC_STAT_AT_BURDEN_PERCENT: 0 %
MDC_IDC_STAT_AT_DTM_END: NORMAL
MDC_IDC_STAT_AT_DTM_START: NORMAL
MDC_IDC_STAT_BRADY_AP_VP_PERCENT: 25.85 %
MDC_IDC_STAT_BRADY_AP_VS_PERCENT: 0.7 %
MDC_IDC_STAT_BRADY_AS_VP_PERCENT: 67.94 %
MDC_IDC_STAT_BRADY_AS_VS_PERCENT: 5.51 %
MDC_IDC_STAT_BRADY_DTM_END: NORMAL
MDC_IDC_STAT_BRADY_DTM_START: NORMAL
MDC_IDC_STAT_BRADY_RA_PERCENT_PACED: 26.5 %
MDC_IDC_STAT_BRADY_RV_PERCENT_PACED: 93.79 %
MDC_IDC_STAT_EPISODE_RECENT_COUNT: 0
MDC_IDC_STAT_EPISODE_RECENT_COUNT_DTM_END: NORMAL
MDC_IDC_STAT_EPISODE_RECENT_COUNT_DTM_START: NORMAL
MDC_IDC_STAT_EPISODE_TOTAL_COUNT: 0
MDC_IDC_STAT_EPISODE_TOTAL_COUNT: 0
MDC_IDC_STAT_EPISODE_TOTAL_COUNT: 2
MDC_IDC_STAT_EPISODE_TOTAL_COUNT: 2
MDC_IDC_STAT_EPISODE_TOTAL_COUNT: 77
MDC_IDC_STAT_EPISODE_TOTAL_COUNT_DTM_END: NORMAL
MDC_IDC_STAT_EPISODE_TOTAL_COUNT_DTM_START: NORMAL
MDC_IDC_STAT_EPISODE_TYPE: NORMAL

## 2022-04-25 ENCOUNTER — OFFICE VISIT (OUTPATIENT)
Dept: DERMATOLOGY | Facility: CLINIC | Age: 62
End: 2022-04-25
Payer: COMMERCIAL

## 2022-04-25 VITALS — DIASTOLIC BLOOD PRESSURE: 93 MMHG | SYSTOLIC BLOOD PRESSURE: 138 MMHG

## 2022-04-25 DIAGNOSIS — C44.212: ICD-10-CM

## 2022-04-25 PROCEDURE — 17311 MOHS 1 STAGE H/N/HF/G: CPT | Mod: GC | Performed by: DERMATOLOGY

## 2022-04-25 PROCEDURE — 14061 TIS TRNFR E/N/E/L10.1-30SQCM: CPT | Mod: GC | Performed by: DERMATOLOGY

## 2022-04-25 NOTE — LETTER
4/25/2022       RE: Sumit Park  [address]  [facility] [address]     Dear Colleague,    Thank you for referring your patient, Sumit Park, to the [facility] DERMATOLOGIC SURGERY CLINIC [place] at [facility]. Please see a copy of my visit note below.    [facility] Mohs Surgery Procedure Note    Case #: 1  Date of Service:  Apr 25, 2022  Surgery: Mohs micrographic surgery (MMS)  Staff surgeon: Jorge Alberto Taylor MD  Fellow surgeon: Jameson Santoyo MD  Resident surgeon: Abbey Colvin MD  Nurse: Linda Frank CMA    Tumor Type: BCC  Location: right ear helix  Derm-Path Accession #: [id number]     Mohs Accession #:   Pre-Op Size: 1.2 cm x 0.7 cm  Final Defect Size: 4.0 cm x 3.0 cm  Number of Mohs stages: 1  Level of Defect: Fat  Local anesthetic: 6 mL 1% lidocaine with epinephrine  Repair Type: Bilobed transposition flap  Repair Size: 4.0 x 3.0 cm  Suture Material: 4-0 Monocryl; 5-0 fast absorbing gut    Procedure:    Stage I  We discussed the principles of treatment and most likely complications including scarring, bleeding, infection, swelling, pain, crusting, nerve damage, large wound,  incomplete excision, wound dehiscence,  nerve damage, recurrence, and a second procedure may be recommended to obtain the best cosmetic or functional result.    Informed consent was obtained and the patient underwent the procedure as follows:  The patient was placed supine on the operating table.  The cancer was identified, outlined with a marker, and verified by the patient.  The entire surgical field was prepped with chlorhexidine.  The surgical site was anesthetized using lidocaine with epinephrine.    The area of clinically apparent tumor was debulked. The layer of tissue was then surgically excised using a #15 blade and was then transferred onto a specimen sheet maintaining the orientation of the specimen. Hemostasis was obtained using  electrocoagulation. The wound site was then covered with a dressing while the tissue samples were processed for examination.    The excised tissue was transported to the Mohs histology laboratory maintaining the tissue orientation.  The tissue specimen was relaxed so that the entire surgical margin was in a a single horizontal plane for sectioning and inked for precise mapping.  A precise reference map was drawn to reflect the sectioning of the specimen, colored inking of the margins, and orientation on the patient.  The tissue was processed using horizontal sectioning of the base and continuous peripheral margins.  The histopathologic sections were reviewed in conjunction with the reference map.    Total blocks: 1  Total slides: 2    There were no cancer cells visualized on examination, therefore Mohs surgery was complete.    REPAIR: Bilobed Transposition Flap    The patient was taken to the operative suite and placed supine on the operating room table.  The wound was identified and infiltrated with 1% lidocaine with epinephrine.  The defect was then cleansed and prepped with chlorhexidine and draped with sterile drapes.  Using a marker, a bilobed transposition flap repair was planned.  The wound edges were then debeveled and the wound was undermined bluntly in all directions. The transposition flap was incised sharply to the level of the subcutaneous fat.  The flap was undermined from all surrounding tissue. Hemostasis was obtained with electrocoagulation.  The flap was transposed into the primary defect.  The secondary defect and flap were closed with deep dermal 4-0 Monocryl sutures.  Epidermal tissue was carefully approximated using 5-0 fast absorbing gut in a simple running fashion throughout the length of the flap.  Redundant skin was excised by the triangulation technique and closed in similar fashion.  The wound was cleansed with sterile saline and Vaseline was applied. A sterile non-adherent pressure  dressing was placed.  The patient left the operating suite in stable condition.    Follow-up for suture removal: Not applicable as only dissolving sutures used    Jorge Alberto Taylor MD was immediately available for the entire surgery and was physicially present for the key portions of the procedure.    Dr. Jameson Santoyo (Mohs micrographic surgery fellow) performed the Mohs micrographic surgery and reconstruction under the direct supervision of Jorge Alberto Taylor MD, who was present for the entire micrographic surgery and key portions of the reconstruction, and always immediately available.    Scribe Disclosure:  I, Andi Piña, am serving as a scribe to document services personally performed by Jorge Alberto Taylor MD based on data collection and the provider's statements to me.       Attending attestation:  I was present for key elements of the procedure and immediately available for all other portions of the procedure.  I have reviewed the note and edited it as necessary.    Jorge Alberto Taylor M.D.  Professor  Director of Dermatologic Surgery  Department of Dermatology  [facility]    Dermatology Surgery Clinic  [facility] [place] [phone]

## 2022-04-25 NOTE — NURSING NOTE
Chief Complaint   Patient presents with     Derm Problem     Patient is here today for mohs on right ear.      Linda DIAZ CMA

## 2022-04-25 NOTE — PATIENT INSTRUCTIONS
Wound care    I will experience scar, bleeding, swelling, pain, crusting and redness. I may experience incomplete removal or recurrence. Risks are bleeding, bruising, swelling, infection, nerve damage, & a large wound. A second procedure may be recommended to obtain the best cosmetic or functional result.       A three month office visit with your Surgeon is recommended for scar evaluation. Please reach out sooner if you have concerns about you surgical site/wound.    Caring for your skin after surgery    After your surgery, a pressure bandage will be placed over the area that has stitches. This is important to prevent bleeding. Please follow these instructions over the next 1 to 2 weeks. Following this regimen will help to prevent complications as your wound heals.     For the first 48 hours after your surgery:    Leave the pressure dressing on and keep it dry. If it should come loose, you may re-tape it, but do not take it off.  Relax and take it easy. Do not do any vigorous exercise or heavy lifting. This could cause the wound to bleed.  Post-Operative pain is usually mild. If you are able to take tylenol, You may take plain or extra-strength Tylenol (acetaminophen) As directed on the bottle (do not take more than 4,000mg in one day). If you are able to take ibuprofen, you can alternate the tylenol and ibuprofen.   Avoid alcohol as this may increase your tendency to bleed.   You may put an ice pack around the bandaged area for 20 minutes at a time as needed. This may help reduce swelling, bruising, and pain. Make sure the ice pack is waterproof so that the pressure bandage doesn t get wet.  If the wound is on the face try to sleep with your head elevated. Either in a recliner or propped up in bed, this will decrease swelling around the eyes.   You may see a small amount of drainage or blood on your pressure bandage. This is normal. However:  If drainage or bleeding continues or saturates the bandage, you will  need to apply firm pressure over the bandage with a piece of gauze for 15 minutes.  If bleeding continues after applying pressure for 15 minutes, apply an ice pack to the bandaged area for 15 minutes.  If bleeding still continues, call our office or go to the nearest emergency room.    Remove pressure dressing 48 hours after surgery:    Carefully remove the pressure bandage. If it seems sticky or too difficult to get off, you may need to soak it off in the shower.  After the pressure dressing is removed, you may shower and get the wound wet. However, Do Not let the forceful stream of the shower hit the wound directly.  Follow these wound care and dressing change instructions:   In the shower was the surgical site last with its own separate was cloth.  You may allow water to run over the site. Take a clean wash cloth wet with soapy warm water and gently pat the suture site to help remove any crust or drainage.   Do Not rub or scrub the site    After site is clean pat dry and apply a thin layer of Vaseline ointment  over the suture site with a cotton swab or clean finger.   Cover the suture site with Telfa (non-stick) dressing. You may tape a piece of gauze over the Telfa for extra protection if you wish.  Continue wound care at least once a day, twice if you are active or around a contaminated environment.  Continue daily wound care until your surgical site is completely healed. To determine this, around 2 weeks post procedure you can take a cotton swab with a small amount of Hydrogen peroxide and roll it on the suture site. If the site bubbles and turns white your wound is still healing. Please continue wound care until the area no longer bubbles up from the hydrogen peroxide.   Dissolving stitches, if you have been told your stitches are dissolving they should dissolve in one to one and a half week. If the stiches do not dissolve by one and a half week you can use a Qtip with hydrogen peroxide on it and roll it  along the suture line to help the stitches dissolve and come out. Please give us a call if stitches are still in after two weeks. If you do not keep your wound moist at all times while it heals the dissolving sutures will dry out and not dissolve.         Follow up will be a 3 month scar evaluation either in person or via a telephone visit with you sending in a photo via [employer]. Unless you have been told to follow up sooner or if you have concerns and would like to be see sooner. Please call or send us in a [employer] message if possible and attach a photo.        What to expect:    The first couple of days your wound may be tender and may bleed slightly when doing wound care.  There may be swelling and bruising around the wound, especially if it is near the eyes. For your comfort, you may apply ice or cold compresses to the bruises after your have removed the pressure bandage.  The area around your wound may be numb for several weeks or even months.  You may experience periodic sharp pain or mild itching around the wound as it heals.   The suture line will look dark for a while but will lighten over time.       When to call us:    You have bleeding that will not stop after applying pressure and ice.  You have pain that is not controlled with Tylenol (acetaminophen.)  You have signs or symptoms of an infection such as:  Fever over 100 degrees Fahrenheit  Redness, warmth or foul-smelling drainage from the wound  If you have any questions, or are not sure how to take care of the wound.    Phone numbers:    During business hours (M-F 8:00-4:30 p.m.)  [facility]-  [phone] Option 1 appt. desk  [phone]  Option 3 nurse triage line  ---------------------------------------------------------  Evenings/Weekends/Holidays  Hospital - [phone]   TTY for hearing [id number]-[phone]  *Ask  to page dermatologist on-call  Emergency Ovaq-[phone]  TTY for hearing impaired-  [phone]

## 2022-04-25 NOTE — LETTER
[facility] DERMATOLOGIC SURGERY CLINIC [address]  [address]RD FLO[address]  [facility] [address]  Phone: [phone]  Fax: [phone]    April 25, 2022        Sumit ARLEEN Park  [address] [facility] [address]          To whom it may concern:    RE: Sumit Park    Patient may return to work 4/27/22 with the following:  Light duty-unable to lift more than 25 pounds    Please contact me for questions or concerns.      Sincerely,        Jorge Alberto Taylor MD

## 2022-04-25 NOTE — PROGRESS NOTES
[facility] Mohs Surgery Procedure Note    Case #: 1  Date of Service:  Apr 25, 2022  Surgery: Mohs micrographic surgery (MMS)  Staff surgeon: Jorge Alberto Taylor MD  Fellow surgeon: Jameson Santoyo MD  Resident surgeon: Abbey Colvin MD  Nurse: Linda Frank CMA    Tumor Type: BCC  Location: right ear helix  Derm-Path Accession #: [id number]     Mohs Accession #:   Pre-Op Size: 1.2 cm x 0.7 cm  Final Defect Size: 4.0 cm x 3.0 cm  Number of Mohs stages: 1  Level of Defect: Fat  Local anesthetic: 6 mL 1% lidocaine with epinephrine  Repair Type: Bilobed transposition flap  Repair Size: 4.0 x 3.0 cm  Suture Material: 4-0 Monocryl; 5-0 fast absorbing gut    Procedure:    Stage I  We discussed the principles of treatment and most likely complications including scarring, bleeding, infection, swelling, pain, crusting, nerve damage, large wound,  incomplete excision, wound dehiscence,  nerve damage, recurrence, and a second procedure may be recommended to obtain the best cosmetic or functional result.    Informed consent was obtained and the patient underwent the procedure as follows:  The patient was placed supine on the operating table.  The cancer was identified, outlined with a marker, and verified by the patient.  The entire surgical field was prepped with chlorhexidine.  The surgical site was anesthetized using lidocaine with epinephrine.    The area of clinically apparent tumor was debulked. The layer of tissue was then surgically excised using a #15 blade and was then transferred onto a specimen sheet maintaining the orientation of the specimen. Hemostasis was obtained using electrocoagulation. The wound site was then covered with a dressing while the tissue samples were processed for examination.    The excised tissue was transported to the Mohs histology laboratory maintaining the tissue orientation.  The tissue specimen was relaxed so that the entire surgical margin was in a a single horizontal plane  for sectioning and inked for precise mapping.  A precise reference map was drawn to reflect the sectioning of the specimen, colored inking of the margins, and orientation on the patient.  The tissue was processed using horizontal sectioning of the base and continuous peripheral margins.  The histopathologic sections were reviewed in conjunction with the reference map.    Total blocks: 1  Total slides: 2    There were no cancer cells visualized on examination, therefore Mohs surgery was complete.    REPAIR: Bilobed Transposition Flap    The patient was taken to the operative suite and placed supine on the operating room table.  The wound was identified and infiltrated with 1% lidocaine with epinephrine.  The defect was then cleansed and prepped with chlorhexidine and draped with sterile drapes.  Using a marker, a bilobed transposition flap repair was planned.  The wound edges were then debeveled and the wound was undermined bluntly in all directions. The transposition flap was incised sharply to the level of the subcutaneous fat.  The flap was undermined from all surrounding tissue. Hemostasis was obtained with electrocoagulation.  The flap was transposed into the primary defect.  The secondary defect and flap were closed with deep dermal 4-0 Monocryl sutures.  Epidermal tissue was carefully approximated using 5-0 fast absorbing gut in a simple running fashion throughout the length of the flap.  Redundant skin was excised by the triangulation technique and closed in similar fashion.  The wound was cleansed with sterile saline and Vaseline was applied. A sterile non-adherent pressure dressing was placed.  The patient left the operating suite in stable condition.    Follow-up for suture removal: Not applicable as only dissolving sutures used    Jorge Alberto Taylor MD was immediately available for the entire surgery and was physicially present for the key portions of the procedure.    Dr. Jameson Santoyo (Mohs micrographic surgery  fellow) performed the Mohs micrographic surgery and reconstruction under the direct supervision of Jorge Alberto Taylor MD, who was present for the entire micrographic surgery and key portions of the reconstruction, and always immediately available.    Scribe Disclosure:  I, Andi Piña, am serving as a scribe to document services personally performed by Jorge Alberto Taylor MD based on data collection and the provider's statements to me.       Attending attestation:  I was present for key elements of the procedure and immediately available for all other portions of the procedure.  I have reviewed the note and edited it as necessary.    Jorge Alberto Taylor M.D.  Professor  Director of Dermatologic Surgery  Department of Dermatology  [facility]    Dermatology Surgery Clinic  [facility] [place] [phone]

## 2022-05-01 DIAGNOSIS — Z98.84 GASTRIC BYPASS STATUS FOR OBESITY: ICD-10-CM

## 2022-05-01 DIAGNOSIS — I25.10 CORONARY ARTERY DISEASE INVOLVING NATIVE HEART WITHOUT ANGINA PECTORIS, UNSPECIFIED VESSEL OR LESION TYPE: ICD-10-CM

## 2022-05-01 RX ORDER — FERROUS SULFATE 325(65) MG
TABLET ORAL
Qty: 90 TABLET | Refills: 0 | Status: CANCELLED | OUTPATIENT
Start: 2022-05-01

## 2022-05-02 RX ORDER — FUROSEMIDE 20 MG
20 TABLET ORAL DAILY
Qty: 90 TABLET | Refills: 0 | Status: SHIPPED | OUTPATIENT
Start: 2022-05-02 | End: 2022-07-26

## 2022-05-02 RX ORDER — FERROUS SULFATE 325(65) MG
TABLET ORAL
Qty: 90 TABLET | Refills: 0 | Status: SHIPPED | OUTPATIENT
Start: 2022-05-02 | End: 2022-08-02

## 2022-05-03 DIAGNOSIS — Z98.84 GASTRIC BYPASS STATUS FOR OBESITY: ICD-10-CM

## 2022-05-04 RX ORDER — FERROUS SULFATE 325(65) MG
TABLET ORAL
Qty: 90 TABLET | Refills: 0 | OUTPATIENT
Start: 2022-05-04

## 2022-05-07 DIAGNOSIS — E11.9 TYPE 2 DIABETES MELLITUS WITHOUT COMPLICATION, WITHOUT LONG-TERM CURRENT USE OF INSULIN (H): ICD-10-CM

## 2022-05-10 RX ORDER — ASPIRIN 81 MG/1
TABLET, COATED ORAL
Qty: 100 TABLET | Refills: 3 | Status: SHIPPED | OUTPATIENT
Start: 2022-05-10 | End: 2023-05-19

## 2022-05-10 NOTE — TELEPHONE ENCOUNTER
Aspirin Low Dose 81 mg  Prescription approved per [facility] Refill Protocol.

## 2022-05-20 ENCOUNTER — LAB (OUTPATIENT)
Dept: LAB | Facility: CLINIC | Age: 62
End: 2022-05-20
Payer: COMMERCIAL

## 2022-05-20 DIAGNOSIS — E11.9 TYPE 2 DIABETES MELLITUS WITHOUT COMPLICATION, WITHOUT LONG-TERM CURRENT USE OF INSULIN (H): ICD-10-CM

## 2022-05-20 LAB — HBA1C MFR BLD: 7 % (ref 0–5.6)

## 2022-05-20 PROCEDURE — 83036 HEMOGLOBIN GLYCOSYLATED A1C: CPT

## 2022-05-20 PROCEDURE — 36415 COLL VENOUS BLD VENIPUNCTURE: CPT

## 2022-05-28 DIAGNOSIS — Z98.84 GASTRIC BYPASS STATUS FOR OBESITY: ICD-10-CM

## 2022-05-31 RX ORDER — FOLIC ACID 1 MG/1
1000 TABLET ORAL DAILY
Qty: 30 TABLET | Refills: 0 | Status: SHIPPED | OUTPATIENT
Start: 2022-05-31 | End: 2022-06-10

## 2022-06-08 DIAGNOSIS — Z98.84 GASTRIC BYPASS STATUS FOR OBESITY: ICD-10-CM

## 2022-06-10 RX ORDER — FOLIC ACID 1 MG/1
TABLET ORAL
Qty: 30 TABLET | Refills: 0 | Status: SHIPPED | OUTPATIENT
Start: 2022-06-10 | End: 2022-07-08

## 2022-07-20 ENCOUNTER — ANCILLARY PROCEDURE (OUTPATIENT)
Dept: CARDIOLOGY | Facility: CLINIC | Age: 62
End: 2022-07-20
Attending: INTERNAL MEDICINE
Payer: COMMERCIAL

## 2022-07-20 DIAGNOSIS — Z95.0 CARDIAC PACEMAKER IN SITU: ICD-10-CM

## 2022-07-20 LAB
MDC_IDC_EPISODE_DTM: NORMAL
MDC_IDC_EPISODE_DURATION: 15 S
MDC_IDC_EPISODE_ID: 82
MDC_IDC_EPISODE_TYPE: NORMAL
MDC_IDC_LEAD_IMPLANT_DT: NORMAL
MDC_IDC_LEAD_IMPLANT_DT: NORMAL
MDC_IDC_LEAD_LOCATION: NORMAL
MDC_IDC_LEAD_LOCATION: NORMAL
MDC_IDC_LEAD_LOCATION_DETAIL_1: NORMAL
MDC_IDC_LEAD_LOCATION_DETAIL_1: NORMAL
MDC_IDC_LEAD_MFG: NORMAL
MDC_IDC_LEAD_MFG: NORMAL
MDC_IDC_LEAD_MODEL: NORMAL
MDC_IDC_LEAD_MODEL: NORMAL
MDC_IDC_LEAD_POLARITY_TYPE: NORMAL
MDC_IDC_LEAD_POLARITY_TYPE: NORMAL
MDC_IDC_LEAD_SERIAL: NORMAL
MDC_IDC_LEAD_SERIAL: NORMAL
MDC_IDC_MSMT_BATTERY_DTM: NORMAL
MDC_IDC_MSMT_BATTERY_REMAINING_LONGEVITY: 102 MO
MDC_IDC_MSMT_BATTERY_RRT_TRIGGER: 2.62
MDC_IDC_MSMT_BATTERY_STATUS: NORMAL
MDC_IDC_MSMT_BATTERY_VOLTAGE: 3 V
MDC_IDC_MSMT_LEADCHNL_RA_IMPEDANCE_VALUE: 304 OHM
MDC_IDC_MSMT_LEADCHNL_RA_IMPEDANCE_VALUE: 342 OHM
MDC_IDC_MSMT_LEADCHNL_RA_PACING_THRESHOLD_AMPLITUDE: 0.38 V
MDC_IDC_MSMT_LEADCHNL_RA_PACING_THRESHOLD_PULSEWIDTH: 0.4 MS
MDC_IDC_MSMT_LEADCHNL_RA_SENSING_INTR_AMPL: 2.75 MV
MDC_IDC_MSMT_LEADCHNL_RA_SENSING_INTR_AMPL: 2.75 MV
MDC_IDC_MSMT_LEADCHNL_RV_IMPEDANCE_VALUE: 323 OHM
MDC_IDC_MSMT_LEADCHNL_RV_IMPEDANCE_VALUE: 418 OHM
MDC_IDC_MSMT_LEADCHNL_RV_PACING_THRESHOLD_AMPLITUDE: 0.75 V
MDC_IDC_MSMT_LEADCHNL_RV_PACING_THRESHOLD_PULSEWIDTH: 0.4 MS
MDC_IDC_MSMT_LEADCHNL_RV_SENSING_INTR_AMPL: 11.5 MV
MDC_IDC_MSMT_LEADCHNL_RV_SENSING_INTR_AMPL: 11.5 MV
MDC_IDC_PG_IMPLANT_DTM: NORMAL
MDC_IDC_PG_MFG: NORMAL
MDC_IDC_PG_MODEL: NORMAL
MDC_IDC_PG_SERIAL: NORMAL
MDC_IDC_PG_TYPE: NORMAL
MDC_IDC_SESS_CLINIC_NAME: NORMAL
MDC_IDC_SESS_DTM: NORMAL
MDC_IDC_SESS_TYPE: NORMAL
MDC_IDC_SET_BRADY_AT_MODE_SWITCH_RATE: 150 {BEATS}/MIN
MDC_IDC_SET_BRADY_HYSTRATE: NORMAL
MDC_IDC_SET_BRADY_LOWRATE: 50 {BEATS}/MIN
MDC_IDC_SET_BRADY_MAX_SENSOR_RATE: 130 {BEATS}/MIN
MDC_IDC_SET_BRADY_MAX_TRACKING_RATE: 130 {BEATS}/MIN
MDC_IDC_SET_BRADY_MODE: NORMAL
MDC_IDC_SET_BRADY_PAV_DELAY_LOW: 180 MS
MDC_IDC_SET_BRADY_SAV_DELAY_LOW: 150 MS
MDC_IDC_SET_LEADCHNL_RA_PACING_AMPLITUDE: 1.5 V
MDC_IDC_SET_LEADCHNL_RA_PACING_ANODE_ELECTRODE_1: NORMAL
MDC_IDC_SET_LEADCHNL_RA_PACING_ANODE_LOCATION_1: NORMAL
MDC_IDC_SET_LEADCHNL_RA_PACING_CAPTURE_MODE: NORMAL
MDC_IDC_SET_LEADCHNL_RA_PACING_CATHODE_ELECTRODE_1: NORMAL
MDC_IDC_SET_LEADCHNL_RA_PACING_CATHODE_LOCATION_1: NORMAL
MDC_IDC_SET_LEADCHNL_RA_PACING_POLARITY: NORMAL
MDC_IDC_SET_LEADCHNL_RA_PACING_PULSEWIDTH: 0.4 MS
MDC_IDC_SET_LEADCHNL_RA_SENSING_ANODE_ELECTRODE_1: NORMAL
MDC_IDC_SET_LEADCHNL_RA_SENSING_ANODE_LOCATION_1: NORMAL
MDC_IDC_SET_LEADCHNL_RA_SENSING_CATHODE_ELECTRODE_1: NORMAL
MDC_IDC_SET_LEADCHNL_RA_SENSING_CATHODE_LOCATION_1: NORMAL
MDC_IDC_SET_LEADCHNL_RA_SENSING_POLARITY: NORMAL
MDC_IDC_SET_LEADCHNL_RA_SENSING_SENSITIVITY: 0.3 MV
MDC_IDC_SET_LEADCHNL_RV_PACING_AMPLITUDE: 1.5 V
MDC_IDC_SET_LEADCHNL_RV_PACING_ANODE_ELECTRODE_1: NORMAL
MDC_IDC_SET_LEADCHNL_RV_PACING_ANODE_LOCATION_1: NORMAL
MDC_IDC_SET_LEADCHNL_RV_PACING_CAPTURE_MODE: NORMAL
MDC_IDC_SET_LEADCHNL_RV_PACING_CATHODE_ELECTRODE_1: NORMAL
MDC_IDC_SET_LEADCHNL_RV_PACING_CATHODE_LOCATION_1: NORMAL
MDC_IDC_SET_LEADCHNL_RV_PACING_POLARITY: NORMAL
MDC_IDC_SET_LEADCHNL_RV_PACING_PULSEWIDTH: 0.4 MS
MDC_IDC_SET_LEADCHNL_RV_SENSING_ANODE_ELECTRODE_1: NORMAL
MDC_IDC_SET_LEADCHNL_RV_SENSING_ANODE_LOCATION_1: NORMAL
MDC_IDC_SET_LEADCHNL_RV_SENSING_CATHODE_ELECTRODE_1: NORMAL
MDC_IDC_SET_LEADCHNL_RV_SENSING_CATHODE_LOCATION_1: NORMAL
MDC_IDC_SET_LEADCHNL_RV_SENSING_POLARITY: NORMAL
MDC_IDC_SET_LEADCHNL_RV_SENSING_SENSITIVITY: 0.9 MV
MDC_IDC_SET_ZONE_DETECTION_INTERVAL: 370 MS
MDC_IDC_SET_ZONE_DETECTION_INTERVAL: 400 MS
MDC_IDC_SET_ZONE_TYPE: NORMAL
MDC_IDC_STAT_AT_BURDEN_PERCENT: 0 %
MDC_IDC_STAT_AT_DTM_END: NORMAL
MDC_IDC_STAT_AT_DTM_START: NORMAL
MDC_IDC_STAT_BRADY_AP_VP_PERCENT: 25.75 %
MDC_IDC_STAT_BRADY_AP_VS_PERCENT: 0.8 %
MDC_IDC_STAT_BRADY_AS_VP_PERCENT: 67.61 %
MDC_IDC_STAT_BRADY_AS_VS_PERCENT: 5.84 %
MDC_IDC_STAT_BRADY_DTM_END: NORMAL
MDC_IDC_STAT_BRADY_DTM_START: NORMAL
MDC_IDC_STAT_BRADY_RA_PERCENT_PACED: 26.47 %
MDC_IDC_STAT_BRADY_RV_PERCENT_PACED: 93.36 %
MDC_IDC_STAT_EPISODE_RECENT_COUNT: 0
MDC_IDC_STAT_EPISODE_RECENT_COUNT: 1
MDC_IDC_STAT_EPISODE_RECENT_COUNT_DTM_END: NORMAL
MDC_IDC_STAT_EPISODE_RECENT_COUNT_DTM_START: NORMAL
MDC_IDC_STAT_EPISODE_TOTAL_COUNT: 0
MDC_IDC_STAT_EPISODE_TOTAL_COUNT: 0
MDC_IDC_STAT_EPISODE_TOTAL_COUNT: 2
MDC_IDC_STAT_EPISODE_TOTAL_COUNT: 2
MDC_IDC_STAT_EPISODE_TOTAL_COUNT: 78
MDC_IDC_STAT_EPISODE_TOTAL_COUNT_DTM_END: NORMAL
MDC_IDC_STAT_EPISODE_TOTAL_COUNT_DTM_START: NORMAL
MDC_IDC_STAT_EPISODE_TYPE: NORMAL

## 2022-07-20 PROCEDURE — 93294 REM INTERROG EVL PM/LDLS PM: CPT | Performed by: INTERNAL MEDICINE

## 2022-07-20 PROCEDURE — 93296 REM INTERROG EVL PM/IDS: CPT | Performed by: INTERNAL MEDICINE

## 2022-07-25 ENCOUNTER — OFFICE VISIT (OUTPATIENT)
Dept: DERMATOLOGY | Facility: CLINIC | Age: 62
End: 2022-07-25
Payer: COMMERCIAL

## 2022-07-25 DIAGNOSIS — Z48.89 ENCOUNTER FOR POSTOPERATIVE WOUND CHECK: ICD-10-CM

## 2022-07-25 DIAGNOSIS — Z85.828 HISTORY OF NONMELANOMA SKIN CANCER: Primary | ICD-10-CM

## 2022-07-25 DIAGNOSIS — L90.5 SCAR: ICD-10-CM

## 2022-07-25 PROCEDURE — 99024 POSTOP FOLLOW-UP VISIT: CPT | Mod: GC | Performed by: DERMATOLOGY

## 2022-07-25 ASSESSMENT — PAIN SCALES - GENERAL: PAINLEVEL: NO PAIN (1)

## 2022-07-25 NOTE — NURSING NOTE
Chief Complaint   Patient presents with     Derm Problem     Patient is here today for a 3 month scar eval on ear.      Linda DIAZ CMA

## 2022-07-25 NOTE — PROGRESS NOTES
Dermatologic Surgery Clinic Note    Jul 25, 2022    Dermatology Problem List:  1. BCC, right ear, s/p MMS 2/28/22    Subjective: The patient is a 62 year old man who presents today for a wound check after recent dermatologic surgery. He points to a bump behind his ear that was tender a few weeks ago. He is wondering if it is from an undissolved suture. However, he no longer reports any itching or tenderness.     No other associated symptoms, modifying factors, or prior treatments, except when noted above. The patient denies any constitutional symptoms, lymphadenopathy, unintentional weight loss or decreased appetite. No other skin concerns today.    Objective:   Gen: This is a well appearing individual in no acute distress. The patient is alert and oriented x 3.  An exam of the R ear was performed today and visualized the following:  - Well healed scar on the posterior pinna and postauricular skin without erythema or nodularity    Assessment and Plan:     # BCC, right ear, s/p MMS 2/28/22  - The patient's surgery site(s) is/are healing very well. No evidence of infection on examination today.  - The patient should follow up with dermatologic surgery PRN, as well as continue with regular skin exams in general dermatology clinic.    The patient was discussed with and evaluated by attending physician, Jorge Alberto Taylor MD.    Justin Min MD  Dermatology, PGY-5  Mohs surgery fellow    Scribe Disclosure:  Georgia LAO, am serving as a scribe to document services personally performed by Jorge Alberto Taylor MD based on data collection and the provider's statements to me.

## 2022-07-25 NOTE — LETTER
7/25/2022       RE: Sumit Park  [id number]  [place] [address]     Dear Colleague,    Thank you for referring your patient, Sumit Park, to the [facility]MAT[facility] [place] at [facility]. Please see a copy of my visit note below.    Dermatologic Surgery Clinic Note    Jul 25, 2022    Dermatology Problem List:  1. BCC, right ear, s/p MMS 2/28/22    Subjective: The patient is a 62 year old man who presents today for a wound check after recent dermatologic surgery. He points to a bump behind his ear that was tender a few weeks ago. He is wondering if it is from an undissolved suture. However, he no longer reports any itching or tenderness.     No other associated symptoms, modifying factors, or prior treatments, except when noted above. The patient denies any constitutional symptoms, lymphadenopathy, unintentional weight loss or decreased appetite. No other skin concerns today.    Objective:   Gen: This is a well appearing individual in no acute distress. The patient is alert and oriented x 3.  An exam of the R ear was performed today and visualized the following:  - Well healed scar on the posterior pinna and postauricular skin without erythema or nodularity    Assessment and Plan:     # BCC, right ear, s/p MMS 2/28/22  - The patient's surgery site(s) is/are healing very well. No evidence of infection on examination today.  - The patient should follow up with dermatologic surgery PRN, as well as continue with regular skin exams in general dermatology clinic.    The patient was discussed with and evaluated by attending physician, Jorge Alberto Taylor MD.    Justin Min MD  Dermatology, PGY-5  Mohs surgery fellow    Scribe Disclosure:  Georgia LAO, am serving as a scribe to document services personally performed by Jorge Alberto Taylor MD based on data collection and the provider's statements to me.         Attestation signed by  Jorge Alberto Taylor MD at 7/26/2022  3:12 PM:  Attending Attestation  I attest that the Scribe recorded the interview and exam that I personally performed.  I have reviewed the note and edited it as necessary.    Jorge Alberto Taylor M.D.  Professor  Director of Dermatologic Surgery  Department of Dermatology  [facility]

## 2022-07-26 ENCOUNTER — OFFICE VISIT (OUTPATIENT)
Dept: CARDIOLOGY | Facility: CLINIC | Age: 62
End: 2022-07-26
Payer: COMMERCIAL

## 2022-07-26 ENCOUNTER — HOSPITAL ENCOUNTER (OUTPATIENT)
Dept: CARDIOLOGY | Facility: CLINIC | Age: 62
Discharge: HOME | End: 2022-07-26
Attending: INTERNAL MEDICINE | Admitting: INTERNAL MEDICINE
Payer: COMMERCIAL

## 2022-07-26 ENCOUNTER — MYC MEDICAL ADVICE (OUTPATIENT)
Dept: INTERNAL MEDICINE | Facility: CLINIC | Age: 62
End: 2022-07-26

## 2022-07-26 VITALS
BODY MASS INDEX: 34.57 KG/M2 | HEIGHT: 66 IN | OXYGEN SATURATION: 97 % | HEART RATE: 96 BPM | SYSTOLIC BLOOD PRESSURE: 134 MMHG | DIASTOLIC BLOOD PRESSURE: 74 MMHG | WEIGHT: 215.1 LBS

## 2022-07-26 DIAGNOSIS — I44.2 COMPLETE HEART BLOCK (H): ICD-10-CM

## 2022-07-26 DIAGNOSIS — Z95.0 CARDIAC PACEMAKER IN SITU: ICD-10-CM

## 2022-07-26 DIAGNOSIS — I25.10 CORONARY ARTERY DISEASE INVOLVING NATIVE HEART WITHOUT ANGINA PECTORIS, UNSPECIFIED VESSEL OR LESION TYPE: Primary | ICD-10-CM

## 2022-07-26 DIAGNOSIS — I25.10 CORONARY ARTERY DISEASE INVOLVING NATIVE HEART WITHOUT ANGINA PECTORIS, UNSPECIFIED VESSEL OR LESION TYPE: ICD-10-CM

## 2022-07-26 LAB — LVEF ECHO: NORMAL

## 2022-07-26 PROCEDURE — 93306 TTE W/DOPPLER COMPLETE: CPT

## 2022-07-26 PROCEDURE — 93306 TTE W/DOPPLER COMPLETE: CPT | Mod: 26 | Performed by: INTERNAL MEDICINE

## 2022-07-26 PROCEDURE — 99214 OFFICE O/P EST MOD 30 MIN: CPT | Mod: 25 | Performed by: INTERNAL MEDICINE

## 2022-07-26 NOTE — LETTER
7/26/2022    Candis Altamirano MD  [address] [facility] [place] [address]    RE: Sumit Park       Dear Colleague,     I had the pleasure of seeing Sumit Park in the [facility].  CARDIOLOGY CLINIC CONSULTATION    REASON FOR CONSULT: CAD    PRIMARY CARE PHYSICIAN:  Candis Altamirano    Today's clinic visit entailed:  Review of the result(s) of each unique test - Echo cath labs outside record  35 minutes spent on the date of the encounter doing chart review, history and exam, documentation and further activities per the note  Provider  Link to [facility] Help Grid     The level of medical decision making during this visit was of moderate complexity.      HISTORY OF PRESENT ILLNESS:  This a very pleasant 62-year-old gentleman who used to follow-up with [facility] and transferred care to me in 2021. He has following several medical problems     1. Premature coronary artery disease with stenting in 1999  2. Presentation in January 2018 at [place] with Holter monitor showing high-grade AV block status post dual-chamber pacemaker -since then significant right ventricular pacing over 95%.  3. Presentation in July 2018 with non-ST elevation MI noted to have distal circumflex lesion that was stented and a moderate LAD lesion left to medical management and mild left main disease  4. Mild heart failure with preserved EF not on diuretics  5. Severe sleep apnea sees pulmonary  6. Obesity hypertension diabetes hyperlipidemia and family history of coronary disease     Patient is here for routine follow-up today. Denies any anginal symptoms no syncope presyncope.  NYHA class I.  Overall feels well.  Blood pressure heart rate are stable.  Complains of mild stiffness in his bilateral fingers but no muscle aches.  His new job requires him to walk around significantly.  He has no functional limitations.  Denies even atypical symptoms.    PAST MEDICAL HISTORY:  Past Medical  History:   Diagnosis Date     CAD, 2 stents 1999, 2018 5/10/2021     Cardiac pacemaker in situ 5/10/2021     Celiac disease 5/10/2021     Complete heart block (H) -- s/p PPM 2018 5/10/2021     DM 2, no insulin (H) 5/10/2021     Gastric bypass status for obesity - 2015 5/10/2021     HTN, goal below 140/80 5/10/2021     Hyperlipidemia LDL goal <100 5/10/2021     ZULEIKA (obstructive sleep apnea) 5/10/2021       MEDICATIONS:  Current Outpatient Medications   Medication     alcohol swab prep pads     ASPIRIN LOW DOSE 81 MG EC tablet     atorvastatin (LIPITOR) 40 MG tablet     blood glucose (NO BRAND SPECIFIED) test strip     blood glucose calibration (NO BRAND SPECIFIED) solution     blood glucose monitoring (NO BRAND SPECIFIED) meter device kit     cyclobenzaprine (FLEXERIL) 5 MG tablet     FEROSUL 325 (65 Fe) MG tablet     folic acid (FOLVITE) 1 MG tablet     lisinopril (ZESTRIL) 20 MG tablet     metFORMIN (GLUCOPHAGE) 500 MG tablet     metoprolol succinate ER (TOPROL-XL) 25 MG 24 hr tablet     nitroGLYcerin (NITROSTAT) 0.4 MG sublingual tablet     thin (NO BRAND SPECIFIED) lancets     Vitamin D3 (CHOLECALCIFEROL) 25 mcg (1000 units) tablet     zinc gluconate 50 MG tablet     No current facility-administered medications for this visit.       ALLERGIES:  No Known Allergies    SOCIAL HISTORY:  I have reviewed this patient's social history and updated it with pertinent information if needed. Sumit Park  reports that he has never smoked. He has never used smokeless tobacco. He reports that he does not drink alcohol and does not use drugs.    FAMILY HISTORY:  I have reviewed this patient's family history and updated it with pertinent information if needed.   Family History   Problem Relation Age of Onset     Breast Cancer Mother 58     Heart Disease Father 71     Lymphoma Brother        REVIEW OF SYSTEMS:  Skin:        Eyes:       ENT:       Respiratory:  Positive for sleep apnea  Cardiovascular:  Negative     Gastroenterology:      Genitourinary:       Musculoskeletal:       Neurologic:       Psychiatric:       Heme/Lymph/Imm:       Endocrine:           PHYSICAL EXAM:      BP: 134/74 Pulse: 96     SpO2: 97 %      Vital Signs with Ranges  Pulse:  [96] 96  BP: (134)/(74) 134/74  SpO2:  [97 %] 97 %  215 lbs 1.6 oz    Constitutional: awake, alert, no distress  Respiratory: Clear to auscultation  Cardiovascular: Normal heart sounds normal JVP no edema ejection systolic murmur preserved S2  GI: nondistended  Neuropsychiatric: appropriate affact    DATA:  Labs: Pertinent cardiac labs reviewed    Echocardiogram: Normal LV function    Pacemaker device interrogation: Sinus rhythm with RV pacing over 90% of the times on device interrogation    ASSESSMENT:  CAD status post stenting without residual angina  Complete heart block status post dual-chamber pacemaker with over 95% RV pacing at lower rate limit of 50  Obesity hypertension diabetes hyperlipidemia    RECOMMENDATIONS:  1. Patient is doing well from a cardiac standpoint without symptoms.  2. Continue aspirin statin for life.  LDL is at goal.  3. Pacemaker interrogation reviewed.  High burden of right ventricular pacing but EF is normal.  Recommend repeat echocardiogram next year.  4. He had moderate disease in his LAD and mild disease in his left main a few years ago.  He denies any cardiovascular symptoms despite good exercise functional capacity.  He needs to alert us with any clinical changes.  If so would recommend prompt ischemic work-up.  5. Aggressive risk factor modification.  PCP to consider Jardiance for diabetes if clinically appropriate.  6. See me back in 1 year with an echocardiogram and labs in clinic.  Sooner if anything changes clinically.    MORIS Mitchell, [facility]  Cardiology - [facility]  July 26, 2022      Thank you for allowing me to participate in the care of your patient.      Sincerely,     Sae Rolle MD     [facility]  [facility]  cc:   Referred Self,

## 2022-07-26 NOTE — PROGRESS NOTES
CARDIOLOGY CLINIC CONSULTATION    REASON FOR CONSULT: CAD    PRIMARY CARE PHYSICIAN:  Candis Altamirano    Today's clinic visit entailed:  Review of the result(s) of each unique test - Echo cath labs outside record  35 minutes spent on the date of the encounter doing chart review, history and exam, documentation and further activities per the note  Provider  Link to [facility] Help Grid     The level of medical decision making during this visit was of moderate complexity.      HISTORY OF PRESENT ILLNESS:  This a very pleasant 62-year-old gentleman who used to follow-up with [facility] and transferred care to me in 2021. He has following several medical problems     1. Premature coronary artery disease with stenting in 1999  2. Presentation in January 2018 at [place] with Holter monitor showing high-grade AV block status post dual-chamber pacemaker -since then significant right ventricular pacing over 95%.  3. Presentation in July 2018 with non-ST elevation MI noted to have distal circumflex lesion that was stented and a moderate LAD lesion left to medical management and mild left main disease  4. Mild heart failure with preserved EF not on diuretics  5. Severe sleep apnea sees pulmonary  6. Obesity hypertension diabetes hyperlipidemia and family history of coronary disease     Patient is here for routine follow-up today. Denies any anginal symptoms no syncope presyncope.  NYHA class I.  Overall feels well.  Blood pressure heart rate are stable.  Complains of mild stiffness in his bilateral fingers but no muscle aches.  His new job requires him to walk around significantly.  He has no functional limitations.  Denies even atypical symptoms.    PAST MEDICAL HISTORY:  Past Medical History:   Diagnosis Date     CAD, 2 stents 1999, 2018 5/10/2021     Cardiac pacemaker in situ 5/10/2021     Celiac disease 5/10/2021     Complete heart block (H) -- s/p PPM 2018 5/10/2021     DM 2, no insulin (H) 5/10/2021      Gastric bypass status for obesity - 2015 5/10/2021     HTN, goal below 140/80 5/10/2021     Hyperlipidemia LDL goal <100 5/10/2021     ZULEIKA (obstructive sleep apnea) 5/10/2021       MEDICATIONS:  Current Outpatient Medications   Medication     alcohol swab prep pads     ASPIRIN LOW DOSE 81 MG EC tablet     atorvastatin (LIPITOR) 40 MG tablet     blood glucose (NO BRAND SPECIFIED) test strip     blood glucose calibration (NO BRAND SPECIFIED) solution     blood glucose monitoring (NO BRAND SPECIFIED) meter device kit     cyclobenzaprine (FLEXERIL) 5 MG tablet     FEROSUL 325 (65 Fe) MG tablet     folic acid (FOLVITE) 1 MG tablet     lisinopril (ZESTRIL) 20 MG tablet     metFORMIN (GLUCOPHAGE) 500 MG tablet     metoprolol succinate ER (TOPROL-XL) 25 MG 24 hr tablet     nitroGLYcerin (NITROSTAT) 0.4 MG sublingual tablet     thin (NO BRAND SPECIFIED) lancets     Vitamin D3 (CHOLECALCIFEROL) 25 mcg (1000 units) tablet     zinc gluconate 50 MG tablet     No current facility-administered medications for this visit.       ALLERGIES:  No Known Allergies    SOCIAL HISTORY:  I have reviewed this patient's social history and updated it with pertinent information if needed. Sumit Park  reports that he has never smoked. He has never used smokeless tobacco. He reports that he does not drink alcohol and does not use drugs.    FAMILY HISTORY:  I have reviewed this patient's family history and updated it with pertinent information if needed.   Family History   Problem Relation Age of Onset     Breast Cancer Mother 58     Heart Disease Father 71     Lymphoma Brother        REVIEW OF SYSTEMS:  Skin:        Eyes:       ENT:       Respiratory:  Positive for sleep apnea  Cardiovascular:  Negative    Gastroenterology:      Genitourinary:       Musculoskeletal:       Neurologic:       Psychiatric:       Heme/Lymph/Imm:       Endocrine:           PHYSICAL EXAM:      BP: 134/74 Pulse: 96     SpO2: 97 %      Vital Signs with  Ranges  Pulse:  [96] 96  BP: (134)/(74) 134/74  SpO2:  [97 %] 97 %  215 lbs 1.6 oz    Constitutional: awake, alert, no distress  Respiratory: Clear to auscultation  Cardiovascular: Normal heart sounds normal JVP no edema ejection systolic murmur preserved S2  GI: nondistended  Neuropsychiatric: appropriate affact    DATA:  Labs: Pertinent cardiac labs reviewed    Echocardiogram: Normal LV function    Pacemaker device interrogation: Sinus rhythm with RV pacing over 90% of the times on device interrogation    ASSESSMENT:  CAD status post stenting without residual angina  Complete heart block status post dual-chamber pacemaker with over 95% RV pacing at lower rate limit of 50  Obesity hypertension diabetes hyperlipidemia    RECOMMENDATIONS:  1. Patient is doing well from a cardiac standpoint without symptoms.  2. Continue aspirin statin for life.  LDL is at goal.  3. Pacemaker interrogation reviewed.  High burden of right ventricular pacing but EF is normal.  Recommend repeat echocardiogram next year.  4. He had moderate disease in his LAD and mild disease in his left main a few years ago.  He denies any cardiovascular symptoms despite good exercise functional capacity.  He needs to alert us with any clinical changes.  If so would recommend prompt ischemic work-up.  5. Aggressive risk factor modification.  PCP to consider Jardiance for diabetes if clinically appropriate.  6. See me back in 1 year with an echocardiogram and labs in clinic.  Sooner if anything changes clinically.    MORIS Mitchell, [facility]  Cardiology - [facility]  July 26, 2022

## 2022-07-28 DIAGNOSIS — Z98.84 GASTRIC BYPASS STATUS FOR OBESITY: ICD-10-CM

## 2022-08-02 RX ORDER — FERROUS SULFATE 325(65) MG
TABLET ORAL
Qty: 90 TABLET | Refills: 0 | Status: SHIPPED | OUTPATIENT
Start: 2022-08-02 | End: 2022-09-22

## 2022-09-01 ENCOUNTER — LAB (OUTPATIENT)
Dept: LAB | Facility: CLINIC | Age: 62
End: 2022-09-01
Payer: COMMERCIAL

## 2022-09-01 DIAGNOSIS — Z98.84 GASTRIC BYPASS STATUS FOR OBESITY: ICD-10-CM

## 2022-09-01 DIAGNOSIS — I10 HTN, GOAL BELOW 140/80: ICD-10-CM

## 2022-09-01 DIAGNOSIS — K90.0 CELIAC DISEASE: ICD-10-CM

## 2022-09-01 DIAGNOSIS — I25.10 CORONARY ARTERY DISEASE INVOLVING NATIVE HEART WITHOUT ANGINA PECTORIS, UNSPECIFIED VESSEL OR LESION TYPE: ICD-10-CM

## 2022-09-01 DIAGNOSIS — Z11.59 ENCOUNTER FOR HEPATITIS C SCREENING TEST FOR LOW RISK PATIENT: ICD-10-CM

## 2022-09-01 DIAGNOSIS — E11.9 TYPE 2 DIABETES MELLITUS WITHOUT COMPLICATION, WITHOUT LONG-TERM CURRENT USE OF INSULIN (H): ICD-10-CM

## 2022-09-01 DIAGNOSIS — I44.2 COMPLETE HEART BLOCK (H): ICD-10-CM

## 2022-09-01 LAB
ALBUMIN SERPL-MCNC: 3.6 G/DL (ref 3.4–5)
ALP SERPL-CCNC: 69 U/L (ref 40–150)
ALT SERPL W P-5'-P-CCNC: 36 U/L (ref 0–70)
ANION GAP SERPL CALCULATED.3IONS-SCNC: 11 MMOL/L (ref 3–14)
AST SERPL W P-5'-P-CCNC: 22 U/L (ref 0–45)
BILIRUB SERPL-MCNC: 1.4 MG/DL (ref 0.2–1.3)
BUN SERPL-MCNC: 15 MG/DL (ref 7–30)
CALCIUM SERPL-MCNC: 8.9 MG/DL (ref 8.5–10.1)
CHLORIDE BLD-SCNC: 102 MMOL/L (ref 94–109)
CHOLEST SERPL-MCNC: 118 MG/DL
CK SERPL-CCNC: 111 U/L (ref 30–300)
CO2 SERPL-SCNC: 23 MMOL/L (ref 20–32)
CREAT SERPL-MCNC: 0.51 MG/DL (ref 0.66–1.25)
CREAT UR-MCNC: 228 MG/DL
ERYTHROCYTE [DISTWIDTH] IN BLOOD BY AUTOMATED COUNT: 13.4 % (ref 10–15)
FASTING STATUS PATIENT QL REPORTED: YES
FERRITIN SERPL-MCNC: 48 NG/ML (ref 26–388)
FOLATE SERPL-MCNC: >40 NG/ML (ref 4.6–34.8)
GFR SERPL CREATININE-BSD FRML MDRD: >90 ML/MIN/1.73M2
GLUCOSE BLD-MCNC: 122 MG/DL (ref 70–99)
HBA1C MFR BLD: 6.8 % (ref 0–5.6)
HCT VFR BLD AUTO: 41.6 % (ref 40–53)
HDLC SERPL-MCNC: 29 MG/DL
HGB BLD-MCNC: 13.9 G/DL (ref 13.3–17.7)
IRON SATN MFR SERPL: 26 % (ref 15–46)
IRON SERPL-MCNC: 89 UG/DL (ref 35–180)
LDLC SERPL CALC-MCNC: 69 MG/DL
MCH RBC QN AUTO: 29.5 PG (ref 26.5–33)
MCHC RBC AUTO-ENTMCNC: 33.4 G/DL (ref 31.5–36.5)
MCV RBC AUTO: 88 FL (ref 78–100)
MICROALBUMIN UR-MCNC: 69 MG/L
MICROALBUMIN/CREAT UR: 30.26 MG/G CR (ref 0–17)
NONHDLC SERPL-MCNC: 89 MG/DL
PLATELET # BLD AUTO: 188 10E3/UL (ref 150–450)
POTASSIUM BLD-SCNC: 4.2 MMOL/L (ref 3.4–5.3)
PROT SERPL-MCNC: 6.9 G/DL (ref 6.8–8.8)
PTH-INTACT SERPL-MCNC: 46 PG/ML (ref 15–65)
RBC # BLD AUTO: 4.71 10E6/UL (ref 4.4–5.9)
SODIUM SERPL-SCNC: 136 MMOL/L (ref 133–144)
TIBC SERPL-MCNC: 338 UG/DL (ref 240–430)
TRIGL SERPL-MCNC: 98 MG/DL
TSH SERPL DL<=0.005 MIU/L-ACNC: 3.46 MU/L (ref 0.4–4)
WBC # BLD AUTO: 8.6 10E3/UL (ref 4–11)

## 2022-09-01 PROCEDURE — 82043 UR ALBUMIN QUANTITATIVE: CPT

## 2022-09-01 PROCEDURE — 82306 VITAMIN D 25 HYDROXY: CPT

## 2022-09-01 PROCEDURE — 83970 ASSAY OF PARATHORMONE: CPT

## 2022-09-01 PROCEDURE — 86803 HEPATITIS C AB TEST: CPT

## 2022-09-01 PROCEDURE — 82550 ASSAY OF CK (CPK): CPT

## 2022-09-01 PROCEDURE — 36415 COLL VENOUS BLD VENIPUNCTURE: CPT

## 2022-09-01 PROCEDURE — 82607 VITAMIN B-12: CPT

## 2022-09-01 PROCEDURE — 80061 LIPID PANEL: CPT

## 2022-09-01 PROCEDURE — 85027 COMPLETE CBC AUTOMATED: CPT

## 2022-09-01 PROCEDURE — 86364 TISS TRNSGLTMNASE EA IG CLAS: CPT

## 2022-09-01 PROCEDURE — 83036 HEMOGLOBIN GLYCOSYLATED A1C: CPT

## 2022-09-01 PROCEDURE — 82728 ASSAY OF FERRITIN: CPT

## 2022-09-01 PROCEDURE — 83550 IRON BINDING TEST: CPT

## 2022-09-01 PROCEDURE — 82746 ASSAY OF FOLIC ACID SERUM: CPT

## 2022-09-01 PROCEDURE — 84443 ASSAY THYROID STIM HORMONE: CPT

## 2022-09-01 PROCEDURE — 84425 ASSAY OF VITAMIN B-1: CPT | Mod: 90

## 2022-09-01 PROCEDURE — 99000 SPECIMEN HANDLING OFFICE-LAB: CPT

## 2022-09-01 PROCEDURE — 80053 COMPREHEN METABOLIC PANEL: CPT

## 2022-09-02 LAB
DEPRECATED CALCIDIOL+CALCIFEROL SERPL-MC: 43 UG/L (ref 20–75)
HCV AB SERPL QL IA: NONREACTIVE
VIT B12 SERPL-MCNC: 2725 PG/ML (ref 232–1245)

## 2022-09-06 LAB
TTG IGA SER-ACNC: 1 U/ML
TTG IGG SER-ACNC: 0.9 U/ML
VIT B1 PYROPHOSHATE BLD-SCNC: 81 NMOL/L

## 2022-09-14 DIAGNOSIS — M54.6 ACUTE RIGHT-SIDED THORACIC BACK PAIN: ICD-10-CM

## 2022-09-15 NOTE — TELEPHONE ENCOUNTER
Routing refill request to provider for review/approval because:  Drug not on the [facility] refill protocol   Con Ferris RN, BSN

## 2022-09-16 RX ORDER — CYCLOBENZAPRINE HCL 5 MG
5 TABLET ORAL 3 TIMES DAILY PRN
Qty: 30 TABLET | Refills: 0 | Status: SHIPPED | OUTPATIENT
Start: 2022-09-16 | End: 2023-10-19

## 2022-09-19 ENCOUNTER — MYC MEDICAL ADVICE (OUTPATIENT)
Dept: CARDIOLOGY | Facility: CLINIC | Age: 62
End: 2022-09-19

## 2022-09-19 ENCOUNTER — TELEPHONE (OUTPATIENT)
Dept: CARDIOLOGY | Facility: CLINIC | Age: 62
End: 2022-09-19

## 2022-09-19 DIAGNOSIS — I25.10 CORONARY ARTERY DISEASE INVOLVING NATIVE HEART WITHOUT ANGINA PECTORIS, UNSPECIFIED VESSEL OR LESION TYPE: Primary | ICD-10-CM

## 2022-09-19 DIAGNOSIS — I47.29 NON-SUSTAINED VENTRICULAR TACHYCARDIA (H): ICD-10-CM

## 2022-09-19 NOTE — TELEPHONE ENCOUNTER
I called pt to let him know I will send an update to . 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 His last one was 2018. I will update  to see what type of stress test he would like pt to have at this time. Moreno HOLLIDAY September 19, 2022, 3:46 PM

## 2022-09-19 NOTE — TELEPHONE ENCOUNTER
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 Rolle. Per pt they want a treadmill stress EKG to see he can reach 6 METS or higher.     Last Echo and OV w/Dr. Rolle 7/26/22. Routed to Dr. Rolle for orders. Chelsey Woo RN on 9/19/2022 at 3:49 PM

## 2022-09-19 NOTE — TELEPHONE ENCOUNTER
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 Please place the order and call him to schedule.  Thank you!  Date needed: 9/19/2022  Provider name: Dr. Rolle      Action Taken: Message routed to:  Other: Cardiology    Travel Screening: Not Applicable

## 2022-09-20 ENCOUNTER — TELEPHONE (OUTPATIENT)
Dept: CARDIOLOGY | Facility: CLINIC | Age: 62
End: 2022-09-20

## 2022-09-20 DIAGNOSIS — I25.10 CORONARY ARTERY DISEASE INVOLVING NATIVE HEART WITHOUT ANGINA PECTORIS, UNSPECIFIED VESSEL OR LESION TYPE: Primary | ICD-10-CM

## 2022-09-20 DIAGNOSIS — I44.2 COMPLETE HEART BLOCK (H): ICD-10-CM

## 2022-09-20 NOTE — TELEPHONE ENCOUNTER
I spoke to scheduling and pt will need to be canceled and rescheduled as his EKG stress test for tomorrow is being changed to a lexiscan. Scheduling will update me in the AM as to whether we have any lexiscan stress test openings this week in  or [place]. I called pt and canceled the EKG stress test for tomorrow. An updated lexiscan nuclear stress test order has been placed. Moreno HOLLIDAY September 20, 2022, 4:55 PM

## 2022-09-20 NOTE — TELEPHONE ENCOUNTER
I called pt and reviewed 's recommendation for lexiscan stress test and follow up visit to discuss results and DOT clearance. I will have scheduling call pt to set up.  has openings on Monday so I told pt scheduling will try to schedule him for the stress test this week if possible. Pt said he has not had any issues with chest pain or shortness of breath. Moreno HOLLIDAY September 20, 2022, 10:25 AM

## 2022-09-20 NOTE — TELEPHONE ENCOUNTER
----- Message from Stephanie Toney sent at 9/20/2022  3:54 PM CDT -----  Regarding: Stress Test Clarification  Hi All,    Looking for clarification on the type of stress test Mr. Park should have tomorrow. He is on our stress test schedule tomorrow 9/21 @ 1PM. Looking at some notes there is talk about just a treadmill which is what was ordered. There is another note from today talking about a nuclear medicine treadmill stress test. Since the patient is 95% ventricular paced a just a regular treadmill would likely be read non-diagnostic since we can't interpret EKG tracing when a patient is V-paced. Please advise on what type of test is wanted.    Thanks!    Stephanie Toney  Exercise Physiologist  [phone]

## 2022-09-20 NOTE — TELEPHONE ENCOUNTER
Thank you.  The patient is asymptomatic and they only require a stress ECG and not imaging, then I would order a stress ECG test.

## 2022-09-20 NOTE — TELEPHONE ENCOUNTER
I reviewed update with . He said pt needs to be changed to a lexiscan stress test. Moreno HOLLIDAY September 20, 2022, 4:18 PM

## 2022-09-20 NOTE — TELEPHONE ENCOUNTER
Lexiscan MPI. He should follow up with us in clinic after to discuss. I am assuming he is asymptomatic and needs it for his DOT. If he has symptoms, I would NOT recommend stress testing.

## 2022-09-21 NOTE — TELEPHONE ENCOUNTER
There are no stress test openings in [place] or  this week. I heard back from Stephanie exercise physiologist at the  and she said there should be openings at the  location this week. I called pt and instructed him to call central scheduling, and they can help direct him to the  scheduling team to set up a nuclear lexiscan stress test. Pt to call back if he has issues getting scheduled. Moreno HOLLIDAY September 21, 2022, 9:13 AM

## 2022-09-21 NOTE — TELEPHONE ENCOUNTER
Pt could not get in for a nuclear stress test until 9/27. I messaged  to clarify if pt still needs a visit for DOT clearance as he has been asymptomatic. I will have our scheduling team call pt to reschedule visit.    Sae Rolle MD [place], Erin B., RN  He should have a visit             Previous Messages       ----- Message -----   From: Sonali Demarco RN   Sent: 9/21/2022  10:38 AM CDT   To: Sae Rolle MD   Subject: See note below                                   Hi,   Pt had to reschedule  his stress test from today  to 9/27  since it is now a lexiscan, and not an EKG stress test. He was originally scheduled to see you 9/26.. Does he need a visit in order for you to clear him from cardiac standpoint for DOT, or are you willing to make a statement if his stress test is normal without a visit? If he needs a visit either way I will have to put him with DOD as you don't have any openings. Thanks.   Sonali

## 2022-09-22 ENCOUNTER — OFFICE VISIT (OUTPATIENT)
Dept: INTERNAL MEDICINE | Facility: CLINIC | Age: 62
End: 2022-09-22
Payer: COMMERCIAL

## 2022-09-22 VITALS
DIASTOLIC BLOOD PRESSURE: 80 MMHG | BODY MASS INDEX: 34.41 KG/M2 | HEIGHT: 66 IN | TEMPERATURE: 96 F | HEART RATE: 79 BPM | OXYGEN SATURATION: 98 % | WEIGHT: 214.1 LBS | RESPIRATION RATE: 18 BRPM | SYSTOLIC BLOOD PRESSURE: 155 MMHG

## 2022-09-22 DIAGNOSIS — Z98.84 GASTRIC BYPASS STATUS FOR OBESITY: ICD-10-CM

## 2022-09-22 DIAGNOSIS — I10 HTN, GOAL BELOW 140/90: ICD-10-CM

## 2022-09-22 DIAGNOSIS — E11.9 TYPE 2 DIABETES MELLITUS WITHOUT COMPLICATION, WITHOUT LONG-TERM CURRENT USE OF INSULIN (H): ICD-10-CM

## 2022-09-22 DIAGNOSIS — E78.5 HYPERLIPIDEMIA LDL GOAL <100: ICD-10-CM

## 2022-09-22 DIAGNOSIS — I25.10 CORONARY ARTERY DISEASE INVOLVING NATIVE HEART WITHOUT ANGINA PECTORIS, UNSPECIFIED VESSEL OR LESION TYPE: Primary | ICD-10-CM

## 2022-09-22 PROCEDURE — 99207 PR FOOT EXAM NO CHARGE: CPT | Performed by: INTERNAL MEDICINE

## 2022-09-22 PROCEDURE — 99214 OFFICE O/P EST MOD 30 MIN: CPT | Performed by: INTERNAL MEDICINE

## 2022-09-22 RX ORDER — METOPROLOL SUCCINATE 25 MG/1
12.5 TABLET, EXTENDED RELEASE ORAL DAILY
Qty: 45 TABLET | Refills: 1 | Status: SHIPPED | OUTPATIENT
Start: 2022-09-22 | End: 2023-01-27

## 2022-09-22 RX ORDER — AMLODIPINE BESYLATE 2.5 MG/1
2.5 TABLET ORAL DAILY
Qty: 30 TABLET | Refills: 3 | Status: SHIPPED | OUTPATIENT
Start: 2022-09-22 | End: 2023-01-09

## 2022-09-22 RX ORDER — ATORVASTATIN CALCIUM 40 MG/1
40 TABLET, FILM COATED ORAL DAILY
Qty: 90 TABLET | Refills: 1 | Status: SHIPPED | OUTPATIENT
Start: 2022-09-22 | End: 2023-01-27

## 2022-09-22 RX ORDER — FERROUS SULFATE 325(65) MG
TABLET ORAL
Qty: 90 TABLET | Refills: 0 | Status: SHIPPED | OUTPATIENT
Start: 2022-09-22 | End: 2022-11-03

## 2022-09-22 RX ORDER — FOLIC ACID 1 MG/1
TABLET ORAL
Qty: 90 TABLET | Refills: 1 | Status: SHIPPED | OUTPATIENT
Start: 2022-09-22 | End: 2023-05-10

## 2022-09-22 RX ORDER — NITROGLYCERIN 0.4 MG/1
TABLET SUBLINGUAL
Qty: 10 TABLET | Refills: 3 | Status: SHIPPED | OUTPATIENT
Start: 2022-09-22

## 2022-09-22 RX ORDER — LISINOPRIL 20 MG/1
20 TABLET ORAL 2 TIMES DAILY
Qty: 180 TABLET | Refills: 1 | Status: SHIPPED | OUTPATIENT
Start: 2022-09-22 | End: 2023-01-27

## 2022-09-22 NOTE — PATIENT INSTRUCTIONS
Plan:  1. Start Amlodipine 2.5 mg daily -- for the blood pressure   2. Please make a lab appointment for nonfasting labs  In January  3.  Please make an appointment few days after the labs to discuss about the results. Bring BP records and machine. ANNUAL EXAM\  5. Continue the other meds, same doses for now.

## 2022-09-22 NOTE — PROGRESS NOTES
Dr Jose's note      Patient's instructions / PLAN:                                                        Plan:  1. Start Amlodipine 2.5 mg daily -- for the blood pressure   2. Please make a lab appointment for nonfasting labs  In January  3.  Please make an appointment few days after the labs to discuss about the results. Bring BP records and machine. ANNUAL EXAM\  5. Continue the other meds, same doses for now.      ASSESSMENT & PLAN:                                                      (I25.10) CAD, 2 stents 1999, 2018  (primary encounter diagnosis)  Comment: He has few appointment scheduled with cardiology for stress test, required for his driving license as a   Plan: nitroGLYcerin (NITROSTAT) 0.4 MG sublingual         tablet, metoprolol succinate ER (TOPROL XL) 25         MG 24 hr tablet, CBC with platelets, Hemoglobin        A1c, CK total, Comprehensive metabolic panel,         Ferritin            (E78.5) Hyperlipidemia LDL goal <100  Comment: Controlled  Plan: nitroGLYcerin (NITROSTAT) 0.4 MG sublingual         tablet, CBC with platelets, Hemoglobin A1c, CK         total, Comprehensive metabolic panel, Ferritin        Continue same meds, same doses for now     (E11.9) DM 2 no insulin (H)  Comment: Controlled  Plan: metFORMIN (GLUCOPHAGE) 500 MG tablet, CBC with         platelets, Hemoglobin A1c, CK total,         Comprehensive metabolic panel, Ferritin            (I10) HTN, goal below 140/90  Comment: Not controlled  Plan: amLODIPine (NORVASC) 2.5 MG tablet, lisinopril         (ZESTRIL) 20 MG tablet, CBC with platelets,         Hemoglobin A1c, CK total, Comprehensive         metabolic panel, Ferritin        He will have an appointment soon with cardiology and if his blood pressure remains elevated, I hope the cardiology adjust the medications    (Z98.84) Gastric bypass status for obesity - 2015  Comment:   Plan: ferrous sulfate (FEROSUL) 325 (65 Fe) MG         tablet, folic acid (FOLVITE) 1 MG  tablet               Chief complaint:                                                      Follow up chronic medical problems       SUBJECTIVE:                                                    History of present illness:    HTN  -- wrist cuff at home: BP 130s-140s     Sumit is a 62 year old, presenting for the following health issues:  Patient is being seen to review labs.    No acute complaints.  We reviewed the recent blood test results and I answered his questions    History of Present Illness       Reason for visit:  Lab fu    He eats 0-1 servings of fruits and vegetables daily.He consumes 2 sweetened beverage(s) daily.He exercises with enough effort to increase his heart rate 9 or less minutes per day.  He exercises with enough effort to increase his heart rate 3 or less days per week. He is missing 1 dose(s) of medications per week.       Diabetes Follow-up      How often are you checking your blood sugar? Not at all    What concerns do you have today about your diabetes? None     Do you have any of these symptoms? (Select all that apply)  No numbness or tingling in feet.  No redness, sores or blisters on feet.  No complaints of excessive thirst.  No reports of blurry vision.  No significant changes to weight.              Hyperlipidemia Follow-Up      Are you regularly taking any medication or supplement to lower your cholesterol?   Yes- Atorvastatin    Are you having muscle aches or other side effects that you think could be caused by your cholesterol lowering medication?  No    Hypertension Follow-up      Do you check your blood pressure regularly outside of the clinic? Yes     Are you following a low salt diet? No    Are your blood pressures ever more than 140 on the top number (systolic) OR more   than 90 on the bottom number (diastolic), for example 140/90? Yes    BP Readings from Last 2 Encounters:   07/26/22 134/74   04/25/22 (!) 138/93     Hemoglobin A1C (%)   Date Value   09/01/2022 6.8 (H)  "  05/20/2022 7.0 (H)   05/10/2021 7.8 (H)     LDL Cholesterol Calculated (mg/dL)   Date Value   09/01/2022 69   08/12/2021 66   05/10/2021 92       Review of Systems:                                                      ROS: negative for fever, chills, cough, wheezes, chest pain, shortness of breath, vomiting, abdominal pain, leg swelling         OBJECTIVE:             Physical exam:  Blood pressure (!) 175/93, pulse 79, temperature (!) 96  F (35.6  C), temperature source Tympanic, resp. rate 18, height 1.683 m (5' 6.25\"), weight 97.1 kg (214 lb 1.6 oz), SpO2 98 %.   Recheck blood pressure was 155/80  NAD, appears comfortable  Skin: no rashes   Neck: supple, no JVD,  No thyroidmegaly. Lymph nodes nonpalpable cervical and supraclavicular.  Chest: clear to auscultation bilaterally, good respiratory effort  Heart: S1 S2, RRR, no mgr appreciated  Abdomen: soft, not tender,  Extremities: no edema, clubbing, cyanosis. Palpable pedal pulses bilaterally, Monofilament skin sensation is intact, no feet skin lesions   Neurologic: A, Ox3, no focal signs appreciated    PMHx: reviewed  Past Medical History:   Diagnosis Date     CAD, 2 stents 1999, 2018 5/10/2021     Cardiac pacemaker in situ 5/10/2021     Celiac disease 5/10/2021     Complete heart block (H) -- s/p PPM 2018 5/10/2021     DM 2, no insulin (H) 5/10/2021     Gastric bypass status for obesity - 2015 5/10/2021     HTN, goal below 140/80 5/10/2021     Hyperlipidemia LDL goal <100 5/10/2021     ZULEIKA (obstructive sleep apnea) 5/10/2021      PSHx: reviewed  Past Surgical History:   Procedure Laterality Date     ANGIOPLASTY       COLONOSCOPY N/A 1/13/2015    Procedure: COLONOSCOPY;  Surgeon: Juni Craig MD;  Location: [facility];  Service:      DISTAL BICEPS TENDON REPAIR Left      ESOPHAGOSCOPY, GASTROSCOPY, DUODENOSCOPY (EGD), COMBINED N/A 1/13/2015    Procedure: UPPER ENDOSCOPY;  Surgeon: Juni Craig MD;  Location: [facility];  Service:  "     HERNIORRHAPHY INCISIONAL (LOCATION)      incarcerated     LAPAROSCOPIC GASTRIC BYPASS N/A 2015     LEG SURGERY Right     muscle surgery     OTHER SURGICAL HISTORY      forearm / wrist surgery     REPAIR TENDON BICEPS DISTAL UPPER EXTREMITY       UMBILICAL HERNIA REPAIR N/A      WRIST SURGERY Right         Meds: reviewed  Current Outpatient Medications   Medication Sig Dispense Refill     alcohol swab prep pads Use to swab area of injection/joe as directed. 100 each 1     ASPIRIN LOW DOSE 81 MG EC tablet TAKE ONE TABLET BY MOUTH EVERY DAY FOR HEART DISEASE  **DO NOT CHEW** FOR HEART DISEASE  **DO NOT CHEW** 100 tablet 3     atorvastatin (LIPITOR) 40 MG tablet Take 1 tablet (40 mg) by mouth daily 90 tablet 1     blood glucose (NO BRAND SPECIFIED) test strip Use to test blood sugar 1 times daily or as directed. 100 strip 1     blood glucose calibration (NO BRAND SPECIFIED) solution Use to calibrate blood glucose monitor as needed as directed. 1 each 1     blood glucose monitoring (NO BRAND SPECIFIED) meter device kit Use to test blood sugar 1 times daily or as directed. 1 kit 0     cyclobenzaprine (FLEXERIL) 5 MG tablet Take 1 tablet (5 mg) by mouth 3 times daily as needed for muscle spasms 30 tablet 0     FEROSUL 325 (65 Fe) MG tablet Take 1 tablet (325 mg) by mouth daily. 90 tablet 0     folic acid (FOLVITE) 1 MG tablet Take 1 tablet (1,000 mcg) by mouth daily -- For further refills patient needs appointment 30 tablet 1     lisinopril (ZESTRIL) 20 MG tablet Take 1 tablet (20 mg) by mouth 2 times daily 180 tablet 1     metFORMIN (GLUCOPHAGE) 500 MG tablet Take 2 tablets (1,000 mg) by mouth 2 times daily (with meals) 360 tablet 1     metoprolol succinate ER (TOPROL-XL) 25 MG 24 hr tablet Take 0.5 tablets (12.5 mg) by mouth daily 45 tablet 1     nitroGLYcerin (NITROSTAT) 0.4 MG sublingual tablet For chest pain place 1 tablet under the tongue every 5 minutes for 3 doses. If symptoms persist 5 minutes after 1st dose  call 911. 10 tablet 3     thin (NO BRAND SPECIFIED) lancets Use with lanceting device. 100 each 1     Vitamin D3 (CHOLECALCIFEROL) 25 mcg (1000 units) tablet Take by mouth daily       zinc gluconate 50 MG tablet Take 50 mg by mouth daily         Soc Hx: reviewed  Fam Hx: reviewed      Chart documentation was completed, in part, with [employer] voice-recognition software. Even though reviewed, some grammatical, spelling, and word errors may remain.      Candis Jose MD  Internal Medicine

## 2022-09-27 ENCOUNTER — HOSPITAL ENCOUNTER (OUTPATIENT)
Dept: NUCLEAR MEDICINE | Facility: CLINIC | Age: 62
Setting detail: NUCLEAR MEDICINE
Discharge: HOME | End: 2022-09-27
Attending: INTERNAL MEDICINE
Payer: COMMERCIAL

## 2022-09-27 ENCOUNTER — HOSPITAL ENCOUNTER (OUTPATIENT)
Dept: CARDIOLOGY | Facility: CLINIC | Age: 62
Discharge: HOME | End: 2022-09-27
Attending: INTERNAL MEDICINE
Payer: COMMERCIAL

## 2022-09-27 VITALS — DIASTOLIC BLOOD PRESSURE: 63 MMHG | HEART RATE: 69 BPM | SYSTOLIC BLOOD PRESSURE: 121 MMHG

## 2022-09-27 DIAGNOSIS — I25.10 CORONARY ARTERY DISEASE INVOLVING NATIVE HEART WITHOUT ANGINA PECTORIS, UNSPECIFIED VESSEL OR LESION TYPE: ICD-10-CM

## 2022-09-27 DIAGNOSIS — I44.2 COMPLETE HEART BLOCK (H): ICD-10-CM

## 2022-09-27 LAB
CV STRESS MAX HR HE: 78
RATE PRESSURE PRODUCT: 9438
STRESS ECHO BASELINE DIASTOLIC HE: 70
STRESS ECHO BASELINE HR: 60 BPM
STRESS ECHO BASELINE SYSTOLIC BP: 114
STRESS ECHO CALCULATED PERCENT HR: 49 %
STRESS ECHO LAST STRESS DIASTOLIC BP: 67
STRESS ECHO LAST STRESS SYSTOLIC BP: 121
STRESS ECHO TARGET HR: 158

## 2022-09-27 PROCEDURE — 78452 HT MUSCLE IMAGE SPECT MULT: CPT | Mod: 26 | Performed by: INTERNAL MEDICINE

## 2022-09-27 PROCEDURE — 93018 CV STRESS TEST I&R ONLY: CPT | Performed by: INTERNAL MEDICINE

## 2022-09-27 PROCEDURE — 343N000001 HC RX 343: Performed by: INTERNAL MEDICINE

## 2022-09-27 PROCEDURE — 250N000011 HC RX IP 250 OP 636: Performed by: INTERNAL MEDICINE

## 2022-09-27 PROCEDURE — 93017 CV STRESS TEST TRACING ONLY: CPT

## 2022-09-27 PROCEDURE — 78452 HT MUSCLE IMAGE SPECT MULT: CPT

## 2022-09-27 PROCEDURE — A9502 TC99M TETROFOSMIN: HCPCS | Performed by: INTERNAL MEDICINE

## 2022-09-27 PROCEDURE — 93016 CV STRESS TEST SUPVJ ONLY: CPT | Performed by: INTERNAL MEDICINE

## 2022-09-27 RX ORDER — AMINOPHYLLINE 25 MG/ML
50-100 INJECTION, SOLUTION INTRAVENOUS
Status: DISCONTINUED | OUTPATIENT
Start: 2022-09-27 | End: 2022-09-28 | Stop reason: HOSPADM

## 2022-09-27 RX ORDER — CAFFEINE CITRATE 20 MG/ML
60 SOLUTION INTRAVENOUS
Status: DISCONTINUED | OUTPATIENT
Start: 2022-09-27 | End: 2022-09-28 | Stop reason: HOSPADM

## 2022-09-27 RX ORDER — REGADENOSON 0.08 MG/ML
0.4 INJECTION, SOLUTION INTRAVENOUS ONCE
Status: COMPLETED | OUTPATIENT
Start: 2022-09-27 | End: 2022-09-27

## 2022-09-27 RX ORDER — ALBUTEROL SULFATE 90 UG/1
2 AEROSOL, METERED RESPIRATORY (INHALATION) EVERY 5 MIN PRN
Status: DISCONTINUED | OUTPATIENT
Start: 2022-09-27 | End: 2022-09-28 | Stop reason: HOSPADM

## 2022-09-27 RX ORDER — ACYCLOVIR 200 MG/1
0-1 CAPSULE ORAL
Status: DISCONTINUED | OUTPATIENT
Start: 2022-09-27 | End: 2022-09-28 | Stop reason: HOSPADM

## 2022-09-27 RX ADMIN — TETROFOSMIN 9.9 MCI.: 1.38 INJECTION, POWDER, LYOPHILIZED, FOR SOLUTION INTRAVENOUS at 09:05

## 2022-09-27 RX ADMIN — REGADENOSON 0.4 MG: 0.08 INJECTION, SOLUTION INTRAVENOUS at 09:58

## 2022-09-27 RX ADMIN — TETROFOSMIN 40 MCI.: 1.38 INJECTION, POWDER, LYOPHILIZED, FOR SOLUTION INTRAVENOUS at 10:03

## 2022-09-27 NOTE — PROGRESS NOTES
Pt here for Lexiscan nuclear stress test.  Medication and side effects reviewed with patient. Lung sounds clear to auscultation bilaterally.  Denied caffeine use.  Patient tolerated Lexiscan dose without any adverse reactions.  VSS.  Monitored post injection and then taken to the [facility] waiting room and instructed to wait there for nuclear medicine tech for follow up imaging.

## 2022-09-28 ENCOUNTER — OFFICE VISIT (OUTPATIENT)
Dept: CARDIOLOGY | Facility: CLINIC | Age: 62
End: 2022-09-28
Attending: INTERNAL MEDICINE
Payer: COMMERCIAL

## 2022-09-28 VITALS
WEIGHT: 213.5 LBS | HEIGHT: 66 IN | DIASTOLIC BLOOD PRESSURE: 76 MMHG | HEART RATE: 72 BPM | SYSTOLIC BLOOD PRESSURE: 126 MMHG | BODY MASS INDEX: 34.31 KG/M2

## 2022-09-28 DIAGNOSIS — I25.10 CORONARY ARTERY DISEASE INVOLVING NATIVE HEART WITHOUT ANGINA PECTORIS, UNSPECIFIED VESSEL OR LESION TYPE: ICD-10-CM

## 2022-09-28 PROCEDURE — 99214 OFFICE O/P EST MOD 30 MIN: CPT | Performed by: INTERNAL MEDICINE

## 2022-09-28 NOTE — PROGRESS NOTES
HPI and Plan:   Pleasant 62-year-old gentleman here for DOT clearance letter.  Patient of Dr. Rolle.    History is noted as follows:    1. Premature coronary artery disease with stenting in 1999  2. Presentation in January 2018 at [place] [facility] with Holter monitor showing high-grade AV block status post dual-chamber pacemaker -since then significant right ventricular pacing over 95%.  3. Presentation in July 2018 with non-ST elevation MI noted to have distal circumflex lesion that was stented and a moderate LAD lesion left to medical management and mild left main disease  4. Mild heart failure with preserved EF not on diuretics  5. Severe sleep apnea , has dental device, denies problems with with this condition.    6. 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  Walks 2 to 3 miles a day, no symptoms of angina or heart failure.  Cardiac examination shows normal JVP unremarkable heart sounds clear chest no peripheral edema.    Had a Lexiscan nuclear study done yesterday.  This is normal.  Echocardiogram in July shows normal LV function.  No major valvular lesions noted.    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  Provide DOT letter.      No orders of the defined types were placed in this encounter.      No orders of the defined types were placed in this encounter.      Encounter Diagnosis   Name Primary?     Coronary artery disease involving native heart without angina pectoris, unspecified vessel or lesion type        CURRENT MEDICATIONS:  Current Outpatient Medications   Medication Sig Dispense Refill     alcohol swab prep pads Use to swab area of injection/joe as directed. 100 each 1     amLODIPine (NORVASC) 2.5 MG tablet Take 1 tablet (2.5 mg) by mouth daily 30 tablet 3     ASPIRIN LOW DOSE 81 MG EC tablet TAKE ONE TABLET BY MOUTH EVERY DAY FOR HEART DISEASE  **DO NOT CHEW** FOR HEART DISEASE  **DO NOT CHEW**  100 tablet 3     atorvastatin (LIPITOR) 40 MG tablet Take 1 tablet (40 mg) by mouth daily 90 tablet 1     blood glucose (NO BRAND SPECIFIED) test strip Use to test blood sugar 1 times daily or as directed. 100 strip 1     blood glucose calibration (NO BRAND SPECIFIED) solution Use to calibrate blood glucose monitor as needed as directed. 1 each 1     blood glucose monitoring (NO BRAND SPECIFIED) meter device kit Use to test blood sugar 1 times daily or as directed. 1 kit 0     cyclobenzaprine (FLEXERIL) 5 MG tablet Take 1 tablet (5 mg) by mouth 3 times daily as needed for muscle spasms 30 tablet 0     ferrous sulfate (FEROSUL) 325 (65 Fe) MG tablet Take 1 tablet (325 mg) by mouth daily. 90 tablet 0     folic acid (FOLVITE) 1 MG tablet Take 1 tablet (1,000 mcg) by mouth daily 90 tablet 1     lisinopril (ZESTRIL) 20 MG tablet Take 1 tablet (20 mg) by mouth 2 times daily 180 tablet 1     metFORMIN (GLUCOPHAGE) 500 MG tablet Take 2 tablets (1,000 mg) by mouth 2 times daily (with meals) 360 tablet 1     metoprolol succinate ER (TOPROL XL) 25 MG 24 hr tablet Take 0.5 tablets (12.5 mg) by mouth daily 45 tablet 1     nitroGLYcerin (NITROSTAT) 0.4 MG sublingual tablet For chest pain place 1 tablet under the tongue every 5 minutes for 3 doses. If symptoms persist 5 minutes after 1st dose call 911. 10 tablet 3     thin (NO BRAND SPECIFIED) lancets Use with lanceting device. 100 each 1     Vitamin D3 (CHOLECALCIFEROL) 25 mcg (1000 units) tablet Take by mouth daily       zinc gluconate 50 MG tablet Take 50 mg by mouth daily         ALLERGIES   No Known Allergies    PAST MEDICAL HISTORY:  Past Medical History:   Diagnosis Date     CAD, 2 stents 1999, 2018 5/10/2021     Cardiac pacemaker in situ 5/10/2021     Celiac disease 5/10/2021     Complete heart block (H) -- s/p PPM 2018 5/10/2021     DM 2, no insulin (H) 5/10/2021     Gastric bypass status for obesity - 2015 5/10/2021     HTN, goal below 140/80 5/10/2021     Hyperlipidemia  "LDL goal <100 5/10/2021     ZULEIKA (obstructive sleep apnea) 5/10/2021       PAST SURGICAL HISTORY:  Past Surgical History:   Procedure Laterality Date     ANGIOPLASTY       COLONOSCOPY N/A 1/13/2015    Procedure: COLONOSCOPY;  Surgeon: Juni Craig MD;  Location: [facility];  Service:      DISTAL BICEPS TENDON REPAIR Left      ESOPHAGOSCOPY, GASTROSCOPY, DUODENOSCOPY (EGD), COMBINED N/A 1/13/2015    Procedure: UPPER ENDOSCOPY;  Surgeon: Juni Craig MD;  Location: [facility];  Service:      HERNIORRHAPHY INCISIONAL (LOCATION)      incarcerated     LAPAROSCOPIC GASTRIC BYPASS N/A 2015     LEG SURGERY Right     muscle surgery     OTHER SURGICAL HISTORY      forearm / wrist surgery     REPAIR TENDON BICEPS DISTAL UPPER EXTREMITY       UMBILICAL HERNIA REPAIR N/A      WRIST SURGERY Right        FAMILY HISTORY:  Family History   Problem Relation Age of Onset     Breast Cancer Mother 58     Heart Disease Father 71     Lymphoma Brother        SOCIAL HISTORY:  Social History     Socioeconomic History     Marital status:      Spouse name: None     Number of children: None     Years of education: None     Highest education level: None   Tobacco Use     Smoking status: Never Smoker     Smokeless tobacco: Never Used   Vaping Use     Vaping Use: Never used   Substance and Sexual Activity     Alcohol use: Never     Drug use: Never     Sexual activity: Yes     Partners: Female       Review of Systems:  Skin:  not assessed     Eyes:  not assessed    ENT:  not assessed    Respiratory:  not assessed    Cardiovascular:       Gastroenterology: Negative    Genitourinary:  not assessed    Musculoskeletal:  not assessed    Neurologic:  Negative    Psychiatric:  not assessed    Heme/Lymph/Imm:  not assessed    Endocrine:  not assessed      Physical Exam:  Vitals: /76   Pulse 72   Ht 1.683 m (5' 6.25\")   Wt 96.8 kg (213 lb 8 oz)   BMI 34.20 kg/m      Constitutional:  cooperative, alert and " oriented, well developed, well nourished, in no acute distress        Skin:  warm and dry to the touch, no apparent skin lesions or masses noted   pacemaker incision in the left infraclavicular area was well-healed      Head:  normocephalic, no masses or lesions        Eyes:  pupils equal and round, conjunctivae and lids unremarkable, sclera white, no xanthalasma, EOMS intact, no nystagmus        Lymph:No Cervical lymphadenopathy present     ENT:  no pallor or cyanosis, dentition good        Neck:  carotid pulses are full and equal bilaterally, JVP normal, no carotid bruit        Respiratory:  normal breath sounds, clear to auscultation, normal A-P diameter, normal symmetry, normal respiratory excursion, no use of accessory muscles         Cardiac: regular rhythm, normal S1/S2, no S3 or S4, apical impulse not displaced, no murmurs, gallops or rubs                pulses full and equal, no bruits auscultated                                        GI:  abdomen soft, non-tender, BS normoactive, no mass, no HSM, no bruits        Extremities and Muscular Skeletal:  no deformities, clubbing, cyanosis, erythema observed              Neurological:  no gross motor deficits        Psych:  Alert and Oriented x 3        Recent Lab Results:  LIPID RESULTS:  Lab Results   Component Value Date    CHOL 118 09/01/2022    CHOL 150 05/10/2021    HDL 29 (L) 09/01/2022    HDL 35 (L) 05/10/2021    LDL 69 09/01/2022    LDL 92 05/10/2021    TRIG 98 09/01/2022    TRIG 114 05/10/2021       LIVER ENZYME RESULTS:  Lab Results   Component Value Date    AST 22 09/01/2022    AST 18 05/10/2021    ALT 36 09/01/2022    ALT 27 05/10/2021       CBC RESULTS:  Lab Results   Component Value Date    WBC 8.6 09/01/2022    WBC 7.0 05/10/2021    RBC 4.71 09/01/2022    RBC 4.96 05/10/2021    HGB 13.9 09/01/2022    HGB 14.7 05/10/2021    HCT 41.6 09/01/2022    HCT 45.2 05/10/2021    MCV 88 09/01/2022    MCV 91 05/10/2021    MCH 29.5 09/01/2022    MCH 29.6  05/10/2021    MCHC 33.4 09/01/2022    MCHC 32.5 05/10/2021    RDW 13.4 09/01/2022    RDW 13.9 05/10/2021     09/01/2022     05/10/2021       BMP RESULTS:  Lab Results   Component Value Date     09/01/2022     05/10/2021    POTASSIUM 4.2 09/01/2022    POTASSIUM 4.4 05/10/2021    CHLORIDE 102 09/01/2022    CHLORIDE 103 05/10/2021    CO2 23 09/01/2022    CO2 34 (H) 05/10/2021    ANIONGAP 11 09/01/2022    ANIONGAP 1 (L) 05/10/2021     (H) 09/01/2022     (H) 05/10/2021    BUN 15 09/01/2022    BUN 15 05/10/2021    CR 0.51 (L) 09/01/2022    CR 0.76 05/10/2021    GFRESTIMATED >90 09/01/2022    GFRESTIMATED >90 05/10/2021    GFRESTBLACK >90 05/10/2021    JADYN 8.9 09/01/2022    JADYN 8.5 05/10/2021        A1C RESULTS:  Lab Results   Component Value Date    A1C 6.8 (H) 09/01/2022    A1C 7.8 (H) 05/10/2021       INR RESULTS:  No results found for: INR        CC  Sae Rolle MD  [id number]  [place] [id number]

## 2022-09-28 NOTE — LETTER
9/28/2022    Candis Altamirano MD  [address] [facility] [address]    RE: Sumit Park       Dear Colleague,     I had the pleasure of seeing Sumit Park in the [facility].  HPI and Plan:   Pleasant 62-year-old gentleman here for DOT clearance letter.  Patient of Dr. Rolle.    History is noted as follows:    1. Premature coronary artery disease with stenting in 1999  2. Presentation in January 2018 at [place] with Holter monitor showing high-grade AV block status post dual-chamber pacemaker -since then significant right ventricular pacing over 95%.  3. Presentation in July 2018 with non-ST elevation MI noted to have distal circumflex lesion that was stented and a moderate LAD lesion left to medical management and mild left main disease  4. Mild heart failure with preserved EF not on diuretics  5. Severe sleep apnea , has dental device, denies problems with with this condition.    6. 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  Walks 2 to 3 miles a day, no symptoms of angina or heart failure.  Cardiac examination shows normal JVP unremarkable heart sounds clear chest no peripheral edema.    Had a Lexiscan nuclear study done yesterday.  This is normal.  Echocardiogram in July shows normal LV function.  No major valvular lesions noted.    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  Provide DOT letter.      No orders of the defined types were placed in this encounter.      No orders of the defined types were placed in this encounter.      Encounter Diagnosis   Name Primary?     Coronary artery disease involving native heart without angina pectoris, unspecified vessel or lesion type        CURRENT MEDICATIONS:  Current Outpatient Medications   Medication Sig Dispense Refill     alcohol swab prep pads Use to swab area of injection/joe as directed. 100 each 1      amLODIPine (NORVASC) 2.5 MG tablet Take 1 tablet (2.5 mg) by mouth daily 30 tablet 3     ASPIRIN LOW DOSE 81 MG EC tablet TAKE ONE TABLET BY MOUTH EVERY DAY FOR HEART DISEASE  **DO NOT CHEW** FOR HEART DISEASE  **DO NOT CHEW** 100 tablet 3     atorvastatin (LIPITOR) 40 MG tablet Take 1 tablet (40 mg) by mouth daily 90 tablet 1     blood glucose (NO BRAND SPECIFIED) test strip Use to test blood sugar 1 times daily or as directed. 100 strip 1     blood glucose calibration (NO BRAND SPECIFIED) solution Use to calibrate blood glucose monitor as needed as directed. 1 each 1     blood glucose monitoring (NO BRAND SPECIFIED) meter device kit Use to test blood sugar 1 times daily or as directed. 1 kit 0     cyclobenzaprine (FLEXERIL) 5 MG tablet Take 1 tablet (5 mg) by mouth 3 times daily as needed for muscle spasms 30 tablet 0     ferrous sulfate (FEROSUL) 325 (65 Fe) MG tablet Take 1 tablet (325 mg) by mouth daily. 90 tablet 0     folic acid (FOLVITE) 1 MG tablet Take 1 tablet (1,000 mcg) by mouth daily 90 tablet 1     lisinopril (ZESTRIL) 20 MG tablet Take 1 tablet (20 mg) by mouth 2 times daily 180 tablet 1     metFORMIN (GLUCOPHAGE) 500 MG tablet Take 2 tablets (1,000 mg) by mouth 2 times daily (with meals) 360 tablet 1     metoprolol succinate ER (TOPROL XL) 25 MG 24 hr tablet Take 0.5 tablets (12.5 mg) by mouth daily 45 tablet 1     nitroGLYcerin (NITROSTAT) 0.4 MG sublingual tablet For chest pain place 1 tablet under the tongue every 5 minutes for 3 doses. If symptoms persist 5 minutes after 1st dose call 911. 10 tablet 3     thin (NO BRAND SPECIFIED) lancets Use with lanceting device. 100 each 1     Vitamin D3 (CHOLECALCIFEROL) 25 mcg (1000 units) tablet Take by mouth daily       zinc gluconate 50 MG tablet Take 50 mg by mouth daily         ALLERGIES   No Known Allergies    PAST MEDICAL HISTORY:  Past Medical History:   Diagnosis Date     CAD, 2 stents 1999, 2018 5/10/2021     Cardiac pacemaker in situ 5/10/2021      Celiac disease 5/10/2021     Complete heart block (H) -- s/p PPM 2018 5/10/2021     DM 2, no insulin (H) 5/10/2021     Gastric bypass status for obesity - 2015 5/10/2021     HTN, goal below 140/80 5/10/2021     Hyperlipidemia LDL goal <100 5/10/2021     ZULEIKA (obstructive sleep apnea) 5/10/2021       PAST SURGICAL HISTORY:  Past Surgical History:   Procedure Laterality Date     ANGIOPLASTY       COLONOSCOPY N/A 1/13/2015    Procedure: COLONOSCOPY;  Surgeon: Juni Craig MD;  Location: [facility];  Service:      DISTAL BICEPS TENDON REPAIR Left      ESOPHAGOSCOPY, GASTROSCOPY, DUODENOSCOPY (EGD), COMBINED N/A 1/13/2015    Procedure: UPPER ENDOSCOPY;  Surgeon: Juni Craig MD;  Location: [facility] OR;  Service:      HERNIORRHAPHY INCISIONAL (LOCATION)      incarcerated     LAPAROSCOPIC GASTRIC BYPASS N/A 2015     LEG SURGERY Right     muscle surgery     OTHER SURGICAL HISTORY      forearm / wrist surgery     REPAIR TENDON BICEPS DISTAL UPPER EXTREMITY       UMBILICAL HERNIA REPAIR N/A      WRIST SURGERY Right        FAMILY HISTORY:  Family History   Problem Relation Age of Onset     Breast Cancer Mother 58     Heart Disease Father 71     Lymphoma Brother        SOCIAL HISTORY:  Social History     Socioeconomic History     Marital status:      Spouse name: None     Number of children: None     Years of education: None     Highest education level: None   Tobacco Use     Smoking status: Never Smoker     Smokeless tobacco: Never Used   Vaping Use     Vaping Use: Never used   Substance and Sexual Activity     Alcohol use: Never     Drug use: Never     Sexual activity: Yes     Partners: Female       Review of Systems:  Skin:  not assessed     Eyes:  not assessed    ENT:  not assessed    Respiratory:  not assessed    Cardiovascular:       Gastroenterology: Negative    Genitourinary:  not assessed    Musculoskeletal:  not assessed    Neurologic:  Negative    Psychiatric:  not assessed   "  Heme/Lymph/Imm:  not assessed    Endocrine:  not assessed      Physical Exam:  Vitals: /76   Pulse 72   Ht 1.683 m (5' 6.25\")   Wt 96.8 kg (213 lb 8 oz)   BMI 34.20 kg/m      Constitutional:  cooperative, alert and oriented, well developed, well nourished, in no acute distress        Skin:  warm and dry to the touch, no apparent skin lesions or masses noted   pacemaker incision in the left infraclavicular area was well-healed      Head:  normocephalic, no masses or lesions        Eyes:  pupils equal and round, conjunctivae and lids unremarkable, sclera white, no xanthalasma, EOMS intact, no nystagmus        Lymph:No Cervical lymphadenopathy present     ENT:  no pallor or cyanosis, dentition good        Neck:  carotid pulses are full and equal bilaterally, JVP normal, no carotid bruit        Respiratory:  normal breath sounds, clear to auscultation, normal A-P diameter, normal symmetry, normal respiratory excursion, no use of accessory muscles         Cardiac: regular rhythm, normal S1/S2, no S3 or S4, apical impulse not displaced, no murmurs, gallops or rubs                pulses full and equal, no bruits auscultated                                        GI:  abdomen soft, non-tender, BS normoactive, no mass, no HSM, no bruits        Extremities and Muscular Skeletal:  no deformities, clubbing, cyanosis, erythema observed              Neurological:  no gross motor deficits        Psych:  Alert and Oriented x 3        Recent Lab Results:  LIPID RESULTS:  Lab Results   Component Value Date    CHOL 118 09/01/2022    CHOL 150 05/10/2021    HDL 29 (L) 09/01/2022    HDL 35 (L) 05/10/2021    LDL 69 09/01/2022    LDL 92 05/10/2021    TRIG 98 09/01/2022    TRIG 114 05/10/2021       LIVER ENZYME RESULTS:  Lab Results   Component Value Date    AST 22 09/01/2022    AST 18 05/10/2021    ALT 36 09/01/2022    ALT 27 05/10/2021       CBC RESULTS:  Lab Results   Component Value Date    WBC 8.6 09/01/2022    WBC 7.0 " 05/10/2021    RBC 4.71 09/01/2022    RBC 4.96 05/10/2021    HGB 13.9 09/01/2022    HGB 14.7 05/10/2021    HCT 41.6 09/01/2022    HCT 45.2 05/10/2021    MCV 88 09/01/2022    MCV 91 05/10/2021    MCH 29.5 09/01/2022    MCH 29.6 05/10/2021    MCHC 33.4 09/01/2022    MCHC 32.5 05/10/2021    RDW 13.4 09/01/2022    RDW 13.9 05/10/2021     09/01/2022     05/10/2021       BMP RESULTS:  Lab Results   Component Value Date     09/01/2022     05/10/2021    POTASSIUM 4.2 09/01/2022    POTASSIUM 4.4 05/10/2021    CHLORIDE 102 09/01/2022    CHLORIDE 103 05/10/2021    CO2 23 09/01/2022    CO2 34 (H) 05/10/2021    ANIONGAP 11 09/01/2022    ANIONGAP 1 (L) 05/10/2021     (H) 09/01/2022     (H) 05/10/2021    BUN 15 09/01/2022    BUN 15 05/10/2021    CR 0.51 (L) 09/01/2022    CR 0.76 05/10/2021    GFRESTIMATED >90 09/01/2022    GFRESTIMATED >90 05/10/2021    GFRESTBLACK >90 05/10/2021    JADYN 8.9 09/01/2022    JADYN 8.5 05/10/2021        A1C RESULTS:  Lab Results   Component Value Date    A1C 6.8 (H) 09/01/2022    A1C 7.8 (H) 05/10/2021       INR RESULTS:  No results found for: INR        CC  Sae Rolle MD  [address]  [place] [id number]    Thank you for allowing me to participate in the care of your patient.      Sincerely,     DR ISRAEL VELAZQUEZ MD     [facility]

## 2022-09-29 ENCOUNTER — DOCUMENTATION ONLY (OUTPATIENT)
Dept: CARDIOLOGY | Facility: CLINIC | Age: 62
End: 2022-09-29

## 2022-09-29 NOTE — PROGRESS NOTES
Per request of patient who saw Dr. Hicks yesterday, DOT letter composed and signed by Dr. HICKS.    Patient requested letter to be mailed to his home address and this was done this morning.

## 2022-10-02 ENCOUNTER — HEALTH MAINTENANCE LETTER (OUTPATIENT)
Age: 62
End: 2022-10-02

## 2022-10-26 ENCOUNTER — ANCILLARY PROCEDURE (OUTPATIENT)
Dept: CARDIOLOGY | Facility: CLINIC | Age: 62
End: 2022-10-26
Attending: INTERNAL MEDICINE
Payer: COMMERCIAL

## 2022-10-26 DIAGNOSIS — Z95.0 CARDIAC PACEMAKER IN SITU: ICD-10-CM

## 2022-10-26 PROCEDURE — 93296 REM INTERROG EVL PM/IDS: CPT | Performed by: INTERNAL MEDICINE

## 2022-10-26 PROCEDURE — 93294 REM INTERROG EVL PM/LDLS PM: CPT | Performed by: INTERNAL MEDICINE

## 2022-11-10 ENCOUNTER — OFFICE VISIT (OUTPATIENT)
Dept: URGENT CARE | Facility: URGENT CARE | Age: 62
End: 2022-11-10
Payer: COMMERCIAL

## 2022-11-10 VITALS
TEMPERATURE: 97.6 F | SYSTOLIC BLOOD PRESSURE: 142 MMHG | OXYGEN SATURATION: 99 % | DIASTOLIC BLOOD PRESSURE: 88 MMHG | HEART RATE: 68 BPM | RESPIRATION RATE: 18 BRPM

## 2022-11-10 DIAGNOSIS — I10 HTN, GOAL BELOW 140/90: ICD-10-CM

## 2022-11-10 DIAGNOSIS — J06.9 VIRAL URI: Primary | ICD-10-CM

## 2022-11-10 DIAGNOSIS — R07.0 THROAT PAIN: ICD-10-CM

## 2022-11-10 LAB
DEPRECATED S PYO AG THROAT QL EIA: NEGATIVE
GROUP A STREP BY PCR: NOT DETECTED

## 2022-11-10 PROCEDURE — 99213 OFFICE O/P EST LOW 20 MIN: CPT | Performed by: PHYSICIAN ASSISTANT

## 2022-11-10 PROCEDURE — 87651 STREP A DNA AMP PROBE: CPT | Performed by: PHYSICIAN ASSISTANT

## 2022-11-10 NOTE — PROGRESS NOTES
Assessment/Plan:    Strep negative. No sign of retropharyngeal or peritonsillar abscess. Lungs CTAB. Afebrile and in no acute distress. Suspect viral etiology.   Continue supportive cares- use of ibuprofen, acetaminophen, Chloraseptic throat spray, throat lozenges as needed.    Hx of HTN- BP slightly elevated today. Asymptomatic in regards to this. Continue amlodipine & metoprolol, f/u with PCP.  See patient instructions below.    At the end of the encounter, I discussed results, diagnosis, medications. Discussed red flags for immediate return to clinic/ER, as well as indications for follow up if no improvement. Patient understood and agreed to plan. Patient was stable for discharge.      ICD-10-CM    1. Viral URI  J06.9       2. Throat pain  R07.0 Streptococcus A Rapid Screen w/Reflex to PCR - Clinic Collect     Group A Streptococcus PCR Throat Swab      3. HTN, goal below 140/90  I10             Return in about 1 week (around 11/17/2022) for BP Recheck.    SUZETTE Cruz, PAANGEL  [facility] [place]  -----------------------------------------------------------------------------------------------------------------------------------------------------    HPI:  Sumit Park is a 62 year old male with hx of type 2 DM, and HTN who presents for evaluation of scratchy throat, PND, and mild cough onset 2 days ago. No treatments tried. Patient reports no fever/chills, headache, chest pain, shortness of breath, abdominal pain, nausea, vomiting, diarrhea, rash, or any other symptoms.     Past Medical History:   Diagnosis Date     CAD, 2 stents 1999, 2018 5/10/2021     Cardiac pacemaker in situ 5/10/2021     Celiac disease 5/10/2021     Complete heart block (H) -- s/p PPM 2018 5/10/2021     DM 2, no insulin (H) 5/10/2021     Gastric bypass status for obesity - 2015 5/10/2021     HTN, goal below 140/80 5/10/2021     Hyperlipidemia LDL goal <100 5/10/2021     ZULEIKA (obstructive sleep apnea) 5/10/2021        Vitals:    11/10/22 1216   BP: (!) 142/88   Pulse: 68   Resp: 18   Temp: 97.6  F (36.4  C)   TempSrc: Tympanic   SpO2: 99%       Physical Exam  Vitals and nursing note reviewed.   HENT:      Right Ear: Tympanic membrane normal.      Left Ear: Tympanic membrane normal.      Mouth/Throat:      Mouth: Mucous membranes are moist.      Pharynx: Uvula midline. Posterior oropharyngeal erythema present.      Tonsils: No tonsillar exudate.      Comments: Cobblestoning of posterior pharynx  Cardiovascular:      Rate and Rhythm: Normal rate and regular rhythm.   Pulmonary:      Effort: Pulmonary effort is normal.      Breath sounds: Normal breath sounds.   Neurological:      Mental Status: He is alert.         Labs/Imaging:  Results for orders placed or performed in visit on 11/10/22 (from the past 24 hour(s))   Streptococcus A Rapid Screen w/Reflex to PCR - Clinic Collect    Specimen: Throat; Swab   Result Value Ref Range    Group A Strep antigen Negative Negative     No results found for this or any previous visit (from the past 24 hour(s)).        Patient Instructions     See primary care for BP recheck in next few weeks.     You or your child have pharyngitis (sore throat). This infection is caused by a virus. It can cause throat pain that is worse when swallowing, aching all over, headache, and fever. The infection may be spread by coughing, kissing, or touching others after touching your mouth or nose. Antibiotic medicines do not work against viruses. They are not used for treating this illness.    Relieving your symptoms    Drink plenty of fluids to prevent dehydration.    Don t smoke, and avoid secondhand smoke.    For children, try throat sprays or Popsicles. Adults and older children may try lozenges.    Drink warm liquids to soothe the throat and help thin mucus. Avoid alcohol, spicy foods, salty foods, and acidic drinks such as orange juice. These can irritate the throat.    Gargle with warm saltwater (1  teaspoon of salt to 8 ounces of warm water).    Use a humidifier to keep air moist and relieve throat dryness.    Try over-the-counter pain relievers such as acetaminophen or ibuprofen. Use as directed, and don t exceed the recommended dose. Don t give aspirin to children.     Take Tylenol 650mg every 4 hours or ibuprofen 600mg every 6 hours by mouth for pain/fever.  Do not exceed 4000mg of acetaminophen or 2400mg of ibuprofen from any source in a 24 hour period.  Taking Tylenol and ibuprofen together may be helpful in reducing pain. If you have chronic liver or kidney disease or ever had a stomach ulcer or GI bleeding, talk with your healthcare provider before using these medicines.   Are antibiotics needed?  Most sore throats are caused by cold or flu viruses. And antibiotics don t treat viral illness. In fact, using antibiotics when they re not needed may produce bacteria that are harder to kill. Your healthcare provider will prescribe antibiotics only if he or she thinks they are likely to help.  Is surgery needed?  In some cases, tonsils need to be removed. This is often done as outpatient (same-day) surgery. Your healthcare provider may advise removing the tonsils in cases of:    Several severe bouts of tonsillitis in a year.  Severe  episodes include those that lead to missed days of school or work, or that need to be treated with antibiotics.    Tonsillitis that causes breathing problems during sleep    Tonsillitis caused by food particles collecting in pouches in the tonsils (cryptic tonsillitis)  Call your healthcare provider if any of the following occur:    Symptoms worsen, or new symptoms develop.    Swollen tonsils make breathing difficult.    Painful lumps in the back of neck    Stiff neck    Lymph nodes are getting larger    Can t swallow liquids, a lot of drooling, or can t open mouth wide due to throat pain    Signs of dehydration, such as very dark urine or no urine, sunken eyes,  dizziness    Trouble breathing or noisy breathing    Muffled voice    A skin rash, hives, or wheezing develops. Any of these could signal an allergic reaction to antibiotics.    Symptoms don t improve within a week.   Date Last Reviewed: 10/1/2016    1430-1817 [employer]. [address] [place] [address]. All rights reserved. This information is not intended as a substitute for professional medical care. Always follow your healthcare professional's instructions.

## 2022-11-10 NOTE — PATIENT INSTRUCTIONS
See primary care for BP recheck in next few weeks.     You or your child have pharyngitis (sore throat). This infection is caused by a virus. It can cause throat pain that is worse when swallowing, aching all over, headache, and fever. The infection may be spread by coughing, kissing, or touching others after touching your mouth or nose. Antibiotic medicines do not work against viruses. They are not used for treating this illness.    Relieving your symptoms  Drink plenty of fluids to prevent dehydration.  Don t smoke, and avoid secondhand smoke.  For children, try throat sprays or Popsicles. Adults and older children may try lozenges.  Drink warm liquids to soothe the throat and help thin mucus. Avoid alcohol, spicy foods, salty foods, and acidic drinks such as orange juice. These can irritate the throat.  Gargle with warm saltwater (1 teaspoon of salt to 8 ounces of warm water).  Use a humidifier to keep air moist and relieve throat dryness.  Try over-the-counter pain relievers such as acetaminophen or ibuprofen. Use as directed, and don t exceed the recommended dose. Don t give aspirin to children.   Take Tylenol 650mg every 4 hours or ibuprofen 600mg every 6 hours by mouth for pain/fever.  Do not exceed 4000mg of acetaminophen or 2400mg of ibuprofen from any source in a 24 hour period.  Taking Tylenol and ibuprofen together may be helpful in reducing pain. If you have chronic liver or kidney disease or ever had a stomach ulcer or GI bleeding, talk with your healthcare provider before using these medicines.   Are antibiotics needed?  Most sore throats are caused by cold or flu viruses. And antibiotics don t treat viral illness. In fact, using antibiotics when they re not needed may produce bacteria that are harder to kill. Your healthcare provider will prescribe antibiotics only if he or she thinks they are likely to help.  Is surgery needed?  In some cases, tonsils need to be removed. This is often done as  outpatient (same-day) surgery. Your healthcare provider may advise removing the tonsils in cases of:  Several severe bouts of tonsillitis in a year.  Severe  episodes include those that lead to missed days of school or work, or that need to be treated with antibiotics.  Tonsillitis that causes breathing problems during sleep  Tonsillitis caused by food particles collecting in pouches in the tonsils (cryptic tonsillitis)  Call your healthcare provider if any of the following occur:  Symptoms worsen, or new symptoms develop.  Swollen tonsils make breathing difficult.  Painful lumps in the back of neck  Stiff neck  Lymph nodes are getting larger  Can t swallow liquids, a lot of drooling, or can t open mouth wide due to throat pain  Signs of dehydration, such as very dark urine or no urine, sunken eyes, dizziness  Trouble breathing or noisy breathing  Muffled voice  A skin rash, hives, or wheezing develops. Any of these could signal an allergic reaction to antibiotics.  Symptoms don t improve within a week.   Date Last Reviewed: 10/1/2016    4184-9702 [employer]. [address] [place] [address], [place] [address]. All rights reserved. This information is not intended as a substitute for professional medical care. Always follow your healthcare professional's instructions.

## 2022-11-23 LAB
MDC_IDC_LEAD_IMPLANT_DT: NORMAL
MDC_IDC_LEAD_IMPLANT_DT: NORMAL
MDC_IDC_LEAD_LOCATION: NORMAL
MDC_IDC_LEAD_LOCATION: NORMAL
MDC_IDC_LEAD_LOCATION_DETAIL_1: NORMAL
MDC_IDC_LEAD_LOCATION_DETAIL_1: NORMAL
MDC_IDC_LEAD_MFG: NORMAL
MDC_IDC_LEAD_MFG: NORMAL
MDC_IDC_LEAD_MODEL: NORMAL
MDC_IDC_LEAD_MODEL: NORMAL
MDC_IDC_LEAD_POLARITY_TYPE: NORMAL
MDC_IDC_LEAD_POLARITY_TYPE: NORMAL
MDC_IDC_LEAD_SERIAL: NORMAL
MDC_IDC_LEAD_SERIAL: NORMAL
MDC_IDC_MSMT_BATTERY_DTM: NORMAL
MDC_IDC_MSMT_BATTERY_REMAINING_LONGEVITY: 102 MO
MDC_IDC_MSMT_BATTERY_RRT_TRIGGER: 2.62
MDC_IDC_MSMT_BATTERY_STATUS: NORMAL
MDC_IDC_MSMT_BATTERY_VOLTAGE: 3 V
MDC_IDC_MSMT_LEADCHNL_RA_IMPEDANCE_VALUE: 323 OHM
MDC_IDC_MSMT_LEADCHNL_RA_IMPEDANCE_VALUE: 361 OHM
MDC_IDC_MSMT_LEADCHNL_RA_PACING_THRESHOLD_AMPLITUDE: 0.38 V
MDC_IDC_MSMT_LEADCHNL_RA_PACING_THRESHOLD_PULSEWIDTH: 0.4 MS
MDC_IDC_MSMT_LEADCHNL_RA_SENSING_INTR_AMPL: 2.62 MV
MDC_IDC_MSMT_LEADCHNL_RA_SENSING_INTR_AMPL: 2.62 MV
MDC_IDC_MSMT_LEADCHNL_RV_IMPEDANCE_VALUE: 361 OHM
MDC_IDC_MSMT_LEADCHNL_RV_IMPEDANCE_VALUE: 456 OHM
MDC_IDC_MSMT_LEADCHNL_RV_PACING_THRESHOLD_AMPLITUDE: 0.75 V
MDC_IDC_MSMT_LEADCHNL_RV_PACING_THRESHOLD_PULSEWIDTH: 0.4 MS
MDC_IDC_MSMT_LEADCHNL_RV_SENSING_INTR_AMPL: 13.5 MV
MDC_IDC_MSMT_LEADCHNL_RV_SENSING_INTR_AMPL: 13.5 MV
MDC_IDC_PG_IMPLANT_DTM: NORMAL
MDC_IDC_PG_MFG: NORMAL
MDC_IDC_PG_MODEL: NORMAL
MDC_IDC_PG_SERIAL: NORMAL
MDC_IDC_PG_TYPE: NORMAL
MDC_IDC_SESS_CLINIC_NAME: NORMAL
MDC_IDC_SESS_DTM: NORMAL
MDC_IDC_SESS_TYPE: NORMAL
MDC_IDC_SET_BRADY_AT_MODE_SWITCH_RATE: 150 {BEATS}/MIN
MDC_IDC_SET_BRADY_HYSTRATE: NORMAL
MDC_IDC_SET_BRADY_LOWRATE: 50 {BEATS}/MIN
MDC_IDC_SET_BRADY_MAX_SENSOR_RATE: 130 {BEATS}/MIN
MDC_IDC_SET_BRADY_MAX_TRACKING_RATE: 130 {BEATS}/MIN
MDC_IDC_SET_BRADY_MODE: NORMAL
MDC_IDC_SET_BRADY_PAV_DELAY_LOW: 180 MS
MDC_IDC_SET_BRADY_SAV_DELAY_LOW: 150 MS
MDC_IDC_SET_LEADCHNL_RA_PACING_AMPLITUDE: 1.5 V
MDC_IDC_SET_LEADCHNL_RA_PACING_ANODE_ELECTRODE_1: NORMAL
MDC_IDC_SET_LEADCHNL_RA_PACING_ANODE_LOCATION_1: NORMAL
MDC_IDC_SET_LEADCHNL_RA_PACING_CAPTURE_MODE: NORMAL
MDC_IDC_SET_LEADCHNL_RA_PACING_CATHODE_ELECTRODE_1: NORMAL
MDC_IDC_SET_LEADCHNL_RA_PACING_CATHODE_LOCATION_1: NORMAL
MDC_IDC_SET_LEADCHNL_RA_PACING_POLARITY: NORMAL
MDC_IDC_SET_LEADCHNL_RA_PACING_PULSEWIDTH: 0.4 MS
MDC_IDC_SET_LEADCHNL_RA_SENSING_ANODE_ELECTRODE_1: NORMAL
MDC_IDC_SET_LEADCHNL_RA_SENSING_ANODE_LOCATION_1: NORMAL
MDC_IDC_SET_LEADCHNL_RA_SENSING_CATHODE_ELECTRODE_1: NORMAL
MDC_IDC_SET_LEADCHNL_RA_SENSING_CATHODE_LOCATION_1: NORMAL
MDC_IDC_SET_LEADCHNL_RA_SENSING_POLARITY: NORMAL
MDC_IDC_SET_LEADCHNL_RA_SENSING_SENSITIVITY: 0.3 MV
MDC_IDC_SET_LEADCHNL_RV_PACING_AMPLITUDE: 1.5 V
MDC_IDC_SET_LEADCHNL_RV_PACING_ANODE_ELECTRODE_1: NORMAL
MDC_IDC_SET_LEADCHNL_RV_PACING_ANODE_LOCATION_1: NORMAL
MDC_IDC_SET_LEADCHNL_RV_PACING_CAPTURE_MODE: NORMAL
MDC_IDC_SET_LEADCHNL_RV_PACING_CATHODE_ELECTRODE_1: NORMAL
MDC_IDC_SET_LEADCHNL_RV_PACING_CATHODE_LOCATION_1: NORMAL
MDC_IDC_SET_LEADCHNL_RV_PACING_POLARITY: NORMAL
MDC_IDC_SET_LEADCHNL_RV_PACING_PULSEWIDTH: 0.4 MS
MDC_IDC_SET_LEADCHNL_RV_SENSING_ANODE_ELECTRODE_1: NORMAL
MDC_IDC_SET_LEADCHNL_RV_SENSING_ANODE_LOCATION_1: NORMAL
MDC_IDC_SET_LEADCHNL_RV_SENSING_CATHODE_ELECTRODE_1: NORMAL
MDC_IDC_SET_LEADCHNL_RV_SENSING_CATHODE_LOCATION_1: NORMAL
MDC_IDC_SET_LEADCHNL_RV_SENSING_POLARITY: NORMAL
MDC_IDC_SET_LEADCHNL_RV_SENSING_SENSITIVITY: 0.9 MV
MDC_IDC_SET_ZONE_DETECTION_INTERVAL: 370 MS
MDC_IDC_SET_ZONE_DETECTION_INTERVAL: 400 MS
MDC_IDC_SET_ZONE_TYPE: NORMAL
MDC_IDC_STAT_AT_BURDEN_PERCENT: 0 %
MDC_IDC_STAT_AT_DTM_END: NORMAL
MDC_IDC_STAT_AT_DTM_START: NORMAL
MDC_IDC_STAT_BRADY_AP_VP_PERCENT: 20.2 %
MDC_IDC_STAT_BRADY_AP_VS_PERCENT: 0.28 %
MDC_IDC_STAT_BRADY_AS_VP_PERCENT: 75.13 %
MDC_IDC_STAT_BRADY_AS_VS_PERCENT: 4.39 %
MDC_IDC_STAT_BRADY_DTM_END: NORMAL
MDC_IDC_STAT_BRADY_DTM_START: NORMAL
MDC_IDC_STAT_BRADY_RA_PERCENT_PACED: 20.43 %
MDC_IDC_STAT_BRADY_RV_PERCENT_PACED: 95.33 %
MDC_IDC_STAT_EPISODE_RECENT_COUNT: 0
MDC_IDC_STAT_EPISODE_RECENT_COUNT_DTM_END: NORMAL
MDC_IDC_STAT_EPISODE_RECENT_COUNT_DTM_START: NORMAL
MDC_IDC_STAT_EPISODE_TOTAL_COUNT: 0
MDC_IDC_STAT_EPISODE_TOTAL_COUNT: 0
MDC_IDC_STAT_EPISODE_TOTAL_COUNT: 2
MDC_IDC_STAT_EPISODE_TOTAL_COUNT: 2
MDC_IDC_STAT_EPISODE_TOTAL_COUNT: 78
MDC_IDC_STAT_EPISODE_TOTAL_COUNT_DTM_END: NORMAL
MDC_IDC_STAT_EPISODE_TOTAL_COUNT_DTM_START: NORMAL
MDC_IDC_STAT_EPISODE_TYPE: NORMAL

## 2022-12-01 ENCOUNTER — TRANSFERRED RECORDS (OUTPATIENT)
Dept: MULTI SPECIALTY CLINIC | Facility: CLINIC | Age: 62
End: 2022-12-01

## 2022-12-01 LAB — RETINOPATHY: NEGATIVE

## 2023-01-08 DIAGNOSIS — I10 HTN, GOAL BELOW 140/90: ICD-10-CM

## 2023-01-09 RX ORDER — AMLODIPINE BESYLATE 2.5 MG/1
2.5 TABLET ORAL DAILY
Qty: 30 TABLET | Refills: 0 | Status: SHIPPED | OUTPATIENT
Start: 2023-01-09 | End: 2023-01-27

## 2023-01-20 ENCOUNTER — LAB (OUTPATIENT)
Dept: LAB | Facility: CLINIC | Age: 63
End: 2023-01-20
Payer: COMMERCIAL

## 2023-01-20 DIAGNOSIS — E78.5 HYPERLIPIDEMIA LDL GOAL <100: ICD-10-CM

## 2023-01-20 DIAGNOSIS — E11.9 TYPE 2 DIABETES MELLITUS WITHOUT COMPLICATION, WITHOUT LONG-TERM CURRENT USE OF INSULIN (H): ICD-10-CM

## 2023-01-20 DIAGNOSIS — I10 HTN, GOAL BELOW 140/90: ICD-10-CM

## 2023-01-20 DIAGNOSIS — I25.10 CORONARY ARTERY DISEASE INVOLVING NATIVE HEART WITHOUT ANGINA PECTORIS, UNSPECIFIED VESSEL OR LESION TYPE: ICD-10-CM

## 2023-01-20 LAB
ALBUMIN SERPL BCG-MCNC: 4.4 G/DL (ref 3.5–5.2)
ALP SERPL-CCNC: 74 U/L (ref 40–129)
ALT SERPL W P-5'-P-CCNC: 23 U/L (ref 10–50)
ANION GAP SERPL CALCULATED.3IONS-SCNC: 14 MMOL/L (ref 7–15)
AST SERPL W P-5'-P-CCNC: 23 U/L (ref 10–50)
BILIRUB SERPL-MCNC: 1.8 MG/DL
BUN SERPL-MCNC: 15.1 MG/DL (ref 8–23)
CALCIUM SERPL-MCNC: 9.2 MG/DL (ref 8.8–10.2)
CHLORIDE SERPL-SCNC: 101 MMOL/L (ref 98–107)
CK SERPL-CCNC: 143 U/L (ref 39–308)
CREAT SERPL-MCNC: 0.65 MG/DL (ref 0.67–1.17)
DEPRECATED HCO3 PLAS-SCNC: 26 MMOL/L (ref 22–29)
ERYTHROCYTE [DISTWIDTH] IN BLOOD BY AUTOMATED COUNT: 13.1 % (ref 10–15)
FERRITIN SERPL-MCNC: 61 NG/ML (ref 31–409)
GFR SERPL CREATININE-BSD FRML MDRD: >90 ML/MIN/1.73M2
GLUCOSE SERPL-MCNC: 91 MG/DL (ref 70–99)
HBA1C MFR BLD: 7 % (ref 0–5.6)
HCT VFR BLD AUTO: 40.7 % (ref 40–53)
HGB BLD-MCNC: 13.8 G/DL (ref 13.3–17.7)
MCH RBC QN AUTO: 30.5 PG (ref 26.5–33)
MCHC RBC AUTO-ENTMCNC: 33.9 G/DL (ref 31.5–36.5)
MCV RBC AUTO: 90 FL (ref 78–100)
PLATELET # BLD AUTO: 181 10E3/UL (ref 150–450)
POTASSIUM SERPL-SCNC: 4.3 MMOL/L (ref 3.4–5.3)
PROT SERPL-MCNC: 6.6 G/DL (ref 6.4–8.3)
RBC # BLD AUTO: 4.52 10E6/UL (ref 4.4–5.9)
SODIUM SERPL-SCNC: 141 MMOL/L (ref 136–145)
WBC # BLD AUTO: 7.8 10E3/UL (ref 4–11)

## 2023-01-20 PROCEDURE — 82550 ASSAY OF CK (CPK): CPT

## 2023-01-20 PROCEDURE — 82728 ASSAY OF FERRITIN: CPT

## 2023-01-20 PROCEDURE — 36415 COLL VENOUS BLD VENIPUNCTURE: CPT

## 2023-01-20 PROCEDURE — 85027 COMPLETE CBC AUTOMATED: CPT

## 2023-01-20 PROCEDURE — 83036 HEMOGLOBIN GLYCOSYLATED A1C: CPT

## 2023-01-20 PROCEDURE — 80053 COMPREHEN METABOLIC PANEL: CPT

## 2023-01-27 ENCOUNTER — OFFICE VISIT (OUTPATIENT)
Dept: INTERNAL MEDICINE | Facility: CLINIC | Age: 63
End: 2023-01-27
Payer: COMMERCIAL

## 2023-01-27 VITALS
RESPIRATION RATE: 18 BRPM | HEIGHT: 66 IN | WEIGHT: 216.5 LBS | HEART RATE: 76 BPM | BODY MASS INDEX: 34.79 KG/M2 | OXYGEN SATURATION: 97 % | DIASTOLIC BLOOD PRESSURE: 64 MMHG | SYSTOLIC BLOOD PRESSURE: 138 MMHG | TEMPERATURE: 96.1 F

## 2023-01-27 DIAGNOSIS — Z00.00 ROUTINE GENERAL MEDICAL EXAMINATION AT A HEALTH CARE FACILITY: Primary | ICD-10-CM

## 2023-01-27 DIAGNOSIS — E78.5 HYPERLIPIDEMIA LDL GOAL <100: ICD-10-CM

## 2023-01-27 DIAGNOSIS — Z12.5 SCREENING FOR PROSTATE CANCER: ICD-10-CM

## 2023-01-27 DIAGNOSIS — G47.00 INSOMNIA, UNSPECIFIED TYPE: ICD-10-CM

## 2023-01-27 DIAGNOSIS — I25.10 CORONARY ARTERY DISEASE INVOLVING NATIVE HEART WITHOUT ANGINA PECTORIS, UNSPECIFIED VESSEL OR LESION TYPE: ICD-10-CM

## 2023-01-27 DIAGNOSIS — L98.9 SKIN LESION: ICD-10-CM

## 2023-01-27 DIAGNOSIS — E55.9 VITAMIN D DEFICIENCY: ICD-10-CM

## 2023-01-27 DIAGNOSIS — Z11.4 ENCOUNTER FOR SCREENING FOR HIV: ICD-10-CM

## 2023-01-27 DIAGNOSIS — Z98.84 GASTRIC BYPASS STATUS FOR OBESITY: ICD-10-CM

## 2023-01-27 DIAGNOSIS — I10 HTN, GOAL BELOW 140/90: ICD-10-CM

## 2023-01-27 DIAGNOSIS — E11.9 TYPE 2 DIABETES MELLITUS WITHOUT COMPLICATION, WITHOUT LONG-TERM CURRENT USE OF INSULIN (H): ICD-10-CM

## 2023-01-27 DIAGNOSIS — Z95.0 S/P CARDIAC PACEMAKER PROCEDURE: ICD-10-CM

## 2023-01-27 PROCEDURE — 99396 PREV VISIT EST AGE 40-64: CPT | Performed by: INTERNAL MEDICINE

## 2023-01-27 PROCEDURE — 99214 OFFICE O/P EST MOD 30 MIN: CPT | Mod: 25 | Performed by: INTERNAL MEDICINE

## 2023-01-27 RX ORDER — METOPROLOL SUCCINATE 25 MG/1
12.5 TABLET, EXTENDED RELEASE ORAL DAILY
Qty: 45 TABLET | Refills: 1 | Status: SHIPPED | OUTPATIENT
Start: 2023-01-27 | End: 2023-06-30

## 2023-01-27 RX ORDER — ATORVASTATIN CALCIUM 40 MG/1
40 TABLET, FILM COATED ORAL DAILY
Qty: 90 TABLET | Refills: 1 | Status: SHIPPED | OUTPATIENT
Start: 2023-01-27 | End: 2023-07-26

## 2023-01-27 RX ORDER — LISINOPRIL 20 MG/1
20 TABLET ORAL 2 TIMES DAILY
Qty: 180 TABLET | Refills: 1 | Status: SHIPPED | OUTPATIENT
Start: 2023-01-27 | End: 2023-07-26

## 2023-01-27 RX ORDER — AMLODIPINE BESYLATE 2.5 MG/1
2.5 TABLET ORAL 2 TIMES DAILY
Qty: 180 TABLET | Refills: 1 | Status: SHIPPED | OUTPATIENT
Start: 2023-01-27 | End: 2023-07-26

## 2023-01-27 ASSESSMENT — ENCOUNTER SYMPTOMS
CONSTIPATION: 0
NAUSEA: 0
SHORTNESS OF BREATH: 0
HEADACHES: 0
HEMATURIA: 0
MYALGIAS: 0
EYE PAIN: 0
JOINT SWELLING: 0
PALPITATIONS: 0
ABDOMINAL PAIN: 0
FEVER: 0
WEAKNESS: 0
PARESTHESIAS: 0
DIARRHEA: 0
NERVOUS/ANXIOUS: 0
HEMATOCHEZIA: 0
DIZZINESS: 0
CHILLS: 0
SORE THROAT: 0
COUGH: 0
FREQUENCY: 0
HEARTBURN: 0
ARTHRALGIAS: 0
DYSURIA: 0

## 2023-01-27 ASSESSMENT — PAIN SCALES - GENERAL: PAINLEVEL: NO PAIN (0)

## 2023-01-27 NOTE — Clinical Note
CareEverywhere/Patient reports:  -- colonoscopy done on 2015 with different clinic. It was normal . Info sent to abstraction  -- eye exam done on Dec 2022 with [facility] eye clinic. It was normal . Info sent to abstraction

## 2023-01-27 NOTE — PROGRESS NOTES
Dr Jose's note    Patient's instructions / PLAN:                                                        Plan:  1. Dermatology referral for skin check   2. Increase Amlodipine to 2.5 mg twice a day  3. Continue the other meds, same doses for now.  4. Low carb diet as we discussed   5. Please make a lab appointment for non fasting labs  In May  6. Please make an appointment few days after the labs to discuss about the results.             ASSESSMENT & PLAN:                                                        (Z00.00) Routine general medical examination at a health care facility  (primary encounter diagnosis)  Comment:   Plan:     (Z98.84) Gastric bypass status for obesity - 2015  Comment: doing well   Plan: Monitor labs      (E55.9) Vitamin D deficiency  Comment:   Plan: Monitor labs      (Z95.0) S/P cardiac pacemaker procedure  Comment:   Plan:     (E11.9) DM 2, no insulin (H)  Comment: Controlled    Plan: Hemoglobin A1c            (I25.10) CAD, 2 stents 1999, 2018  Comment: stable   Plan: metoprolol succinate ER (TOPROL XL) 25 MG 24 hr        tablet            (L98.9) Skin lesion  Comment:   Plan: Adult Dermatology Referral            (G47.00) Insomnia, unspecified type  Comment:   Plan: melatonin 5 MG tablet            (Z12.5) Screening for prostate cancer  Comment:   Plan: Prostate Specific Antigen Screen            (Z11.4) Encounter for screening for HIV  Comment:   Plan: HIV Antigen Antibody Combo            (I10) HTN, goal below 140/90  Comment: Not controlled   Plan: amLODIPine (NORVASC) 2.5 MG tablet, lisinopril         (ZESTRIL) 20 MG tablet            (E11.9) DM 2 no insulin (H)  Comment: Controlled    Plan: metFORMIN (GLUCOPHAGE) 500 MG tablet            (E78.5) Hyperlipidemia LDL goal <100  Comment: Controlled    Plan: atorvastatin (LIPITOR) 40 MG tablet                 Chief Complaint:                                                      Annual exam  Follow up chronic medical problems       SUBJECTIVE:                                                    History of present illness     We reviewed the chronic medical problems as above.   I reviewed the recent tests results in Epic       L external ear -- on/off small lesion. He would like whole skin check --- derm ref done       CareEverywhere/Patient reports:    -- colonoscopy done on 2015 with different clinic. It was normal . Info sent to abstraction   -- eye exam done on Dec 2022 with [facility] eye clinic. It was normal . Info sent to abstraction      ROS:     See below  PMHx: - reviewed  Past Medical History:   Diagnosis Date     CAD, 2 stents 1999, 2018 5/10/2021     Cardiac pacemaker in situ 5/10/2021     Celiac disease 5/10/2021     Complete heart block (H) -- s/p PPM 2018 5/10/2021     DM 2, no insulin (H) 5/10/2021     Gastric bypass status for obesity - 2015 5/10/2021     HTN, goal below 140/80 5/10/2021     Hyperlipidemia LDL goal <100 5/10/2021     ZULEIKA (obstructive sleep apnea) 5/10/2021         PSHx: reviewed  Past Surgical History:   Procedure Laterality Date     ANGIOPLASTY       COLONOSCOPY N/A 1/13/2015    Procedure: COLONOSCOPY;  Surgeon: Juni Craig MD;  Location: [facility];  Service:      DISTAL BICEPS TENDON REPAIR Left      ESOPHAGOSCOPY, GASTROSCOPY, DUODENOSCOPY (EGD), COMBINED N/A 1/13/2015    Procedure: UPPER ENDOSCOPY;  Surgeon: Juni Craig MD;  Location: [facility];  Service:      HERNIORRHAPHY INCISIONAL (LOCATION)      incarcerated     LAPAROSCOPIC GASTRIC BYPASS N/A 2015     LEG SURGERY Right     muscle surgery     OTHER SURGICAL HISTORY      forearm / wrist surgery     REPAIR TENDON BICEPS DISTAL UPPER EXTREMITY       UMBILICAL HERNIA REPAIR N/A      WRIST SURGERY Right         Soc Hx: No daily alcohol, no smoking  Social History     Socioeconomic History     Marital status:      Spouse name: Not on file     Number of children: Not on file     Years of education: Not on file      Highest education level: Not on file   Occupational History     Not on file   Tobacco Use     Smoking status: Never     Smokeless tobacco: Never   Vaping Use     Vaping Use: Never used   Substance and Sexual Activity     Alcohol use: Never     Drug use: Never     Sexual activity: Yes     Partners: Female   Other Topics Concern     Not on file   Social History Narrative     Not on file     Social Determinants of Health     Financial Resource Strain: Not on file   Food Insecurity: Not on file   Transportation Needs: Not on file   Physical Activity: Not on file   Stress: Not on file   Social Connections: Not on file   Intimate Partner Violence: Not on file   Housing Stability: Not on file        Fam Hx: reviewed  Family History   Problem Relation Age of Onset     Breast Cancer Mother 58     Heart Disease Father 71     Lymphoma Brother          Screening: reviewed    All: reviewed    Meds: reviewed  Current Outpatient Medications   Medication Sig Dispense Refill     alcohol swab prep pads Use to swab area of injection/joe as directed. 100 each 1     amLODIPine (NORVASC) 2.5 MG tablet Take 1 tablet (2.5 mg) by mouth daily. 30 tablet 0     ASPIRIN LOW DOSE 81 MG EC tablet TAKE ONE TABLET BY MOUTH EVERY DAY FOR HEART DISEASE  **DO NOT CHEW** FOR HEART DISEASE  **DO NOT CHEW** 100 tablet 3     atorvastatin (LIPITOR) 40 MG tablet Take 1 tablet (40 mg) by mouth daily 90 tablet 1     blood glucose (NO BRAND SPECIFIED) test strip Use to test blood sugar 1 times daily or as directed. 100 strip 1     blood glucose calibration (NO BRAND SPECIFIED) solution Use to calibrate blood glucose monitor as needed as directed. 1 each 1     blood glucose monitoring (NO BRAND SPECIFIED) meter device kit Use to test blood sugar 1 times daily or as directed. 1 kit 0     cyclobenzaprine (FLEXERIL) 5 MG tablet Take 1 tablet (5 mg) by mouth 3 times daily as needed for muscle spasms 30 tablet 0     FEROSUL 325 (65 Fe) MG tablet Take 1 tablet (325  "mg) by mouth daily. 90 tablet 0     folic acid (FOLVITE) 1 MG tablet Take 1 tablet (1,000 mcg) by mouth daily 90 tablet 1     lisinopril (ZESTRIL) 20 MG tablet Take 1 tablet (20 mg) by mouth 2 times daily 180 tablet 1     metFORMIN (GLUCOPHAGE) 500 MG tablet Take 2 tablets (1,000 mg) by mouth 2 times daily (with meals) 360 tablet 1     metoprolol succinate ER (TOPROL XL) 25 MG 24 hr tablet Take 0.5 tablets (12.5 mg) by mouth daily 45 tablet 1     nitroGLYcerin (NITROSTAT) 0.4 MG sublingual tablet For chest pain place 1 tablet under the tongue every 5 minutes for 3 doses. If symptoms persist 5 minutes after 1st dose call 911. 10 tablet 3     thin (NO BRAND SPECIFIED) lancets Use with lanceting device. 100 each 1     Vitamin D3 (CHOLECALCIFEROL) 25 mcg (1000 units) tablet Take by mouth daily       zinc gluconate 50 MG tablet Take 50 mg by mouth daily             OBJECTIVE:                                                    Physical Exam :    Blood pressure (!) 142/82, pulse 76, temperature (!) 96.1  F (35.6  C), temperature source Tympanic, resp. rate 18, height 1.683 m (5' 6.25\"), weight 98.2 kg (216 lb 8 oz), SpO2 97 %.     NAD, appears comfortable  Skin clear, no rashes  Neck: supple, no JVD,  no thyroidmegaly  Lymph nodes non palpable in the cervical, supraclavicular axillaries,   Chest: clear to auscultation with good respiratory effort  Cardiac: S1S2, RRR, no mgr appreciated  Abdomen: soft, not tender, not distended, audible bowel sound, no hepatosplenomegaly, no palpable masses, no abdominal bruits  Extremities: No cyanosis, clubbing No edema. Good pedal pulses. No skin lesions. Monofilament skin sensation is intact   Neuro: A, Ox3, no focal signs.      Candis Jose MD  Internal Medicine     SUBJECTIVE:   CC: Sumit is an 62 year old who presents for preventative health visit.   Patient has been advised of split billing requirements and indicates understanding: Yes     Healthy Habits:     Getting at least 3 " servings of Calcium per day:  Yes    Bi-annual eye exam:  Yes    Dental care twice a year:  Yes    Sleep apnea or symptoms of sleep apnea:  Sleep apnea    Diet:  Gluten-free/reduced    Frequency of exercise:  None    Taking medications regularly:  Yes    Medication side effects:  Not applicable    PHQ-2 Total Score: 0    Additional concerns today:  Yes            Today's PHQ-2 Score:   PHQ-2 ( 1999 Pf[employer]) 1/27/2023   Q1: Little interest or pleasure in doing things 0   Q2: Feeling down, depressed or hopeless 0   PHQ-2 Score 0   Q1: Little interest or pleasure in doing things Not at all   Q2: Feeling down, depressed or hopeless Not at all   PHQ-2 Score 0           Social History     Tobacco Use     Smoking status: Never     Smokeless tobacco: Never   Substance Use Topics     Alcohol use: Never     If you drink alcohol do you typically have >3 drinks per day or >7 drinks per week? No    Alcohol Use 1/27/2023   Prescreen: >3 drinks/day or >7 drinks/week? Not Applicable       Last PSA:   PSA   Date Value Ref Range Status   05/10/2021 0.56 0 - 4 ug/L Final     Comment:     Assay Method:  Chemiluminescence using Siemens Vista analyzer       Reviewed orders with patient. Reviewed health maintenance and updated orders accordingly - Yes  Labs reviewed in EPIC    Reviewed and updated as needed this visit by clinical staff   Tobacco  Allergies  Meds              Reviewed and updated as needed this visit by Provider                     Review of Systems   Constitutional: Negative for chills and fever.   HENT: Negative for congestion, ear pain, hearing loss and sore throat.    Eyes: Negative for pain and visual disturbance.   Respiratory: Negative for cough and shortness of breath.    Cardiovascular: Negative for chest pain, palpitations and peripheral edema.   Gastrointestinal: Negative for abdominal pain, constipation, diarrhea, heartburn, hematochezia and nausea.   Genitourinary: Negative for dysuria, frequency, genital  "sores, hematuria, impotence, penile discharge and urgency.   Musculoskeletal: Negative for arthralgias, joint swelling and myalgias.   Skin: Negative for rash.   Neurological: Negative for dizziness, weakness, headaches and paresthesias.   Psychiatric/Behavioral: Negative for mood changes. The patient is not nervous/anxious.          Patient has been advised of split billing requirements and indicates understanding: Yes At the check in, at the        COUNSELING:   Reviewed preventive health counseling, as reflected in patient instructions       Regular exercise       Healthy diet/nutrition      BMI:   Estimated body mass index is 34.68 kg/m  as calculated from the following:    Height as of this encounter: 1.683 m (5' 6.25\").    Weight as of this encounter: 98.2 kg (216 lb 8 oz).   Weight management plan: Discussed healthy diet and exercise guidelines      He reports that he has never smoked. He has never used smokeless tobacco.            Candis Altamirano MD  [facility]  "

## 2023-01-27 NOTE — PATIENT INSTRUCTIONS
Plan:  1. Dermatology referral for skin check   2. Increase Amlodipine to 2.5 mg twice a day  3. Continue the other meds, same doses for now.  4. Low carb diet as we discussed   5. Please make a lab appointment for non fasting labs  In May  6. Please make an appointment few days after the labs to discuss about the results.

## 2023-02-01 ENCOUNTER — ANCILLARY PROCEDURE (OUTPATIENT)
Dept: CARDIOLOGY | Facility: CLINIC | Age: 63
End: 2023-02-01
Attending: INTERNAL MEDICINE
Payer: COMMERCIAL

## 2023-02-01 DIAGNOSIS — I44.2 COMPLETE HEART BLOCK (H): ICD-10-CM

## 2023-02-01 DIAGNOSIS — Z95.0 CARDIAC PACEMAKER IN SITU: ICD-10-CM

## 2023-02-01 DIAGNOSIS — I44.1 SECOND DEGREE HEART BLOCK: Primary | ICD-10-CM

## 2023-02-01 PROCEDURE — 93280 PM DEVICE PROGR EVAL DUAL: CPT | Performed by: INTERNAL MEDICINE

## 2023-02-02 LAB
MDC_IDC_EPISODE_DTM: NORMAL
MDC_IDC_EPISODE_DURATION: 23 S
MDC_IDC_EPISODE_DURATION: 24 S
MDC_IDC_EPISODE_DURATION: 54 S
MDC_IDC_EPISODE_ID: 83
MDC_IDC_EPISODE_ID: 84
MDC_IDC_EPISODE_ID: 85
MDC_IDC_EPISODE_TYPE: NORMAL
MDC_IDC_LEAD_IMPLANT_DT: NORMAL
MDC_IDC_LEAD_IMPLANT_DT: NORMAL
MDC_IDC_LEAD_LOCATION: NORMAL
MDC_IDC_LEAD_LOCATION: NORMAL
MDC_IDC_LEAD_LOCATION_DETAIL_1: NORMAL
MDC_IDC_LEAD_LOCATION_DETAIL_1: NORMAL
MDC_IDC_LEAD_MFG: NORMAL
MDC_IDC_LEAD_MFG: NORMAL
MDC_IDC_LEAD_MODEL: NORMAL
MDC_IDC_LEAD_MODEL: NORMAL
MDC_IDC_LEAD_POLARITY_TYPE: NORMAL
MDC_IDC_LEAD_POLARITY_TYPE: NORMAL
MDC_IDC_LEAD_SERIAL: NORMAL
MDC_IDC_LEAD_SERIAL: NORMAL
MDC_IDC_MSMT_BATTERY_DTM: NORMAL
MDC_IDC_MSMT_BATTERY_REMAINING_LONGEVITY: 100 MO
MDC_IDC_MSMT_BATTERY_RRT_TRIGGER: 2.62
MDC_IDC_MSMT_BATTERY_STATUS: NORMAL
MDC_IDC_MSMT_BATTERY_VOLTAGE: 3 V
MDC_IDC_MSMT_LEADCHNL_RA_IMPEDANCE_VALUE: 323 OHM
MDC_IDC_MSMT_LEADCHNL_RA_IMPEDANCE_VALUE: 380 OHM
MDC_IDC_MSMT_LEADCHNL_RA_PACING_THRESHOLD_AMPLITUDE: 0.38 V
MDC_IDC_MSMT_LEADCHNL_RA_PACING_THRESHOLD_AMPLITUDE: 0.5 V
MDC_IDC_MSMT_LEADCHNL_RA_PACING_THRESHOLD_PULSEWIDTH: 0.4 MS
MDC_IDC_MSMT_LEADCHNL_RA_PACING_THRESHOLD_PULSEWIDTH: 0.4 MS
MDC_IDC_MSMT_LEADCHNL_RA_SENSING_INTR_AMPL: 2.38 MV
MDC_IDC_MSMT_LEADCHNL_RA_SENSING_INTR_AMPL: 2.75 MV
MDC_IDC_MSMT_LEADCHNL_RV_IMPEDANCE_VALUE: 342 OHM
MDC_IDC_MSMT_LEADCHNL_RV_IMPEDANCE_VALUE: 437 OHM
MDC_IDC_MSMT_LEADCHNL_RV_PACING_THRESHOLD_AMPLITUDE: 0.75 V
MDC_IDC_MSMT_LEADCHNL_RV_PACING_THRESHOLD_PULSEWIDTH: 0.4 MS
MDC_IDC_MSMT_LEADCHNL_RV_SENSING_INTR_AMPL: 8.5 MV
MDC_IDC_MSMT_LEADCHNL_RV_SENSING_INTR_AMPL: 9.5 MV
MDC_IDC_PG_IMPLANT_DTM: NORMAL
MDC_IDC_PG_MFG: NORMAL
MDC_IDC_PG_MODEL: NORMAL
MDC_IDC_PG_SERIAL: NORMAL
MDC_IDC_PG_TYPE: NORMAL
MDC_IDC_SESS_CLINIC_NAME: NORMAL
MDC_IDC_SESS_DTM: NORMAL
MDC_IDC_SESS_TYPE: NORMAL
MDC_IDC_SET_BRADY_AT_MODE_SWITCH_RATE: 150 {BEATS}/MIN
MDC_IDC_SET_BRADY_HYSTRATE: NORMAL
MDC_IDC_SET_BRADY_LOWRATE: 50 {BEATS}/MIN
MDC_IDC_SET_BRADY_MAX_SENSOR_RATE: 130 {BEATS}/MIN
MDC_IDC_SET_BRADY_MAX_TRACKING_RATE: 130 {BEATS}/MIN
MDC_IDC_SET_BRADY_MODE: NORMAL
MDC_IDC_SET_BRADY_PAV_DELAY_LOW: 180 MS
MDC_IDC_SET_BRADY_SAV_DELAY_LOW: 150 MS
MDC_IDC_SET_LEADCHNL_RA_PACING_AMPLITUDE: 1.5 V
MDC_IDC_SET_LEADCHNL_RA_PACING_ANODE_ELECTRODE_1: NORMAL
MDC_IDC_SET_LEADCHNL_RA_PACING_ANODE_LOCATION_1: NORMAL
MDC_IDC_SET_LEADCHNL_RA_PACING_CAPTURE_MODE: NORMAL
MDC_IDC_SET_LEADCHNL_RA_PACING_CATHODE_ELECTRODE_1: NORMAL
MDC_IDC_SET_LEADCHNL_RA_PACING_CATHODE_LOCATION_1: NORMAL
MDC_IDC_SET_LEADCHNL_RA_PACING_POLARITY: NORMAL
MDC_IDC_SET_LEADCHNL_RA_PACING_PULSEWIDTH: 0.4 MS
MDC_IDC_SET_LEADCHNL_RA_SENSING_ANODE_ELECTRODE_1: NORMAL
MDC_IDC_SET_LEADCHNL_RA_SENSING_ANODE_LOCATION_1: NORMAL
MDC_IDC_SET_LEADCHNL_RA_SENSING_CATHODE_ELECTRODE_1: NORMAL
MDC_IDC_SET_LEADCHNL_RA_SENSING_CATHODE_LOCATION_1: NORMAL
MDC_IDC_SET_LEADCHNL_RA_SENSING_POLARITY: NORMAL
MDC_IDC_SET_LEADCHNL_RA_SENSING_SENSITIVITY: 0.3 MV
MDC_IDC_SET_LEADCHNL_RV_PACING_AMPLITUDE: 1.5 V
MDC_IDC_SET_LEADCHNL_RV_PACING_ANODE_ELECTRODE_1: NORMAL
MDC_IDC_SET_LEADCHNL_RV_PACING_ANODE_LOCATION_1: NORMAL
MDC_IDC_SET_LEADCHNL_RV_PACING_CAPTURE_MODE: NORMAL
MDC_IDC_SET_LEADCHNL_RV_PACING_CATHODE_ELECTRODE_1: NORMAL
MDC_IDC_SET_LEADCHNL_RV_PACING_CATHODE_LOCATION_1: NORMAL
MDC_IDC_SET_LEADCHNL_RV_PACING_POLARITY: NORMAL
MDC_IDC_SET_LEADCHNL_RV_PACING_PULSEWIDTH: 0.4 MS
MDC_IDC_SET_LEADCHNL_RV_SENSING_ANODE_ELECTRODE_1: NORMAL
MDC_IDC_SET_LEADCHNL_RV_SENSING_ANODE_LOCATION_1: NORMAL
MDC_IDC_SET_LEADCHNL_RV_SENSING_CATHODE_ELECTRODE_1: NORMAL
MDC_IDC_SET_LEADCHNL_RV_SENSING_CATHODE_LOCATION_1: NORMAL
MDC_IDC_SET_LEADCHNL_RV_SENSING_POLARITY: NORMAL
MDC_IDC_SET_LEADCHNL_RV_SENSING_SENSITIVITY: 0.9 MV
MDC_IDC_SET_ZONE_DETECTION_INTERVAL: 370 MS
MDC_IDC_SET_ZONE_DETECTION_INTERVAL: 400 MS
MDC_IDC_SET_ZONE_TYPE: NORMAL
MDC_IDC_STAT_AT_BURDEN_PERCENT: 0 %
MDC_IDC_STAT_AT_DTM_END: NORMAL
MDC_IDC_STAT_AT_DTM_START: NORMAL
MDC_IDC_STAT_BRADY_AP_VP_PERCENT: 25.94 %
MDC_IDC_STAT_BRADY_AP_VS_PERCENT: 0.92 %
MDC_IDC_STAT_BRADY_AS_VP_PERCENT: 65.77 %
MDC_IDC_STAT_BRADY_AS_VS_PERCENT: 7.36 %
MDC_IDC_STAT_BRADY_DTM_END: NORMAL
MDC_IDC_STAT_BRADY_DTM_START: NORMAL
MDC_IDC_STAT_BRADY_RA_PERCENT_PACED: 26.8 %
MDC_IDC_STAT_BRADY_RV_PERCENT_PACED: 91.71 %
MDC_IDC_STAT_EPISODE_RECENT_COUNT: 0
MDC_IDC_STAT_EPISODE_RECENT_COUNT_DTM_END: NORMAL
MDC_IDC_STAT_EPISODE_RECENT_COUNT_DTM_START: NORMAL
MDC_IDC_STAT_EPISODE_TOTAL_COUNT: 0
MDC_IDC_STAT_EPISODE_TOTAL_COUNT: 2
MDC_IDC_STAT_EPISODE_TOTAL_COUNT: 2
MDC_IDC_STAT_EPISODE_TOTAL_COUNT_DTM_END: NORMAL
MDC_IDC_STAT_EPISODE_TOTAL_COUNT_DTM_START: NORMAL
MDC_IDC_STAT_EPISODE_TYPE: NORMAL

## 2023-04-26 ENCOUNTER — TELEPHONE (OUTPATIENT)
Dept: CARDIOLOGY | Facility: CLINIC | Age: 63
End: 2023-04-26
Payer: COMMERCIAL

## 2023-04-26 DIAGNOSIS — E78.5 HYPERLIPIDEMIA LDL GOAL <100: Primary | ICD-10-CM

## 2023-04-26 NOTE — TELEPHONE ENCOUNTER
[facility] Call Center    Phone Message    May a detailed message be left on voicemail: yes     Reason for Call: Other: Per last visit note from Dr Rolle, patient to have echo and labs prior to seeing him but I don't believe there are lab orders. Please review and reach out to patient.      Action Taken: Other: Cardio    Travel Screening: Not Applicable

## 2023-04-26 NOTE — TELEPHONE ENCOUNTER
Pt will be due for annual FLP. Will message scheduling to call pt to set up labs prior to July visit with . Moreno HOLLIDAY April 26, 2023, 1:39 PM

## 2023-05-03 ENCOUNTER — ANCILLARY PROCEDURE (OUTPATIENT)
Dept: CARDIOLOGY | Facility: CLINIC | Age: 63
End: 2023-05-03
Attending: INTERNAL MEDICINE
Payer: COMMERCIAL

## 2023-05-03 DIAGNOSIS — I44.1 SECOND DEGREE HEART BLOCK: ICD-10-CM

## 2023-05-03 DIAGNOSIS — I44.2 COMPLETE HEART BLOCK (H): ICD-10-CM

## 2023-05-03 DIAGNOSIS — Z95.0 CARDIAC PACEMAKER IN SITU: ICD-10-CM

## 2023-05-03 PROCEDURE — 93296 REM INTERROG EVL PM/IDS: CPT | Performed by: INTERNAL MEDICINE

## 2023-05-03 PROCEDURE — 93294 REM INTERROG EVL PM/LDLS PM: CPT | Performed by: INTERNAL MEDICINE

## 2023-05-04 NOTE — TELEPHONE ENCOUNTER
Patient was called back and a sleep study and follow up were scheduled.  
Reason for call:  Other   Patient called regarding (reason for call): call back  Additional comments: Patient is calling would like a call back to schedule over night sleep study     Phone number to reach patient:  Cell number on file:    Telephone Information:   Mobile [phone]       Best Time:  Anytime    Can we leave a detailed message on this number?  YES    Travel screening: Not Applicable  
No

## 2023-05-14 LAB
MDC_IDC_LEAD_IMPLANT_DT: NORMAL
MDC_IDC_LEAD_IMPLANT_DT: NORMAL
MDC_IDC_LEAD_LOCATION: NORMAL
MDC_IDC_LEAD_LOCATION: NORMAL
MDC_IDC_LEAD_LOCATION_DETAIL_1: NORMAL
MDC_IDC_LEAD_LOCATION_DETAIL_1: NORMAL
MDC_IDC_LEAD_MFG: NORMAL
MDC_IDC_LEAD_MFG: NORMAL
MDC_IDC_LEAD_MODEL: NORMAL
MDC_IDC_LEAD_MODEL: NORMAL
MDC_IDC_LEAD_POLARITY_TYPE: NORMAL
MDC_IDC_LEAD_POLARITY_TYPE: NORMAL
MDC_IDC_LEAD_SERIAL: NORMAL
MDC_IDC_LEAD_SERIAL: NORMAL
MDC_IDC_MSMT_BATTERY_DTM: NORMAL
MDC_IDC_MSMT_BATTERY_REMAINING_LONGEVITY: 97 MO
MDC_IDC_MSMT_BATTERY_RRT_TRIGGER: 2.62
MDC_IDC_MSMT_BATTERY_STATUS: NORMAL
MDC_IDC_MSMT_BATTERY_VOLTAGE: 2.99 V
MDC_IDC_MSMT_LEADCHNL_RA_IMPEDANCE_VALUE: 304 OHM
MDC_IDC_MSMT_LEADCHNL_RA_IMPEDANCE_VALUE: 342 OHM
MDC_IDC_MSMT_LEADCHNL_RA_PACING_THRESHOLD_AMPLITUDE: 0.5 V
MDC_IDC_MSMT_LEADCHNL_RA_PACING_THRESHOLD_PULSEWIDTH: 0.4 MS
MDC_IDC_MSMT_LEADCHNL_RA_SENSING_INTR_AMPL: 2.5 MV
MDC_IDC_MSMT_LEADCHNL_RA_SENSING_INTR_AMPL: 2.5 MV
MDC_IDC_MSMT_LEADCHNL_RV_IMPEDANCE_VALUE: 304 OHM
MDC_IDC_MSMT_LEADCHNL_RV_IMPEDANCE_VALUE: 418 OHM
MDC_IDC_MSMT_LEADCHNL_RV_PACING_THRESHOLD_AMPLITUDE: 0.75 V
MDC_IDC_MSMT_LEADCHNL_RV_PACING_THRESHOLD_PULSEWIDTH: 0.4 MS
MDC_IDC_MSMT_LEADCHNL_RV_SENSING_INTR_AMPL: 12.88 MV
MDC_IDC_MSMT_LEADCHNL_RV_SENSING_INTR_AMPL: 12.88 MV
MDC_IDC_PG_IMPLANT_DTM: NORMAL
MDC_IDC_PG_MFG: NORMAL
MDC_IDC_PG_MODEL: NORMAL
MDC_IDC_PG_SERIAL: NORMAL
MDC_IDC_PG_TYPE: NORMAL
MDC_IDC_SESS_CLINIC_NAME: NORMAL
MDC_IDC_SESS_DTM: NORMAL
MDC_IDC_SESS_TYPE: NORMAL
MDC_IDC_SET_BRADY_AT_MODE_SWITCH_RATE: 150 {BEATS}/MIN
MDC_IDC_SET_BRADY_HYSTRATE: NORMAL
MDC_IDC_SET_BRADY_LOWRATE: 50 {BEATS}/MIN
MDC_IDC_SET_BRADY_MAX_SENSOR_RATE: 130 {BEATS}/MIN
MDC_IDC_SET_BRADY_MAX_TRACKING_RATE: 130 {BEATS}/MIN
MDC_IDC_SET_BRADY_MODE: NORMAL
MDC_IDC_SET_BRADY_PAV_DELAY_LOW: 180 MS
MDC_IDC_SET_BRADY_SAV_DELAY_LOW: 150 MS
MDC_IDC_SET_LEADCHNL_RA_PACING_AMPLITUDE: 1.5 V
MDC_IDC_SET_LEADCHNL_RA_PACING_ANODE_ELECTRODE_1: NORMAL
MDC_IDC_SET_LEADCHNL_RA_PACING_ANODE_LOCATION_1: NORMAL
MDC_IDC_SET_LEADCHNL_RA_PACING_CAPTURE_MODE: NORMAL
MDC_IDC_SET_LEADCHNL_RA_PACING_CATHODE_ELECTRODE_1: NORMAL
MDC_IDC_SET_LEADCHNL_RA_PACING_CATHODE_LOCATION_1: NORMAL
MDC_IDC_SET_LEADCHNL_RA_PACING_POLARITY: NORMAL
MDC_IDC_SET_LEADCHNL_RA_PACING_PULSEWIDTH: 0.4 MS
MDC_IDC_SET_LEADCHNL_RA_SENSING_ANODE_ELECTRODE_1: NORMAL
MDC_IDC_SET_LEADCHNL_RA_SENSING_ANODE_LOCATION_1: NORMAL
MDC_IDC_SET_LEADCHNL_RA_SENSING_CATHODE_ELECTRODE_1: NORMAL
MDC_IDC_SET_LEADCHNL_RA_SENSING_CATHODE_LOCATION_1: NORMAL
MDC_IDC_SET_LEADCHNL_RA_SENSING_POLARITY: NORMAL
MDC_IDC_SET_LEADCHNL_RA_SENSING_SENSITIVITY: 0.3 MV
MDC_IDC_SET_LEADCHNL_RV_PACING_AMPLITUDE: 1.5 V
MDC_IDC_SET_LEADCHNL_RV_PACING_ANODE_ELECTRODE_1: NORMAL
MDC_IDC_SET_LEADCHNL_RV_PACING_ANODE_LOCATION_1: NORMAL
MDC_IDC_SET_LEADCHNL_RV_PACING_CAPTURE_MODE: NORMAL
MDC_IDC_SET_LEADCHNL_RV_PACING_CATHODE_ELECTRODE_1: NORMAL
MDC_IDC_SET_LEADCHNL_RV_PACING_CATHODE_LOCATION_1: NORMAL
MDC_IDC_SET_LEADCHNL_RV_PACING_POLARITY: NORMAL
MDC_IDC_SET_LEADCHNL_RV_PACING_PULSEWIDTH: 0.4 MS
MDC_IDC_SET_LEADCHNL_RV_SENSING_ANODE_ELECTRODE_1: NORMAL
MDC_IDC_SET_LEADCHNL_RV_SENSING_ANODE_LOCATION_1: NORMAL
MDC_IDC_SET_LEADCHNL_RV_SENSING_CATHODE_ELECTRODE_1: NORMAL
MDC_IDC_SET_LEADCHNL_RV_SENSING_CATHODE_LOCATION_1: NORMAL
MDC_IDC_SET_LEADCHNL_RV_SENSING_POLARITY: NORMAL
MDC_IDC_SET_LEADCHNL_RV_SENSING_SENSITIVITY: 0.9 MV
MDC_IDC_SET_ZONE_DETECTION_INTERVAL: 370 MS
MDC_IDC_SET_ZONE_DETECTION_INTERVAL: 400 MS
MDC_IDC_SET_ZONE_TYPE: NORMAL
MDC_IDC_STAT_AT_BURDEN_PERCENT: 0 %
MDC_IDC_STAT_AT_DTM_END: NORMAL
MDC_IDC_STAT_AT_DTM_START: NORMAL
MDC_IDC_STAT_BRADY_AP_VP_PERCENT: 18.93 %
MDC_IDC_STAT_BRADY_AP_VS_PERCENT: 0.04 %
MDC_IDC_STAT_BRADY_AS_VP_PERCENT: 80.73 %
MDC_IDC_STAT_BRADY_AS_VS_PERCENT: 0.31 %
MDC_IDC_STAT_BRADY_DTM_END: NORMAL
MDC_IDC_STAT_BRADY_DTM_START: NORMAL
MDC_IDC_STAT_BRADY_RA_PERCENT_PACED: 18.94 %
MDC_IDC_STAT_BRADY_RV_PERCENT_PACED: 99.65 %
MDC_IDC_STAT_EPISODE_RECENT_COUNT: 0
MDC_IDC_STAT_EPISODE_RECENT_COUNT_DTM_END: NORMAL
MDC_IDC_STAT_EPISODE_RECENT_COUNT_DTM_START: NORMAL
MDC_IDC_STAT_EPISODE_TOTAL_COUNT: 0
MDC_IDC_STAT_EPISODE_TOTAL_COUNT: 0
MDC_IDC_STAT_EPISODE_TOTAL_COUNT: 2
MDC_IDC_STAT_EPISODE_TOTAL_COUNT: 2
MDC_IDC_STAT_EPISODE_TOTAL_COUNT: 81
MDC_IDC_STAT_EPISODE_TOTAL_COUNT_DTM_END: NORMAL
MDC_IDC_STAT_EPISODE_TOTAL_COUNT_DTM_START: NORMAL
MDC_IDC_STAT_EPISODE_TYPE: NORMAL

## 2023-05-18 DIAGNOSIS — E11.9 TYPE 2 DIABETES MELLITUS WITHOUT COMPLICATION, WITHOUT LONG-TERM CURRENT USE OF INSULIN (H): ICD-10-CM

## 2023-05-19 RX ORDER — ASPIRIN 81 MG/1
TABLET, COATED ORAL
Qty: 100 TABLET | Refills: 3 | Status: SHIPPED | OUTPATIENT
Start: 2023-05-19 | End: 2024-06-10

## 2023-05-20 ENCOUNTER — HEALTH MAINTENANCE LETTER (OUTPATIENT)
Age: 63
End: 2023-05-20

## 2023-06-14 ENCOUNTER — MYC MEDICAL ADVICE (OUTPATIENT)
Dept: INTERNAL MEDICINE | Facility: CLINIC | Age: 63
End: 2023-06-14
Payer: COMMERCIAL

## 2023-06-15 NOTE — TELEPHONE ENCOUNTER
[employer] message sent to patient.    Tyrone Louis, Triage RN Maria Guadalupe Arce  12:59 PM 6/15/2023

## 2023-06-30 DIAGNOSIS — I25.10 CORONARY ARTERY DISEASE INVOLVING NATIVE HEART WITHOUT ANGINA PECTORIS, UNSPECIFIED VESSEL OR LESION TYPE: ICD-10-CM

## 2023-06-30 RX ORDER — METOPROLOL SUCCINATE 25 MG/1
TABLET, EXTENDED RELEASE ORAL
Qty: 45 TABLET | Refills: 0 | Status: SHIPPED | OUTPATIENT
Start: 2023-06-30 | End: 2023-07-26

## 2023-06-30 NOTE — TELEPHONE ENCOUNTER
Medication is being filled for 1 time refill only due to:  Future appointment scheduled.  Appointments in Next Year    Jul 13, 2023 10:30 AM  Lab visit with [facility] LAB  [facility] ([facility] ) [phone]   Jul 18, 2023 11:00 AM  (Arrive by 10:45 AM)  New Visit with Kaycee Brewer PA-C  [facility] ([facility] ) [phone]   Jul 26, 2023 11:00 AM  (Arrive by 10:40 AM)  Provider Visit with Candis Altamirano MD  [facility] ([facility] ) [phone]   Jul 26, 2023 12:30 PM  ECHO COMPLETE with [email or web address]  [facility] ([facility] ) [phone]   Aug 09, 2023 12:00 AM  CARDIAC DEVICE CHECK - REMOTE with PEREZ TECH1  [facility] ([facility] ) [phone]   Aug 30, 2023  7:45 AM  (Arrive by 7:40 AM)  Return Cardiology with Sae Rolle MD  [facility] ([facility] ) [phone]         Franca Mukherjee RN  [facility]

## 2023-07-05 DIAGNOSIS — E11.9 TYPE 2 DIABETES MELLITUS WITHOUT COMPLICATION, WITHOUT LONG-TERM CURRENT USE OF INSULIN (H): ICD-10-CM

## 2023-07-13 ENCOUNTER — LAB (OUTPATIENT)
Dept: LAB | Facility: CLINIC | Age: 63
End: 2023-07-13
Payer: COMMERCIAL

## 2023-07-13 DIAGNOSIS — E11.9 TYPE 2 DIABETES MELLITUS WITHOUT COMPLICATION, WITHOUT LONG-TERM CURRENT USE OF INSULIN (H): ICD-10-CM

## 2023-07-13 DIAGNOSIS — Z12.5 SCREENING FOR PROSTATE CANCER: ICD-10-CM

## 2023-07-13 DIAGNOSIS — Z11.4 ENCOUNTER FOR SCREENING FOR HIV: ICD-10-CM

## 2023-07-13 DIAGNOSIS — E78.5 HYPERLIPIDEMIA LDL GOAL <100: ICD-10-CM

## 2023-07-13 LAB
ALT SERPL W P-5'-P-CCNC: 25 U/L (ref 0–70)
CHOLEST SERPL-MCNC: 115 MG/DL
HBA1C MFR BLD: 7.5 % (ref 0–5.6)
HDLC SERPL-MCNC: 29 MG/DL
LDLC SERPL CALC-MCNC: 51 MG/DL
NONHDLC SERPL-MCNC: 86 MG/DL
PSA SERPL DL<=0.01 NG/ML-MCNC: 0.41 NG/ML (ref 0–4.5)
TRIGL SERPL-MCNC: 175 MG/DL

## 2023-07-13 PROCEDURE — 80061 LIPID PANEL: CPT

## 2023-07-13 PROCEDURE — 83036 HEMOGLOBIN GLYCOSYLATED A1C: CPT

## 2023-07-13 PROCEDURE — G0103 PSA SCREENING: HCPCS

## 2023-07-13 PROCEDURE — 87389 HIV-1 AG W/HIV-1&-2 AB AG IA: CPT

## 2023-07-13 PROCEDURE — 36415 COLL VENOUS BLD VENIPUNCTURE: CPT

## 2023-07-13 PROCEDURE — 84460 ALANINE AMINO (ALT) (SGPT): CPT

## 2023-07-14 LAB — HIV 1+2 AB+HIV1 P24 AG SERPL QL IA: NONREACTIVE

## 2023-07-18 ENCOUNTER — PATIENT OUTREACH (OUTPATIENT)
Dept: ONCOLOGY | Facility: CLINIC | Age: 63
End: 2023-07-18

## 2023-07-18 ENCOUNTER — OFFICE VISIT (OUTPATIENT)
Dept: DERMATOLOGY | Facility: CLINIC | Age: 63
End: 2023-07-18
Attending: INTERNAL MEDICINE
Payer: COMMERCIAL

## 2023-07-18 ENCOUNTER — PRE VISIT (OUTPATIENT)
Dept: OTHER | Age: 63
End: 2023-07-18

## 2023-07-18 DIAGNOSIS — D18.01 ANGIOMA OF SKIN: ICD-10-CM

## 2023-07-18 DIAGNOSIS — L71.9 ROSACEA: Primary | ICD-10-CM

## 2023-07-18 DIAGNOSIS — L81.4 LENTIGO: ICD-10-CM

## 2023-07-18 DIAGNOSIS — L82.1 SEBORRHEIC KERATOSIS: ICD-10-CM

## 2023-07-18 DIAGNOSIS — D22.9 NEVUS: ICD-10-CM

## 2023-07-18 DIAGNOSIS — Z15.09 BRCA GENE MUTATION POSITIVE IN MALE: ICD-10-CM

## 2023-07-18 DIAGNOSIS — D48.5 NEOPLASM OF UNCERTAIN BEHAVIOR OF SKIN: ICD-10-CM

## 2023-07-18 DIAGNOSIS — Z15.03 BRCA GENE MUTATION POSITIVE IN MALE: ICD-10-CM

## 2023-07-18 DIAGNOSIS — L57.0 ACTINIC KERATOSIS: ICD-10-CM

## 2023-07-18 DIAGNOSIS — Z15.01 BRCA GENE MUTATION POSITIVE IN MALE: ICD-10-CM

## 2023-07-18 PROCEDURE — 17003 DESTRUCT PREMALG LES 2-14: CPT | Performed by: PHYSICIAN ASSISTANT

## 2023-07-18 PROCEDURE — 99213 OFFICE O/P EST LOW 20 MIN: CPT | Mod: 25 | Performed by: PHYSICIAN ASSISTANT

## 2023-07-18 PROCEDURE — 88305 TISSUE EXAM BY PATHOLOGIST: CPT | Performed by: DERMATOLOGY

## 2023-07-18 PROCEDURE — 11102 TANGNTL BX SKIN SINGLE LES: CPT | Performed by: PHYSICIAN ASSISTANT

## 2023-07-18 PROCEDURE — 17000 DESTRUCT PREMALG LESION: CPT | Mod: 59 | Performed by: PHYSICIAN ASSISTANT

## 2023-07-18 PROCEDURE — 11103 TANGNTL BX SKIN EA SEP/ADDL: CPT | Performed by: PHYSICIAN ASSISTANT

## 2023-07-18 RX ORDER — METRONIDAZOLE 7.5 MG/G
GEL TOPICAL
Qty: 45 G | Refills: 11 | Status: SHIPPED | OUTPATIENT
Start: 2023-07-18

## 2023-07-18 RX ORDER — FLUOROURACIL 50 MG/G
CREAM TOPICAL
Qty: 40 G | Refills: 3 | Status: SHIPPED | OUTPATIENT
Start: 2023-07-18 | End: 2024-08-22

## 2023-07-18 NOTE — TELEPHONE ENCOUNTER
RECORDS STATUS - ALL OTHER DIAGNOSIS    Records Needed - 7/18, please contact patient to find out where genetic testing was done and obtain copies of those results and visit notes with Genetic Counselor    Action July 18, 2023 2:07 PM    Action Taken Called and left a voicemail for patient to confirm where he completed genetic testing.     3:44 PM:  Pt called back. He doesn't remember the name of the facility and no longer have a copy of the report. He only remember it was done around 2016 when his daughter was dx with breast cancer. He and his ex-wife took a genetic testing that was done via mail ordered. IB nurse.     RECORDS RECEIVED FROM:    DATE RECEIVED:    NOTES STATUS DETAILS   OFFICE NOTE from referring provider Epic 7/18/23: Dr. Candis Altamirano   OFFICE NOTE from other specialist [facility] Dr. Jorge Alberto Taylor   MEDICATION LIST [facility]    LABS     ANYTHING RELATED TO DIAGNOSIS Epic Most recent from 7/13/23   GENONOMIC TESTING     TYPE:

## 2023-07-18 NOTE — LETTER
7/18/2023         RE: Sumit Park  [address]  [place] [address]        Dear Colleague,    Thank you for referring your patient, Sumit Park, to the [facility]. Please see a copy of my visit note below.    HPI:   Chief complaints: Sumit Park is a pleasant 63 year old male who presents for Full skin cancer screening to rule out skin cancer   Last Skin Exam: 1 year ago      1st Baseline: no  Personal HX of Skin Cancer: yes BCC on the right ear    Personal HX of Malignant Melanoma: no   Family HX of Skin Cancer / Malignant Melanoma: No  Personal HX of Atypical Moles:   no  Risk factors: history of sun exposure and burns  New / Changing lesions: yes spot on the left ear which is similar to his previous BCC on the right ear  Social History:   On review of systems, there are no further skin complaints, patient is feeling otherwise well.   ROS of the following were done and are negative: Constitutional, Eyes, Ears, Nose,   Mouth, Throat, Cardiovascular, Respiratory, GI, Genitourinary, Musculoskeletal,   Psychiatric, Endocrine, Allergic/Immunologic.    PHYSICAL EXAM:   There were no vitals taken for this visit.  Skin exam performed as follows: Type 2 skin. Mood appropriate  Alert and Oriented X 3. Well developed, well nourished in no distress.  General appearance: Normal  Head including face: Normal  Eyes: conjunctiva and lids: Normal  Mouth: Lips, teeth, gums: Normal  Neck: Normal  Chest-breast/axillae: Normal  Back: Normal  Extremities: digits/nails (clubbing): Normal  Eccrine and Apocrine glands: Normal  Right upper extremity: Normal  Left upper extremity: Normal  Right lower extremity: Normal  Left lower extremity: Normal  Skin: Scalp and body hair: See below    Pt deferred exam of breasts, groin, buttocks: No    Other physical findings:  1. Multiple pigmented macules on extremities and trunk  2. Multiple pigmented macules on face, trunk and  extremities  3. Multiple vascular papules on trunk, arms and legs  4. Multiple scattered keratotic plaques  5. Numerous pink gritty papules on the scalp  6. Pink gritty papule on the right zygoma x 2, left cheek x 1  7. 5 mm pearly papule on the left helix  8. 5 mm irregular brown/pink macule on the left 4th toe        Except as noted above, no other signs of skin cancer or melanoma.     ASSESSMENT/PLAN:   Benign Full skin cancer screening today. . Patient with history of BCC  Advised on monthly self exams and 1 year  Patient Education: Appropriate brochures given.    Multiple benign appearing melanocytic nevi on arms, legs and trunk. Discussed ABCDEs of melanoma and sunscreen.   Multiple lentigos on arms, legs and trunk. Advised benign, no treatment needed.  Multiple scattered angiomas. Advised benign, no treatment needed.   Seborrheic keratosis on arms, legs and trunk. Advised benign, no treatment needed.  History of rosacea - start metrogel BID PRN  Numerous AKs on the scalp   --Start Efudex BID x 2 weeks  Actinic keratosis on the right zygoma x 2, left cheek x 1. As precancerous, cryosurgery performed. Advised on blistering and post-op care. Advised if not resolved in 1-2 months to return for evaluation  R/o BCC on the left helix. Shave biopsy performed.  Area cleaned and anesthetized with 1% lidocaine with epinephrine.  Dermablade used to remove the lesion and sent to pathology. Bleeding was cauterized. Pt tolerated procedure well with no complications.   Atypical nevus on the left 4th toe. Shave biopsy performed.  Area cleaned and anesthetized with 1% lidocaine with epinephrine.  Dermablade used to remove the lesion and sent to pathology. Bleeding was cauterized. Pt tolerated procedure well with no complications.   BRCA positive per pt history - will refer to oncology            Follow-up: 6 months    1.) Patient was asked about new and changing moles. YES  2.) Patient received a complete physical skin  examination: YES  3.) Patient was counseled to perform a monthly self skin examination: YES  Scribed By: Kaycee Brewer MS, PAANGEL      Again, thank you for allowing me to participate in the care of your patient.        Sincerely,        Kaycee Brewer PA-C

## 2023-07-18 NOTE — PATIENT INSTRUCTIONS
Start using 5-fluorouracil to your scalp twice per day for 2 weeks then stop. You will become VERY red/irritated with this. Ok to wait until the winter/fall to start this.     Use metrogel on your nose twice per day whenever you  need it.     Wound Care Instructions     FOR SUPERFICIAL WOUNDS     [facility]  [phone]                 AFTER 24 HOURS YOU SHOULD REMOVE THE BANDAGE AND BEGIN DAILY DRESSING CHANGES AS FOLLOWS:     1) Remove Dressing.     2) Clean and dry the area with tap water using a Q-tip or sterile gauze pad.     3) Apply Vaseline, Aquaphor, Polysporin ointment or Bacitracin ointment over entire wound.  Do NOT use Neosporin ointment.     4) Cover the wound with a band-aid, or a sterile non-stick gauze pad and micropore paper tape    REPEAT THESE INSTRUCTIONS AT LEAST ONCE A DAY UNTIL THE WOUND HAS COMPLETELY HEALED.    It is an old wives tale that a wound heals better when it is exposed to air and allowed to dry out. The wound will heal faster with a better cosmetic result if it is kept moist with ointment and covered with a bandage.    **Do not let the wound dry out.**    Supplies Needed:      *Cotton tipped applicators (Q-tips)    *Vaseline, Aquaphor, Polysporin or Bacitracin Ointment (NOT NEOSPORIN)    *Band-aids or non-stick gauze pads and micropore paper tape.      PATIENT INFORMATION:    During the healing process you will notice a number of changes. All wounds develop a small halo of redness surrounding the wound.  This means healing is occurring. Severe itching with extensive redness usually indicates sensitivity to the ointment or bandage tape used to dress the wound.  You should call our office if this develops.      Swelling  and/or discoloration around your surgical site is common, particularly when performed around the eye.    All wounds normally drain.  The larger the wound the more drainage there will be.  After 7-10 days, you will notice the wound  beginning to shrink and new skin will begin to grow.  The wound is healed when you can see skin has formed over the entire area.  A healed wound has a healthy, shiny look to the surface and is red to dark pink in color to normalize.  Wounds may take approximately 4-6 weeks to heal.  Larger wounds may take 6-8 weeks.  After the wound is healed you may discontinue dressing changes.    You may experience a sensation of tightness as your wound heals. This is normal and will gradually subside.    Your healed wound may be sensitive to temperature changes. This sensitivity improves with time, but if you re having a lot of discomfort, try to avoid temperature extremes.    Patients frequently experience itching after their wound appears to have healed because of the continue healing under the skin.  Plain Vaseline will help relieve the itching.      POSSIBLE COMPLICATIONS    BLEEDING:    Leave the bandage in place.  Use tightly rolled up gauze or a cloth to apply direct pressure over the bandage for 30  minutes.  Reapply pressure for an additional 30 minutes if necessary  Use additional gauze and tape to maintain pressure once the bleeding has stopped.

## 2023-07-18 NOTE — PROGRESS NOTES
HPI:   Chief complaints: Sumit Park is a pleasant 63 year old male who presents for Full skin cancer screening to rule out skin cancer   Last Skin Exam: 1 year ago      1st Baseline: no  Personal HX of Skin Cancer: yes BCC on the right ear    Personal HX of Malignant Melanoma: no   Family HX of Skin Cancer / Malignant Melanoma: No  Personal HX of Atypical Moles:   no  Risk factors: history of sun exposure and burns  New / Changing lesions: yes spot on the left ear which is similar to his previous BCC on the right ear  Social History:   On review of systems, there are no further skin complaints, patient is feeling otherwise well.   ROS of the following were done and are negative: Constitutional, Eyes, Ears, Nose,   Mouth, Throat, Cardiovascular, Respiratory, GI, Genitourinary, Musculoskeletal,   Psychiatric, Endocrine, Allergic/Immunologic.    PHYSICAL EXAM:   There were no vitals taken for this visit.  Skin exam performed as follows: Type 2 skin. Mood appropriate  Alert and Oriented X 3. Well developed, well nourished in no distress.  General appearance: Normal  Head including face: Normal  Eyes: conjunctiva and lids: Normal  Mouth: Lips, teeth, gums: Normal  Neck: Normal  Chest-breast/axillae: Normal  Back: Normal  Extremities: digits/nails (clubbing): Normal  Eccrine and Apocrine glands: Normal  Right upper extremity: Normal  Left upper extremity: Normal  Right lower extremity: Normal  Left lower extremity: Normal  Skin: Scalp and body hair: See below    Pt deferred exam of breasts, groin, buttocks: No    Other physical findings:  1. Multiple pigmented macules on extremities and trunk  2. Multiple pigmented macules on face, trunk and extremities  3. Multiple vascular papules on trunk, arms and legs  4. Multiple scattered keratotic plaques  5. Numerous pink gritty papules on the scalp  6. Pink gritty papule on the right zygoma x 2, left cheek x 1  7. 5 mm pearly papule on the left helix  8. 5 mm irregular  brown/pink macule on the left 4th toe        Except as noted above, no other signs of skin cancer or melanoma.     ASSESSMENT/PLAN:   Benign Full skin cancer screening today. . Patient with history of BCC  Advised on monthly self exams and 1 year  Patient Education: Appropriate brochures given.    Multiple benign appearing melanocytic nevi on arms, legs and trunk. Discussed ABCDEs of melanoma and sunscreen.   Multiple lentigos on arms, legs and trunk. Advised benign, no treatment needed.  Multiple scattered angiomas. Advised benign, no treatment needed.   Seborrheic keratosis on arms, legs and trunk. Advised benign, no treatment needed.  History of rosacea - start metrogel BID PRN  Numerous AKs on the scalp   --Start Efudex BID x 2 weeks  Actinic keratosis on the right zygoma x 2, left cheek x 1. As precancerous, cryosurgery performed. Advised on blistering and post-op care. Advised if not resolved in 1-2 months to return for evaluation  R/o BCC on the left helix. Shave biopsy performed.  Area cleaned and anesthetized with 1% lidocaine with epinephrine.  Dermablade used to remove the lesion and sent to pathology. Bleeding was cauterized. Pt tolerated procedure well with no complications.   Atypical nevus on the left 4th toe. Shave biopsy performed.  Area cleaned and anesthetized with 1% lidocaine with epinephrine.  Dermablade used to remove the lesion and sent to pathology. Bleeding was cauterized. Pt tolerated procedure well with no complications.   BRCA positive per pt history - will refer to oncology            Follow-up: 6 months    1.) Patient was asked about new and changing moles. YES  2.) Patient received a complete physical skin examination: YES  3.) Patient was counseled to perform a monthly self skin examination: YES  Scribed By: Kaycee Brewer, MS, PA-C

## 2023-07-18 NOTE — PROGRESS NOTES
Writer received Cancer Risk Management Program referral, referred for:     Has h/o positive BRCA status but no access to records and per CSS note, may have been through mail-in testing. Per Haylie, needs to see GC team first.    Reviewed for appropriate plan, and sent to New Patient Scheduling for completion.

## 2023-07-20 ENCOUNTER — TELEPHONE (OUTPATIENT)
Dept: DERMATOLOGY | Facility: CLINIC | Age: 63
End: 2023-07-20
Payer: COMMERCIAL

## 2023-07-20 LAB
PATH REPORT.COMMENTS IMP SPEC: ABNORMAL
PATH REPORT.COMMENTS IMP SPEC: ABNORMAL
PATH REPORT.COMMENTS IMP SPEC: YES
PATH REPORT.FINAL DX SPEC: ABNORMAL
PATH REPORT.GROSS SPEC: ABNORMAL
PATH REPORT.MICROSCOPIC SPEC OTHER STN: ABNORMAL
PATH REPORT.RELEVANT HX SPEC: ABNORMAL

## 2023-07-20 NOTE — TELEPHONE ENCOUNTER
Called patient:  Patient notified and educated on test results   Mohs procedure explained- all questions answered  appointment scheduled- mohs packet mailed.     Thank you,  Jyoti LEDESMA RN  Dermatology   [phone]

## 2023-07-20 NOTE — TELEPHONE ENCOUNTER
Pt Sumit states he is returning a call from the clinic in regards to test results. Please call Pt back at [phone] to discuss, thanks!

## 2023-07-20 NOTE — TELEPHONE ENCOUNTER
----- Message from Kaycee Brewer PA-C sent at 7/20/2023 11:00 AM CDT -----  Left helix BCC please schedule mohs  Left 4th toe moderately atypical nevus no further treatment for now.

## 2023-07-20 NOTE — TELEPHONE ENCOUNTER
Left message on answering machine for patient to call back.    Thank you,    Joelle SOSARN BSN  [facility]- [phone]

## 2023-07-25 DIAGNOSIS — I10 HTN, GOAL BELOW 140/90: ICD-10-CM

## 2023-07-26 ENCOUNTER — HOSPITAL ENCOUNTER (OUTPATIENT)
Dept: CARDIOLOGY | Facility: CLINIC | Age: 63
Discharge: HOME | End: 2023-07-26
Attending: INTERNAL MEDICINE | Admitting: INTERNAL MEDICINE
Payer: COMMERCIAL

## 2023-07-26 ENCOUNTER — OFFICE VISIT (OUTPATIENT)
Dept: INTERNAL MEDICINE | Facility: CLINIC | Age: 63
End: 2023-07-26
Payer: COMMERCIAL

## 2023-07-26 ENCOUNTER — TELEPHONE (OUTPATIENT)
Dept: CARDIOLOGY | Facility: CLINIC | Age: 63
End: 2023-07-26

## 2023-07-26 VITALS
DIASTOLIC BLOOD PRESSURE: 73 MMHG | OXYGEN SATURATION: 97 % | SYSTOLIC BLOOD PRESSURE: 124 MMHG | TEMPERATURE: 97 F | HEART RATE: 79 BPM | WEIGHT: 216.7 LBS | BODY MASS INDEX: 34.82 KG/M2 | HEIGHT: 66 IN | RESPIRATION RATE: 18 BRPM

## 2023-07-26 DIAGNOSIS — E78.5 HYPERLIPIDEMIA LDL GOAL <100: ICD-10-CM

## 2023-07-26 DIAGNOSIS — I10 HTN, GOAL BELOW 140/90: ICD-10-CM

## 2023-07-26 DIAGNOSIS — I25.10 CORONARY ARTERY DISEASE INVOLVING NATIVE HEART WITHOUT ANGINA PECTORIS, UNSPECIFIED VESSEL OR LESION TYPE: ICD-10-CM

## 2023-07-26 DIAGNOSIS — I51.89 LEFT ATRIAL MASS: Primary | ICD-10-CM

## 2023-07-26 DIAGNOSIS — L08.9 TOE INFECTION: ICD-10-CM

## 2023-07-26 DIAGNOSIS — E11.9 TYPE 2 DIABETES MELLITUS WITHOUT COMPLICATION, WITHOUT LONG-TERM CURRENT USE OF INSULIN (H): Primary | ICD-10-CM

## 2023-07-26 LAB — LVEF ECHO: NORMAL

## 2023-07-26 PROCEDURE — 93306 TTE W/DOPPLER COMPLETE: CPT

## 2023-07-26 PROCEDURE — 99214 OFFICE O/P EST MOD 30 MIN: CPT | Performed by: INTERNAL MEDICINE

## 2023-07-26 PROCEDURE — 99207 PR FOOT EXAM NO CHARGE: CPT | Performed by: INTERNAL MEDICINE

## 2023-07-26 PROCEDURE — 93306 TTE W/DOPPLER COMPLETE: CPT | Mod: 26 | Performed by: INTERNAL MEDICINE

## 2023-07-26 RX ORDER — AMLODIPINE BESYLATE 2.5 MG/1
2.5 TABLET ORAL 2 TIMES DAILY
Qty: 180 TABLET | Refills: 1 | Status: SHIPPED | OUTPATIENT
Start: 2023-07-26 | End: 2023-10-19

## 2023-07-26 RX ORDER — AMLODIPINE BESYLATE 2.5 MG/1
2.5 TABLET ORAL 2 TIMES DAILY
Qty: 180 TABLET | Refills: 0 | OUTPATIENT
Start: 2023-07-26

## 2023-07-26 RX ORDER — METOPROLOL SUCCINATE 25 MG/1
TABLET, EXTENDED RELEASE ORAL
Qty: 45 TABLET | Refills: 1 | Status: SHIPPED | OUTPATIENT
Start: 2023-07-26 | End: 2023-10-19

## 2023-07-26 RX ORDER — LISINOPRIL 20 MG/1
20 TABLET ORAL 2 TIMES DAILY
Qty: 180 TABLET | Refills: 1 | Status: SHIPPED | OUTPATIENT
Start: 2023-07-26 | End: 2023-10-19

## 2023-07-26 RX ORDER — CEPHALEXIN 500 MG/1
500 CAPSULE ORAL 2 TIMES DAILY
Qty: 20 CAPSULE | Refills: 0 | Status: SHIPPED | OUTPATIENT
Start: 2023-07-26 | End: 2023-10-19

## 2023-07-26 RX ORDER — ATORVASTATIN CALCIUM 40 MG/1
40 TABLET, FILM COATED ORAL DAILY
Qty: 90 TABLET | Refills: 1 | Status: SHIPPED | OUTPATIENT
Start: 2023-07-26 | End: 2023-10-19

## 2023-07-26 ASSESSMENT — PAIN SCALES - GENERAL: PAINLEVEL: NO PAIN (0)

## 2023-07-26 NOTE — PROGRESS NOTES
Dr Jose's note      Patient's instructions / PLAN:                                                        Plan:  Soak the L foot in warm water 2-3 times a day  2.  Cephalexin 500 mg twice a day -- antibiotic  3. Continue the other meds, same doses for now.  4. Low carb diet  5. Please make a lab appointment for non fasting labs in 3 months  6. Please make an appointment few days after the labs to discuss about the results.         ASSESSMENT & PLAN:                                                      (E11.9) Type 2 diabetes mellitus without complication, without long-term current use of insulin (H)  (primary encounter diagnosis)  Comment: A1c higher biut he plans to change diet  Plan: Hemoglobin A1c            (L08.9) Toe infection  Comment:   Plan: cephALEXin (KEFLEX) 500 MG capsule            (I10) HTN, goal below 140/90  Comment: Controlled  Plan: amLODIPine (NORVASC) 2.5 MG tablet, lisinopril         (ZESTRIL) 20 MG tablet            (E78.5) Hyperlipidemia LDL goal <100  Comment: Controlled  Plan: atorvastatin (LIPITOR) 40 MG tablet            (I25.10) CAD, 2 stents 1999, 2018  Comment: Stable  Plan: metoprolol succinate ER (TOPROL XL) 25 MG 24 hr        tablet                 Chief complaint:                                                      Follow up chronic medical problems       SUBJECTIVE:                                                    History of present illness:    DM  -- A1c 7.5 <-- 7  -- he admits eating more carbs  -- he plans to change diet  -- takes Metformin  -- he doesn't want more meds     HLip -- Controlled      L 4th toe  -- biopsy a week ago and now is red and painful  -- exam: cellulitis around the biopsy place and possible small abscess     Subjective   Sumit is a 63 year old, presenting for the following health issues:  Patient is being seen for an follow up and blood work.       No data to display              History of Present Illness       Diabetes:   He presents for follow  "up of diabetes.    He is not checking blood glucose.         He has no concerns regarding his diabetes at this time.   He is not experiencing numbness or burning in feet, excessive thirst, blurry vision, weight changes or redness, sores or blisters on feet.           Hypertension: He presents for follow up of hypertension.  He does not check blood pressure  regularly outside of the clinic. Outside blood pressures have been over 140/90. He follows a low salt diet.     He eats 0-1 servings of fruits and vegetables daily.He consumes 3 sweetened beverage(s) daily.He exercises with enough effort to increase his heart rate 9 or less minutes per day.  He exercises with enough effort to increase his heart rate 3 or less days per week.   He is taking medications regularly.       Hyperlipidemia Follow-Up    Are you regularly taking any medication or supplement to lower your cholesterol?   Yes- atorvastatin  Are you having muscle aches or other side effects that you think could be caused by your cholesterol lowering medication?  No        Review of Systems:                                                      ROS: negative for fever, chills, cough, wheezes, chest pain, shortness of breath, vomiting, abdominal pain, leg swelling         OBJECTIVE:             Physical exam:  Blood pressure 124/73, pulse 79, temperature 97  F (36.1  C), temperature source Tympanic, resp. rate 18, height 1.683 m (5' 6.25\"), weight 98.3 kg (216 lb 11.2 oz), SpO2 97 %.     NAD, appears comfortable  Skin: no rashes   Neck: supple, no JVD,  No thyroidmegaly. Lymph nodes nonpalpable cervical and supraclavicular.  Chest: clear to auscultation bilaterally, good respiratory effort  Heart: S1 S2, RRR, no mgr appreciated  Abdomen: soft, not tender, no hepatosplenomegaly or masses appreciated, no abdominal bruit, present bowel sounds  Extremities: no edema, clubbing, cyanosis. Palpable pedal pulses bilaterally, Monofilament skin sensation is intact, no feet " skin lesions, signs of infection, see above  Neurologic: A, Ox3, no focal signs appreciated    PMHx: reviewed  Past Medical History:   Diagnosis Date    Basal cell carcinoma     CAD, 2 stents 1999, 2018 05/10/2021    Cardiac pacemaker in situ 05/10/2021    Celiac disease 05/10/2021    Complete heart block (H) -- s/p PPM 2018 05/10/2021    DM 2, no insulin (H) 05/10/2021    Gastric bypass status for obesity - 2015 05/10/2021    HTN, goal below 140/80 05/10/2021    Hyperlipidemia LDL goal <100 05/10/2021    ZULEIKA (obstructive sleep apnea) 05/10/2021      PSHx: reviewed  Past Surgical History:   Procedure Laterality Date    ANGIOPLASTY      COLONOSCOPY N/A 1/13/2015    Procedure: COLONOSCOPY;  Surgeon: Juni Craig MD;  Location: [facility];  Service:     DISTAL BICEPS TENDON REPAIR Left     ESOPHAGOSCOPY, GASTROSCOPY, DUODENOSCOPY (EGD), COMBINED N/A 1/13/2015    Procedure: UPPER ENDOSCOPY;  Surgeon: Juni Craig MD;  Location: [facility];  Service:     HERNIORRHAPHY INCISIONAL (LOCATION)      incarcerated    LAPAROSCOPIC GASTRIC BYPASS N/A 2015    LEG SURGERY Right     muscle surgery    OTHER SURGICAL HISTORY      forearm / wrist surgery    REPAIR TENDON BICEPS DISTAL UPPER EXTREMITY      UMBILICAL HERNIA REPAIR N/A     WRIST SURGERY Right         Meds: reviewed  Current Outpatient Medications   Medication Sig Dispense Refill    alcohol swab prep pads Use to swab area of injection/joe as directed. 100 each 1    amLODIPine (NORVASC) 2.5 MG tablet Take 1 tablet (2.5 mg) by mouth 2 times daily 180 tablet 1    ASPIRIN LOW DOSE 81 MG EC tablet TAKE ONE TABLET BY MOUTH EVERY DAY FOR HEART DISEASE  **DO NOT CHEW** FOR HEART DISEASE  **DO NOT CHEW** 100 tablet 3    atorvastatin (LIPITOR) 40 MG tablet Take 1 tablet (40 mg) by mouth daily 90 tablet 1    blood glucose (NO BRAND SPECIFIED) test strip Use to test blood sugar 1 times daily or as directed. 100 strip 1    blood glucose calibration (NO  BRAND SPECIFIED) solution Use to calibrate blood glucose monitor as needed as directed. 1 each 1    blood glucose monitoring (NO BRAND SPECIFIED) meter device kit Use to test blood sugar 1 times daily or as directed. 1 kit 0    cyclobenzaprine (FLEXERIL) 5 MG tablet Take 1 tablet (5 mg) by mouth 3 times daily as needed for muscle spasms 30 tablet 0    FEROSUL 325 (65 Fe) MG tablet Take 1 tablet (325 mg) by mouth daily. 90 tablet 3    fluorouracil (EFUDEX) 5 % external cream Apply to scalp BID x 14 days 40 g 3    folic acid (FOLVITE) 1 MG tablet Take 1 tablet (1,000 mcg) by mouth daily. 90 tablet 0    lisinopril (ZESTRIL) 20 MG tablet Take 1 tablet (20 mg) by mouth 2 times daily 180 tablet 1    melatonin 5 MG tablet Take 1 tablet (5 mg) by mouth nightly as needed for sleep      metFORMIN (GLUCOPHAGE) 500 MG tablet Take 2 tablets (1,000 mg) by mouth 2 times daily (with meals.) 360 tablet 0    metoprolol succinate ER (TOPROL XL) 25 MG 24 hr tablet Take 0.5 tablet (12.5 mg) by mouth daily 45 tablet 0    metroNIDAZOLE (METROGEL) 0.75 % external gel Apply to face BID 45 g 11    nitroGLYcerin (NITROSTAT) 0.4 MG sublingual tablet For chest pain place 1 tablet under the tongue every 5 minutes for 3 doses. If symptoms persist 5 minutes after 1st dose call 911. 10 tablet 3    thin (NO BRAND SPECIFIED) lancets Use with lanceting device. 100 each 1    Vitamin D3 (CHOLECALCIFEROL) 25 mcg (1000 units) tablet Take by mouth daily      zinc gluconate 50 MG tablet Take 50 mg by mouth daily         Soc Hx: reviewed  Fam Hx: reviewed      Chart documentation was completed, in part, with [employer] voice-recognition software. Even though reviewed, some grammatical, spelling, and word errors may remain.      Candis Jose MD  Internal Medicine

## 2023-07-26 NOTE — TELEPHONE ENCOUNTER
"Please see echo results and advise on any further recommendations/testing.  Done for annual follow up.  Next OV 8/30/23 with Dr. Rolle.    Interpretation Summary     Technically difficult study limited views obtained due to body habitus  Contrast not used due to patient declined.  Proximal septal thickening is noted.  Echo findings are not consistent with left ventricular outflow obstruction.  The visual ejection fraction is 50-55%.  Grade I or early diastolic dysfunction.  There is mild inferior wall hypokinesis.  There is an \"echogenic\" structure in the LA. Suspect this is artifact/reverb  from the mitral annulus but cannot exclude actual structure such as myxoma.  Consider MR or LEYDA to improve imaging of this area  The ascending aorta is Borderline dilated.  BP: 169/84 mmHg    Danielle Pfeiffer RN on 7/26/2023 at 3:17 PM    "

## 2023-07-26 NOTE — PATIENT INSTRUCTIONS
Plan:  Soak the L foot in warm water 2-3 times a day  2.  Cephalexin 500 mg twice a day -- antibiotic  3. Continue the other meds, same doses for now.  4. Low carb diet  5. Please make a lab appointment for non fasting labs in 3 months  6. Please make an appointment few days after the labs to discuss about the results.

## 2023-08-01 ENCOUNTER — E-VISIT (OUTPATIENT)
Dept: INTERNAL MEDICINE | Facility: CLINIC | Age: 63
End: 2023-08-01
Payer: COMMERCIAL

## 2023-08-01 DIAGNOSIS — R21 RASH AND NONSPECIFIC SKIN ERUPTION: Primary | ICD-10-CM

## 2023-08-01 PROCEDURE — 99421 OL DIG E/M SVC 5-10 MIN: CPT | Performed by: INTERNAL MEDICINE

## 2023-08-03 NOTE — TELEPHONE ENCOUNTER
Call pt w/update of Dr. Rolle's review of Echo and recommendations. No answer, left  informing pt of Echo result interpretation 7/26/23 study and Dr. Rolle recommendations to get a limited Echo with contrast to more definitively look at LA. Informed pt order will be placed he can call [facility] to set that up and send the care team a message with any questions. Order placed. Scheduling messaged. Chelsey Woo, RN on 8/3/2023 at 3:28 PM      Sae Rolle MD Lidke, Jen M RN4 hours ago (11:12 AM)     KV  Thank you. I reviewed the echo. I think it is an artifact but cannot rule out. Please have the patient get a limited echo with contrast just to evaluate that. Thanks K.          Note

## 2023-08-03 NOTE — TELEPHONE ENCOUNTER
Thank you. I reviewed the echo. I think it is an artifact but cannot rule out. Please have the patient get a limited echo with contrast just to evaluate that. Thanks ALEJANDRINA

## 2023-08-09 ENCOUNTER — ANCILLARY PROCEDURE (OUTPATIENT)
Dept: CARDIOLOGY | Facility: CLINIC | Age: 63
End: 2023-08-09
Attending: INTERNAL MEDICINE
Payer: COMMERCIAL

## 2023-08-09 DIAGNOSIS — Z95.0 CARDIAC PACEMAKER IN SITU: ICD-10-CM

## 2023-08-09 DIAGNOSIS — I44.1 SECOND DEGREE HEART BLOCK: ICD-10-CM

## 2023-08-09 DIAGNOSIS — I44.2 COMPLETE HEART BLOCK (H): ICD-10-CM

## 2023-08-09 PROCEDURE — 93296 REM INTERROG EVL PM/IDS: CPT | Performed by: INTERNAL MEDICINE

## 2023-08-09 PROCEDURE — 93294 REM INTERROG EVL PM/LDLS PM: CPT | Performed by: INTERNAL MEDICINE

## 2023-08-10 LAB
MDC_IDC_EPISODE_DTM: NORMAL
MDC_IDC_EPISODE_DURATION: 101 S
MDC_IDC_EPISODE_DURATION: 19 S
MDC_IDC_EPISODE_DURATION: 36 S
MDC_IDC_EPISODE_ID: 86
MDC_IDC_EPISODE_ID: 87
MDC_IDC_EPISODE_ID: 88
MDC_IDC_EPISODE_TYPE: NORMAL
MDC_IDC_LEAD_IMPLANT_DT: NORMAL
MDC_IDC_LEAD_IMPLANT_DT: NORMAL
MDC_IDC_LEAD_LOCATION: NORMAL
MDC_IDC_LEAD_LOCATION: NORMAL
MDC_IDC_LEAD_LOCATION_DETAIL_1: NORMAL
MDC_IDC_LEAD_LOCATION_DETAIL_1: NORMAL
MDC_IDC_LEAD_MFG: NORMAL
MDC_IDC_LEAD_MFG: NORMAL
MDC_IDC_LEAD_MODEL: NORMAL
MDC_IDC_LEAD_MODEL: NORMAL
MDC_IDC_LEAD_POLARITY_TYPE: NORMAL
MDC_IDC_LEAD_POLARITY_TYPE: NORMAL
MDC_IDC_LEAD_SERIAL: NORMAL
MDC_IDC_LEAD_SERIAL: NORMAL
MDC_IDC_MSMT_BATTERY_DTM: NORMAL
MDC_IDC_MSMT_BATTERY_REMAINING_LONGEVITY: 95 MO
MDC_IDC_MSMT_BATTERY_RRT_TRIGGER: 2.62
MDC_IDC_MSMT_BATTERY_STATUS: NORMAL
MDC_IDC_MSMT_BATTERY_VOLTAGE: 2.99 V
MDC_IDC_MSMT_LEADCHNL_RA_IMPEDANCE_VALUE: 304 OHM
MDC_IDC_MSMT_LEADCHNL_RA_IMPEDANCE_VALUE: 361 OHM
MDC_IDC_MSMT_LEADCHNL_RA_PACING_THRESHOLD_AMPLITUDE: 0.5 V
MDC_IDC_MSMT_LEADCHNL_RA_PACING_THRESHOLD_PULSEWIDTH: 0.4 MS
MDC_IDC_MSMT_LEADCHNL_RA_SENSING_INTR_AMPL: 2.38 MV
MDC_IDC_MSMT_LEADCHNL_RA_SENSING_INTR_AMPL: 2.38 MV
MDC_IDC_MSMT_LEADCHNL_RV_IMPEDANCE_VALUE: 304 OHM
MDC_IDC_MSMT_LEADCHNL_RV_IMPEDANCE_VALUE: 418 OHM
MDC_IDC_MSMT_LEADCHNL_RV_PACING_THRESHOLD_AMPLITUDE: 0.75 V
MDC_IDC_MSMT_LEADCHNL_RV_PACING_THRESHOLD_PULSEWIDTH: 0.4 MS
MDC_IDC_MSMT_LEADCHNL_RV_SENSING_INTR_AMPL: 11.38 MV
MDC_IDC_MSMT_LEADCHNL_RV_SENSING_INTR_AMPL: 11.38 MV
MDC_IDC_PG_IMPLANT_DTM: NORMAL
MDC_IDC_PG_MFG: NORMAL
MDC_IDC_PG_MODEL: NORMAL
MDC_IDC_PG_SERIAL: NORMAL
MDC_IDC_PG_TYPE: NORMAL
MDC_IDC_SESS_CLINIC_NAME: NORMAL
MDC_IDC_SESS_DTM: NORMAL
MDC_IDC_SESS_TYPE: NORMAL
MDC_IDC_SET_BRADY_AT_MODE_SWITCH_RATE: 150 {BEATS}/MIN
MDC_IDC_SET_BRADY_HYSTRATE: NORMAL
MDC_IDC_SET_BRADY_LOWRATE: 50 {BEATS}/MIN
MDC_IDC_SET_BRADY_MAX_SENSOR_RATE: 130 {BEATS}/MIN
MDC_IDC_SET_BRADY_MAX_TRACKING_RATE: 130 {BEATS}/MIN
MDC_IDC_SET_BRADY_MODE: NORMAL
MDC_IDC_SET_BRADY_PAV_DELAY_LOW: 180 MS
MDC_IDC_SET_BRADY_SAV_DELAY_LOW: 150 MS
MDC_IDC_SET_LEADCHNL_RA_PACING_AMPLITUDE: 1.5 V
MDC_IDC_SET_LEADCHNL_RA_PACING_ANODE_ELECTRODE_1: NORMAL
MDC_IDC_SET_LEADCHNL_RA_PACING_ANODE_LOCATION_1: NORMAL
MDC_IDC_SET_LEADCHNL_RA_PACING_CAPTURE_MODE: NORMAL
MDC_IDC_SET_LEADCHNL_RA_PACING_CATHODE_ELECTRODE_1: NORMAL
MDC_IDC_SET_LEADCHNL_RA_PACING_CATHODE_LOCATION_1: NORMAL
MDC_IDC_SET_LEADCHNL_RA_PACING_POLARITY: NORMAL
MDC_IDC_SET_LEADCHNL_RA_PACING_PULSEWIDTH: 0.4 MS
MDC_IDC_SET_LEADCHNL_RA_SENSING_ANODE_ELECTRODE_1: NORMAL
MDC_IDC_SET_LEADCHNL_RA_SENSING_ANODE_LOCATION_1: NORMAL
MDC_IDC_SET_LEADCHNL_RA_SENSING_CATHODE_ELECTRODE_1: NORMAL
MDC_IDC_SET_LEADCHNL_RA_SENSING_CATHODE_LOCATION_1: NORMAL
MDC_IDC_SET_LEADCHNL_RA_SENSING_POLARITY: NORMAL
MDC_IDC_SET_LEADCHNL_RA_SENSING_SENSITIVITY: 0.3 MV
MDC_IDC_SET_LEADCHNL_RV_PACING_AMPLITUDE: 1.5 V
MDC_IDC_SET_LEADCHNL_RV_PACING_ANODE_ELECTRODE_1: NORMAL
MDC_IDC_SET_LEADCHNL_RV_PACING_ANODE_LOCATION_1: NORMAL
MDC_IDC_SET_LEADCHNL_RV_PACING_CAPTURE_MODE: NORMAL
MDC_IDC_SET_LEADCHNL_RV_PACING_CATHODE_ELECTRODE_1: NORMAL
MDC_IDC_SET_LEADCHNL_RV_PACING_CATHODE_LOCATION_1: NORMAL
MDC_IDC_SET_LEADCHNL_RV_PACING_POLARITY: NORMAL
MDC_IDC_SET_LEADCHNL_RV_PACING_PULSEWIDTH: 0.4 MS
MDC_IDC_SET_LEADCHNL_RV_SENSING_ANODE_ELECTRODE_1: NORMAL
MDC_IDC_SET_LEADCHNL_RV_SENSING_ANODE_LOCATION_1: NORMAL
MDC_IDC_SET_LEADCHNL_RV_SENSING_CATHODE_ELECTRODE_1: NORMAL
MDC_IDC_SET_LEADCHNL_RV_SENSING_CATHODE_LOCATION_1: NORMAL
MDC_IDC_SET_LEADCHNL_RV_SENSING_POLARITY: NORMAL
MDC_IDC_SET_LEADCHNL_RV_SENSING_SENSITIVITY: 0.9 MV
MDC_IDC_SET_ZONE_DETECTION_INTERVAL: 370 MS
MDC_IDC_SET_ZONE_DETECTION_INTERVAL: 400 MS
MDC_IDC_SET_ZONE_TYPE: NORMAL
MDC_IDC_STAT_AT_BURDEN_PERCENT: 0 %
MDC_IDC_STAT_AT_DTM_END: NORMAL
MDC_IDC_STAT_AT_DTM_START: NORMAL
MDC_IDC_STAT_BRADY_AP_VP_PERCENT: 11.96 %
MDC_IDC_STAT_BRADY_AP_VS_PERCENT: 0 %
MDC_IDC_STAT_BRADY_AS_VP_PERCENT: 88 %
MDC_IDC_STAT_BRADY_AS_VS_PERCENT: 0.04 %
MDC_IDC_STAT_BRADY_DTM_END: NORMAL
MDC_IDC_STAT_BRADY_DTM_START: NORMAL
MDC_IDC_STAT_BRADY_RA_PERCENT_PACED: 11.94 %
MDC_IDC_STAT_BRADY_RV_PERCENT_PACED: 99.96 %
MDC_IDC_STAT_EPISODE_RECENT_COUNT: 0
MDC_IDC_STAT_EPISODE_RECENT_COUNT: 1
MDC_IDC_STAT_EPISODE_RECENT_COUNT: 2
MDC_IDC_STAT_EPISODE_RECENT_COUNT_DTM_END: NORMAL
MDC_IDC_STAT_EPISODE_RECENT_COUNT_DTM_START: NORMAL
MDC_IDC_STAT_EPISODE_TOTAL_COUNT: 0
MDC_IDC_STAT_EPISODE_TOTAL_COUNT: 0
MDC_IDC_STAT_EPISODE_TOTAL_COUNT: 2
MDC_IDC_STAT_EPISODE_TOTAL_COUNT: 3
MDC_IDC_STAT_EPISODE_TOTAL_COUNT: 83
MDC_IDC_STAT_EPISODE_TOTAL_COUNT_DTM_END: NORMAL
MDC_IDC_STAT_EPISODE_TOTAL_COUNT_DTM_START: NORMAL
MDC_IDC_STAT_EPISODE_TYPE: NORMAL

## 2023-08-15 DIAGNOSIS — Z98.84 GASTRIC BYPASS STATUS FOR OBESITY: ICD-10-CM

## 2023-08-16 RX ORDER — FOLIC ACID 1 MG/1
TABLET ORAL
Qty: 90 TABLET | Refills: 0 | Status: SHIPPED | OUTPATIENT
Start: 2023-08-16 | End: 2023-11-06

## 2023-08-21 ENCOUNTER — HOSPITAL ENCOUNTER (OUTPATIENT)
Dept: CARDIOLOGY | Facility: CLINIC | Age: 63
Discharge: HOME | End: 2023-08-21
Attending: INTERNAL MEDICINE | Admitting: INTERNAL MEDICINE
Payer: COMMERCIAL

## 2023-08-21 DIAGNOSIS — I51.89 LEFT ATRIAL MASS: ICD-10-CM

## 2023-08-21 LAB — LVEF ECHO: NORMAL

## 2023-08-21 PROCEDURE — 255N000002 HC RX 255 OP 636: Performed by: INTERNAL MEDICINE

## 2023-08-21 PROCEDURE — 999N000208 ECHOCARDIOGRAM LIMITED

## 2023-08-21 PROCEDURE — 93321 DOPPLER ECHO F-UP/LMTD STD: CPT | Mod: 26 | Performed by: INTERNAL MEDICINE

## 2023-08-21 PROCEDURE — 93325 DOPPLER ECHO COLOR FLOW MAPG: CPT | Mod: 26 | Performed by: INTERNAL MEDICINE

## 2023-08-21 PROCEDURE — 93321 DOPPLER ECHO F-UP/LMTD STD: CPT

## 2023-08-21 PROCEDURE — 93308 TTE F-UP OR LMTD: CPT | Mod: 26 | Performed by: INTERNAL MEDICINE

## 2023-08-21 RX ADMIN — HUMAN ALBUMIN MICROSPHERES AND PERFLUTREN 3 ML: 10; .22 INJECTION, SOLUTION INTRAVENOUS at 10:59

## 2023-08-30 ENCOUNTER — OFFICE VISIT (OUTPATIENT)
Dept: CARDIOLOGY | Facility: CLINIC | Age: 63
End: 2023-08-30
Payer: COMMERCIAL

## 2023-08-30 VITALS
WEIGHT: 213.7 LBS | HEART RATE: 73 BPM | BODY MASS INDEX: 34.34 KG/M2 | HEIGHT: 66 IN | DIASTOLIC BLOOD PRESSURE: 84 MMHG | OXYGEN SATURATION: 99 % | SYSTOLIC BLOOD PRESSURE: 136 MMHG

## 2023-08-30 DIAGNOSIS — I25.10 CORONARY ARTERY DISEASE INVOLVING NATIVE HEART WITHOUT ANGINA PECTORIS, UNSPECIFIED VESSEL OR LESION TYPE: Primary | ICD-10-CM

## 2023-08-30 PROCEDURE — 99214 OFFICE O/P EST MOD 30 MIN: CPT | Performed by: INTERNAL MEDICINE

## 2023-08-30 NOTE — PROGRESS NOTES
CARDIOLOGY CLINIC CONSULTATION    PRIMARY CARE PHYSICIAN:  Candis Altamirano    HISTORY OF PRESENT ILLNESS:  This is a very pleasant 63-year-old gentleman who is here for routine follow-up.  He has a following set of pertinent medical issues.    Premature coronary artery disease with stenting in 1999  Presentation in January 2018 at [facility] with Holter monitor showing high-grade AV block status post dual-chamber pacemaker -since then significant right ventricular pacing over 95%.  Presentation in July 2018 with non-ST elevation MI noted to have distal circumflex lesion that was stented and a moderate LAD lesion left to medical management and mild left main disease  Mild heart failure with preserved EF not on diuretics  Severe sleep apnea , has dental device, denies problems with with this condition.    Obesity hypertension diabetes hyperlipidemia and family history of coronary disease    Today the patient is here for routine follow-up.  He denies any sort of cardiovascular symptoms.  In regards to pertinent data he recently had an echocardiogram that shows normal LV function.  No major valve disease.  Patient denies angina heart failure symptoms syncope presyncope.  His LDL is at goal.  A1c slightly worsening.    PAST MEDICAL HISTORY:  Past Medical History:   Diagnosis Date    Basal cell carcinoma     CAD, 2 stents 1999, 2018 05/10/2021    Cardiac pacemaker in situ 05/10/2021    Celiac disease 05/10/2021    Complete heart block (H) -- s/p PPM 2018 05/10/2021    DM 2, no insulin (H) 05/10/2021    Gastric bypass status for obesity - 2015 05/10/2021    HTN, goal below 140/80 05/10/2021    Hyperlipidemia LDL goal <100 05/10/2021    ZULEIKA (obstructive sleep apnea) 05/10/2021       MEDICATIONS:  Current Outpatient Medications   Medication    alcohol swab prep pads    amLODIPine (NORVASC) 2.5 MG tablet    ASPIRIN LOW DOSE 81 MG EC tablet    atorvastatin (LIPITOR) 40 MG tablet    blood glucose (NO BRAND SPECIFIED)  test strip    blood glucose calibration (NO BRAND SPECIFIED) solution    blood glucose monitoring (NO BRAND SPECIFIED) meter device kit    cephALEXin (KEFLEX) 500 MG capsule    cyclobenzaprine (FLEXERIL) 5 MG tablet    FEROSUL 325 (65 Fe) MG tablet    fluorouracil (EFUDEX) 5 % external cream    folic acid (FOLVITE) 1 MG tablet    lisinopril (ZESTRIL) 20 MG tablet    melatonin 5 MG tablet    metFORMIN (GLUCOPHAGE) 500 MG tablet    metoprolol succinate ER (TOPROL XL) 25 MG 24 hr tablet    metroNIDAZOLE (METROGEL) 0.75 % external gel    nitroGLYcerin (NITROSTAT) 0.4 MG sublingual tablet    thin (NO BRAND SPECIFIED) lancets    Vitamin D3 (CHOLECALCIFEROL) 25 mcg (1000 units) tablet    zinc gluconate 50 MG tablet     No current facility-administered medications for this visit.       SOCIAL HISTORY:  I have reviewed this patient's social history and updated it with pertinent information if needed. Sumit Park  reports that he has never smoked. He has never been exposed to tobacco smoke. He has never used smokeless tobacco. He reports that he does not drink alcohol and does not use drugs.    PHYSICAL EXAM:  Pulse:  [73] 73  BP: (136)/(84) 136/84  SpO2:  [99 %] 99 %  213 lbs 11.2 oz    Constitutional: alert, no distress  Respiratory: Good bilateral air entry  Cardiovascular: Normal regular heart sounds no edema  GI: nondistended  Neuropsychiatric: appropriate affact    ASSESSMENT: Pertinent issues addressed/ reviewed during this cardiology visit  Stable coronary artery disease  Status post pacemaker implantation for high-grade AV block  Hyperlipidemia hypertriglyceridemia and HDL deficiency  Obesity hypertension diabetes    RECOMMENDATIONS:  Patient denies any cardiovascular symptoms.  Echocardiogram shows normal LV function.  In the absence of active cardiac symptoms, no further cardiovascular work-up is recommended at this time prior to getting letter for DOT.    Continue current medical therapy.  Healthy diet  exercise weight loss strongly recommended.  He needs to watch his triglycerides and A1c levels.    Routine follow-up in the cardiology clinic in 1 year sooner if anything changes clinically.    It was a pleasure seeing this patient in clinic today. Please do not hesitate to contact me with any future questions.     MORIS Mitchell, [facility]  Cardiology - [facility]  August 30, 2023    Review of the result(s) of each unique test - Last CBC BMP lipids A1c echocardiogram     The level of medical decision making during this visit was of moderate complexity.    This note was completed in part using dictation via the Dragon voice recognition software. Some word and grammatical errors may occur and must be interpreted in the appropriate clinical context.  If there are any questions pertaining to this issue, please contact me for further clarification.

## 2023-08-30 NOTE — LETTER
8/30/2023    Candis Altamirano MD  [address] [facility] [address]    RE: Sumit Park       Dear Colleague,     I had the pleasure of seeing Sumit Park in the [facility].  CARDIOLOGY CLINIC CONSULTATION    PRIMARY CARE PHYSICIAN:  Candis Altamirano    HISTORY OF PRESENT ILLNESS:  This is a very pleasant 63-year-old gentleman who is here for routine follow-up.  He has a following set of pertinent medical issues.    Premature coronary artery disease with stenting in 1999  Presentation in January 2018 at [facility] with Holter monitor showing high-grade AV block status post dual-chamber pacemaker -since then significant right ventricular pacing over 95%.  Presentation in July 2018 with non-ST elevation MI noted to have distal circumflex lesion that was stented and a moderate LAD lesion left to medical management and mild left main disease  Mild heart failure with preserved EF not on diuretics  Severe sleep apnea , has dental device, denies problems with with this condition.    Obesity hypertension diabetes hyperlipidemia and family history of coronary disease    Today the patient is here for routine follow-up.  He denies any sort of cardiovascular symptoms.  In regards to pertinent data he recently had an echocardiogram that shows normal LV function.  No major valve disease.  Patient denies angina heart failure symptoms syncope presyncope.  His LDL is at goal.  A1c slightly worsening.    PAST MEDICAL HISTORY:  Past Medical History:   Diagnosis Date    Basal cell carcinoma     CAD, 2 stents 1999, 2018 05/10/2021    Cardiac pacemaker in situ 05/10/2021    Celiac disease 05/10/2021    Complete heart block (H) -- s/p PPM 2018 05/10/2021    DM 2, no insulin (H) 05/10/2021    Gastric bypass status for obesity - 2015 05/10/2021    HTN, goal below 140/80 05/10/2021    Hyperlipidemia LDL goal <100 05/10/2021    ZULEIKA (obstructive sleep apnea) 05/10/2021        MEDICATIONS:  Current Outpatient Medications   Medication    alcohol swab prep pads    amLODIPine (NORVASC) 2.5 MG tablet    ASPIRIN LOW DOSE 81 MG EC tablet    atorvastatin (LIPITOR) 40 MG tablet    blood glucose (NO BRAND SPECIFIED) test strip    blood glucose calibration (NO BRAND SPECIFIED) solution    blood glucose monitoring (NO BRAND SPECIFIED) meter device kit    cephALEXin (KEFLEX) 500 MG capsule    cyclobenzaprine (FLEXERIL) 5 MG tablet    FEROSUL 325 (65 Fe) MG tablet    fluorouracil (EFUDEX) 5 % external cream    folic acid (FOLVITE) 1 MG tablet    lisinopril (ZESTRIL) 20 MG tablet    melatonin 5 MG tablet    metFORMIN (GLUCOPHAGE) 500 MG tablet    metoprolol succinate ER (TOPROL XL) 25 MG 24 hr tablet    metroNIDAZOLE (METROGEL) 0.75 % external gel    nitroGLYcerin (NITROSTAT) 0.4 MG sublingual tablet    thin (NO BRAND SPECIFIED) lancets    Vitamin D3 (CHOLECALCIFEROL) 25 mcg (1000 units) tablet    zinc gluconate 50 MG tablet     No current facility-administered medications for this visit.       SOCIAL HISTORY:  I have reviewed this patient's social history and updated it with pertinent information if needed. Sumit Park  reports that he has never smoked. He has never been exposed to tobacco smoke. He has never used smokeless tobacco. He reports that he does not drink alcohol and does not use drugs.    PHYSICAL EXAM:  Pulse:  [73] 73  BP: (136)/(84) 136/84  SpO2:  [99 %] 99 %  213 lbs 11.2 oz    Constitutional: alert, no distress  Respiratory: Good bilateral air entry  Cardiovascular: Normal regular heart sounds no edema  GI: nondistended  Neuropsychiatric: appropriate affact    ASSESSMENT: Pertinent issues addressed/ reviewed during this cardiology visit  Stable coronary artery disease  Status post pacemaker implantation for high-grade AV block  Hyperlipidemia hypertriglyceridemia and HDL deficiency  Obesity hypertension diabetes    RECOMMENDATIONS:  Patient denies any cardiovascular  symptoms.  Echocardiogram shows normal LV function.  In the absence of active cardiac symptoms, no further cardiovascular work-up is recommended at this time prior to getting letter for DOT.    Continue current medical therapy.  Healthy diet exercise weight loss strongly recommended.  He needs to watch his triglycerides and A1c levels.    Routine follow-up in the cardiology clinic in 1 year sooner if anything changes clinically.    It was a pleasure seeing this patient in clinic today. Please do not hesitate to contact me with any future questions.     MORIS Mitchell, [facility]  Cardiology - [facility]  August 30, 2023    Review of the result(s) of each unique test - Last CBC BMP lipids A1c echocardiogram     The level of medical decision making during this visit was of moderate complexity.    This note was completed in part using dictation via the Dragon voice recognition software. Some word and grammatical errors may occur and must be interpreted in the appropriate clinical context.  If there are any questions pertaining to this issue, please contact me for further clarification.      Thank you for allowing me to participate in the care of your patient.      Sincerely,     Sae Rolle MD     [facility]  cc:   No referring provider defined for this encounter.

## 2023-09-12 DIAGNOSIS — E11.9 TYPE 2 DIABETES MELLITUS WITHOUT COMPLICATION, WITHOUT LONG-TERM CURRENT USE OF INSULIN (H): ICD-10-CM

## 2023-09-19 NOTE — PROGRESS NOTES
Surgical Office Location:  [facility]  [address] [place] [id number]

## 2023-09-21 ENCOUNTER — OFFICE VISIT (OUTPATIENT)
Dept: DERMATOLOGY | Facility: CLINIC | Age: 63
End: 2023-09-21
Payer: COMMERCIAL

## 2023-09-21 DIAGNOSIS — C44.219 BASAL CELL CARCINOMA (BCC) OF HELIX OF LEFT EAR: Primary | ICD-10-CM

## 2023-09-21 PROCEDURE — 17311 MOHS 1 STAGE H/N/HF/G: CPT | Performed by: DERMATOLOGY

## 2023-09-21 ASSESSMENT — PAIN SCALES - GENERAL: PAINLEVEL: NO PAIN (0)

## 2023-09-21 NOTE — PROGRESS NOTES
Sumit Park is an extremely pleasant 63 year old year old male patient here today for evaluation and managment of basal cell carcinoma on left helix.  Patient has no other skin complaints today.  Remainder of the HPI, Meds, PMH, Allergies, FH, and SH was reviewed in chart.      Past Medical History:   Diagnosis Date    Basal cell carcinoma     CAD, 2 stents 1999, 2018 05/10/2021    Cardiac pacemaker in situ 05/10/2021    Celiac disease 05/10/2021    Complete heart block (H) -- s/p PPM 2018 05/10/2021    DM 2, no insulin (H) 05/10/2021    Gastric bypass status for obesity - 2015 05/10/2021    HTN, goal below 140/80 05/10/2021    Hyperlipidemia LDL goal <100 05/10/2021    ZULEIKA (obstructive sleep apnea) 05/10/2021       Past Surgical History:   Procedure Laterality Date    ANGIOPLASTY      COLONOSCOPY N/A 1/13/2015    Procedure: COLONOSCOPY;  Surgeon: Juni Craig MD;  Location: [facility];  Service:     DISTAL BICEPS TENDON REPAIR Left     ESOPHAGOSCOPY, GASTROSCOPY, DUODENOSCOPY (EGD), COMBINED N/A 1/13/2015    Procedure: UPPER ENDOSCOPY;  Surgeon: Juni Craig MD;  Location: [facility];  Service:     HERNIORRHAPHY INCISIONAL (LOCATION)      incarcerated    LAPAROSCOPIC GASTRIC BYPASS N/A 2015    LEG SURGERY Right     muscle surgery    OTHER SURGICAL HISTORY      forearm / wrist surgery    REPAIR TENDON BICEPS DISTAL UPPER EXTREMITY      UMBILICAL HERNIA REPAIR N/A     WRIST SURGERY Right         Family History   Problem Relation Age of Onset    Breast Cancer Mother 58    Heart Disease Father 71    Lymphoma Brother        Social History     Socioeconomic History    Marital status:      Spouse name: Not on file    Number of children: Not on file    Years of education: Not on file    Highest education level: Not on file   Occupational History    Not on file   Tobacco Use    Smoking status: Never     Passive exposure: Never    Smokeless tobacco: Never   Vaping Use    Vaping Use:  Never used   Substance and Sexual Activity    Alcohol use: Never    Drug use: Never    Sexual activity: Yes     Partners: Female   Other Topics Concern    Not on file   Social History Narrative    Not on file     Social Determinants of Health     Financial Resource Strain: Not on file   Food Insecurity: Not on file   Transportation Needs: Not on file   Physical Activity: Not on file   Stress: Not on file   Social Connections: Not on file   Interpersonal Safety: Not on file   Housing Stability: Not on file       Outpatient Encounter Medications as of 9/21/2023   Medication Sig Dispense Refill    alcohol swab prep pads Use to swab area of injection/joe as directed. 100 each 1    amLODIPine (NORVASC) 2.5 MG tablet Take 1 tablet (2.5 mg) by mouth 2 times daily 180 tablet 1    ASPIRIN LOW DOSE 81 MG EC tablet TAKE ONE TABLET BY MOUTH EVERY DAY FOR HEART DISEASE  **DO NOT CHEW** FOR HEART DISEASE  **DO NOT CHEW** 100 tablet 3    atorvastatin (LIPITOR) 40 MG tablet Take 1 tablet (40 mg) by mouth daily 90 tablet 1    blood glucose (NO BRAND SPECIFIED) test strip Use to test blood sugar 1 times daily or as directed. 100 strip 1    blood glucose calibration (NO BRAND SPECIFIED) solution Use to calibrate blood glucose monitor as needed as directed. 1 each 1    blood glucose monitoring (NO BRAND SPECIFIED) meter device kit Use to test blood sugar 1 times daily or as directed. 1 kit 0    cephALEXin (KEFLEX) 500 MG capsule Take 1 capsule (500 mg) by mouth 2 times daily 20 capsule 0    cyclobenzaprine (FLEXERIL) 5 MG tablet Take 1 tablet (5 mg) by mouth 3 times daily as needed for muscle spasms 30 tablet 0    FEROSUL 325 (65 Fe) MG tablet Take 1 tablet (325 mg) by mouth daily. 90 tablet 3    fluorouracil (EFUDEX) 5 % external cream Apply to scalp BID x 14 days 40 g 3    folic acid (FOLVITE) 1 MG tablet Take 1 tablet (1,000 mcg) by mouth daily. 90 tablet 0    lisinopril (ZESTRIL) 20 MG tablet Take 1 tablet (20 mg) by mouth 2  times daily 180 tablet 1    melatonin 5 MG tablet Take 1 tablet (5 mg) by mouth nightly as needed for sleep      metFORMIN (GLUCOPHAGE) 500 MG tablet Take 2 tablets (1,000 mg) by mouth 2 times daily (with meals.) 360 tablet 0    metoprolol succinate ER (TOPROL XL) 25 MG 24 hr tablet Take 0.5 tablet (12.5 mg) by mouth daily 45 tablet 1    metroNIDAZOLE (METROGEL) 0.75 % external gel Apply to face BID 45 g 11    nitroGLYcerin (NITROSTAT) 0.4 MG sublingual tablet For chest pain place 1 tablet under the tongue every 5 minutes for 3 doses. If symptoms persist 5 minutes after 1st dose call 911. 10 tablet 3    thin (NO BRAND SPECIFIED) lancets Use with lanceting device. 100 each 1    Vitamin D3 (CHOLECALCIFEROL) 25 mcg (1000 units) tablet Take by mouth daily      zinc gluconate 50 MG tablet Take 50 mg by mouth daily       No facility-administered encounter medications on file as of 9/21/2023.             O:   NAD, WDWN, Alert & Oriented, Mood & Affect wnl, Vitals stable   Here today alone   General appearance normal   Vitals stable   Alert, oriented and in no acute distress     L helix 5mm scaly papule       Eyes: Conjunctivae/lids:Normal     ENT: Lips, buccal mucosa, tongue: normal    MSK:Normal    Cardiovascular: peripheral edema none    Pulm: Breathing Normal    Neuro/Psych: Orientation:Alert and Orientedx3 ; Mood/Affect:normal       A/P:  L helix basal cell carcinoma   MOHS:   Location    The rationale for Mohs surgery was discussed with the patient and consent was obtained.  The risks and benefits as well as alternatives to therapy were discussed, in detail.  Specifically, the risks of infection, scarring, bleeding, prolonged wound healing, incomplete removal, allergy to anesthesia, nerve injury and recurrence were addressed.  Indication for Mohs was Location. Prior to the procedure, the treatment site was clearly identified and, if available, confirmed with previous photos and confirmed by the patient   All  components of the Universal Protocol/PAUSE rule were completed.  The Mohs surgeon operated in two distinct and integrated capacities as the surgeon and pathologist.      The area was prepped with Betasept.  A rim of normal appearing skin was marked circumferentially around the lesion.  The area was infiltrated with local anesthesia.  The tumor was first debulked to remove all clinically apparent tumor.  An incision following the standard Mohs approach was done and the specimen was oriented,mapped and placed in 1 block(s).  Each specimen was then chromacoded and processed in the Mohs laboratory using standard Mohs technique and submitted for frozen section histology.  Frozen section analysis showed no residual tumor but CLEAR MARGINS.      The tumor was excised using standard Mohs technique in 1 stages(s).  CLEAR MARGINS OBTAINED and Final defect size was 1.1 cm.     We discussed the options for wound management in full with the patient including risks/benefits/ possible outcomes.      REPAIR SECOND INTENT: We discussed the options for wound management in full with the patient including risks/benefits/possible outcomes. Decision made to allow the wound to heal by second intention. Cautery was used for for hemostasis. EBL minimal; complications none; wound care routine.  The patient was discharged in good condition and will return in one month or prn for wound evaluation.  It was a pleasure speaking to Sumit Park today.  Previous clinic notes and pertinent laboratory tests were reviewed prior to Sumit Park's visit.  Signs and Symptoms of skin cancer discussed with patient.  Patient encouraged to perform monthly skin exams.  UV precautions reviewed with patient.  Risks of non-melanoma skin cancer discussed with patient   Return to clinic 6 months

## 2023-09-21 NOTE — PATIENT INSTRUCTIONS
Open Wound Care     for ______________        No strenuous activity for 48 hours    Take Tylenol as needed for discomfort.                                                .         Do not drink alcoholic beverages for 48 hours.    Keep the pressure bandage in place for 24 hours. If the bandage becomes blood tinged or loose, reinforce it with gauze and tape.        (Refer to the reverse side of this page for management of bleeding).    Remove bandage in 24 hours and begin wound care as follows:     Clean area with tap water using a Q tip or gauze pad, (shower / bathe normally)  Dry wound with Q tip or gauze pad  Apply Aquaphor, Vaseline, Polysporin or Bacitracin Ointment with a Q tip  Do NOT use Neosporin Ointment *  Cover the wound with a band-aid or nonstick gauze pad and paper tape.  Repeat wound care once a day until wound is completely healed.    It is an old wives tale that a wound heals better when it is exposed to air and allowed to dry out. The wound will heal faster with a better cosmetic result if it is kept moist with ointment and covered with a bandage.  Do not let the wound dry out.      Supplies Needed:                Qtips or gauze pads                Polysporin or Bacitracin Ointment                Bandaids or nonstick gauze pads and paper tape    Wound care kits and brown paper tape are available for purchase at   the pharmacy.    BLEEDING:    Use tightly rolled up gauze or cloth to apply direct pressure over the bandage for 20   minutes.  Reapply pressure for an additional 20 minutes if necessary  Call the office or go to the nearest emergency room if pressure fails to stop the bleeding.  Use additional gauze and tape to maintain pressure once the bleeding has stopped.  Begin wound care 24 hours after surgery as directed.                  WOUND HEALING    One week after surgery a pink / red halo will form around the outside of the wound.   This is new skin.  The center of the wound will appear  yellowish white and produce some drainage.  The pink halo will slowly migrate in toward the center of the wound until the wound is covered with new shiny pink skin.  There will be no more drainage when the wound is completely healed.  It will take six months to one year for the redness to fade.  The scar may be itchy, tight and sensitive to extreme temperatures for a year after the surgery.  Massaging the area several times a day for several minutes after the wound is completely healed will help the scar soften and normalize faster. Begin massage only after healing is complete.      In case of emergency call: Dr Kelly: [phone]    [facility]: [phone]    [facility]:[phone]

## 2023-09-21 NOTE — LETTER
9/21/2023         RE: Sumit Park  [address]  [facility] [address]        Dear Colleague,    Thank you for referring your patient, Sumit Park, to the [facility]. Please see a copy of my visit note below.    Surgical Office Location:  [facility]  [address] [place] [id number]      Sumit Park is an extremely pleasant 63 year old year old male patient here today for evaluation and managment of basal cell carcinoma on left helix.  Patient has no other skin complaints today.  Remainder of the HPI, Meds, PMH, Allergies, FH, and SH was reviewed in chart.      Past Medical History:   Diagnosis Date     Basal cell carcinoma      CAD, 2 stents 1999, 2018 05/10/2021     Cardiac pacemaker in situ 05/10/2021     Celiac disease 05/10/2021     Complete heart block (H) -- s/p PPM 2018 05/10/2021     DM 2, no insulin (H) 05/10/2021     Gastric bypass status for obesity - 2015 05/10/2021     HTN, goal below 140/80 05/10/2021     Hyperlipidemia LDL goal <100 05/10/2021     ZULEIKA (obstructive sleep apnea) 05/10/2021       Past Surgical History:   Procedure Laterality Date     ANGIOPLASTY       COLONOSCOPY N/A 1/13/2015    Procedure: COLONOSCOPY;  Surgeon: Juni Craig MD;  Location: [facility];  Service:      DISTAL BICEPS TENDON REPAIR Left      ESOPHAGOSCOPY, GASTROSCOPY, DUODENOSCOPY (EGD), COMBINED N/A 1/13/2015    Procedure: UPPER ENDOSCOPY;  Surgeon: Juni Craig MD;  Location: [facility];  Service:      HERNIORRHAPHY INCISIONAL (LOCATION)      incarcerated     LAPAROSCOPIC GASTRIC BYPASS N/A 2015     LEG SURGERY Right     muscle surgery     OTHER SURGICAL HISTORY      forearm / wrist surgery     REPAIR TENDON BICEPS DISTAL UPPER EXTREMITY       UMBILICAL HERNIA REPAIR N/A      WRIST SURGERY Right         Family History   Problem Relation Age of Onset     Breast Cancer Mother 58     Heart Disease Father 71      Lymphoma Brother        Social History     Socioeconomic History     Marital status:      Spouse name: Not on file     Number of children: Not on file     Years of education: Not on file     Highest education level: Not on file   Occupational History     Not on file   Tobacco Use     Smoking status: Never     Passive exposure: Never     Smokeless tobacco: Never   Vaping Use     Vaping Use: Never used   Substance and Sexual Activity     Alcohol use: Never     Drug use: Never     Sexual activity: Yes     Partners: Female   Other Topics Concern     Not on file   Social History Narrative     Not on file     Social Determinants of Health     Financial Resource Strain: Not on file   Food Insecurity: Not on file   Transportation Needs: Not on file   Physical Activity: Not on file   Stress: Not on file   Social Connections: Not on file   Interpersonal Safety: Not on file   Housing Stability: Not on file       Outpatient Encounter Medications as of 9/21/2023   Medication Sig Dispense Refill     alcohol swab prep pads Use to swab area of injection/joe as directed. 100 each 1     amLODIPine (NORVASC) 2.5 MG tablet Take 1 tablet (2.5 mg) by mouth 2 times daily 180 tablet 1     ASPIRIN LOW DOSE 81 MG EC tablet TAKE ONE TABLET BY MOUTH EVERY DAY FOR HEART DISEASE  **DO NOT CHEW** FOR HEART DISEASE  **DO NOT CHEW** 100 tablet 3     atorvastatin (LIPITOR) 40 MG tablet Take 1 tablet (40 mg) by mouth daily 90 tablet 1     blood glucose (NO BRAND SPECIFIED) test strip Use to test blood sugar 1 times daily or as directed. 100 strip 1     blood glucose calibration (NO BRAND SPECIFIED) solution Use to calibrate blood glucose monitor as needed as directed. 1 each 1     blood glucose monitoring (NO BRAND SPECIFIED) meter device kit Use to test blood sugar 1 times daily or as directed. 1 kit 0     cephALEXin (KEFLEX) 500 MG capsule Take 1 capsule (500 mg) by mouth 2 times daily 20 capsule 0     cyclobenzaprine (FLEXERIL) 5 MG  tablet Take 1 tablet (5 mg) by mouth 3 times daily as needed for muscle spasms 30 tablet 0     FEROSUL 325 (65 Fe) MG tablet Take 1 tablet (325 mg) by mouth daily. 90 tablet 3     fluorouracil (EFUDEX) 5 % external cream Apply to scalp BID x 14 days 40 g 3     folic acid (FOLVITE) 1 MG tablet Take 1 tablet (1,000 mcg) by mouth daily. 90 tablet 0     lisinopril (ZESTRIL) 20 MG tablet Take 1 tablet (20 mg) by mouth 2 times daily 180 tablet 1     melatonin 5 MG tablet Take 1 tablet (5 mg) by mouth nightly as needed for sleep       metFORMIN (GLUCOPHAGE) 500 MG tablet Take 2 tablets (1,000 mg) by mouth 2 times daily (with meals.) 360 tablet 0     metoprolol succinate ER (TOPROL XL) 25 MG 24 hr tablet Take 0.5 tablet (12.5 mg) by mouth daily 45 tablet 1     metroNIDAZOLE (METROGEL) 0.75 % external gel Apply to face BID 45 g 11     nitroGLYcerin (NITROSTAT) 0.4 MG sublingual tablet For chest pain place 1 tablet under the tongue every 5 minutes for 3 doses. If symptoms persist 5 minutes after 1st dose call 911. 10 tablet 3     thin (NO BRAND SPECIFIED) lancets Use with lanceting device. 100 each 1     Vitamin D3 (CHOLECALCIFEROL) 25 mcg (1000 units) tablet Take by mouth daily       zinc gluconate 50 MG tablet Take 50 mg by mouth daily       No facility-administered encounter medications on file as of 9/21/2023.             O:   NAD, WDWN, Alert & Oriented, Mood & Affect wnl, Vitals stable   Here today alone   General appearance normal   Vitals stable   Alert, oriented and in no acute distress     L helix 5mm scaly papule       Eyes: Conjunctivae/lids:Normal     ENT: Lips, buccal mucosa, tongue: normal    MSK:Normal    Cardiovascular: peripheral edema none    Pulm: Breathing Normal    Neuro/Psych: Orientation:Alert and Orientedx3 ; Mood/Affect:normal       A/P:  L helix basal cell carcinoma   MOHS:   Location    The rationale for Mohs surgery was discussed with the patient and consent was obtained.  The risks and benefits  as well as alternatives to therapy were discussed, in detail.  Specifically, the risks of infection, scarring, bleeding, prolonged wound healing, incomplete removal, allergy to anesthesia, nerve injury and recurrence were addressed.  Indication for Mohs was Location. Prior to the procedure, the treatment site was clearly identified and, if available, confirmed with previous photos and confirmed by the patient   All components of the Universal Protocol/PAUSE rule were completed.  The Mohs surgeon operated in two distinct and integrated capacities as the surgeon and pathologist.      The area was prepped with Betasept.  A rim of normal appearing skin was marked circumferentially around the lesion.  The area was infiltrated with local anesthesia.  The tumor was first debulked to remove all clinically apparent tumor.  An incision following the standard Mohs approach was done and the specimen was oriented,mapped and placed in 1 block(s).  Each specimen was then chromacoded and processed in the Mohs laboratory using standard Mohs technique and submitted for frozen section histology.  Frozen section analysis showed no residual tumor but CLEAR MARGINS.      The tumor was excised using standard Mohs technique in 1 stages(s).  CLEAR MARGINS OBTAINED and Final defect size was 1.1 cm.     We discussed the options for wound management in full with the patient including risks/benefits/ possible outcomes.      REPAIR SECOND INTENT: We discussed the options for wound management in full with the patient including risks/benefits/possible outcomes. Decision made to allow the wound to heal by second intention. Cautery was used for for hemostasis. EBL minimal; complications none; wound care routine.  The patient was discharged in good condition and will return in one month or prn for wound evaluation.  It was a pleasure speaking to Sumit Park today.  Previous clinic notes and pertinent laboratory tests were reviewed prior to  Sumit Park's visit.  Signs and Symptoms of skin cancer discussed with patient.  Patient encouraged to perform monthly skin exams.  UV precautions reviewed with patient.  Risks of non-melanoma skin cancer discussed with patient   Return to clinic 6 months        Again, thank you for allowing me to participate in the care of your patient.        Sincerely,        Mikey Kelly MD

## 2023-10-02 ENCOUNTER — MYC MEDICAL ADVICE (OUTPATIENT)
Dept: CARDIOLOGY | Facility: CLINIC | Age: 63
End: 2023-10-02

## 2023-10-02 ENCOUNTER — DOCUMENTATION ONLY (OUTPATIENT)
Dept: CARDIOLOGY | Facility: CLINIC | Age: 63
End: 2023-10-02
Payer: COMMERCIAL

## 2023-10-02 NOTE — PROGRESS NOTES
Received above message. According to [employer] carelink website, patients monitor is connected. Patient will continue to keep an eye on monitor and call with any questions or concerns in the future. Patient decline [employer] tech support number.     [facility]

## 2023-10-02 NOTE — LETTER
8/22/24    To Whom it May Concern,    I am the cardiologist who has been providing care for Sumit Park ([birth year]). I have been asked to write a DOT clearance letter from a cardiac standpoint. From a DOT standpoint, I will only state whether Sumit needs cardiac work up or not. From my side at this time,since Sumit is asymptomatic no further cardiology work up is recommended. Similarly no pacemaker changes are warranted  at this time. Final clearance for DOT however is always determined by the DOT examiner. I will also fax over the most recent office note, echo from 8/2023, and pacemaker report update.     Please call [phone] with any questions.       Sincerely,       Dr.Kairav Rolle

## 2023-10-07 DIAGNOSIS — E11.9 TYPE 2 DIABETES MELLITUS WITHOUT COMPLICATION, WITHOUT LONG-TERM CURRENT USE OF INSULIN (H): ICD-10-CM

## 2023-10-12 ENCOUNTER — LAB (OUTPATIENT)
Dept: LAB | Facility: CLINIC | Age: 63
End: 2023-10-12
Payer: COMMERCIAL

## 2023-10-12 DIAGNOSIS — E11.9 TYPE 2 DIABETES MELLITUS WITHOUT COMPLICATION, WITHOUT LONG-TERM CURRENT USE OF INSULIN (H): Primary | ICD-10-CM

## 2023-10-12 LAB
CREAT UR-MCNC: 237 MG/DL
HBA1C MFR BLD: 6.6 % (ref 0–5.6)
MICROALBUMIN UR-MCNC: 24.2 MG/L
MICROALBUMIN/CREAT UR: 10.21 MG/G CR (ref 0–17)

## 2023-10-12 PROCEDURE — 82043 UR ALBUMIN QUANTITATIVE: CPT

## 2023-10-12 PROCEDURE — 82570 ASSAY OF URINE CREATININE: CPT

## 2023-10-12 PROCEDURE — 36415 COLL VENOUS BLD VENIPUNCTURE: CPT

## 2023-10-12 PROCEDURE — 83036 HEMOGLOBIN GLYCOSYLATED A1C: CPT

## 2023-10-19 ENCOUNTER — OFFICE VISIT (OUTPATIENT)
Dept: INTERNAL MEDICINE | Facility: CLINIC | Age: 63
End: 2023-10-19
Payer: COMMERCIAL

## 2023-10-19 VITALS
BODY MASS INDEX: 34.82 KG/M2 | WEIGHT: 216.7 LBS | TEMPERATURE: 95.6 F | SYSTOLIC BLOOD PRESSURE: 130 MMHG | OXYGEN SATURATION: 96 % | RESPIRATION RATE: 18 BRPM | DIASTOLIC BLOOD PRESSURE: 72 MMHG | HEIGHT: 66 IN | HEART RATE: 79 BPM

## 2023-10-19 DIAGNOSIS — G47.9 SLEEP DIFFICULTIES: Primary | ICD-10-CM

## 2023-10-19 DIAGNOSIS — I25.10 CORONARY ARTERY DISEASE INVOLVING NATIVE HEART WITHOUT ANGINA PECTORIS, UNSPECIFIED VESSEL OR LESION TYPE: ICD-10-CM

## 2023-10-19 DIAGNOSIS — R61 NIGHT SWEATS: ICD-10-CM

## 2023-10-19 DIAGNOSIS — Z98.84 GASTRIC BYPASS STATUS FOR OBESITY: ICD-10-CM

## 2023-10-19 DIAGNOSIS — E78.5 HYPERLIPIDEMIA LDL GOAL <100: ICD-10-CM

## 2023-10-19 DIAGNOSIS — Z11.1 SCREENING EXAMINATION FOR PULMONARY TUBERCULOSIS: ICD-10-CM

## 2023-10-19 DIAGNOSIS — I10 HTN, GOAL BELOW 140/90: ICD-10-CM

## 2023-10-19 DIAGNOSIS — Z12.5 SCREENING FOR PROSTATE CANCER: ICD-10-CM

## 2023-10-19 DIAGNOSIS — E11.9 TYPE 2 DIABETES MELLITUS WITHOUT COMPLICATION, WITHOUT LONG-TERM CURRENT USE OF INSULIN (H): ICD-10-CM

## 2023-10-19 PROCEDURE — 99214 OFFICE O/P EST MOD 30 MIN: CPT | Performed by: INTERNAL MEDICINE

## 2023-10-19 PROCEDURE — 99207 PR FOOT EXAM NO CHARGE: CPT | Performed by: INTERNAL MEDICINE

## 2023-10-19 RX ORDER — AMLODIPINE BESYLATE 2.5 MG/1
2.5 TABLET ORAL 2 TIMES DAILY
Qty: 180 TABLET | Refills: 1 | Status: SHIPPED | OUTPATIENT
Start: 2023-10-19 | End: 2024-04-09

## 2023-10-19 RX ORDER — TRAZODONE HYDROCHLORIDE 50 MG/1
50-100 TABLET, FILM COATED ORAL
Qty: 60 TABLET | Refills: 5 | Status: SHIPPED | OUTPATIENT
Start: 2023-10-19 | End: 2024-04-03

## 2023-10-19 RX ORDER — METOPROLOL SUCCINATE 25 MG/1
TABLET, EXTENDED RELEASE ORAL
Qty: 45 TABLET | Refills: 1 | Status: SHIPPED | OUTPATIENT
Start: 2023-10-19 | End: 2024-04-19

## 2023-10-19 RX ORDER — ATORVASTATIN CALCIUM 40 MG/1
40 TABLET, FILM COATED ORAL DAILY
Qty: 90 TABLET | Refills: 1 | Status: SHIPPED | OUTPATIENT
Start: 2023-10-19 | End: 2024-04-19

## 2023-10-19 RX ORDER — LISINOPRIL 20 MG/1
20 TABLET ORAL 2 TIMES DAILY
Qty: 180 TABLET | Refills: 1 | Status: SHIPPED | OUTPATIENT
Start: 2023-10-19 | End: 2024-04-19

## 2023-10-19 ASSESSMENT — PAIN SCALES - GENERAL: PAINLEVEL: NO PAIN (0)

## 2023-10-19 NOTE — PROGRESS NOTES
Dr Jose's note      Patient's instructions / PLAN:                                                        Plan:  For sleep try Trazodone 50 m/2 - 1 - 2 tabs as needed   2. Continue the other meds, same doses for now.  3. Schedule ANNUAL EXAM in 6 months  4. Please make a lab appointment for fasting labs  few days before the annual exam  5. Please make a lab appointment for non fasting labs  in 3 months - only A1c      ASSESSMENT & PLAN:                                                      (G47.9) Sleep difficulties  (primary encounter diagnosis)  Comment: We discussed about the new meds, advantages and potential side effects. The patient will read also the info from the pharmacy and call back if questions.   Plan: traZODone (DESYREL) 50 MG tablet            (E11.9) Type 2 diabetes mellitus without complication, without long-term current use of insulin (H)  Comment: Controlled  -- A1c much better  Plan: Hemoglobin A1c, Hemoglobin A1c            (Z11.1) Screening examination for pulmonary tuberculosis  (R61) Night sweats  Comment:   Plan: Lactate Dehydrogenase, Quantiferon TB Gold Plus            (I10) HTN, goal below 140/90  Comment:   Plan: amLODIPine (NORVASC) 2.5 MG tablet, lisinopril         (ZESTRIL) 20 MG tablet            (I25.10) CAD, 2 stents ,   Comment:   Plan: metoprolol succinate ER (TOPROL XL) 25 MG 24 hr        tablet, Comprehensive metabolic panel, Lipid         panel reflex to direct LDL Fasting            (E78.5) Hyperlipidemia LDL goal <100  Comment:   Plan: atorvastatin (LIPITOR) 40 MG tablet,         Comprehensive metabolic panel, Lipid panel         reflex to direct LDL Fasting            (Z98.84) Gastric bypass status for obesity -   Comment:   Plan: TSH with free T4 reflex, CBC with platelets,         Iron & Iron Binding Capacity, Vitamin B12,         Ferritin, Vitamin D Deficiency, Comprehensive         metabolic panel, Lipid panel reflex to direct         LDL Fasting,  Parathyroid Hormone Intact,         Vitamin B1 whole blood, Folate            (Z12.5) Screening for prostate cancer  Comment:   Plan: Prostate Specific Antigen Screen               Chief complaint:                                                      Follow up chronic medical problems   sleep    SUBJECTIVE:                                                    History of present illness:    Sleep  -- he falls asleep easily but he wakes up around 2:00 am and it is hard to fall back to sleep. He is thinking about old jobs. Melatonin hasn't helped     Night sweats   -- no cough no fever, he doesn't feel ill     Subjective   Sumit is a 63 year old, presenting for the following health issues:  Patient is being seen for an 3 month follow up.      10/19/2023     8:50 AM   Additional Questions   Roomed by Noemi Makcey       History of Present Illness       Reason for visit:  Lab follow up    He eats 0-1 servings of fruits and vegetables daily.He consumes 0 sweetened beverage(s) daily.He exercises with enough effort to increase his heart rate 10 to 19 minutes per day.  He exercises with enough effort to increase his heart rate 4 days per week.   He is taking medications regularly.     Diabetes Follow-up    How often are you checking your blood sugar? Not at all  What concerns do you have today about your diabetes? None   Do you have any of these symptoms? (Select all that apply)  No numbness or tingling in feet.  No redness, sores or blisters on feet.  No complaints of excessive thirst.  No reports of blurry vision.  No significant changes to weight.          Hyperlipidemia Follow-Up    Are you regularly taking any medication or supplement to lower your cholesterol?   Yes- atorvastatin  Are you having muscle aches or other side effects that you think could be caused by your cholesterol lowering medication?  No    Hypertension Follow-up    Do you check your blood pressure regularly outside of the clinic? Yes   Are you following  "a low salt diet? No  Are your blood pressures ever more than 140 on the top number (systolic) OR more   than 90 on the bottom number (diastolic), for example 140/90? No    BP Readings from Last 2 Encounters:   08/30/23 136/84   07/26/23 124/73     Hemoglobin A1C (%)   Date Value   10/12/2023 6.6 (H)   07/13/2023 7.5 (H)   05/10/2021 7.8 (H)     LDL Cholesterol Calculated (mg/dL)   Date Value   07/13/2023 51   09/01/2022 69   05/10/2021 92     Review of Systems:                                                      ROS: negative for fever, chills, cough, wheezes, chest pain, shortness of breath, vomiting, abdominal pain, leg swelling       OBJECTIVE:             Physical exam:  Blood pressure 130/72, pulse 79, temperature (!) 95.6  F (35.3  C), temperature source Tympanic, resp. rate 18, height 1.683 m (5' 6.25\"), weight 98.3 kg (216 lb 11.2 oz), SpO2 96%.     NAD, appears comfortable  Skin: no rashes   HEENT: PERRLA, EOMI, pink conjunctiva, anicteric sclerae, bilateral tympanic membranes are clinically normal, oropharynx is normal color  Neck: supple, no JVD,  No thyroidmegaly. Lymph nodes nonpalpable cervical and supraclavicular.  Chest: clear to auscultation bilaterally, good respiratory effort  Heart: S1 S2, RRR, no mgr appreciated  Abdomen: soft, not tender, no hepatosplenomegaly or masses appreciated, no abdominal bruit, present bowel sounds  Extremities: no edema, clubbing, cyanosis. Palpable pedal pulses bilaterally, Monofilament skin sensation is intact, no feet skin lesions - tines pedis getting better  Neurologic: A, Ox3, no focal signs appreciated    PMHx: reviewed  Past Medical History:   Diagnosis Date    Basal cell carcinoma     CAD, 2 stents 1999, 2018 05/10/2021    Cardiac pacemaker in situ 05/10/2021    Celiac disease 05/10/2021    Complete heart block (H) -- s/p PPM 2018 05/10/2021    DM 2, no insulin (H) 05/10/2021    Gastric bypass status for obesity - 2015 05/10/2021    HTN, goal below 140/80 " 05/10/2021    Hyperlipidemia LDL goal <100 05/10/2021    ZULEIKA (obstructive sleep apnea) 05/10/2021      PSHx: reviewed  Past Surgical History:   Procedure Laterality Date    ANGIOPLASTY      COLONOSCOPY N/A 1/13/2015    Procedure: COLONOSCOPY;  Surgeon: Juni Craig MD;  Location: [facility];  Service:     DISTAL BICEPS TENDON REPAIR Left     ESOPHAGOSCOPY, GASTROSCOPY, DUODENOSCOPY (EGD), COMBINED N/A 1/13/2015    Procedure: UPPER ENDOSCOPY;  Surgeon: Juni Craig MD;  Location: [facility];  Service:     HERNIORRHAPHY INCISIONAL (LOCATION)      incarcerated    LAPAROSCOPIC GASTRIC BYPASS N/A 2015    LEG SURGERY Right     muscle surgery    OTHER SURGICAL HISTORY      forearm / wrist surgery    REPAIR TENDON BICEPS DISTAL UPPER EXTREMITY      UMBILICAL HERNIA REPAIR N/A     WRIST SURGERY Right         Meds: reviewed  Current Outpatient Medications   Medication Sig Dispense Refill    alcohol swab prep pads Use to swab area of injection/joe as directed. 100 each 1    amLODIPine (NORVASC) 2.5 MG tablet Take 1 tablet (2.5 mg) by mouth 2 times daily 180 tablet 1    ASPIRIN LOW DOSE 81 MG EC tablet TAKE ONE TABLET BY MOUTH EVERY DAY FOR HEART DISEASE  **DO NOT CHEW** FOR HEART DISEASE  **DO NOT CHEW** 100 tablet 3    atorvastatin (LIPITOR) 40 MG tablet Take 1 tablet (40 mg) by mouth daily 90 tablet 1    blood glucose (NO BRAND SPECIFIED) test strip Use to test blood sugar 1 times daily or as directed. 100 strip 1    blood glucose calibration (NO BRAND SPECIFIED) solution Use to calibrate blood glucose monitor as needed as directed. 1 each 1    blood glucose monitoring (NO BRAND SPECIFIED) meter device kit Use to test blood sugar 1 times daily or as directed. 1 kit 0    cephALEXin (KEFLEX) 500 MG capsule Take 1 capsule (500 mg) by mouth 2 times daily 20 capsule 0    cyclobenzaprine (FLEXERIL) 5 MG tablet Take 1 tablet (5 mg) by mouth 3 times daily as needed for muscle spasms 30 tablet 0     FEROSUL 325 (65 Fe) MG tablet Take 1 tablet (325 mg) by mouth daily. 90 tablet 3    fluorouracil (EFUDEX) 5 % external cream Apply to scalp BID x 14 days 40 g 3    folic acid (FOLVITE) 1 MG tablet Take 1 tablet (1,000 mcg) by mouth daily. 90 tablet 0    lisinopril (ZESTRIL) 20 MG tablet Take 1 tablet (20 mg) by mouth 2 times daily 180 tablet 1    melatonin 5 MG tablet Take 1 tablet (5 mg) by mouth nightly as needed for sleep      metFORMIN (GLUCOPHAGE) 500 MG tablet Take 2 tablets (1,000 mg) by mouth 2 times daily (with meals.) 360 tablet 0    metoprolol succinate ER (TOPROL XL) 25 MG 24 hr tablet Take 0.5 tablet (12.5 mg) by mouth daily 45 tablet 1    metroNIDAZOLE (METROGEL) 0.75 % external gel Apply to face BID 45 g 11    nitroGLYcerin (NITROSTAT) 0.4 MG sublingual tablet For chest pain place 1 tablet under the tongue every 5 minutes for 3 doses. If symptoms persist 5 minutes after 1st dose call 911. 10 tablet 3    thin (NO BRAND SPECIFIED) lancets Use with lanceting device. 100 each 1    Vitamin D3 (CHOLECALCIFEROL) 25 mcg (1000 units) tablet Take by mouth daily      zinc gluconate 50 MG tablet Take 50 mg by mouth daily         Soc Hx: reviewed  Fam Hx: reviewed      Chart documentation was completed, in part, with [employer] voice-recognition software. Even though reviewed, some grammatical, spelling, and word errors may remain.      Candis Jose MD  Internal Medicine

## 2023-10-19 NOTE — PATIENT INSTRUCTIONS
Plan:  For sleep try Trazodone 50 m/2 - 1 - 2 tabs as needed   2. Continue the other meds, same doses for now.  3. Schedule ANNUAL EXAM in 6 months  4. Please make a lab appointment for fasting labs  few days before the annual exam  5. Please make a lab appointment for non fasting labs  in 3 months - only A1c

## 2023-11-05 DIAGNOSIS — Z98.84 GASTRIC BYPASS STATUS FOR OBESITY: ICD-10-CM

## 2023-11-06 RX ORDER — FOLIC ACID 1 MG/1
TABLET ORAL
Qty: 90 TABLET | Refills: 1 | Status: SHIPPED | OUTPATIENT
Start: 2023-11-06 | End: 2024-05-08

## 2023-11-20 ENCOUNTER — ANCILLARY PROCEDURE (OUTPATIENT)
Dept: CARDIOLOGY | Facility: CLINIC | Age: 63
End: 2023-11-20
Attending: INTERNAL MEDICINE
Payer: COMMERCIAL

## 2023-11-20 DIAGNOSIS — I44.1 SECOND DEGREE HEART BLOCK: ICD-10-CM

## 2023-11-20 DIAGNOSIS — Z95.0 CARDIAC PACEMAKER IN SITU: Primary | ICD-10-CM

## 2023-11-20 DIAGNOSIS — Z95.0 CARDIAC PACEMAKER IN SITU: ICD-10-CM

## 2023-11-20 DIAGNOSIS — I44.2 COMPLETE HEART BLOCK (H): ICD-10-CM

## 2023-11-20 PROCEDURE — 93296 REM INTERROG EVL PM/IDS: CPT | Performed by: INTERNAL MEDICINE

## 2023-11-20 PROCEDURE — 93294 REM INTERROG EVL PM/LDLS PM: CPT | Performed by: INTERNAL MEDICINE

## 2023-11-24 LAB
MDC_IDC_EPISODE_DTM: NORMAL
MDC_IDC_EPISODE_DURATION: 183 S
MDC_IDC_EPISODE_ID: 89
MDC_IDC_EPISODE_TYPE: NORMAL
MDC_IDC_LEAD_CONNECTION_STATUS: NORMAL
MDC_IDC_LEAD_CONNECTION_STATUS: NORMAL
MDC_IDC_LEAD_IMPLANT_DT: NORMAL
MDC_IDC_LEAD_IMPLANT_DT: NORMAL
MDC_IDC_LEAD_LOCATION: NORMAL
MDC_IDC_LEAD_LOCATION: NORMAL
MDC_IDC_LEAD_LOCATION_DETAIL_1: NORMAL
MDC_IDC_LEAD_LOCATION_DETAIL_1: NORMAL
MDC_IDC_LEAD_MFG: NORMAL
MDC_IDC_LEAD_MFG: NORMAL
MDC_IDC_LEAD_MODEL: NORMAL
MDC_IDC_LEAD_MODEL: NORMAL
MDC_IDC_LEAD_POLARITY_TYPE: NORMAL
MDC_IDC_LEAD_POLARITY_TYPE: NORMAL
MDC_IDC_LEAD_SERIAL: NORMAL
MDC_IDC_LEAD_SERIAL: NORMAL
MDC_IDC_MSMT_BATTERY_DTM: NORMAL
MDC_IDC_MSMT_BATTERY_REMAINING_LONGEVITY: 93 MO
MDC_IDC_MSMT_BATTERY_RRT_TRIGGER: 2.62
MDC_IDC_MSMT_BATTERY_STATUS: NORMAL
MDC_IDC_MSMT_BATTERY_VOLTAGE: 2.99 V
MDC_IDC_MSMT_LEADCHNL_RA_IMPEDANCE_VALUE: 304 OHM
MDC_IDC_MSMT_LEADCHNL_RA_IMPEDANCE_VALUE: 361 OHM
MDC_IDC_MSMT_LEADCHNL_RA_PACING_THRESHOLD_AMPLITUDE: 0.5 V
MDC_IDC_MSMT_LEADCHNL_RA_PACING_THRESHOLD_PULSEWIDTH: 0.4 MS
MDC_IDC_MSMT_LEADCHNL_RA_SENSING_INTR_AMPL: 2.5 MV
MDC_IDC_MSMT_LEADCHNL_RA_SENSING_INTR_AMPL: 2.5 MV
MDC_IDC_MSMT_LEADCHNL_RV_IMPEDANCE_VALUE: 361 OHM
MDC_IDC_MSMT_LEADCHNL_RV_IMPEDANCE_VALUE: 456 OHM
MDC_IDC_MSMT_LEADCHNL_RV_PACING_THRESHOLD_AMPLITUDE: 0.75 V
MDC_IDC_MSMT_LEADCHNL_RV_PACING_THRESHOLD_PULSEWIDTH: 0.4 MS
MDC_IDC_MSMT_LEADCHNL_RV_SENSING_INTR_AMPL: 11.25 MV
MDC_IDC_MSMT_LEADCHNL_RV_SENSING_INTR_AMPL: 11.25 MV
MDC_IDC_PG_IMPLANT_DTM: NORMAL
MDC_IDC_PG_MFG: NORMAL
MDC_IDC_PG_MODEL: NORMAL
MDC_IDC_PG_SERIAL: NORMAL
MDC_IDC_PG_TYPE: NORMAL
MDC_IDC_SESS_CLINIC_NAME: NORMAL
MDC_IDC_SESS_DTM: NORMAL
MDC_IDC_SESS_TYPE: NORMAL
MDC_IDC_SET_BRADY_AT_MODE_SWITCH_RATE: 150 {BEATS}/MIN
MDC_IDC_SET_BRADY_HYSTRATE: NORMAL
MDC_IDC_SET_BRADY_LOWRATE: 50 {BEATS}/MIN
MDC_IDC_SET_BRADY_MAX_SENSOR_RATE: 130 {BEATS}/MIN
MDC_IDC_SET_BRADY_MAX_TRACKING_RATE: 130 {BEATS}/MIN
MDC_IDC_SET_BRADY_MODE: NORMAL
MDC_IDC_SET_BRADY_PAV_DELAY_LOW: 180 MS
MDC_IDC_SET_BRADY_SAV_DELAY_LOW: 150 MS
MDC_IDC_SET_LEADCHNL_RA_PACING_AMPLITUDE: 1.5 V
MDC_IDC_SET_LEADCHNL_RA_PACING_ANODE_ELECTRODE_1: NORMAL
MDC_IDC_SET_LEADCHNL_RA_PACING_ANODE_LOCATION_1: NORMAL
MDC_IDC_SET_LEADCHNL_RA_PACING_CAPTURE_MODE: NORMAL
MDC_IDC_SET_LEADCHNL_RA_PACING_CATHODE_ELECTRODE_1: NORMAL
MDC_IDC_SET_LEADCHNL_RA_PACING_CATHODE_LOCATION_1: NORMAL
MDC_IDC_SET_LEADCHNL_RA_PACING_POLARITY: NORMAL
MDC_IDC_SET_LEADCHNL_RA_PACING_PULSEWIDTH: 0.4 MS
MDC_IDC_SET_LEADCHNL_RA_SENSING_ANODE_ELECTRODE_1: NORMAL
MDC_IDC_SET_LEADCHNL_RA_SENSING_ANODE_LOCATION_1: NORMAL
MDC_IDC_SET_LEADCHNL_RA_SENSING_CATHODE_ELECTRODE_1: NORMAL
MDC_IDC_SET_LEADCHNL_RA_SENSING_CATHODE_LOCATION_1: NORMAL
MDC_IDC_SET_LEADCHNL_RA_SENSING_POLARITY: NORMAL
MDC_IDC_SET_LEADCHNL_RA_SENSING_SENSITIVITY: 0.3 MV
MDC_IDC_SET_LEADCHNL_RV_PACING_AMPLITUDE: 1.5 V
MDC_IDC_SET_LEADCHNL_RV_PACING_ANODE_ELECTRODE_1: NORMAL
MDC_IDC_SET_LEADCHNL_RV_PACING_ANODE_LOCATION_1: NORMAL
MDC_IDC_SET_LEADCHNL_RV_PACING_CAPTURE_MODE: NORMAL
MDC_IDC_SET_LEADCHNL_RV_PACING_CATHODE_ELECTRODE_1: NORMAL
MDC_IDC_SET_LEADCHNL_RV_PACING_CATHODE_LOCATION_1: NORMAL
MDC_IDC_SET_LEADCHNL_RV_PACING_POLARITY: NORMAL
MDC_IDC_SET_LEADCHNL_RV_PACING_PULSEWIDTH: 0.4 MS
MDC_IDC_SET_LEADCHNL_RV_SENSING_ANODE_ELECTRODE_1: NORMAL
MDC_IDC_SET_LEADCHNL_RV_SENSING_ANODE_LOCATION_1: NORMAL
MDC_IDC_SET_LEADCHNL_RV_SENSING_CATHODE_ELECTRODE_1: NORMAL
MDC_IDC_SET_LEADCHNL_RV_SENSING_CATHODE_LOCATION_1: NORMAL
MDC_IDC_SET_LEADCHNL_RV_SENSING_POLARITY: NORMAL
MDC_IDC_SET_LEADCHNL_RV_SENSING_SENSITIVITY: 0.9 MV
MDC_IDC_SET_ZONE_DETECTION_INTERVAL: 370 MS
MDC_IDC_SET_ZONE_DETECTION_INTERVAL: 400 MS
MDC_IDC_SET_ZONE_STATUS: NORMAL
MDC_IDC_SET_ZONE_STATUS: NORMAL
MDC_IDC_SET_ZONE_TYPE: NORMAL
MDC_IDC_SET_ZONE_VENDOR_TYPE: NORMAL
MDC_IDC_STAT_AT_BURDEN_PERCENT: 0 %
MDC_IDC_STAT_AT_DTM_END: NORMAL
MDC_IDC_STAT_AT_DTM_START: NORMAL
MDC_IDC_STAT_BRADY_AP_VP_PERCENT: 18.78 %
MDC_IDC_STAT_BRADY_AP_VS_PERCENT: 0 %
MDC_IDC_STAT_BRADY_AS_VP_PERCENT: 81.16 %
MDC_IDC_STAT_BRADY_AS_VS_PERCENT: 0.05 %
MDC_IDC_STAT_BRADY_DTM_END: NORMAL
MDC_IDC_STAT_BRADY_DTM_START: NORMAL
MDC_IDC_STAT_BRADY_RA_PERCENT_PACED: 18.76 %
MDC_IDC_STAT_BRADY_RV_PERCENT_PACED: 99.94 %
MDC_IDC_STAT_EPISODE_RECENT_COUNT: 0
MDC_IDC_STAT_EPISODE_RECENT_COUNT: 1
MDC_IDC_STAT_EPISODE_RECENT_COUNT_DTM_END: NORMAL
MDC_IDC_STAT_EPISODE_RECENT_COUNT_DTM_START: NORMAL
MDC_IDC_STAT_EPISODE_TOTAL_COUNT: 0
MDC_IDC_STAT_EPISODE_TOTAL_COUNT: 0
MDC_IDC_STAT_EPISODE_TOTAL_COUNT: 2
MDC_IDC_STAT_EPISODE_TOTAL_COUNT: 4
MDC_IDC_STAT_EPISODE_TOTAL_COUNT: 83
MDC_IDC_STAT_EPISODE_TOTAL_COUNT_DTM_END: NORMAL
MDC_IDC_STAT_EPISODE_TOTAL_COUNT_DTM_START: NORMAL
MDC_IDC_STAT_EPISODE_TYPE: NORMAL
MDC_IDC_STAT_TACHYTHERAPY_RECENT_DTM_END: NORMAL
MDC_IDC_STAT_TACHYTHERAPY_RECENT_DTM_START: NORMAL
MDC_IDC_STAT_TACHYTHERAPY_TOTAL_DTM_END: NORMAL
MDC_IDC_STAT_TACHYTHERAPY_TOTAL_DTM_START: NORMAL

## 2023-12-17 DIAGNOSIS — E11.9 TYPE 2 DIABETES MELLITUS WITHOUT COMPLICATION, WITHOUT LONG-TERM CURRENT USE OF INSULIN (H): ICD-10-CM

## 2023-12-27 ENCOUNTER — TRANSFERRED RECORDS (OUTPATIENT)
Dept: MULTI SPECIALTY CLINIC | Facility: CLINIC | Age: 63
End: 2023-12-27

## 2023-12-27 LAB — RETINOPATHY: NORMAL

## 2024-01-10 ENCOUNTER — OFFICE VISIT (OUTPATIENT)
Dept: DERMATOLOGY | Facility: CLINIC | Age: 64
End: 2024-01-10
Payer: COMMERCIAL

## 2024-01-10 DIAGNOSIS — Z85.828 HISTORY OF BASAL CELL CARCINOMA (BCC): Primary | ICD-10-CM

## 2024-01-10 PROCEDURE — 99212 OFFICE O/P EST SF 10 MIN: CPT | Performed by: PHYSICIAN ASSISTANT

## 2024-01-10 NOTE — LETTER
1/10/2024         RE: Sumit Park  [address]  [place] [address]        Dear Colleague,    Thank you for referring your patient, Sumit Park, to the [facility]. Please see a copy of my visit note below.    HPI:   Chief complaints: Sumit Park is a pleasant 63 year old male who presents for recheck scalp and ears. He had BCC on the left helix and treated AKs on the scalp with Efudex recently. He has been using the Efudex since LOV - BID at first; now just when he feels a scaly spot. He reports everything has been healing well. He declines a FSE today.       PHYSICAL EXAM:    There were no vitals taken for this visit.  Skin exam performed as follows: Type 2 skin. Mood appropriate  Alert and Oriented X 3. Well developed, well nourished in no distress.  General appearance: Normal  Head including face: Normal  Eyes: conjunctiva and lids: Normal  Mouth: Lips, teeth, gums: Normal  Neck: Normal  Skin: Scalp and body hair: See below    Skin of face and scalp clear  Scar on the left posterior helix    ASSESSMENT/PLAN:     Previously noted actinic keratoses on the face and scalp and clear today. Advised ok to stop the efudex; can resume in the future if needed.   History of BCC on the left posterior helix - all clear today and healing well.           Follow-up: FSE/PRN sooner  CC:   Scribed By: Kaycee Brewer, MS, BREE      Again, thank you for allowing me to participate in the care of your patient.        Sincerely,        Kaycee Brewer PA-C

## 2024-01-10 NOTE — PROGRESS NOTES
HPI:   Chief complaints: Sumit Park is a pleasant 63 year old male who presents for recheck scalp and ears. He had BCC on the left helix and treated AKs on the scalp with Efudex recently. He has been using the Efudex since LOV - BID at first; now just when he feels a scaly spot. He reports everything has been healing well. He declines a FSE today.       PHYSICAL EXAM:    There were no vitals taken for this visit.  Skin exam performed as follows: Type 2 skin. Mood appropriate  Alert and Oriented X 3. Well developed, well nourished in no distress.  General appearance: Normal  Head including face: Normal  Eyes: conjunctiva and lids: Normal  Mouth: Lips, teeth, gums: Normal  Neck: Normal  Skin: Scalp and body hair: See below    Skin of face and scalp clear  Scar on the left posterior helix    ASSESSMENT/PLAN:     Previously noted actinic keratoses on the face and scalp and clear today. Advised ok to stop the efudex; can resume in the future if needed.   History of BCC on the left posterior helix - all clear today and healing well.           Follow-up: FSE/PRN sooner  CC:   Scribed By: Kaycee Brewer, MS, PA-C

## 2024-02-15 DIAGNOSIS — Z98.84 GASTRIC BYPASS STATUS FOR OBESITY: ICD-10-CM

## 2024-02-15 RX ORDER — FERROUS SULFATE 325(65) MG
325 TABLET ORAL DAILY
Qty: 90 TABLET | Refills: 0 | Status: SHIPPED | OUTPATIENT
Start: 2024-02-15 | End: 2024-04-19

## 2024-02-26 ENCOUNTER — TRANSFERRED RECORDS (OUTPATIENT)
Dept: HEALTH INFORMATION MANAGEMENT | Facility: CLINIC | Age: 64
End: 2024-02-26
Payer: COMMERCIAL

## 2024-02-28 ENCOUNTER — ANCILLARY PROCEDURE (OUTPATIENT)
Dept: CARDIOLOGY | Facility: CLINIC | Age: 64
End: 2024-02-28
Attending: INTERNAL MEDICINE
Payer: COMMERCIAL

## 2024-02-28 DIAGNOSIS — I44.1 SECOND DEGREE HEART BLOCK: ICD-10-CM

## 2024-02-28 DIAGNOSIS — Z95.0 CARDIAC PACEMAKER IN SITU: ICD-10-CM

## 2024-02-28 PROCEDURE — 93280 PM DEVICE PROGR EVAL DUAL: CPT | Performed by: INTERNAL MEDICINE

## 2024-02-29 LAB
MDC_IDC_LEAD_CONNECTION_STATUS: NORMAL
MDC_IDC_LEAD_CONNECTION_STATUS: NORMAL
MDC_IDC_LEAD_IMPLANT_DT: NORMAL
MDC_IDC_LEAD_IMPLANT_DT: NORMAL
MDC_IDC_LEAD_LOCATION: NORMAL
MDC_IDC_LEAD_LOCATION: NORMAL
MDC_IDC_LEAD_LOCATION_DETAIL_1: NORMAL
MDC_IDC_LEAD_LOCATION_DETAIL_1: NORMAL
MDC_IDC_LEAD_MFG: NORMAL
MDC_IDC_LEAD_MFG: NORMAL
MDC_IDC_LEAD_MODEL: NORMAL
MDC_IDC_LEAD_MODEL: NORMAL
MDC_IDC_LEAD_POLARITY_TYPE: NORMAL
MDC_IDC_LEAD_POLARITY_TYPE: NORMAL
MDC_IDC_LEAD_SERIAL: NORMAL
MDC_IDC_LEAD_SERIAL: NORMAL
MDC_IDC_MSMT_BATTERY_DTM: NORMAL
MDC_IDC_MSMT_BATTERY_REMAINING_LONGEVITY: 92 MO
MDC_IDC_MSMT_BATTERY_RRT_TRIGGER: 2.62
MDC_IDC_MSMT_BATTERY_STATUS: NORMAL
MDC_IDC_MSMT_BATTERY_VOLTAGE: 2.98 V
MDC_IDC_MSMT_LEADCHNL_RA_IMPEDANCE_VALUE: 323 OHM
MDC_IDC_MSMT_LEADCHNL_RA_IMPEDANCE_VALUE: 380 OHM
MDC_IDC_MSMT_LEADCHNL_RA_PACING_THRESHOLD_AMPLITUDE: 0.38 V
MDC_IDC_MSMT_LEADCHNL_RA_PACING_THRESHOLD_PULSEWIDTH: 0.4 MS
MDC_IDC_MSMT_LEADCHNL_RA_SENSING_INTR_AMPL: 2.5 MV
MDC_IDC_MSMT_LEADCHNL_RA_SENSING_INTR_AMPL: 2.62 MV
MDC_IDC_MSMT_LEADCHNL_RV_IMPEDANCE_VALUE: 399 OHM
MDC_IDC_MSMT_LEADCHNL_RV_IMPEDANCE_VALUE: 513 OHM
MDC_IDC_MSMT_LEADCHNL_RV_PACING_THRESHOLD_AMPLITUDE: 0.75 V
MDC_IDC_MSMT_LEADCHNL_RV_PACING_THRESHOLD_PULSEWIDTH: 0.4 MS
MDC_IDC_MSMT_LEADCHNL_RV_SENSING_INTR_AMPL: 13.62 MV
MDC_IDC_MSMT_LEADCHNL_RV_SENSING_INTR_AMPL: 13.62 MV
MDC_IDC_PG_IMPLANT_DTM: NORMAL
MDC_IDC_PG_MFG: NORMAL
MDC_IDC_PG_MODEL: NORMAL
MDC_IDC_PG_SERIAL: NORMAL
MDC_IDC_PG_TYPE: NORMAL
MDC_IDC_SESS_CLINIC_NAME: NORMAL
MDC_IDC_SESS_DTM: NORMAL
MDC_IDC_SESS_TYPE: NORMAL
MDC_IDC_SET_BRADY_AT_MODE_SWITCH_RATE: 150 {BEATS}/MIN
MDC_IDC_SET_BRADY_HYSTRATE: NORMAL
MDC_IDC_SET_BRADY_LOWRATE: 50 {BEATS}/MIN
MDC_IDC_SET_BRADY_MAX_SENSOR_RATE: 130 {BEATS}/MIN
MDC_IDC_SET_BRADY_MAX_TRACKING_RATE: 130 {BEATS}/MIN
MDC_IDC_SET_BRADY_MODE: NORMAL
MDC_IDC_SET_BRADY_PAV_DELAY_LOW: 180 MS
MDC_IDC_SET_BRADY_SAV_DELAY_LOW: 150 MS
MDC_IDC_SET_LEADCHNL_RA_PACING_AMPLITUDE: 1.5 V
MDC_IDC_SET_LEADCHNL_RA_PACING_ANODE_ELECTRODE_1: NORMAL
MDC_IDC_SET_LEADCHNL_RA_PACING_ANODE_LOCATION_1: NORMAL
MDC_IDC_SET_LEADCHNL_RA_PACING_CAPTURE_MODE: NORMAL
MDC_IDC_SET_LEADCHNL_RA_PACING_CATHODE_ELECTRODE_1: NORMAL
MDC_IDC_SET_LEADCHNL_RA_PACING_CATHODE_LOCATION_1: NORMAL
MDC_IDC_SET_LEADCHNL_RA_PACING_POLARITY: NORMAL
MDC_IDC_SET_LEADCHNL_RA_PACING_PULSEWIDTH: 0.4 MS
MDC_IDC_SET_LEADCHNL_RA_SENSING_ANODE_ELECTRODE_1: NORMAL
MDC_IDC_SET_LEADCHNL_RA_SENSING_ANODE_LOCATION_1: NORMAL
MDC_IDC_SET_LEADCHNL_RA_SENSING_CATHODE_ELECTRODE_1: NORMAL
MDC_IDC_SET_LEADCHNL_RA_SENSING_CATHODE_LOCATION_1: NORMAL
MDC_IDC_SET_LEADCHNL_RA_SENSING_POLARITY: NORMAL
MDC_IDC_SET_LEADCHNL_RA_SENSING_SENSITIVITY: 0.3 MV
MDC_IDC_SET_LEADCHNL_RV_PACING_AMPLITUDE: 1.5 V
MDC_IDC_SET_LEADCHNL_RV_PACING_ANODE_ELECTRODE_1: NORMAL
MDC_IDC_SET_LEADCHNL_RV_PACING_ANODE_LOCATION_1: NORMAL
MDC_IDC_SET_LEADCHNL_RV_PACING_CAPTURE_MODE: NORMAL
MDC_IDC_SET_LEADCHNL_RV_PACING_CATHODE_ELECTRODE_1: NORMAL
MDC_IDC_SET_LEADCHNL_RV_PACING_CATHODE_LOCATION_1: NORMAL
MDC_IDC_SET_LEADCHNL_RV_PACING_POLARITY: NORMAL
MDC_IDC_SET_LEADCHNL_RV_PACING_PULSEWIDTH: 0.4 MS
MDC_IDC_SET_LEADCHNL_RV_SENSING_ANODE_ELECTRODE_1: NORMAL
MDC_IDC_SET_LEADCHNL_RV_SENSING_ANODE_LOCATION_1: NORMAL
MDC_IDC_SET_LEADCHNL_RV_SENSING_CATHODE_ELECTRODE_1: NORMAL
MDC_IDC_SET_LEADCHNL_RV_SENSING_CATHODE_LOCATION_1: NORMAL
MDC_IDC_SET_LEADCHNL_RV_SENSING_POLARITY: NORMAL
MDC_IDC_SET_LEADCHNL_RV_SENSING_SENSITIVITY: 0.9 MV
MDC_IDC_SET_ZONE_DETECTION_INTERVAL: 370 MS
MDC_IDC_SET_ZONE_DETECTION_INTERVAL: 400 MS
MDC_IDC_SET_ZONE_STATUS: NORMAL
MDC_IDC_SET_ZONE_STATUS: NORMAL
MDC_IDC_SET_ZONE_TYPE: NORMAL
MDC_IDC_SET_ZONE_VENDOR_TYPE: NORMAL
MDC_IDC_STAT_AT_BURDEN_PERCENT: 0 %
MDC_IDC_STAT_AT_DTM_END: NORMAL
MDC_IDC_STAT_AT_DTM_START: NORMAL
MDC_IDC_STAT_BRADY_AP_VP_PERCENT: 16.48 %
MDC_IDC_STAT_BRADY_AP_VS_PERCENT: 0.01 %
MDC_IDC_STAT_BRADY_AS_VP_PERCENT: 83.38 %
MDC_IDC_STAT_BRADY_AS_VS_PERCENT: 0.13 %
MDC_IDC_STAT_BRADY_DTM_END: NORMAL
MDC_IDC_STAT_BRADY_DTM_START: NORMAL
MDC_IDC_STAT_BRADY_RA_PERCENT_PACED: 16.47 %
MDC_IDC_STAT_BRADY_RV_PERCENT_PACED: 99.86 %
MDC_IDC_STAT_EPISODE_RECENT_COUNT: 0
MDC_IDC_STAT_EPISODE_RECENT_COUNT: 2
MDC_IDC_STAT_EPISODE_RECENT_COUNT: 2
MDC_IDC_STAT_EPISODE_RECENT_COUNT_DTM_END: NORMAL
MDC_IDC_STAT_EPISODE_RECENT_COUNT_DTM_START: NORMAL
MDC_IDC_STAT_EPISODE_TOTAL_COUNT: 0
MDC_IDC_STAT_EPISODE_TOTAL_COUNT: 0
MDC_IDC_STAT_EPISODE_TOTAL_COUNT: 2
MDC_IDC_STAT_EPISODE_TOTAL_COUNT: 4
MDC_IDC_STAT_EPISODE_TOTAL_COUNT: 83
MDC_IDC_STAT_EPISODE_TOTAL_COUNT_DTM_END: NORMAL
MDC_IDC_STAT_EPISODE_TOTAL_COUNT_DTM_START: NORMAL
MDC_IDC_STAT_EPISODE_TYPE: NORMAL
MDC_IDC_STAT_TACHYTHERAPY_RECENT_DTM_END: NORMAL
MDC_IDC_STAT_TACHYTHERAPY_RECENT_DTM_START: NORMAL
MDC_IDC_STAT_TACHYTHERAPY_TOTAL_DTM_END: NORMAL
MDC_IDC_STAT_TACHYTHERAPY_TOTAL_DTM_START: NORMAL

## 2024-03-09 ENCOUNTER — HEALTH MAINTENANCE LETTER (OUTPATIENT)
Age: 64
End: 2024-03-09

## 2024-03-18 DIAGNOSIS — E11.9 TYPE 2 DIABETES MELLITUS WITHOUT COMPLICATION, WITHOUT LONG-TERM CURRENT USE OF INSULIN (H): ICD-10-CM

## 2024-03-25 ENCOUNTER — VIRTUAL VISIT (OUTPATIENT)
Dept: ONCOLOGY | Facility: CLINIC | Age: 64
End: 2024-03-25
Attending: PHYSICIAN ASSISTANT
Payer: COMMERCIAL

## 2024-03-25 DIAGNOSIS — Z80.3 FAMILY HISTORY OF MALIGNANT NEOPLASM OF BREAST: Primary | ICD-10-CM

## 2024-03-25 DIAGNOSIS — Z84.81 FAMILY HISTORY OF CARRIER OF GENETIC DISEASE: ICD-10-CM

## 2024-03-25 PROCEDURE — 96040 HC GENETIC COUNSELING, EACH 30 MINUTES: CPT | Mod: GT,95 | Performed by: GENETIC COUNSELOR, MS

## 2024-03-25 NOTE — Clinical Note
"    3/25/2024         RE: Sumit Park  [address]  [place] [address]        Dear Colleague,    Thank you for referring your patient, Sumit Park, to the [facility]. Please see a copy of my visit note below.    Virtual Visit Details    Type of service:  Video Visit     Originating Location (pt. Location): {video visit patient location:940191::\"Home\"}  {PROVIDER LOCATION On-site should be selected for visits conducted from your clinic location or adjoining Health system hospital, academic office, or other nearby Health system building. Off-site should be selected for all other provider locations, including home:[id number]}  Distant Location (provider location):  {virtual location provider:492290}  Platform used for Video Visit: {Virtual Visit Platforms:505671::\"AXS-One\"}      Again, thank you for allowing me to participate in the care of your patient.        Sincerely,        Jayson Young GC  "

## 2024-03-25 NOTE — NURSING NOTE
Is the patient currently in the state of [place]? YES    Visit mode:VIDEO    If the visit is dropped, the patient can be reconnected by: VIDEO VISIT: Text to cell phone:   Telephone Information:   Mobile [phone]    and VIDEO VISIT: Send to e-mail at: [email or web address]    Will anyone else be joining the visit? NO  (If patient encounters technical issues they should call [phone])    How would you like to obtain your AVS? MyChart    Are changes needed to the allergy or medication list? No    Reason for visit: Consult (New Oncology )    Kiley TRACY

## 2024-03-25 NOTE — PATIENT INSTRUCTIONS
Assessing Cancer Risk  Cancer is a common diagnosis which impacts many families.  Individuals may develop cancer due to environmental factors (such as exposures and lifestyle), aging, genetic predisposition, or a combination of these factors.      Only about 5-10% of cancers are thought to be due to an inherited cancer susceptibility gene.    These families often have:  Several people with the same or related types of cancer  Cancers diagnosed at a young age (before age 50)  Individuals with more than one primary cancer  Multiple generations of the family affected with cancer    Comprehensive Breast and Gynecologic Cancer Panel  We each inherit two copies of every gene in our bodies: one from our mother, and one from our father. Each gene has a specific job to do.  When a gene has a mistake or  mutation  in it, it does not work like it should.     Some people may be candidates for genetic testing of more than one gene.  For these families, genetic testing using a cancer panel may be offered. These panels will test different genes at once known to increase the risk for breast, ovarian, uterine, and/or other cancers.    This handout will review common hereditary breast and gynecologic cancer syndromes. The genes that will be discussed in this handout are: HEIDI, BRCA1, BRCA2, BRIP1, CDH1, CHEK2, MLH1, MSH2, MSH6, PMS2, EPCAM, PTEN, PALB2, RAD51C, RAD51D, and TP53.    The purpose of this handout is to serve as a brief summary of the breast and gynecologic cancer risk genes that have published clinical management guidelines for individuals who are found to carry a mutation. Inheriting a mutation does not mean a person will develop cancer, but it does significantly increase their risk above the general population risk.     ______________________________________________________________________________    Hereditary Breast and Ovarian Cancer Syndrome (BRCA1 and BRCA2)  A single mutation in one of the copies of BRCA1 or  BRCA2 increases the risk for breast and ovarian cancer, among others.  The risk for pancreatic cancer and melanoma may also be slightly increased in some families.  The chart below shows the chance that someone with a BRCA mutation would develop cancer in his or her lifetime1,2,3,4.       Lifetime Cancer Risks    General Population BRCA1  BRCA2   Breast  12% >60% >60%   Ovarian  1-2% 39-58% 13-29%   Prostate 12% 7-26% 19-61%   Male Breast 0.1% 0.2-1.2% 1.8-7.1%   Pancreas 1-2% Up to 5% 5-10%     A person s ethnic background is also important to consider, as individuals of Ashkenazi Yazidism ancestry have a higher chance of having a BRCA gene mutation.  There are three BRCA mutations that occur more frequently in this population.      Viramontes Syndrome (MLH1, MSH2, MSH6, PMS2, and EPCAM)  Currently five genes are known to cause Viramontes Syndrome: MLH1, MSH2, MSH6, PMS2, and EPCAM.  A single mutation in one of the Viramontes Syndrome genes increases the risk for colon, endometrial, ovarian, and stomach cancers.  Other cancers that occur less commonly in Viramontes Syndrome include urinary tract, skin, and brain cancers.  The chart below shows the chance that a person with Viramontes syndrome would develop cancer in his or her lifetime5.      Lifetime Cancer Risks    General Population Viramontes Syndrome   Colon 5% 10-61%   Endometrial 3% 13-57%   Ovarian 1-2% 1-38%   Stomach <1% 1-9%   *Cancer risk varies depending on Viramontes syndrome gene found      Cowden Syndrome (PTEN)  Cowden syndrome is a hereditary condition that increases the risk for breast, thyroid, endometrial, colon, and kidney cancer.  Cowden syndrome is caused by a mutation in the PTEN gene.  A single mutation in one of the copies of PTEN causes Cowden syndrome and increases cancer risk.  The chart below shows the chance that someone with a PTEN mutation would develop cancer in their lifetime6,7.  Other benign features seen in some individuals with Cowden syndrome include benign  skin lesions (facial papules, keratoses, lipomas), learning disability, autism, thyroid nodules, colon polyps, and larger head size.     Lifetime Cancer Risks    General Population Cowden   Breast 12% 40-60%*   Thyroid 1% Up to 38%   Renal 1-2% Up to 35%   Endometrial 3% Up to 28%   Colon 5% Up to 9%   Melanoma 2-3% Up to 6%   *Emerging data suggests the risk for breast cancer could be greater than 60%               Li-Fraumeni Syndrome (TP53)  Li-Fraumeni Syndrome (LFS) is a cancer predisposition syndrome caused by a mutation in the TP53 gene. A single mutation in one of the copies of TP53 increases the risk for multiple cancers. Individuals with LFS are at an increased risk for developing cancer at a young age. The lifetime risk for development of a LFS-associated cancer is 50% by age 30 and 90% by age 60.   Core Cancers: Sarcomas, Breast, Brain, Lung, Leukemias/Lymphomas, Adrenocortical carcinomas  Other Cancers: Gastrointestinal, Thyroid, Skin, Genitourinary       Hereditary Diffuse Gastric Cancer (CDH1)  Currently, one gene is known to cause hereditary diffuse gastric cancer (HDGC): CDH1.  Individuals with HDGC are at increased risk for diffuse gastric cancer and lobular breast cancer. Of people diagnosed with HDGC, 30-50% have a mutation in the CDH1 gene.  This suggests there are likely other genes that may cause HDGC that have not been identified yet.      Lifetime Cancer Risks    General Population HDGC   Diffuse Gastric  <1% ~80%   Breast 12% 41-60%       Additional Genes    HEIDI  HEIDI is a moderate-risk breast cancer gene. Women who have a mutation in HEIDI can have between a 2-4 fold increased risk for breast cancer compared to the general population8. HEIDI mutations have also been associated with increased risk for pancreatic cancer between 5-10%9. Individuals who inherit two HEIDI mutations have a condition called ataxia-telangiectasia (AT).  This rare autosomal recessive condition affects the nervous system  and immune system, and is associated with progressive cerebellar ataxia beginning in childhood. Individuals with ataxia-telangiectasia often have a weakened immune system and have an increased risk for childhood cancers.    PALB2  Mutations in PALB2 have been shown to increase the risk of breast cancer up to 41-60% in some families; where individuals fall within this risk range is dependent upon family [id number]. PALB2 mutations have also been associated with increased risk for pancreatic cancer between 5-10%.  Individuals who inherit two PALB2 mutations--one from their mother and one from their father--have a condition called Fanconi Anemia.  This rare autosomal recessive condition is associated with short stature, developmental delay, bone marrow failure, and increased risk for childhood cancers.    CHEK2   CHEK2 is a moderate-risk breast cancer gene.  Women who have a mutation in CHEK2 have around a 2-4 fold increased risk for breast cancer compared to the general population, and this risk may be higher depending upon family history.11,12,13 The risk of colon cancer may be twice as high as the general population risk of colon cancer of 5%. Mutations in CHEK2 have also been shown to increase the risk of other cancers, including prostate, however these cancer risks are currently not well understood.    BRIP1, RAD51C and RAD51D  Mutations in RAD51C and RAD51D have been shown to increase the risk of ovarian cancer and breast cancer 14,. Mutations in BRIP1 have been shown to increase the risk of ovarian cancer and possibly female breast cancer 15 .       Lifetime Cancer Risk    General Population        BRIP1   RAD51C  RAD51D   Breast 12% Not well defined 20-40% 20-40%   Ovarian 1-2% 5-15% 10-15% 10-20%     ______________________________________________________________  Inheritance  All of the cancer syndromes reviewed above are inherited in an autosomal dominant pattern.  This means that if a parent has a mutation,  each of their children will have a 50% chance of inheriting that same mutation. Therefore, each child --male or female-- would have a 50% chance of being at increased risk for developing cancer.    Image obtained from Genetics Home Reference, 2013     Mutations in some genes can occur de yuliana, which means that a person s mutation occurred for the first time in them and was not inherited from a parent.  Now that they have the mutation, however, it can be passed on to future generations.    Genetic Testing  Genetic testing involves a blood test and will look for any harmful mutations that are associated with increased cancer risk.  If possible, it is recommended that the person(s) who has had cancer be tested before other family members.  That person will give us the most useful information about whether or not a specific gene is associated with the cancer in the family.    Results  There are three possible results of genetic testing:  Positive--a harmful mutation was identified in one or more of the genes  Negative--no mutations were identified in any of the genes tested  Variant of unknown significance--a variation in one of the genes was identified, but it is unclear how this impacts cancer risk in the family    Advantages and Disadvantages   There are advantages and disadvantages to genetic testing.    Advantages  May clarify your cancer risk  Can help you make medical decisions  May explain the cancers in your family  May give useful information to your family members (if you share your results)    Disadvantages  Possible negative emotional impact of learning about inherited cancer risk  Uncertainty in interpreting a negative test result in some situations  Possible genetic discrimination concerns (see below)    Genetic Information Nondiscrimination Act (MARIELA)  The Genetic Information Nondiscrimination Act of 2008 (MARIELA) is a federal law that protects individuals from health insurance or employment discrimination  based on a genetic test result alone (with some exceptions, including employers with fewer than 15 employees, and ).  Although rare, MARIELA  does not cover discrimination protections in terms of life insurance, long term care, or disability insurances.  Visit the National Human [employer] [place] website to learn more.    Reducing Cancer Risk  All of the genes described in this handout have nationally recognized cancer screening guidelines that would be recommended for individuals who test positive.  In addition to increased cancer screening, surgeries may be offered or recommended to reduce cancer risk.  Recommendations are based upon an individual s genetic test result as well as their personal and family history of cancer.    Questions to Think About Regarding Genetic Testing:  What effect will the test result have on me and my relationship with my family members if I have an inherited gene mutation?  If I don t have a gene mutation?  Should I share my test results, and how will my family react to this news, which may also affect them?  Are my children ready to learn new information that may one day affect their own health?    Hereditary Cancer Resources    FORCE: Facing Our Risk of Cancer Empowered facingourrisk.org   Bright Pink bebrightpink.org   Li-Fraumeni Syndrome Association lfsassociation.org   PTEN World PTENworld.com   No stomach for cancer, Inc. nostomachforcancer.org   Stomach cancer relief network Scrnet.org   Collaborative Group of the Americas on Inherited Colorectal Cancer (CGA) cgaicc.com    Cancer Care cancercare.org   American Cancer Society (ACS) cancer.org   National Cancer [place] (NCI) cancer.gov     Please call us if you have any questions or concerns.   Cancer Risk Management Program [phone]-CANCER ([phone])  Jayson Young, MS [facility]  [phone]  Kenna Mcgraw, MS, [facility] [phone]  Cecy Haines, MS, [facility]  [phone]  Kaya Infante, MS, [facility]  [phone]  Haylie Broderick,  MS, [facility]  [phone]  Roseanna Jones, MS, [facility] [phone]  Katherine Calvert, MS, [facility] [phone]    References  Alice Rodriguez PDP, Tim S, Piyush VAN, Branden JE, Alondra JL, Yvonne N, Tae H, Yolanda O, Laura A, Pasini B, Radilorenzo P, Manishan S, Reyna DM, Alva N, Georgia E, Thee H, Shipley E, Dominique J, Gronchavo J, Michele B, Tulinius H, Thorlacius S, Eerola H, Nevanlinna H, Homero K, Antoinette OP. Average risks of breast and ovarian cancer associated with BRCA1 or BRCA2 mutations detected in case series unselected for family history: a combined analysis of 222 studies. Am J Hum Nava. 2003;72:1117-30.  Santiago N, Vidya M, Rosalino G.  BRCA1 and BRCA2 Hereditary Breast and Ovarian Cancer. Gene Reviews online. 2013.  Prashant YC, Abimael S, Suzanne G, Burks S. Breast cancer risk among male BRCA1 and BRCA2 mutation carriers. J Natl Cancer Inst. 2007;99:1811-4.  Cyrus COELLO, Dianne I, Milo J, Wong E, Brandon ER, Andreas F. Risk of breast cancer in male BRCA2 carriers. J Med Nava. 2010;47:710-1.  National Comprehensive Cancer Network. Clinical practice guidelines in oncology, colorectal cancer screening. Available online (registration required). 2015.  Case MH, Rajinder J, Ranjit J, Humberto OLIVER, Mili MS, Eng C. Lifetime cancer risks in individuals with germline PTEN mutations. Clin Cancer Res. 2012;18:400-7.  Bon R. Cowden Syndrome: A Critical Review of the Clinical Literature. J Nava . 2009:18:13-27.  Nick REYES, Carlos MONTEIRO, Shira S, Mariza P, Pretty T, Dread M, Terrence B, Elias H, Rachel R, Layla K, Charly L, Cyrus COELLO, Reyna MONTEIRO, Garrett DF, Matias MR, The Breast Cancer Susceptibility Collaboration (UK) & Brandee RUELAS. HEIDI mutations that cause ataxia-telangiectasia are breast cancer susceptibility alleles. Nature Genetics. 2006;38:873-875  Luis N , Ludmila Y, Jazlyn J, Abbey L, Jyothi BOB , Chloé ML, Winnie S, Marcin AG, Natalya S, Erica ML, Katarina J , Emily R, Carolin CHING, Maria Guadalupe  JR, Rosio VE, Sanjay M, Vosudhastein B, Berto N, Graham RH, Franc KW, and Tenisha AP. HEIDI mutations in patients with hereditary pancreatic cancer. Cancer Discover. 2012;2:41-46  Cecilia GIRALDO., et al. Breast-Cancer Risk in Families with Mutations in PALB2. NEJM. 2014; 371(6):497-506.  CHEK2 Breast Cancer Case-Control Consortium. CHEK2*1100delC and susceptibility to breast cancer: A collaborative analysis involving 10,860 breast cancer cases and 9,065 controls from 10 studies. Am J Hum Nava, 74 (2004), pp. 4673-7477  Po T, Soni S, Ke K, et al. Spectrum of Mutations in BRCA1, BRCA2, CHEK2, and TP53 in Families at High Risk of Breast Cancer. MARISOL. 2006;295(12):0522-1837.   Marlena C, Ashley D, Arash REYES, et al. Risk of breast cancer in women with a CHEK2 mutation with and without a family history of breast cancer. J Clin Oncol. 2011;29:7308-1259.  Song H, Srinivasans E, Ramus SJ, et al. Contribution of germline mutations in the RAD51B, RAD51C, and RAD51D genes to ovarian cancer in the population. J Clin Oncol. 2015;33(26):4094-3496. Doi:10.1200/JCO.2015.61.2408.  Blue T, Dave DF, Ilia P, et al. Mutations in BRIP1 confer high risk of ovarian cancer. Alie Nava. 2011;43(11):4397-8398. doi:10.1038/ng.955.

## 2024-03-25 NOTE — PROGRESS NOTES
3/25/2024    Referring Provider: Kaycee Brewer PA-C    Presenting Information:   I met with Sumit for his video genetic counseling visit, through the Cancer Risk Management Program, to discuss his family history of breast cancer. Today we reviewed this history, cancer screening recommendations, and available genetic testing options.    Personal History:  Sumit is a 63 year old year old male. He was diagnosed with basal cell carcinoma (BCC) on his right ear at age 61 and left ear at age 62; treatment included resection. He reports having genetic testing in 2016 which identified a BRCA mutation. However, he did not remember any of the specifics of this test and did not have a copy of this report.    Sumit began having colonoscopies at the age of 50. His most recent colonoscopy in 2015 was normal and follow-up was recommended in 10 years. He does not regularly do any other cancer screening at this time.     Family History: (Please see scanned pedigree for detailed family history information)  Sumit's daughter was diagnosed with breast cancer at age 30. She is reported to have had genetic testing which identified either a BRCA1 or BRCA2 mutation.   Sumit's brother was diagnosed with lymphoma in his 40's.  Sumit's mother was diagnosed with breast cancer at age 52 and  at age 58.  Sumit's maternal grandfather was diagnosed with bladder cancer in his late 80's and  at age 94.  A maternal cousin was diagnosed with leukemia at an unknown age.  There is no reported paternal family history of cancer.  His maternal ethnicity is [place] and . His paternal ethnicity is [place] citizen. There is no known Ashkenazi Latter-day ancestry on either side of his family. There is no reported consanguinity.    Discussion:  Sumit's family history of breast cancer is suggestive of a hereditary cancer syndrome.  We reviewed the features of sporadic, familial, and hereditary cancers. We discussed that mutations  in either BRCA1 or BRCA2 could be possible hereditary explanations for his family history of cancer. Mutations in the BRCA1 or BRCA2 gene are known to cause Hereditary Breast and Ovarian Cancer Syndrome (HBOC). HBOC typically presents with multiple family members diagnosed with breast cancer before age 50 and/or ovarian cancer. Other cancer risks associated with HBOC include male breast cancer, prostate cancer, pancreatic cancer, and melanoma.   Of note, Sumit is reported to be positive for a mutation in one of these genes, however, given that we have no documentation or other information to confirm this, additional genetic testing needs to be completed to confirm this mutation for him.  We discussed the natural history and genetics of hereditary cancer. A detailed handout regarding hereditary cancer, along with the other information we discussed, will be mailed to Sumit at the end of our appointment today and can be found in the after visit summary. Topics included: inheritance pattern, cancer risks, cancer screening recommendations, and also risks, benefits and limitations of testing.  Based on his personal and family history, Sumit meets current National Comprehensive Cancer Network (NCCN) criteria for genetic testing of BRCA1/2 along with other high-penetrance breast cancer susceptibility genes (I.e. CDH1, PALB2, PTEN, and TP53).  We discussed that there are additional genes that could cause increased risk for breast cancer. As many of these genes present with overlapping features in a family and accurate cancer risk cannot always be established based upon the pedigree analysis alone, it would be reasonable for Sumit to consider panel genetic testing to analyze multiple genes at once.  Genetic testing is available for BRCA1/2 as part of the patient's core panel. This will then be automatically reflexed to [facility]'s CancerNext panel.  Genetic testing is available for 34 genes associated with hereditary  cancer: CancerNext (APC, HEIDI, AXIN2, BARD1, BMPR1A, BRCA1, BRCA2, BRIP1, CDH1, CDK4, CDKN2A, CHEK2, DICER1, EPCAM, GREM1, HOXB13, MLH1, MSH2, MSH3, MSH6, MUTYH, NF1, NTHL1, PALB2, PMS2, POLD1, POLE, PTEN, RAD51C, RAD51D SMAD4, SMARCA4, STK11, and TP53).  We discussed that many of the genes in the CancerNext panel are associated with specific hereditary cancer syndromes and published management guidelines: Hereditary Breast and Ovarian Cancer syndrome (BRCA1, BRCA2), Viramontes syndrome (MLH1, MSH2, MSH6, PMS2, EPCAM), Familial Adenomatous Polyposis (APC), Hereditary Diffuse Gastric Cancer (CDH1), Familial Atypical Multiple Mole Melanoma syndrome (CDK4, CDKN2A), Juvenile Polyposis syndrome (BMPR1A, SMAD4), Cowden syndrome (PTEN), Li Fraumeni syndrome (TP53), Peutz-Jeghers syndrome (STK11), MUTYH Associated Polyposis (MUTYH), and Neurofibromatosis type 1 (NF1).   The HEIDI, AXIN2, BRIP1, CHEK2, GREM1, MSH3, NTHL1, PALB2, POLD1, POLE, RAD51C, and RAD51D genes are associated with increased cancer risk and have published management guidelines for certain cancers.    The remaining genes (BARD1, DICER1, HOXB13, and SMARCA4) are associated with increased cancer risk and may allow us to make medical recommendations when mutations are identified.    Sumit would like to submit a blood sample for his genetic testing. He will go to his [facility] at his earliest convenience to get his blood drawn for his genetic testing.   Verbal consent was given over video and written on the consent form. Turnaround time is approximately 4 weeks once the lab receives the sample.  Should Sumit have any questions regarding the billing for his genetic testing, he should visit [employer]'s billing website at [email or web address] or call them at [phone].  Medical Management: For Sumit, we reviewed that the information from genetic testing may determine:  additional cancer screening for which  Sumit may qualify (i.e. mammogram and breast MRI, more frequent colonoscopies, more frequent dermatologic exams, etc.),  and targeted chemotherapies if he were to develop certain cancers in the future (i.e. immunotherapy for individuals with Viramontes syndrome, PARP inhibitors, etc.).   These recommendations and possible targeted chemotherapies will be discussed in detail once genetic testing is completed.     Plan:  1) Today Sumit elected to proceed with BRCA1/2 testing with automatic reflex to [employer]'s CancerNext panel.  2) A copy of the consent form and the after visit summary will be sent to Sumit.  3) This information should be available in approximately 4 weeks, once the lab receives the sample.  4) I will call Sumit with the results once they become available.    Time spent on video: 20 minutes    Jayson Young MS, [facility]  Licensed, Certified Genetic Counselor      Virtual Visit Details    Type of service:  Video Visit     Originating Location (pt. Location): Home  Distant Location (provider location):  Off-site  Platform used for Video Visit: Anette

## 2024-04-03 DIAGNOSIS — G47.9 SLEEP DIFFICULTIES: ICD-10-CM

## 2024-04-03 RX ORDER — TRAZODONE HYDROCHLORIDE 50 MG/1
TABLET, FILM COATED ORAL
Qty: 60 TABLET | Refills: 5 | Status: SHIPPED | OUTPATIENT
Start: 2024-04-03 | End: 2024-09-30

## 2024-04-03 NOTE — TELEPHONE ENCOUNTER
Prescription approved per [facility] Refill Protocol.  Senia Phipps, RN  [facility] Triage Nurse

## 2024-04-08 ENCOUNTER — LAB (OUTPATIENT)
Dept: LAB | Facility: CLINIC | Age: 64
End: 2024-04-08
Payer: COMMERCIAL

## 2024-04-08 DIAGNOSIS — I25.10 CORONARY ARTERY DISEASE INVOLVING NATIVE HEART WITHOUT ANGINA PECTORIS, UNSPECIFIED VESSEL OR LESION TYPE: ICD-10-CM

## 2024-04-08 DIAGNOSIS — R61 NIGHT SWEATS: ICD-10-CM

## 2024-04-08 DIAGNOSIS — Z80.3 FAMILY HISTORY OF MALIGNANT NEOPLASM OF BREAST: ICD-10-CM

## 2024-04-08 DIAGNOSIS — E78.5 HYPERLIPIDEMIA LDL GOAL <100: ICD-10-CM

## 2024-04-08 DIAGNOSIS — E11.9 TYPE 2 DIABETES MELLITUS WITHOUT COMPLICATION, WITHOUT LONG-TERM CURRENT USE OF INSULIN (H): ICD-10-CM

## 2024-04-08 DIAGNOSIS — Z12.5 SCREENING FOR PROSTATE CANCER: ICD-10-CM

## 2024-04-08 DIAGNOSIS — Z98.84 GASTRIC BYPASS STATUS FOR OBESITY: ICD-10-CM

## 2024-04-08 DIAGNOSIS — Z84.81 FAMILY HISTORY OF CARRIER OF GENETIC DISEASE: ICD-10-CM

## 2024-04-08 LAB
ALBUMIN SERPL BCG-MCNC: 4.5 G/DL (ref 3.5–5.2)
ALP SERPL-CCNC: 79 U/L (ref 40–150)
ALT SERPL W P-5'-P-CCNC: 37 U/L (ref 0–70)
ANION GAP SERPL CALCULATED.3IONS-SCNC: 14 MMOL/L (ref 7–15)
AST SERPL W P-5'-P-CCNC: 24 U/L (ref 0–45)
BILIRUB SERPL-MCNC: 1.2 MG/DL
BUN SERPL-MCNC: 11.5 MG/DL (ref 8–23)
CALCIUM SERPL-MCNC: 9.5 MG/DL (ref 8.8–10.2)
CHLORIDE SERPL-SCNC: 100 MMOL/L (ref 98–107)
CHOLEST SERPL-MCNC: 125 MG/DL
CREAT SERPL-MCNC: 0.69 MG/DL (ref 0.67–1.17)
DEPRECATED HCO3 PLAS-SCNC: 28 MMOL/L (ref 22–29)
EGFRCR SERPLBLD CKD-EPI 2021: >90 ML/MIN/1.73M2
ERYTHROCYTE [DISTWIDTH] IN BLOOD BY AUTOMATED COUNT: 13.7 % (ref 10–15)
FASTING STATUS PATIENT QL REPORTED: YES
FERRITIN SERPL-MCNC: 56 NG/ML (ref 31–409)
FOLATE SERPL-MCNC: >40 NG/ML (ref 4.6–34.8)
GLUCOSE SERPL-MCNC: 148 MG/DL (ref 70–99)
HBA1C MFR BLD: 7.5 % (ref 0–5.6)
HCT VFR BLD AUTO: 42.1 % (ref 40–53)
HDLC SERPL-MCNC: 34 MG/DL
HGB BLD-MCNC: 14.2 G/DL (ref 13.3–17.7)
IRON BINDING CAPACITY (ROCHE): 349 UG/DL (ref 240–430)
IRON SATN MFR SERPL: 27 % (ref 15–46)
IRON SERPL-MCNC: 93 UG/DL (ref 61–157)
LDH SERPL L TO P-CCNC: 171 U/L (ref 0–250)
LDLC SERPL CALC-MCNC: 69 MG/DL
MCH RBC QN AUTO: 30.1 PG (ref 26.5–33)
MCHC RBC AUTO-ENTMCNC: 33.7 G/DL (ref 31.5–36.5)
MCV RBC AUTO: 89 FL (ref 78–100)
NONHDLC SERPL-MCNC: 91 MG/DL
PLATELET # BLD AUTO: 183 10E3/UL (ref 150–450)
POTASSIUM SERPL-SCNC: 4.1 MMOL/L (ref 3.4–5.3)
PROT SERPL-MCNC: 7 G/DL (ref 6.4–8.3)
PSA SERPL DL<=0.01 NG/ML-MCNC: 0.41 NG/ML (ref 0–4.5)
PTH-INTACT SERPL-MCNC: 32 PG/ML (ref 15–65)
RBC # BLD AUTO: 4.71 10E6/UL (ref 4.4–5.9)
SODIUM SERPL-SCNC: 142 MMOL/L (ref 135–145)
T4 FREE SERPL-MCNC: 1.05 NG/DL (ref 0.9–1.7)
TRIGL SERPL-MCNC: 111 MG/DL
TSH SERPL DL<=0.005 MIU/L-ACNC: 5.48 UIU/ML (ref 0.3–4.2)
VIT B12 SERPL-MCNC: 385 PG/ML (ref 232–1245)
VIT D+METAB SERPL-MCNC: 47 NG/ML (ref 20–50)
WBC # BLD AUTO: 9.4 10E3/UL (ref 4–11)

## 2024-04-08 PROCEDURE — 85027 COMPLETE CBC AUTOMATED: CPT

## 2024-04-08 PROCEDURE — 82746 ASSAY OF FOLIC ACID SERUM: CPT

## 2024-04-08 PROCEDURE — 80061 LIPID PANEL: CPT

## 2024-04-08 PROCEDURE — 82306 VITAMIN D 25 HYDROXY: CPT

## 2024-04-08 PROCEDURE — 84425 ASSAY OF VITAMIN B-1: CPT | Mod: 90

## 2024-04-08 PROCEDURE — G0103 PSA SCREENING: HCPCS

## 2024-04-08 PROCEDURE — 83550 IRON BINDING TEST: CPT

## 2024-04-08 PROCEDURE — 83036 HEMOGLOBIN GLYCOSYLATED A1C: CPT

## 2024-04-08 PROCEDURE — 99000 SPECIMEN HANDLING OFFICE-LAB: CPT

## 2024-04-08 PROCEDURE — 84439 ASSAY OF FREE THYROXINE: CPT

## 2024-04-08 PROCEDURE — 82607 VITAMIN B-12: CPT

## 2024-04-08 PROCEDURE — 83540 ASSAY OF IRON: CPT

## 2024-04-08 PROCEDURE — 84443 ASSAY THYROID STIM HORMONE: CPT

## 2024-04-08 PROCEDURE — 36415 COLL VENOUS BLD VENIPUNCTURE: CPT

## 2024-04-08 PROCEDURE — 83615 LACTATE (LD) (LDH) ENZYME: CPT

## 2024-04-08 PROCEDURE — 82728 ASSAY OF FERRITIN: CPT

## 2024-04-08 PROCEDURE — 80053 COMPREHEN METABOLIC PANEL: CPT

## 2024-04-08 PROCEDURE — 83970 ASSAY OF PARATHORMONE: CPT

## 2024-04-08 PROCEDURE — 86481 TB AG RESPONSE T-CELL SUSP: CPT

## 2024-04-09 DIAGNOSIS — I10 HTN, GOAL BELOW 140/90: ICD-10-CM

## 2024-04-09 LAB
GAMMA INTERFERON BACKGROUND BLD IA-ACNC: 0.05 IU/ML
M TB IFN-G BLD-IMP: NEGATIVE
M TB IFN-G CD4+ BCKGRND COR BLD-ACNC: 9.95 IU/ML
MITOGEN IGNF BCKGRD COR BLD-ACNC: 0 IU/ML
MITOGEN IGNF BCKGRD COR BLD-ACNC: 0.01 IU/ML
QUANTIFERON MITOGEN: 10 IU/ML
QUANTIFERON NIL TUBE: 0.05 IU/ML
QUANTIFERON TB1 TUBE: 0.05 IU/ML
QUANTIFERON TB2 TUBE: 0.06

## 2024-04-09 RX ORDER — AMLODIPINE BESYLATE 2.5 MG/1
2.5 TABLET ORAL 2 TIMES DAILY
Qty: 180 TABLET | Refills: 0 | Status: SHIPPED | OUTPATIENT
Start: 2024-04-09 | End: 2024-07-01

## 2024-04-11 LAB — VIT B1 PYROPHOSHATE BLD-SCNC: 114 NMOL/L

## 2024-04-16 DIAGNOSIS — Z84.81 FAMILY HISTORY OF CARRIER OF GENETIC DISEASE: ICD-10-CM

## 2024-04-16 DIAGNOSIS — Z80.3 FAMILY HISTORY OF MALIGNANT NEOPLASM OF BREAST: Primary | ICD-10-CM

## 2024-04-16 LAB — SCANNED LAB RESULT: ABNORMAL

## 2024-04-19 ENCOUNTER — TRANSFERRED RECORDS (OUTPATIENT)
Dept: HEALTH INFORMATION MANAGEMENT | Facility: CLINIC | Age: 64
End: 2024-04-19

## 2024-04-19 ENCOUNTER — OFFICE VISIT (OUTPATIENT)
Dept: INTERNAL MEDICINE | Facility: CLINIC | Age: 64
End: 2024-04-19
Payer: COMMERCIAL

## 2024-04-19 VITALS
DIASTOLIC BLOOD PRESSURE: 82 MMHG | RESPIRATION RATE: 18 BRPM | HEIGHT: 66 IN | OXYGEN SATURATION: 97 % | HEART RATE: 80 BPM | SYSTOLIC BLOOD PRESSURE: 136 MMHG | TEMPERATURE: 96 F | WEIGHT: 220.9 LBS | BODY MASS INDEX: 35.5 KG/M2

## 2024-04-19 DIAGNOSIS — I25.10 CORONARY ARTERY DISEASE INVOLVING NATIVE HEART WITHOUT ANGINA PECTORIS, UNSPECIFIED VESSEL OR LESION TYPE: ICD-10-CM

## 2024-04-19 DIAGNOSIS — Z23 NEED FOR VACCINATION: ICD-10-CM

## 2024-04-19 DIAGNOSIS — E78.5 HYPERLIPIDEMIA LDL GOAL <100: ICD-10-CM

## 2024-04-19 DIAGNOSIS — Z98.84 GASTRIC BYPASS STATUS FOR OBESITY: ICD-10-CM

## 2024-04-19 DIAGNOSIS — Z00.00 ROUTINE GENERAL MEDICAL EXAMINATION AT A HEALTH CARE FACILITY: Primary | ICD-10-CM

## 2024-04-19 DIAGNOSIS — I10 HTN, GOAL BELOW 140/90: ICD-10-CM

## 2024-04-19 DIAGNOSIS — E11.9 TYPE 2 DIABETES MELLITUS WITHOUT COMPLICATION, WITHOUT LONG-TERM CURRENT USE OF INSULIN (H): ICD-10-CM

## 2024-04-19 PROBLEM — Z01.818 PREOP GENERAL PHYSICAL EXAM: Status: ACTIVE | Noted: 2024-04-19

## 2024-04-19 PROCEDURE — 90471 IMMUNIZATION ADMIN: CPT | Performed by: INTERNAL MEDICINE

## 2024-04-19 PROCEDURE — 90714 TD VACC NO PRESV 7 YRS+ IM: CPT | Performed by: INTERNAL MEDICINE

## 2024-04-19 PROCEDURE — 99396 PREV VISIT EST AGE 40-64: CPT | Mod: 25 | Performed by: INTERNAL MEDICINE

## 2024-04-19 PROCEDURE — 99214 OFFICE O/P EST MOD 30 MIN: CPT | Mod: 25 | Performed by: INTERNAL MEDICINE

## 2024-04-19 RX ORDER — ATORVASTATIN CALCIUM 40 MG/1
40 TABLET, FILM COATED ORAL DAILY
Qty: 90 TABLET | Refills: 1 | Status: SHIPPED | OUTPATIENT
Start: 2024-04-19 | End: 2024-08-22

## 2024-04-19 RX ORDER — FERROUS SULFATE 325(65) MG
325 TABLET ORAL DAILY
Qty: 90 TABLET | Refills: 0 | Status: SHIPPED | OUTPATIENT
Start: 2024-04-19 | End: 2024-07-15

## 2024-04-19 RX ORDER — LISINOPRIL 20 MG/1
20 TABLET ORAL 2 TIMES DAILY
Qty: 180 TABLET | Refills: 1 | Status: SHIPPED | OUTPATIENT
Start: 2024-04-19 | End: 2024-08-22

## 2024-04-19 RX ORDER — METOPROLOL SUCCINATE 25 MG/1
TABLET, EXTENDED RELEASE ORAL
Qty: 45 TABLET | Refills: 1 | Status: SHIPPED | OUTPATIENT
Start: 2024-04-19 | End: 2024-08-22

## 2024-04-19 SDOH — HEALTH STABILITY: PHYSICAL HEALTH: ON AVERAGE, HOW MANY DAYS PER WEEK DO YOU ENGAGE IN MODERATE TO STRENUOUS EXERCISE (LIKE A BRISK WALK)?: 1 DAY

## 2024-04-19 SDOH — HEALTH STABILITY: PHYSICAL HEALTH: ON AVERAGE, HOW MANY MINUTES DO YOU ENGAGE IN EXERCISE AT THIS LEVEL?: 30 MIN

## 2024-04-19 ASSESSMENT — SOCIAL DETERMINANTS OF HEALTH (SDOH): HOW OFTEN DO YOU GET TOGETHER WITH FRIENDS OR RELATIVES?: NEVER

## 2024-04-19 ASSESSMENT — PAIN SCALES - GENERAL: PAINLEVEL: NO PAIN (0)

## 2024-04-19 NOTE — PATIENT INSTRUCTIONS
Plan:  Continue Metformin same dose  2. Add Januvia 50 mg daily for the blood sugars  3. Please make a lab appointment for non fasting labs  in August 4. Please make an appointment few days after the labs to discuss about the results.   5. The following vaccines are recommended for you. Please check with your insurance about coverage.  Some insurances cover better if you have these vaccines at the pharmacy:  --Flu, RSV, Covid  -- Pneumonia 20  -- Tetanus vaccine   -- Shingerix vaccine - the newest vaccine for shingles

## 2024-04-19 NOTE — PROGRESS NOTES
Dr Jose's note    Patient's instructions / PLAN:                                                        Plan:  Continue Metformin same dose  2. Add Januvia 50 mg daily for the blood sugars  3. Please make a lab appointment for non fasting labs  in August 4. Please make an appointment few days after the labs to discuss about the results.   5. The following vaccines are recommended for you. Please check with your insurance about coverage.  Some insurances cover better if you have these vaccines at the pharmacy:  --Flu, RSV, Covid  -- Pneumonia 20  -- Tetanus vaccine   -- Shingerix vaccine - the newest vaccine for shingles           ASSESSMENT & PLAN:                                                      (Z00.00) Routine general medical examination at a health care facility  (primary encounter diagnosis)  Comment:   Plan:     (E11.9) Type 2 diabetes mellitus without complication, without long-term current use of insulin (H)  Comment: A1c is climbing  We discussed about the new meds, advantages and potential side effects. The patient will read also the info from the pharmacy and call back if questions.   Plan: sitagliptin (JANUVIA) 50 MG tablet, TSH with         free T4 reflex, Hemoglobin A1c            (Z98.84) Gastric bypass status for obesity - 2015  Comment:   Plan: ferrous sulfate (FEROSUL) 325 (65 Fe) MG tablet            (E78.5) Hyperlipidemia LDL goal <100  Comment: Controlled  Plan: atorvastatin (LIPITOR) 40 MG tablet            (I10) HTN, goal below 140/90  Comment: Controlled  Plan: lisinopril (ZESTRIL) 20 MG tablet            (I25.10) CAD, 2 stents 1999, 2018  Comment: Stable  Plan: metoprolol succinate ER (TOPROL XL) 25 MG 24 hr        tablet            (E11.9) DM 2 no insulin (H)  Comment:   Plan: metFORMIN (GLUCOPHAGE) 500 MG tablet            (Z23) Need for vaccination  Comment: Plan:            Chief Complaint:                                                      Annual exam  Follow up chronic  medical problems      SUBJECTIVE:                                                    History of present illness     We reviewed the chronic medical problems as above.   I reviewed the recent tests results in Epic       L MCP joint jarvis/swelling  -- eval [facility]  -- prednisone x 5 d in Feb    ROS:     See below    General: Negative for fever, chills, major weight changes, fatigue  Skin: Negative for rashes, abnormal spots  Eyes: Negative for blurred or double vision  ENT/mouth: Negative for sinuses discomfort, earache, sore throat  Respiratory: Negative for cough, wheezes, chronic lung disease  Cardiovascular: Negative for rest or exertional chest pain, shortness of breath, palpitations, leg edema,   Gastrointestinal: Negative for vomiting, abdominal pain, heartburn, blood in stool, diarrhea, constipation  Genitourinary: Negative for urinary frequency, blood in urine, history of kidney stones  Male: Negative for difficulty urinating  Neuro: Negative for headaches, numbness, tingling, weakness in arms or legs, history of seizure, recent syncope  Psychiatry: Negative for depression, anxiety, suicidal thoughts  Endo: Negative for known thyroid disease, positive for diabetes.  Hemato/Lymph: Negative for nodes, easy bleeding, history of DVT, blood transfusion  Musculoskeletal: Negative for joint swelling, back pain      PMHx: - reviewed  Past Medical History:   Diagnosis Date    Basal cell carcinoma     CAD, 2 stents 1999, 2018 05/10/2021    Cardiac pacemaker in situ 05/10/2021    Celiac disease 05/10/2021    Complete heart block (H) -- s/p PPM 2018 05/10/2021    DM 2, no insulin (H) 05/10/2021    Gastric bypass status for obesity - 2015 05/10/2021    HTN, goal below 140/80 05/10/2021    Hyperlipidemia LDL goal <100 05/10/2021    ZULEIKA (obstructive sleep apnea) 05/10/2021         PSHx: reviewed  Past Surgical History:   Procedure Laterality Date    ANGIOPLASTY      COLONOSCOPY N/A 1/13/2015    Procedure:  COLONOSCOPY;  Surgeon: Juni Craig MD;  Location: [facility] OR;  Service:     DISTAL BICEPS TENDON REPAIR Left     ESOPHAGOSCOPY, GASTROSCOPY, DUODENOSCOPY (EGD), COMBINED N/A 1/13/2015    Procedure: UPPER ENDOSCOPY;  Surgeon: Juni Craig MD;  Location: [facility] Main OR;  Service:     HERNIORRHAPHY INCISIONAL (LOCATION)      incarcerated    LAPAROSCOPIC GASTRIC BYPASS N/A 2015    LEG SURGERY Right     muscle surgery    OTHER SURGICAL HISTORY      forearm / wrist surgery    REPAIR TENDON BICEPS DISTAL UPPER EXTREMITY      UMBILICAL HERNIA REPAIR N/A     WRIST SURGERY Right         Soc Hx: No daily alcohol, no smoking  Social History     Socioeconomic History    Marital status:      Spouse name: Not on file    Number of children: Not on file    Years of education: Not on file    Highest education level: Not on file   Occupational History    Not on file   Tobacco Use    Smoking status: Never     Passive exposure: Never    Smokeless tobacco: Never   Vaping Use    Vaping status: Never Used   Substance and Sexual Activity    Alcohol use: Never    Drug use: Never    Sexual activity: Yes     Partners: Female   Other Topics Concern    Not on file   Social History Narrative    Not on file     Social Determinants of Health     Financial Resource Strain: Low Risk  (4/19/2024)    Financial Resource Strain     Within the past 12 months, have you or your family members you live with been unable to get utilities (heat, electricity) when it was really needed?: No   Food Insecurity: Low Risk  (4/19/2024)    Food Insecurity     Within the past 12 months, did you worry that your food would run out before you got money to buy more?: No     Within the past 12 months, did the food you bought just not last and you didn t have money to get more?: No   Transportation Needs: Low Risk  (4/19/2024)    Transportation Needs     Within the past 12 months, has lack of transportation kept you from medical appointments,  getting your medicines, non-medical meetings or appointments, work, or from getting things that you need?: No   Physical Activity: Insufficiently Active (4/19/2024)    Exercise Vital Sign     Days of Exercise per Week: 1 day     Minutes of Exercise per Session: 30 min   Stress: No Stress Concern Present (4/19/2024)    [employer] - Occupational Stress Questionnaire     Feeling of Stress : Only a little   Social Connections: Unknown (4/19/2024)    Social Connection and Isolation Panel [NHANES]     Frequency of Communication with Friends and Family: Not on file     Frequency of Social Gatherings with Friends and Family: Never     Attends [employer] Services: Not on file     Active Member of Clubs or Organizations: Not on file     Attends Club or Organization Meetings: Not on file     Marital Status: Not on file   Interpersonal Safety: Low Risk  (4/19/2024)    Interpersonal Safety     Do you feel physically and emotionally safe where you currently live?: Yes     Within the past 12 months, have you been hit, slapped, kicked or otherwise physically hurt by someone?: No     Within the past 12 months, have you been humiliated or emotionally abused in other ways by your partner or ex-partner?: No   Housing Stability: Low Risk  (4/19/2024)    Housing Stability     Do you have housing? : Yes     Are you worried about losing your housing?: No        Fam Hx: reviewed  Family History   Problem Relation Age of Onset    Breast Cancer Mother 58    Heart Disease Father 71    Lymphoma Brother          Screening: reviewed    All: reviewed    Meds: reviewed  Current Outpatient Medications   Medication Sig Dispense Refill    alcohol swab prep pads Use to swab area of injection/joe as directed. 100 each 1    amLODIPine (NORVASC) 2.5 MG tablet Take 1 tablet (2.5 mg) by mouth 2 times daily. 180 tablet 0    ASPIRIN LOW DOSE 81 MG EC tablet TAKE ONE TABLET BY MOUTH EVERY DAY FOR HEART DISEASE  **DO NOT CHEW**  FOR HEART DISEASE  **DO NOT CHEW** 100 tablet 3    atorvastatin (LIPITOR) 40 MG tablet Take 1 tablet (40 mg) by mouth daily 90 tablet 1    blood glucose (NO BRAND SPECIFIED) test strip Use to test blood sugar 1 times daily or as directed. 100 strip 1    blood glucose calibration (NO BRAND SPECIFIED) solution Use to calibrate blood glucose monitor as needed as directed. 1 each 1    blood glucose monitoring (NO BRAND SPECIFIED) meter device kit Use to test blood sugar 1 times daily or as directed. 1 kit 0    ferrous sulfate (FEROSUL) 325 (65 Fe) MG tablet Take 1 tablet (325 mg) by mouth daily. 90 tablet 0    fluorouracil (EFUDEX) 5 % external cream Apply to scalp BID x 14 days 40 g 3    folic acid (FOLVITE) 1 MG tablet Take 1 tablet (1,000 mcg) by mouth daily. 90 tablet 1    lisinopril (ZESTRIL) 20 MG tablet Take 1 tablet (20 mg) by mouth 2 times daily 180 tablet 1    melatonin 5 MG tablet Take 1 tablet (5 mg) by mouth nightly as needed for sleep      metFORMIN (GLUCOPHAGE) 500 MG tablet Take 2 tablets (1,000 mg) by mouth 2 times daily (with meals.) 360 tablet 0    metoprolol succinate ER (TOPROL XL) 25 MG 24 hr tablet Take 0.5 tablet (12.5 mg) by mouth daily 45 tablet 1    metroNIDAZOLE (METROGEL) 0.75 % external gel Apply to face BID 45 g 11    nitroGLYcerin (NITROSTAT) 0.4 MG sublingual tablet For chest pain place 1 tablet under the tongue every 5 minutes for 3 doses. If symptoms persist 5 minutes after 1st dose call 911. 10 tablet 3    thin (NO BRAND SPECIFIED) lancets Use with lanceting device. 100 each 1    traZODone (DESYREL) 50 MG tablet Take 1 - 2 tablets ( mg) by mouth nightly as needed for sleep. 60 tablet 5    Vitamin D3 (CHOLECALCIFEROL) 25 mcg (1000 units) tablet Take by mouth daily      zinc gluconate 50 MG tablet Take 50 mg by mouth daily             OBJECTIVE:                                                    Physical Exam :    Blood pressure 136/82, pulse 80, temperature (!) 96  F (35.6  C),  "temperature source Tympanic, resp. rate 18, height 1.683 m (5' 6.25\"), weight 100.2 kg (220 lb 14.4 oz), SpO2 97%.     NAD, appears comfortable  Skin clear, no rashes  Neck: supple, no JVD,  no thyroidmegaly  Lymph nodes non palpable in the cervical, supraclavicular axillaries,   Chest: clear to auscultation with good respiratory effort  Cardiac: S1S2, RRR, no mgr appreciated  Abdomen: soft, not tender, not distended, audible bowel sound, no hepatosplenomegaly, no palpable masses, no abdominal bruits  Extremities: nNo cyanosis, clubbing No edema. Good pedal pulses. No skin lesions. Monofilament skin sensation is intact    Neuro: A, Ox3, no focal signs.     Patient has been advised of split billing requirements and indicates understanding: Yes.  At the check in, at the      Candis Jose MD  Internal Medicine   #########################    Preventive Care Visit  [facility]  Candis Altamirano MD, Internal Medicine  Apr 19, 2024          Shantal Arana is a 63 year old, presenting for the following:  Physical        4/19/2024     9:52 AM   Additional Questions   Roomed by Noemi Mackey        Via the Health Maintenance questionnaire, the patient has reported the following services have been completed -Eye Exam, this information has been sent to the abstraction team.  Health Care Directive  Patient does not have a Health Care Directive or Living Will: Discussed advance care planning with patient; however, patient declined at this time.    HPI      Diabetes Follow-up    How often are you checking your blood sugar? Not at all  What concerns do you have today about your diabetes? None, Blood sugar is often over 200, and Other:    Do you have any of these symptoms? (Select all that apply)  No numbness or tingling in feet.  No redness, sores or blisters on feet.  No complaints of excessive thirst.  No reports of blurry vision.  No significant changes to " weight.          Hyperlipidemia Follow-Up    Are you regularly taking any medication or supplement to lower your cholesterol?   Yes- atorvastatin  Are you having muscle aches or other side effects that you think could be caused by your cholesterol lowering medication?  No    Hypertension Follow-up    Do you check your blood pressure regularly outside of the clinic? Yes   Are you following a low salt diet? No  Are your blood pressures ever more than 140 on the top number (systolic) OR more   than 90 on the bottom number (diastolic), for example 140/90? Yes    BP Readings from Last 2 Encounters:   10/19/23 130/72   08/30/23 136/84     Hemoglobin A1C (%)   Date Value   04/08/2024 7.5 (H)   10/12/2023 6.6 (H)   05/10/2021 7.8 (H)     LDL Cholesterol Calculated (mg/dL)   Date Value   04/08/2024 69   07/13/2023 51   05/10/2021 92           4/19/2024   General Health   How would you rate your overall physical health? Good   Feel stress (tense, anxious, or unable to sleep) Only a little   (!) STRESS CONCERN      4/19/2024   Nutrition   Three or more servings of calcium each day? (!) I DON'T KNOW   Diet: Gluten-free/reduced   How many servings of fruit and vegetables per day? (!) 0-1   How many sweetened beverages each day? 0-1         4/19/2024   Exercise   Days per week of moderate/strenous exercise 1 day   Average minutes spent exercising at this level 30 min   (!) EXERCISE CONCERN      4/19/2024   Social Factors   Frequency of gathering with friends or relatives Never   Worry food won't last until get money to buy more No   Food not last or not have enough money for food? No   Do you have housing?  Yes   Are you worried about losing your housing? No   Lack of transportation? No   Unable to get utilities (heat,electricity)? No   (!) SOCIAL CONNECTIONS CONCERN      4/19/2024   Fall Risk   Fallen 2 or more times in the past year? No   Trouble with walking or balance? No          4/19/2024   Dental   Dentist two times every  "year? Yes            Today's PHQ-2 Score:       4/19/2024     9:28 AM   PHQ-2 ( 1999 Pf[employer])   Q1: Little interest or pleasure in doing things 0   Q2: Feeling down, depressed or hopeless 0   PHQ-2 Score 0   Q1: Little interest or pleasure in doing things Not at all   Q2: Feeling down, depressed or hopeless Not at all   PHQ-2 Score 0           4/19/2024   Substance Use   Alcohol more than 3/day or more than 7/wk Not Applicable   Do you use any other substances recreationally? No     Social History     Tobacco Use    Smoking status: Never     Passive exposure: Never    Smokeless tobacco: Never   Vaping Use    Vaping status: Never Used   Substance Use Topics    Alcohol use: Never    Drug use: Never           4/19/2024   STI Screening   New sexual partner(s) since last STI/HIV test? No   Last PSA:   PSA   Date Value Ref Range Status   05/10/2021 0.56 0 - 4 ug/L Final     Comment:     Assay Method:  Chemiluminescence using Siemens Vista analyzer     Prostate Specific Antigen Screen   Date Value Ref Range Status   04/08/2024 0.41 0.00 - 4.50 ng/mL Final     ASCVD Risk   The ASCVD Risk score (Zacarias ALEXANDRE, et al., 2019) failed to calculate for the following reasons:    The valid total cholesterol range is 130 to 320 mg/dL           Reviewed and updated as needed this visit by Provider                             Objective    Exam  Resp 18   Ht 1.683 m (5' 6.25\")   BMI 34.71 kg/m     Estimated body mass index is 34.71 kg/m  as calculated from the following:    Height as of this encounter: 1.683 m (5' 6.25\").    Weight as of 10/19/23: 98.3 kg (216 lb 11.2 oz).    Physical Exam          Signed Electronically by: Candis Altamirano MD    "

## 2024-04-25 ENCOUNTER — VIRTUAL VISIT (OUTPATIENT)
Dept: ONCOLOGY | Facility: CLINIC | Age: 64
End: 2024-04-25
Attending: GENETIC COUNSELOR, MS
Payer: COMMERCIAL

## 2024-04-25 DIAGNOSIS — Z15.89 MONOALLELIC MUTATION OF CHEK2 GENE IN MALE PATIENT: Primary | ICD-10-CM

## 2024-04-25 DIAGNOSIS — Z15.09 MONOALLELIC MUTATION OF CHEK2 GENE IN MALE PATIENT: Primary | ICD-10-CM

## 2024-04-25 DIAGNOSIS — Z15.01 MONOALLELIC MUTATION OF CHEK2 GENE IN MALE PATIENT: Primary | ICD-10-CM

## 2024-04-25 DIAGNOSIS — Z80.3 FAMILY HISTORY OF MALIGNANT NEOPLASM OF BREAST: ICD-10-CM

## 2024-04-25 DIAGNOSIS — Z15.03 MONOALLELIC MUTATION OF CHEK2 GENE IN MALE PATIENT: Primary | ICD-10-CM

## 2024-04-25 DIAGNOSIS — Z84.81 FAMILY HISTORY OF CARRIER OF GENETIC DISEASE: ICD-10-CM

## 2024-04-25 PROCEDURE — 999N000069 HC STATISTIC GENETIC COUNSELING, < 16 MIN: Mod: GT,95 | Performed by: GENETIC COUNSELOR, MS

## 2024-04-25 NOTE — PATIENT INSTRUCTIONS
4/25/2024    Dear Relative    The purpose of this letter is to inform you that your relative recently underwent genetic counseling and genetic testing due to a family history of cancer.    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 The accession # is [id number].    A mutation (or harmful change in the genetic code) may cause a specific gene to stop working properly. Ultimately, individuals who have a mutation in the CHEK2 gene are at increased risk for several types of cancer.    Individuals who carry this CHEK2 gene mutation are at increased risk for developing breast, colorectal, and possibly other cancers. These risks are likely influenced by family history  The lifetime breast cancer risk for women who carry one CHEK2 mutation is approximately 24-40%, compared to the lifetime population risk of breast cancer of 12.8%.    The lifetime risk of colon cancer is estimated to be 5-10%, which is approximately twice as high as the general population risk  There may also be increased risk for prostate, melanoma, thyroid, and other cancers, however data is still limited in this area.   Both men and women can have mutations in the CHEK2 gene and have a 50% chance of passing this mutation on to each of their children.     If individuals are found to have a mutation in the CHEK2 gene, increased cancer screening at younger ages may be recommended. All cancer risk management options should be discussed in more detail with an individual s medical providers.     I understand that this may be surprising, unexpected, and even scary news. Because this mutation has been identified in your family, you may be at risk for having the same mutation. Individuals who carry this variant in the CHEK2 gene have a 50% chance of passing the mutation to each of their children.      Scheduling a genetic counseling appointment does not mean you have to  undergo genetic testing. The decision to pursue such testing is a very personal one that is discussed in more detail during the session. The role of genetic counseling is providing valuable information to individuals who are impacted by genetic information such as this.      If you are interested in scheduling a genetic counseling appointment at [facility], please call [phone] to schedule an appointment. You can also find a genetic counselor close to you or at another health system at [facility]    Sincerely,   Jayson Young MS, [facility] [place]  Licensed, Certified Genetic Counselor   negative

## 2024-04-25 NOTE — NURSING NOTE
Is the patient currently in the state of [place]? YES    Visit mode:VIDEO    If the visit is dropped, the patient can be reconnected by: VIDEO VISIT: Text to cell phone:   Telephone Information:   Mobile [phone]       Will anyone else be joining the visit? NO  (If patient encounters technical issues they should call [phone]    How would you like to obtain your AVS? MyChart    Are changes needed to the allergy or medication list? N/A    Reason for visit:  Herber TRACY

## 2024-04-25 NOTE — LETTER
"April 25, 2024    Sumit Park  [address]  [facility] [address]      Dear Sumit,    It was a pleasure speaking with you over video on 4/25/2024. Here is a copy of the progress note from our discussion. If you have any additional questions, please feel free to call.    Referring Provider: Kaycee Brewer PA-C    Presenting Information:   I spoke to Sumit by video today to discuss his genetic testing results. His blood was drawn on 4/8/24. The CancerNext panel was ordered from [facility]. This testing was done because of Sumit's family history of breast cancer and reported BRCA mutation in the family.     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 We discussed that this mutation is associated with an increased risk for breast and colon cancer. We discussed the impact of this testing on Sumit in detail.     Of note, Sumit is negative for mutations in the APC, HEIDI, AXIN2, BARD1, BMPR1A, BRCA1, BRCA2, BRIP1, CDH1, CDK4, CDKN2A, CHEK2, DICER1, EPCAM, GREM1, HOXB13, MLH1, MSH2, MSH3, MSH6, MUTYH, NF1, NTHL1, PALB2, PMS2, POLD1, POLE, PTEN, RAD51C, RAD51D SMAD4, SMARCA4, STK11, and TP53. No mutations were found in any of the 34 genes analyzed. This test involved sequencing and deletion/duplication analysis of all genes with the exceptions of EPCAM and GREM1 (deletions/duplications only) and HOXB13, POLD1 and POLE (sequencing only). We reviewed the autosomal dominant inheritance of these genes. Sumit cannot pass on a mutation in any of these genes to his children based on this test result. Mutations in these genes do not skip generations.      A copy of the test report can be found in the Laboratory tab, dated 4/8/24, and named \"LABORATORY MISCELLANEOUS ORDER\". The report is scanned in as a linked document.    Cancer Risks:    Pathogenic/likely pathogenic variants in the CHEK2 gene are associated with a " moderate risk of breast and colon cancer. Of note, studies investigating the association between specific CHEK2 variants and cancer risk have been primarily based on the variant 1100delC, which is commonly found in individuals of  ancestry.    The lifetime breast cancer risk for women who carry one CHEK2 mutation is approximately 20-40%, compared to the lifetime population risk of breast cancer of 12.8%.  These risks are likely influenced by family history, in which women with a family history of breast cancer are at higher risk than those without.   The lifetime risk of colon cancer is estimated to be 5-10%, which is approximately twice as high as the general population risk.   We also discussed the possible association of CHEK2 mutations with increased risk for prostate, melanoma, thyroid, and other cancers, however data is still limited in this area. No confirmed risk numbers are available for these additional cancers, though they have been reported in families that have a CHEK2 mutation.    Of note, we reviewed that the CHEK2 gene is currently considered a moderate-risk gene. This means that mutations in this gene increase the risk for certain cancers, but are unlikely to be the single cause for an individual's cancer. There are likely other genetic and/or environmental risk factors that, in combination with a CHEK2 gene mutation, cause cancer.    Cancer Screening and Prevention:  The following screening is recommended for individuals who have a mutation in the CHEK2 gene, per current National Comprehensive Cancer Network (NCCN) guidelines.  Women with CHEK2 mutations qualify for high risk breast screening.  High risk breast cancer screening recommendations include annual mammograms and annual breast MRI's, alternating every 6 months.    It is typically recommended that mammogram screening begin at age 40, though this can be discussed in more detail with a woman's medical providers. Breast MRI with  contract may be considered starting at age 30-35.    Earlier and more frequent colon cancer screening is recommended for individuals who carry certain CHEK2 variants (such as 1100delC and I157T). This screening should include high quality colonoscopy every 5 years, beginning at age 40 (or based on family history of colorectal cancer).   There are currently no other specific cancer screening guidelines for other cancers potentially associated with a CHEK2 mutation. As such, additional screening should be based on family history.     Other screening based on Sumit's personal and family history:  Other population cancer screening options, such as those recommended by the American Cancer Society and the National Comprehensive Cancer Network (NCCN), are also appropriate for Sumit and his family. These screening recommendations may change if there are changes to Sumit's personal and/or family history of cancer. Final screening recommendations should be made by each individual's primary care provider.    Of note, the above information is based on our current understanding of Sumit's genetic findings. Sumit is encouraged to reach out to me regularly regarding any pertinent updates to his personal and/or family history of cancer, as our understanding of the genetic findings in his family may change over time.     Implications for Family Members:  We reviewed that mutations in the CHEK2 gene are inherited in an autosomal dominant pattern. This means that each of Sumit's children have a 50% chance of inheriting the same mutation. Likewise, each of his siblings has a 50% risk of having the same mutation. Extended relatives may also carry this mutation, and he is encouraged to share this information with his family members on both sides of the family. I am happy to help his relatives connect with a genetic counselor in their area if they would like to discuss testing.        At our initial appointment, it was thought  "that both uSmit and his daughter had a mutation in either BRCA1 or BRCA2. Given Miguelina's test results, this confirms that he does NOT have a mutation in either of these genes. I encouraged Sumit to talk to his daughter to try to get a copy of her genetic test report to send to me to review. If she does have a BRCA1 or BRCA2 mutation, or a different gene mutation, I will send that information to Esther to determine if he has that same mutation. If his daughter does not have a BRCA1 or BRCA2, and instead has this same CHEK2 mutation, then no further testing would be needed for Sumit at this time.     Additional Testing Considerations:  Even though Sumit's genetic testing result was positive, other relatives may carry the same CHEK2 mutation or a different gene mutation associated with breast cancer. Genetic counseling is recommended for his brothers and children to discuss genetic testing options. If any of these relatives do pursue genetic testing, Sumit is encouraged to contact me so that we may review the impact of their test results on him.    Plan:  1.  A copy of the test results will be sent to Sumit.  2.  I will provide a \"Dear Relative\" letter for Sumit to share with his family members.  3.  He plans to follow up with his other providers.    If Sumit has additional questions, I encouraged him to contact me directly via [employer].     Time spent on video: 5 minutes    Jayson Young MS, [facility] [place]  Licensed, Certified Genetic Counselor  "

## 2024-04-25 NOTE — Clinical Note
"    4/25/2024         RE: Sumit Park  [address]  [facility] [address]        Dear Colleague,    Thank you for referring your patient, Sumit Park, to the [facility]. Please see a copy of my visit note below.    4/25/2024    Referring Provider: Kaycee Brewer PA-C    Presenting Information:   I spoke to Sumit by video today to discuss his genetic testing results. His blood was drawn on 4/8/24. The CancerNext panel was ordered from [facility]. This testing was done because of Sumit's family history of breast cancer and reported BRCA mutation in the family.     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 We discussed that this mutation is associated with an increased risk for breast and colon cancer. We discussed the impact of this testing on Sumit in detail.     Of note, Sumit is negative for mutations in the APC, HEIDI, AXIN2, BARD1, BMPR1A, BRCA1, BRCA2, BRIP1, CDH1, CDK4, CDKN2A, CHEK2, DICER1, EPCAM, GREM1, HOXB13, MLH1, MSH2, MSH3, MSH6, MUTYH, NF1, NTHL1, PALB2, PMS2, POLD1, POLE, PTEN, RAD51C, RAD51D SMAD4, SMARCA4, STK11, and TP53. No mutations were found in any of the 34 genes analyzed. This test involved sequencing and deletion/duplication analysis of all genes with the exceptions of EPCAM and GREM1 (deletions/duplications only) and HOXB13, POLD1 and POLE (sequencing only). We reviewed the autosomal dominant inheritance of these genes. Sumit cannot pass on a mutation in any of these genes to his children based on this test result. Mutations in these genes do not skip generations.      A copy of the test report can be found in the Laboratory tab, dated 4/8/24, and named \"LABORATORY MISCELLANEOUS ORDER\". The report is scanned in as a linked document.    Cancer Risks:    Pathogenic/likely pathogenic variants in the CHEK2 gene are associated with a moderate " risk of breast and colon cancer. Of note, studies investigating the association between specific CHEK2 variants and cancer risk have been primarily based on the variant 1100delC, which is commonly found in individuals of  ancestry.  The lifetime breast cancer risk for women who carry one CHEK2 mutation is approximately 20-40%, compared to the lifetime population risk of breast cancer of 12.8%.  These risks are likely influenced by family history, in which women with a family history of breast cancer are at higher risk than those without.   The lifetime risk of colon cancer is estimated to be 5-10%, which is approximately twice as high as the general population risk.   We also discussed the possible association of CHEK2 mutations with increased risk for prostate, melanoma, thyroid, and other cancers, however data is still limited in this area. No confirmed risk numbers are available for these additional cancers, though they have been reported in families that have a CHEK2 mutation.    Of note, we reviewed that the CHEK2 gene is currently considered a moderate-risk gene. This means that mutations in this gene increase the risk for certain cancers, but are unlikely to be the single cause for an individual's cancer. There are likely other genetic and/or environmental risk factors that, in combination with a CHEK2 gene mutation, cause cancer.    Cancer Screening and Prevention:  The following screening is recommended for individuals who have a mutation in the CHEK2 gene, per current National Comprehensive Cancer Network (NCCN) guidelines.  Women with CHEK2 mutations qualify for high risk breast screening.  High risk breast cancer screening recommendations include annual mammograms and annual breast MRI's, alternating every 6 months.    It is typically recommended that mammogram screening begin at age 40, though this can be discussed in more detail with a woman's medical providers. Breast MRI with contract may be  considered starting at age 30-35.    Earlier and more frequent colon cancer screening is recommended for individuals who carry certain CHEK2 variants (such as 1100delC and I157T). This screening should include high quality colonoscopy every 5 years, beginning at age 40 (or based on family history of colorectal cancer).   There are currently no other specific cancer screening guidelines for other cancers potentially associated with a CHEK2 mutation. As such, additional screening should be based on family history.     Other screening based on Sumit's personal and family history:  Other population cancer screening options, such as those recommended by the American Cancer Society and the National Comprehensive Cancer Network (NCCN), are also appropriate for Sumit and his family. These screening recommendations may change if there are changes to Sumit's personal and/or family history of cancer. Final screening recommendations should be made by each individual's primary care provider.    Of note, the above information is based on our current understanding of Sumit's genetic findings. Sumit is encouraged to reach out to me regularly regarding any pertinent updates to his personal and/or family history of cancer, as our understanding of the genetic findings in his family may change over time.     Implications for Family Members:  We reviewed that mutations in the CHEK2 gene are inherited in an autosomal dominant pattern. This means that each of Sumit's children have a 50% chance of inheriting the same mutation. Likewise, each of his siblings has a 50% risk of having the same mutation. Extended relatives may also carry this mutation, and he is encouraged to share this information with his family members on both sides of the family. I am happy to help his relatives connect with a genetic counselor in their area if they would like to discuss testing.    At our initial appointment, it was thought that both Sumit  "and his daughter had a mutation in either BRCA1 or BRCA2. Given Miguelina's test results, this confirms that he does NOT have a mutation in either of these genes. I encouraged Sumit to talk to his daughter to try to get a copy of her genetic test report to send to me to review. If she does have a BRCA1 or BRCA2 mutation, or a different gene mutation, I will send that information to Esther to determine if he has that same mutation. If his daughter does not have a BRCA1 or BRCA2, and instead has this same CHEK2 mutation, then no further testing would be needed for Sumit at this time.     Additional Testing Considerations:  Even though Sumit's genetic testing result was positive, other relatives may carry the same CHEK2 mutation or a different gene mutation associated with breast cancer. Genetic counseling is recommended for his brothers and children to discuss genetic testing options. If any of these relatives do pursue genetic testing, Sumit is encouraged to contact me so that we may review the impact of their test results on him.    Plan:  1.  A copy of the test results will be sent to Sumit.  2.  I will provide a \"Dear Relative\" letter for Sumit to share with his family members.  3.  He plans to follow up with his other providers.    If Sumit has additional questions, I encouraged him to contact me directly via [employer].     Time spent on video: 5 minutes    Jayson Young MS, [facility] [place]  Licensed, Certified Genetic Counselor      Virtual Visit Details    Type of service:  Video Visit     Originating Location (pt. Location): Home  Distant Location (provider location):  Off-site  Platform used for Video Visit: [employer]      Again, thank you for allowing me to participate in the care of your patient.        Sincerely,        Jayson Young GC  "

## 2024-04-25 NOTE — PROGRESS NOTES
"4/25/2024    Referring Provider: Kaycee Brewer PA-C    Presenting Information:   I spoke to Sumit by video today to discuss his genetic testing results. His blood was drawn on 4/8/24. The CancerNext panel was ordered from [facility]. This testing was done because of Sumit's family history of breast cancer and reported BRCA mutation in the family.     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 We discussed that this mutation is associated with an increased risk for breast and colon cancer. We discussed the impact of this testing on Sumit in detail.     Of note, Sumit is negative for mutations in the APC, HEIDI, AXIN2, BARD1, BMPR1A, BRCA1, BRCA2, BRIP1, CDH1, CDK4, CDKN2A, CHEK2, DICER1, EPCAM, GREM1, HOXB13, MLH1, MSH2, MSH3, MSH6, MUTYH, NF1, NTHL1, PALB2, PMS2, POLD1, POLE, PTEN, RAD51C, RAD51D SMAD4, SMARCA4, STK11, and TP53. No mutations were found in any of the 34 genes analyzed. This test involved sequencing and deletion/duplication analysis of all genes with the exceptions of EPCAM and GREM1 (deletions/duplications only) and HOXB13, POLD1 and POLE (sequencing only). We reviewed the autosomal dominant inheritance of these genes. Sumit cannot pass on a mutation in any of these genes to his children based on this test result. Mutations in these genes do not skip generations.      A copy of the test report can be found in the Laboratory tab, dated 4/8/24, and named \"LABORATORY MISCELLANEOUS ORDER\". The report is scanned in as a linked document.    Cancer Risks:    Pathogenic/likely pathogenic variants in the CHEK2 gene are associated with a moderate risk of breast and colon cancer. Of note, studies investigating the association between specific CHEK2 variants and cancer risk have been primarily based on the variant 1100delC, which is commonly found in individuals of  ancestry.  The lifetime breast cancer " risk for women who carry one CHEK2 mutation is approximately 20-40%, compared to the lifetime population risk of breast cancer of 12.8%.  These risks are likely influenced by family history, in which women with a family history of breast cancer are at higher risk than those without.   The lifetime risk of colon cancer is estimated to be 5-10%, which is approximately twice as high as the general population risk.   We also discussed the possible association of CHEK2 mutations with increased risk for prostate, melanoma, thyroid, and other cancers, however data is still limited in this area. No confirmed risk numbers are available for these additional cancers, though they have been reported in families that have a CHEK2 mutation.    Of note, we reviewed that the CHEK2 gene is currently considered a moderate-risk gene. This means that mutations in this gene increase the risk for certain cancers, but are unlikely to be the single cause for an individual's cancer. There are likely other genetic and/or environmental risk factors that, in combination with a CHEK2 gene mutation, cause cancer.    Cancer Screening and Prevention:  The following screening is recommended for individuals who have a mutation in the CHEK2 gene, per current National Comprehensive Cancer Network (NCCN) guidelines.  Women with CHEK2 mutations qualify for high risk breast screening.  High risk breast cancer screening recommendations include annual mammograms and annual breast MRI's, alternating every 6 months.    It is typically recommended that mammogram screening begin at age 40, though this can be discussed in more detail with a woman's medical providers. Breast MRI with contract may be considered starting at age 30-35.    Earlier and more frequent colon cancer screening is recommended for individuals who carry certain CHEK2 variants (such as 1100delC and I157T). This screening should include high quality colonoscopy every 5 years, beginning at age  40 (or based on family history of colorectal cancer).   There are currently no other specific cancer screening guidelines for other cancers potentially associated with a CHEK2 mutation. As such, additional screening should be based on family history.     Other screening based on Sumit's personal and family history:  Other population cancer screening options, such as those recommended by the American Cancer Society and the National Comprehensive Cancer Network (NCCN), are also appropriate for Sumit and his family. These screening recommendations may change if there are changes to Sumit's personal and/or family history of cancer. Final screening recommendations should be made by each individual's primary care provider.    Of note, the above information is based on our current understanding of Sumit's genetic findings. Sumit is encouraged to reach out to me regularly regarding any pertinent updates to his personal and/or family history of cancer, as our understanding of the genetic findings in his family may change over time.     Implications for Family Members:  We reviewed that mutations in the CHEK2 gene are inherited in an autosomal dominant pattern. This means that each of Sumit's children have a 50% chance of inheriting the same mutation. Likewise, each of his siblings has a 50% risk of having the same mutation. Extended relatives may also carry this mutation, and he is encouraged to share this information with his family members on both sides of the family. I am happy to help his relatives connect with a genetic counselor in their area if they would like to discuss testing.    At our initial appointment, it was thought that both Sumit and his daughter had a mutation in either BRCA1 or BRCA2. Given Miguelina's test results, this confirms that he does NOT have a mutation in either of these genes. I encouraged Sumit to talk to his daughter to try to get a copy of her genetic test report to send to me  "to review. If she does have a BRCA1 or BRCA2 mutation, or a different gene mutation, I will send that information to Esther to determine if he has that same mutation. If his daughter does not have a BRCA1 or BRCA2, and instead has this same CHEK2 mutation, then no further testing would be needed for Sumit at this time.     Additional Testing Considerations:  Even though Sumit's genetic testing result was positive, other relatives may carry the same CHEK2 mutation or a different gene mutation associated with breast cancer. Genetic counseling is recommended for his brothers and children to discuss genetic testing options. If any of these relatives do pursue genetic testing, Sumit is encouraged to contact me so that we may review the impact of their test results on him.    Plan:  1.  A copy of the test results will be sent to Sumit.  2.  I will provide a \"Dear Relative\" letter for Sumit to share with his family members.  3.  He plans to follow up with his other providers.    If Sumit has additional questions, I encouraged him to contact me directly via [employer].     Time spent on video: 5 minutes    Jayson Young MS, [facility] [place]  Licensed, Certified Genetic Counselor      Virtual Visit Details    Type of service:  Video Visit     Originating Location (pt. Location): Home  Distant Location (provider location):  Off-site  Platform used for Video Visit: [employer]"

## 2024-05-04 DIAGNOSIS — Z98.84 GASTRIC BYPASS STATUS FOR OBESITY: ICD-10-CM

## 2024-05-06 ENCOUNTER — TRANSFERRED RECORDS (OUTPATIENT)
Dept: HEALTH INFORMATION MANAGEMENT | Facility: CLINIC | Age: 64
End: 2024-05-06
Payer: COMMERCIAL

## 2024-05-08 RX ORDER — FOLIC ACID 1 MG/1
TABLET ORAL
Qty: 90 TABLET | Refills: 0 | Status: SHIPPED | OUTPATIENT
Start: 2024-05-08 | End: 2024-08-13

## 2024-05-14 DIAGNOSIS — Z98.84 GASTRIC BYPASS STATUS FOR OBESITY: ICD-10-CM

## 2024-05-14 RX ORDER — FERROUS SULFATE 325(65) MG
325 TABLET ORAL DAILY
Qty: 90 TABLET | Refills: 0 | OUTPATIENT
Start: 2024-05-14

## 2024-05-31 ENCOUNTER — TELEPHONE (OUTPATIENT)
Dept: CARDIOLOGY | Facility: CLINIC | Age: 64
End: 2024-05-31
Payer: COMMERCIAL

## 2024-05-31 NOTE — TELEPHONE ENCOUNTER
received  :Pt returning call placed by Carole HOLLIDAY yesterday : Pt states he was up on a roof yesterday between 12:30 and 1:30. States there were several times when he felt slightly dizzy but this seemed to correlate to when he stood up after bending over.    Attempted to call pt back but went to   Leopoldo HOLLIDAY

## 2024-05-31 NOTE — TELEPHONE ENCOUNTER
"Alert received from patient's PPM for, \"AT/AF >= 0.5 hr for 1 days. & Avg. Ventricular Rate >= 100 bpm during AT/AF (>= 0.5 hr) for 1 days.\"    4 mode switch episodes logged on 5/30/24 from 12:45-1:30p lasting [id number]. EGMs show AT w/ near 1:1 conduction (A rates in the 150-160s). Presenting rhythm shows AS- 110-120s.    Upon review, patient had near identical rhythms logged back in 2/2023. Those episodes correlated with exercise.     Called patient, no answer. LVM informing him of findings. Asked that he call back to the Device RN line (# provided) so we can determine if rhythms correlated with activity. If not, need to determine if patient had any symptoms and possible route to MD for review. 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    GG RN      AT/AF Episode Example:              Presenting Rhyhtm 2/1/23:    "

## 2024-06-05 NOTE — TELEPHONE ENCOUNTER
I spoke with patient. 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 He was not particularly exerting himself but it was quite hot so it is possible he was dehydrated. He did notice dizzy spells when he would stand back up from leaning over.    Will route to Dr. Clay to review rhythms to ensure they are ST vs SVT as opposed to something more ominous. Once an atrial arrhythmia is confirmed, will route to patient's primary MD (Dr. Rolle) for review and recommendation.    JASPER RN

## 2024-06-07 NOTE — TELEPHONE ENCOUNTER
Patient is aware that no changes are recommended at this time. We will continue to monitor and reach out if future episodes are detected.  JASPER HOLLIDAY

## 2024-06-10 ENCOUNTER — TRANSFERRED RECORDS (OUTPATIENT)
Dept: HEALTH INFORMATION MANAGEMENT | Facility: CLINIC | Age: 64
End: 2024-06-10
Payer: COMMERCIAL

## 2024-06-10 DIAGNOSIS — E11.9 TYPE 2 DIABETES MELLITUS WITHOUT COMPLICATION, WITHOUT LONG-TERM CURRENT USE OF INSULIN (H): ICD-10-CM

## 2024-06-10 RX ORDER — ASPIRIN 81 MG/1
TABLET, COATED ORAL
Qty: 100 TABLET | Refills: 2 | Status: SHIPPED | OUTPATIENT
Start: 2024-06-10

## 2024-06-16 ENCOUNTER — ANCILLARY PROCEDURE (OUTPATIENT)
Dept: CARDIOLOGY | Facility: CLINIC | Age: 64
End: 2024-06-16
Attending: INTERNAL MEDICINE
Payer: COMMERCIAL

## 2024-06-16 DIAGNOSIS — I44.1 SECOND DEGREE HEART BLOCK: ICD-10-CM

## 2024-06-16 DIAGNOSIS — Z95.0 CARDIAC PACEMAKER IN SITU: ICD-10-CM

## 2024-06-16 PROCEDURE — 93296 REM INTERROG EVL PM/IDS: CPT | Performed by: INTERNAL MEDICINE

## 2024-06-16 PROCEDURE — 93294 REM INTERROG EVL PM/LDLS PM: CPT | Performed by: INTERNAL MEDICINE

## 2024-06-17 DIAGNOSIS — M54.6 ACUTE RIGHT-SIDED THORACIC BACK PAIN: ICD-10-CM

## 2024-06-17 LAB
MDC_IDC_EPISODE_DTM: NORMAL
MDC_IDC_EPISODE_DURATION: 212 S
MDC_IDC_EPISODE_ID: 95
MDC_IDC_EPISODE_TYPE: NORMAL
MDC_IDC_LEAD_CONNECTION_STATUS: NORMAL
MDC_IDC_LEAD_CONNECTION_STATUS: NORMAL
MDC_IDC_LEAD_IMPLANT_DT: NORMAL
MDC_IDC_LEAD_IMPLANT_DT: NORMAL
MDC_IDC_LEAD_LOCATION: NORMAL
MDC_IDC_LEAD_LOCATION: NORMAL
MDC_IDC_LEAD_LOCATION_DETAIL_1: NORMAL
MDC_IDC_LEAD_LOCATION_DETAIL_1: NORMAL
MDC_IDC_LEAD_MFG: NORMAL
MDC_IDC_LEAD_MFG: NORMAL
MDC_IDC_LEAD_MODEL: NORMAL
MDC_IDC_LEAD_MODEL: NORMAL
MDC_IDC_LEAD_POLARITY_TYPE: NORMAL
MDC_IDC_LEAD_POLARITY_TYPE: NORMAL
MDC_IDC_LEAD_SERIAL: NORMAL
MDC_IDC_LEAD_SERIAL: NORMAL
MDC_IDC_MSMT_BATTERY_DTM: NORMAL
MDC_IDC_MSMT_BATTERY_REMAINING_LONGEVITY: 86 MO
MDC_IDC_MSMT_BATTERY_RRT_TRIGGER: 2.62
MDC_IDC_MSMT_BATTERY_STATUS: NORMAL
MDC_IDC_MSMT_BATTERY_VOLTAGE: 2.98 V
MDC_IDC_MSMT_LEADCHNL_RA_IMPEDANCE_VALUE: 304 OHM
MDC_IDC_MSMT_LEADCHNL_RA_IMPEDANCE_VALUE: 361 OHM
MDC_IDC_MSMT_LEADCHNL_RA_PACING_THRESHOLD_AMPLITUDE: 0.38 V
MDC_IDC_MSMT_LEADCHNL_RA_PACING_THRESHOLD_PULSEWIDTH: 0.4 MS
MDC_IDC_MSMT_LEADCHNL_RA_SENSING_INTR_AMPL: 2.38 MV
MDC_IDC_MSMT_LEADCHNL_RA_SENSING_INTR_AMPL: 2.38 MV
MDC_IDC_MSMT_LEADCHNL_RV_IMPEDANCE_VALUE: 361 OHM
MDC_IDC_MSMT_LEADCHNL_RV_IMPEDANCE_VALUE: 456 OHM
MDC_IDC_MSMT_LEADCHNL_RV_PACING_THRESHOLD_AMPLITUDE: 0.75 V
MDC_IDC_MSMT_LEADCHNL_RV_PACING_THRESHOLD_PULSEWIDTH: 0.4 MS
MDC_IDC_MSMT_LEADCHNL_RV_SENSING_INTR_AMPL: 18.38 MV
MDC_IDC_MSMT_LEADCHNL_RV_SENSING_INTR_AMPL: 18.38 MV
MDC_IDC_PG_IMPLANT_DTM: NORMAL
MDC_IDC_PG_MFG: NORMAL
MDC_IDC_PG_MODEL: NORMAL
MDC_IDC_PG_SERIAL: NORMAL
MDC_IDC_PG_TYPE: NORMAL
MDC_IDC_SESS_CLINIC_NAME: NORMAL
MDC_IDC_SESS_DTM: NORMAL
MDC_IDC_SESS_TYPE: NORMAL
MDC_IDC_SET_BRADY_AT_MODE_SWITCH_RATE: 150 {BEATS}/MIN
MDC_IDC_SET_BRADY_HYSTRATE: NORMAL
MDC_IDC_SET_BRADY_LOWRATE: 50 {BEATS}/MIN
MDC_IDC_SET_BRADY_MAX_SENSOR_RATE: 130 {BEATS}/MIN
MDC_IDC_SET_BRADY_MAX_TRACKING_RATE: 130 {BEATS}/MIN
MDC_IDC_SET_BRADY_MODE: NORMAL
MDC_IDC_SET_BRADY_PAV_DELAY_LOW: 180 MS
MDC_IDC_SET_BRADY_SAV_DELAY_LOW: 150 MS
MDC_IDC_SET_LEADCHNL_RA_PACING_AMPLITUDE: 1.5 V
MDC_IDC_SET_LEADCHNL_RA_PACING_ANODE_ELECTRODE_1: NORMAL
MDC_IDC_SET_LEADCHNL_RA_PACING_ANODE_LOCATION_1: NORMAL
MDC_IDC_SET_LEADCHNL_RA_PACING_CAPTURE_MODE: NORMAL
MDC_IDC_SET_LEADCHNL_RA_PACING_CATHODE_ELECTRODE_1: NORMAL
MDC_IDC_SET_LEADCHNL_RA_PACING_CATHODE_LOCATION_1: NORMAL
MDC_IDC_SET_LEADCHNL_RA_PACING_POLARITY: NORMAL
MDC_IDC_SET_LEADCHNL_RA_PACING_PULSEWIDTH: 0.4 MS
MDC_IDC_SET_LEADCHNL_RA_SENSING_ANODE_ELECTRODE_1: NORMAL
MDC_IDC_SET_LEADCHNL_RA_SENSING_ANODE_LOCATION_1: NORMAL
MDC_IDC_SET_LEADCHNL_RA_SENSING_CATHODE_ELECTRODE_1: NORMAL
MDC_IDC_SET_LEADCHNL_RA_SENSING_CATHODE_LOCATION_1: NORMAL
MDC_IDC_SET_LEADCHNL_RA_SENSING_POLARITY: NORMAL
MDC_IDC_SET_LEADCHNL_RA_SENSING_SENSITIVITY: 0.3 MV
MDC_IDC_SET_LEADCHNL_RV_PACING_AMPLITUDE: 1.5 V
MDC_IDC_SET_LEADCHNL_RV_PACING_ANODE_ELECTRODE_1: NORMAL
MDC_IDC_SET_LEADCHNL_RV_PACING_ANODE_LOCATION_1: NORMAL
MDC_IDC_SET_LEADCHNL_RV_PACING_CAPTURE_MODE: NORMAL
MDC_IDC_SET_LEADCHNL_RV_PACING_CATHODE_ELECTRODE_1: NORMAL
MDC_IDC_SET_LEADCHNL_RV_PACING_CATHODE_LOCATION_1: NORMAL
MDC_IDC_SET_LEADCHNL_RV_PACING_POLARITY: NORMAL
MDC_IDC_SET_LEADCHNL_RV_PACING_PULSEWIDTH: 0.4 MS
MDC_IDC_SET_LEADCHNL_RV_SENSING_ANODE_ELECTRODE_1: NORMAL
MDC_IDC_SET_LEADCHNL_RV_SENSING_ANODE_LOCATION_1: NORMAL
MDC_IDC_SET_LEADCHNL_RV_SENSING_CATHODE_ELECTRODE_1: NORMAL
MDC_IDC_SET_LEADCHNL_RV_SENSING_CATHODE_LOCATION_1: NORMAL
MDC_IDC_SET_LEADCHNL_RV_SENSING_POLARITY: NORMAL
MDC_IDC_SET_LEADCHNL_RV_SENSING_SENSITIVITY: 0.9 MV
MDC_IDC_SET_ZONE_DETECTION_INTERVAL: 370 MS
MDC_IDC_SET_ZONE_DETECTION_INTERVAL: 400 MS
MDC_IDC_SET_ZONE_STATUS: NORMAL
MDC_IDC_SET_ZONE_STATUS: NORMAL
MDC_IDC_SET_ZONE_TYPE: NORMAL
MDC_IDC_SET_ZONE_VENDOR_TYPE: NORMAL
MDC_IDC_STAT_AT_BURDEN_PERCENT: 0 %
MDC_IDC_STAT_AT_DTM_END: NORMAL
MDC_IDC_STAT_AT_DTM_START: NORMAL
MDC_IDC_STAT_BRADY_AP_VP_PERCENT: 6.37 %
MDC_IDC_STAT_BRADY_AP_VS_PERCENT: 0.08 %
MDC_IDC_STAT_BRADY_AS_VP_PERCENT: 93.24 %
MDC_IDC_STAT_BRADY_AS_VS_PERCENT: 0.31 %
MDC_IDC_STAT_BRADY_DTM_END: NORMAL
MDC_IDC_STAT_BRADY_DTM_START: NORMAL
MDC_IDC_STAT_BRADY_RA_PERCENT_PACED: 6.47 %
MDC_IDC_STAT_BRADY_RV_PERCENT_PACED: 99.6 %
MDC_IDC_STAT_EPISODE_RECENT_COUNT: 0
MDC_IDC_STAT_EPISODE_RECENT_COUNT: 1
MDC_IDC_STAT_EPISODE_RECENT_COUNT_DTM_END: NORMAL
MDC_IDC_STAT_EPISODE_RECENT_COUNT_DTM_START: NORMAL
MDC_IDC_STAT_EPISODE_TOTAL_COUNT: 0
MDC_IDC_STAT_EPISODE_TOTAL_COUNT: 0
MDC_IDC_STAT_EPISODE_TOTAL_COUNT: 2
MDC_IDC_STAT_EPISODE_TOTAL_COUNT: 84
MDC_IDC_STAT_EPISODE_TOTAL_COUNT: 9
MDC_IDC_STAT_EPISODE_TOTAL_COUNT_DTM_END: NORMAL
MDC_IDC_STAT_EPISODE_TOTAL_COUNT_DTM_START: NORMAL
MDC_IDC_STAT_EPISODE_TYPE: NORMAL
MDC_IDC_STAT_TACHYTHERAPY_RECENT_DTM_END: NORMAL
MDC_IDC_STAT_TACHYTHERAPY_RECENT_DTM_START: NORMAL
MDC_IDC_STAT_TACHYTHERAPY_TOTAL_DTM_END: NORMAL
MDC_IDC_STAT_TACHYTHERAPY_TOTAL_DTM_START: NORMAL

## 2024-06-17 RX ORDER — CYCLOBENZAPRINE HCL 5 MG
5 TABLET ORAL 3 TIMES DAILY PRN
Qty: 30 TABLET | Refills: 0 | Status: SHIPPED | OUTPATIENT
Start: 2024-06-17 | End: 2024-08-22

## 2024-07-01 DIAGNOSIS — I10 HTN, GOAL BELOW 140/90: ICD-10-CM

## 2024-07-01 RX ORDER — AMLODIPINE BESYLATE 2.5 MG/1
2.5 TABLET ORAL 2 TIMES DAILY
Qty: 180 TABLET | Refills: 2 | Status: SHIPPED | OUTPATIENT
Start: 2024-07-01 | End: 2024-10-07

## 2024-07-06 ENCOUNTER — MYC MEDICAL ADVICE (OUTPATIENT)
Dept: CARDIOLOGY | Facility: CLINIC | Age: 64
End: 2024-07-06
Payer: COMMERCIAL

## 2024-07-11 ENCOUNTER — OFFICE VISIT (OUTPATIENT)
Dept: CARDIOLOGY | Facility: CLINIC | Age: 64
End: 2024-07-11
Payer: COMMERCIAL

## 2024-07-11 VITALS
OXYGEN SATURATION: 97 % | DIASTOLIC BLOOD PRESSURE: 76 MMHG | SYSTOLIC BLOOD PRESSURE: 124 MMHG | HEIGHT: 66 IN | WEIGHT: 200.7 LBS | HEART RATE: 67 BPM | BODY MASS INDEX: 32.26 KG/M2

## 2024-07-11 DIAGNOSIS — Z95.0 CARDIAC PACEMAKER IN SITU: Primary | ICD-10-CM

## 2024-07-11 PROCEDURE — 99213 OFFICE O/P EST LOW 20 MIN: CPT | Performed by: INTERNAL MEDICINE

## 2024-07-11 NOTE — LETTER
7/11/2024    Candis Altamirano MD  [address] [facility] [address]    RE: Sumit Park       Dear Colleague,     I had the pleasure of seeing Sumit Park in the [facility].  CARDIOLOGY CLINIC CONSULTATION    PRIMARY CARE PHYSICIAN:  Candis Altamirano    HISTORY OF PRESENT ILLNESS:  This is a very pleasant 63-year-old gentleman who is here for routine follow-up.  He has a following set of pertinent medical issues.     Premature coronary artery disease with stenting in 1999  Presentation in January 2018 at [place] with Holter monitor showing high-grade AV block status post dual-chamber pacemaker at [employer], since then significant right ventricular pacing over 95%.  Presentation in July 2018 with non-ST elevation MI noted to have distal circumflex lesion that was stented and a moderate LAD lesion left to medical management and mild left main disease  Obstructive sleep apnea , has dental device, denies problems with with this condition.    Obesity hypertension diabetes hyperlipidemia and family history of coronary disease     Today the patient is here for routine follow-up.  He denies any sort of cardiovascular symptoms.  In regards to pertinent data he recently had an echocardiogram that shows normal LV function.  No major valve disease.  Patient denies angina heart failure symptoms syncope presyncope.  His LDL is at goal.  A1c slightly worsening.    His recent device check shows 1 episode of atrial tachycardia for about 3 minutes.  No atrial fibrillation noted.    PAST MEDICAL HISTORY:  Past Medical History:   Diagnosis Date    Basal cell carcinoma     CAD, 2 stents 1999, 2018 05/10/2021    Cardiac pacemaker in situ 05/10/2021    Celiac disease 05/10/2021    Complete heart block (H) -- s/p PPM 2018 05/10/2021    DM 2, no insulin (H) 05/10/2021    Gastric bypass status for obesity - 2015 05/10/2021    HTN, goal below 140/80 05/10/2021    Hyperlipidemia LDL  goal <100 05/10/2021    ZULEIKA (obstructive sleep apnea) 05/10/2021       MEDICATIONS:  Current Outpatient Medications   Medication Sig Dispense Refill    alcohol swab prep pads Use to swab area of injection/joe as directed. 100 each 1    amLODIPine (NORVASC) 2.5 MG tablet Take 1 tablet (2.5 mg) by mouth 2 times daily. 180 tablet 2    aspirin (ASPIRIN LOW DOSE) 81 MG EC tablet TAKE ONE TABLET BY MOUTH EVERY DAY FOR HEART DISEASE  **DO NOT CHEW** FOR HEART DISEASE  **DO NOT CHEW** 100 tablet 2    atorvastatin (LIPITOR) 40 MG tablet Take 1 tablet (40 mg) by mouth daily 90 tablet 1    blood glucose (NO BRAND SPECIFIED) test strip Use to test blood sugar 1 times daily or as directed. 100 strip 1    blood glucose calibration (NO BRAND SPECIFIED) solution Use to calibrate blood glucose monitor as needed as directed. 1 each 1    blood glucose monitoring (NO BRAND SPECIFIED) meter device kit Use to test blood sugar 1 times daily or as directed. 1 kit 0    cyclobenzaprine (FLEXERIL) 5 MG tablet Take 1 tablet (5 mg) by mouth 3 times daily as needed for muscle spasms 30 tablet 0    ferrous sulfate (FEROSUL) 325 (65 Fe) MG tablet Take 1 tablet (325 mg) by mouth daily 90 tablet 0    fluorouracil (EFUDEX) 5 % external cream Apply to scalp BID x 14 days 40 g 3    folic acid (FOLVITE) 1 MG tablet Take 1 tablet by mouth daily 90 tablet 0    lisinopril (ZESTRIL) 20 MG tablet Take 1 tablet (20 mg) by mouth 2 times daily 180 tablet 1    melatonin 5 MG tablet Take 1 tablet (5 mg) by mouth nightly as needed for sleep      metFORMIN (GLUCOPHAGE) 500 MG tablet Take 2 tablets (1,000 mg) by mouth 2 times daily (with meals) 360 tablet 1    metoprolol succinate ER (TOPROL XL) 25 MG 24 hr tablet Take 0.5 tablet (12.5 mg) by mouth daily 45 tablet 1    metroNIDAZOLE (METROGEL) 0.75 % external gel Apply to face BID 45 g 11    nitroGLYcerin (NITROSTAT) 0.4 MG sublingual tablet For chest pain place 1 tablet under the tongue every 5 minutes for 3  doses. If symptoms persist 5 minutes after 1st dose call 911. 10 tablet 3    sitagliptin (JANUVIA) 50 MG tablet Take 1 tablet (50 mg) by mouth daily 90 tablet 1    thin (NO BRAND SPECIFIED) lancets Use with lanceting device. 100 each 1    traZODone (DESYREL) 50 MG tablet Take 1 - 2 tablets ( mg) by mouth nightly as needed for sleep. 60 tablet 5    Vitamin D3 (CHOLECALCIFEROL) 25 mcg (1000 units) tablet Take by mouth daily      zinc gluconate 50 MG tablet Take 50 mg by mouth daily       No current facility-administered medications for this visit.       SOCIAL HISTORY:  I have reviewed this patient's social history and updated it with pertinent information if needed. Sumit Park  reports that he has never smoked. He has never been exposed to tobacco smoke. He has never used smokeless tobacco. He reports that he does not drink alcohol and does not use drugs.    PHYSICAL EXAM:  Pulse:  [67] 67  BP: (124)/(76) 124/76  SpO2:  [97 %] 97 %  200 lbs 11.2 oz    Constitutional: alert, no distress  Respiratory: Good bilateral air entry  Cardiovascular: Normal regular heart sounds  GI: nondistended  Neuropsychiatric: appropriate affact    ASSESSMENT: Pertinent issues addressed/ reviewed during this cardiology visit  Stable coronary artery disease  Complete heart block status post dual-chamber pacemaker    RECOMMENDATIONS:  Patient is asymptomatic from a cardiac standpoint.  Excellent functional capacity.  LDL is at goal.  Continue aspirin and statin therapy for life.  LDL goal 50-70.  If worsens, increase atorvastatin to 60 or 80 mg daily.  Follow-up with SEBASTIAN in 1 year.  Hypertension is under good control.  Continue current medications.  Follow-up with PCP for A1c levels and try to get them under better control.  Follow-up with me in 2 years with an echocardiogram to follow-up on EF.  Last EF was 50%.  He has significant ventricular pacing from AV block.  This could result in LV dysfunction down the road.  Continue  routine care in the device clinic.  He had some atrial tachycardia with spontaneous termination.  Continue beta-blocker therapy for now.    It was a pleasure seeing this patient in clinic today. Please do not hesitate to contact me with any future questions.     MORIS Mitchell, [facility]  Cardiology - [facility]  July 11, 2024    This note was completed in part using dictation via the Dragon voice recognition software. Some word and grammatical errors may occur and must be interpreted in the appropriate clinical context.  If there are any questions pertaining to this issue, please contact me for further clarification.    [facility]  cc:   No referring provider defined for this encounter.

## 2024-07-11 NOTE — PROGRESS NOTES
CARDIOLOGY CLINIC CONSULTATION    PRIMARY CARE PHYSICIAN:  Candis Altamirano    HISTORY OF PRESENT ILLNESS:  This is a very pleasant 63-year-old gentleman who is here for routine follow-up.  He has a following set of pertinent medical issues.     Premature coronary artery disease with stenting in 1999  Presentation in January 2018 at [place] with Holter monitor showing high-grade AV block status post dual-chamber pacemaker at [employer], since then significant right ventricular pacing over 95%.  Presentation in July 2018 with non-ST elevation MI noted to have distal circumflex lesion that was stented and a moderate LAD lesion left to medical management and mild left main disease  Obstructive sleep apnea , has dental device, denies problems with with this condition.    Obesity hypertension diabetes hyperlipidemia and family history of coronary disease     Today the patient is here for routine follow-up.  He denies any sort of cardiovascular symptoms.  In regards to pertinent data he recently had an echocardiogram that shows normal LV function.  No major valve disease.  Patient denies angina heart failure symptoms syncope presyncope.  His LDL is at goal.  A1c slightly worsening.    His recent device check shows 1 episode of atrial tachycardia for about 3 minutes.  No atrial fibrillation noted.    PAST MEDICAL HISTORY:  Past Medical History:   Diagnosis Date    Basal cell carcinoma     CAD, 2 stents 1999, 2018 05/10/2021    Cardiac pacemaker in situ 05/10/2021    Celiac disease 05/10/2021    Complete heart block (H) -- s/p PPM 2018 05/10/2021    DM 2, no insulin (H) 05/10/2021    Gastric bypass status for obesity - 2015 05/10/2021    HTN, goal below 140/80 05/10/2021    Hyperlipidemia LDL goal <100 05/10/2021    ZULEIKA (obstructive sleep apnea) 05/10/2021       MEDICATIONS:  Current Outpatient Medications   Medication Sig Dispense Refill    alcohol swab prep pads Use to swab area of injection/joe as directed. 100  each 1    amLODIPine (NORVASC) 2.5 MG tablet Take 1 tablet (2.5 mg) by mouth 2 times daily. 180 tablet 2    aspirin (ASPIRIN LOW DOSE) 81 MG EC tablet TAKE ONE TABLET BY MOUTH EVERY DAY FOR HEART DISEASE  **DO NOT CHEW** FOR HEART DISEASE  **DO NOT CHEW** 100 tablet 2    atorvastatin (LIPITOR) 40 MG tablet Take 1 tablet (40 mg) by mouth daily 90 tablet 1    blood glucose (NO BRAND SPECIFIED) test strip Use to test blood sugar 1 times daily or as directed. 100 strip 1    blood glucose calibration (NO BRAND SPECIFIED) solution Use to calibrate blood glucose monitor as needed as directed. 1 each 1    blood glucose monitoring (NO BRAND SPECIFIED) meter device kit Use to test blood sugar 1 times daily or as directed. 1 kit 0    cyclobenzaprine (FLEXERIL) 5 MG tablet Take 1 tablet (5 mg) by mouth 3 times daily as needed for muscle spasms 30 tablet 0    ferrous sulfate (FEROSUL) 325 (65 Fe) MG tablet Take 1 tablet (325 mg) by mouth daily 90 tablet 0    fluorouracil (EFUDEX) 5 % external cream Apply to scalp BID x 14 days 40 g 3    folic acid (FOLVITE) 1 MG tablet Take 1 tablet by mouth daily 90 tablet 0    lisinopril (ZESTRIL) 20 MG tablet Take 1 tablet (20 mg) by mouth 2 times daily 180 tablet 1    melatonin 5 MG tablet Take 1 tablet (5 mg) by mouth nightly as needed for sleep      metFORMIN (GLUCOPHAGE) 500 MG tablet Take 2 tablets (1,000 mg) by mouth 2 times daily (with meals) 360 tablet 1    metoprolol succinate ER (TOPROL XL) 25 MG 24 hr tablet Take 0.5 tablet (12.5 mg) by mouth daily 45 tablet 1    metroNIDAZOLE (METROGEL) 0.75 % external gel Apply to face BID 45 g 11    nitroGLYcerin (NITROSTAT) 0.4 MG sublingual tablet For chest pain place 1 tablet under the tongue every 5 minutes for 3 doses. If symptoms persist 5 minutes after 1st dose call 911. 10 tablet 3    sitagliptin (JANUVIA) 50 MG tablet Take 1 tablet (50 mg) by mouth daily 90 tablet 1    thin (NO BRAND SPECIFIED) lancets Use with lanceting device. 100  each 1    traZODone (DESYREL) 50 MG tablet Take 1 - 2 tablets ( mg) by mouth nightly as needed for sleep. 60 tablet 5    Vitamin D3 (CHOLECALCIFEROL) 25 mcg (1000 units) tablet Take by mouth daily      zinc gluconate 50 MG tablet Take 50 mg by mouth daily       No current facility-administered medications for this visit.       SOCIAL HISTORY:  I have reviewed this patient's social history and updated it with pertinent information if needed. Sumit Park  reports that he has never smoked. He has never been exposed to tobacco smoke. He has never used smokeless tobacco. He reports that he does not drink alcohol and does not use drugs.    PHYSICAL EXAM:  Pulse:  [67] 67  BP: (124)/(76) 124/76  SpO2:  [97 %] 97 %  200 lbs 11.2 oz    Constitutional: alert, no distress  Respiratory: Good bilateral air entry  Cardiovascular: Normal regular heart sounds  GI: nondistended  Neuropsychiatric: appropriate affact    ASSESSMENT: Pertinent issues addressed/ reviewed during this cardiology visit  Stable coronary artery disease  Complete heart block status post dual-chamber pacemaker    RECOMMENDATIONS:  Patient is asymptomatic from a cardiac standpoint.  Excellent functional capacity.  LDL is at goal.  Continue aspirin and statin therapy for life.  LDL goal 50-70.  If worsens, increase atorvastatin to 60 or 80 mg daily.  Follow-up with SEBASTIAN in 1 year.  Hypertension is under good control.  Continue current medications.  Follow-up with PCP for A1c levels and try to get them under better control.  Follow-up with me in 2 years with an echocardiogram to follow-up on EF.  Last EF was 50%.  He has significant ventricular pacing from AV block.  This could result in LV dysfunction down the road.  Continue routine care in the device clinic.  He had some atrial tachycardia with spontaneous termination.  Continue beta-blocker therapy for now.    It was a pleasure seeing this patient in clinic today. Please do not hesitate to contact  me with any future questions.     MORIS Mitchell, [facility]  Cardiology - [facility]  July 11, 2024    This note was completed in part using dictation via the Dragon voice recognition software. Some word and grammatical errors may occur and must be interpreted in the appropriate clinical context.  If there are any questions pertaining to this issue, please contact me for further clarification.

## 2024-07-14 DIAGNOSIS — Z98.84 GASTRIC BYPASS STATUS FOR OBESITY: ICD-10-CM

## 2024-07-15 RX ORDER — FERROUS SULFATE 325(65) MG
325 TABLET ORAL DAILY
Qty: 90 TABLET | Refills: 0 | Status: SHIPPED | OUTPATIENT
Start: 2024-07-15

## 2024-07-27 ENCOUNTER — HEALTH MAINTENANCE LETTER (OUTPATIENT)
Age: 64
End: 2024-07-27

## 2024-08-02 ENCOUNTER — DOCUMENTATION ONLY (OUTPATIENT)
Dept: LAB | Facility: CLINIC | Age: 64
End: 2024-08-02
Payer: COMMERCIAL

## 2024-08-02 DIAGNOSIS — E11.9 TYPE 2 DIABETES MELLITUS WITHOUT COMPLICATION, WITHOUT LONG-TERM CURRENT USE OF INSULIN (H): Primary | ICD-10-CM

## 2024-08-02 DIAGNOSIS — E78.5 HYPERLIPIDEMIA LDL GOAL <100: ICD-10-CM

## 2024-08-02 DIAGNOSIS — I25.10 CORONARY ARTERY DISEASE INVOLVING NATIVE HEART WITHOUT ANGINA PECTORIS, UNSPECIFIED VESSEL OR LESION TYPE: ICD-10-CM

## 2024-08-02 NOTE — PROGRESS NOTES
Sumit Park has an upcoming lab appointment:    Future Appointments   Date Time Provider Department Center   8/13/2024 10:45 AM Kaycee Brewer PA-C [facility]   8/15/2024  7:15 AM RI LAB [facility]   8/22/2024  9:00 AM [facility]   9/27/2024 12:00 AM PERE[facility] TECH[address] [place] [address] PSA CLIN     Patient is scheduled for the following lab(s): Fasting labs    There is no order available. Please review and place either future orders or HMPO (Review of Health Maintenance Protocol Orders), as appropriate.    Health Maintenance Due   Topic    ANNUAL REVIEW OF HM ORDERS     A1C      Yuni Valles

## 2024-08-12 DIAGNOSIS — Z98.84 GASTRIC BYPASS STATUS FOR OBESITY: ICD-10-CM

## 2024-08-13 ENCOUNTER — OFFICE VISIT (OUTPATIENT)
Dept: DERMATOLOGY | Facility: CLINIC | Age: 64
End: 2024-08-13
Payer: COMMERCIAL

## 2024-08-13 DIAGNOSIS — D22.9 NEVUS: ICD-10-CM

## 2024-08-13 DIAGNOSIS — Z85.828 HISTORY OF BASAL CELL CARCINOMA (BCC): ICD-10-CM

## 2024-08-13 DIAGNOSIS — L71.9 ROSACEA: Primary | ICD-10-CM

## 2024-08-13 DIAGNOSIS — L81.4 LENTIGO: ICD-10-CM

## 2024-08-13 DIAGNOSIS — Z51.81 THERAPEUTIC DRUG MONITORING: ICD-10-CM

## 2024-08-13 DIAGNOSIS — B35.1 ONYCHOMYCOSIS: ICD-10-CM

## 2024-08-13 DIAGNOSIS — L82.1 SEBORRHEIC KERATOSIS: ICD-10-CM

## 2024-08-13 DIAGNOSIS — D18.01 ANGIOMA OF SKIN: ICD-10-CM

## 2024-08-13 PROCEDURE — 99214 OFFICE O/P EST MOD 30 MIN: CPT | Performed by: PHYSICIAN ASSISTANT

## 2024-08-13 RX ORDER — FOLIC ACID 1 MG/1
TABLET ORAL
Qty: 90 TABLET | Refills: 1 | Status: SHIPPED | OUTPATIENT
Start: 2024-08-13

## 2024-08-13 RX ORDER — IVERMECTIN 10 MG/G
CREAM TOPICAL
Qty: 45 G | Refills: 11 | Status: SHIPPED | OUTPATIENT
Start: 2024-08-13

## 2024-08-13 RX ORDER — TERBINAFINE HYDROCHLORIDE 250 MG/1
TABLET ORAL
Qty: 42 TABLET | Refills: 1 | Status: SHIPPED | OUTPATIENT
Start: 2024-08-13 | End: 2024-09-20

## 2024-08-13 NOTE — LETTER
8/13/2024      Sumit Park  [address]  [facility] [address]      Dear Colleague,    Thank you for referring your patient, Sumit Park, to the [facility]. Please see a copy of my visit note below.    HPI:   Chief complaints: Sumit Park is a pleasant 64 year old male who presents for Full skin cancer screening to rule out skin cancer   Last Skin Exam: 1 year ago      1st Baseline: no  Personal HX of Skin Cancer: yes BCC on the right ear, left helix   Personal HX of Malignant Melanoma: no   Family HX of Skin Cancer / Malignant Melanoma: No  Personal HX of Atypical Moles:   no  Risk factors: history of sun exposure and burns  New / Changing lesions: Mole on the left 4th toe grew back. It has not been changing since then. Would like to treat toenail fungus. Rosacea has been flaring as well.   Social History:   On review of systems, there are no further skin complaints, patient is feeling otherwise well.   ROS of the following were done and are negative: Constitutional, Eyes, Ears, Nose,   Mouth, Throat, Cardiovascular, Respiratory, GI, Genitourinary, Musculoskeletal,   Psychiatric, Endocrine, Allergic/Immunologic.    PHYSICAL EXAM:   There were no vitals taken for this visit.  Skin exam performed as follows: Type 2 skin. Mood appropriate  Alert and Oriented X 3. Well developed, well nourished in no distress.  General appearance: Normal  Head including face: Normal  Eyes: conjunctiva and lids: Normal  Mouth: Lips, teeth, gums: Normal  Neck: Normal  Chest-breast/axillae: Normal  Back: Normal  Extremities: digits/nails (clubbing): Normal  Eccrine and Apocrine glands: Normal  Right upper extremity: Normal  Left upper extremity: Normal  Right lower extremity: Normal  Left lower extremity: Normal  Skin: Scalp and body hair: See below    Pt deferred exam of breasts, groin, buttocks: No    Other physical findings:  1. Multiple pigmented macules on extremities and  trunk  2. Multiple pigmented macules on face, trunk and extremities  3. Multiple vascular papules on trunk, arms and legs  4. Multiple scattered keratotic plaques  5. 4 x 3 mm brown papule on the left 3rd toe  6. Background erythema with pustules on the nose  7. Thickened toenails with subungual debris          Except as noted above, no other signs of skin cancer or melanoma.     ASSESSMENT/PLAN:   Benign Full skin cancer screening today. . Patient with history of BCC  Advised on monthly self exams and 1 year  Patient Education: Appropriate brochures given.    Multiple benign appearing melanocytic nevi on arms, legs and trunk. Discussed ABCDEs of melanoma and sunscreen.   Multiple lentigos on arms, legs and trunk. Advised benign, no treatment needed.  Multiple scattered angiomas. Advised benign, no treatment needed.   Seborrheic keratosis on arms, legs and trunk. Advised benign, no treatment needed.  Recurrent nevus on the left 3rd toe; pathology showed mild/moderate atypia. Will monitor for stability. Photograph taken for reference. Advised if changing to contact clinic immediately for evaluation.   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 Discussed rare risk of drug induced liver injury. Will check LFTs today and half way through course.               Follow-up: yearly/PRN sooner    1.) Patient was asked about new and changing moles. YES  2.) Patient received a complete physical skin examination: YES  3.) Patient was counseled to perform a monthly self skin examination: YES  Scribed By: Kaycee Brewer, MS, PAHipolitoC      Again, thank you for allowing me to participate in the care of your patient.        Sincerely,        Kaycee Brewer PA-C

## 2024-08-13 NOTE — PROGRESS NOTES
HPI:   Chief complaints: Sumit Park is a pleasant 64 year old male who presents for Full skin cancer screening to rule out skin cancer   Last Skin Exam: 1 year ago      1st Baseline: no  Personal HX of Skin Cancer: yes BCC on the right ear, left helix   Personal HX of Malignant Melanoma: no   Family HX of Skin Cancer / Malignant Melanoma: No  Personal HX of Atypical Moles:   no  Risk factors: history of sun exposure and burns  New / Changing lesions: Mole on the left 4th toe grew back. It has not been changing since then. Would like to treat toenail fungus. Rosacea has been flaring as well.   Social History:   On review of systems, there are no further skin complaints, patient is feeling otherwise well.   ROS of the following were done and are negative: Constitutional, Eyes, Ears, Nose,   Mouth, Throat, Cardiovascular, Respiratory, GI, Genitourinary, Musculoskeletal,   Psychiatric, Endocrine, Allergic/Immunologic.    PHYSICAL EXAM:   There were no vitals taken for this visit.  Skin exam performed as follows: Type 2 skin. Mood appropriate  Alert and Oriented X 3. Well developed, well nourished in no distress.  General appearance: Normal  Head including face: Normal  Eyes: conjunctiva and lids: Normal  Mouth: Lips, teeth, gums: Normal  Neck: Normal  Chest-breast/axillae: Normal  Back: Normal  Extremities: digits/nails (clubbing): Normal  Eccrine and Apocrine glands: Normal  Right upper extremity: Normal  Left upper extremity: Normal  Right lower extremity: Normal  Left lower extremity: Normal  Skin: Scalp and body hair: See below    Pt deferred exam of breasts, groin, buttocks: No    Other physical findings:  1. Multiple pigmented macules on extremities and trunk  2. Multiple pigmented macules on face, trunk and extremities  3. Multiple vascular papules on trunk, arms and legs  4. Multiple scattered keratotic plaques  5. 4 x 3 mm brown papule on the left 3rd toe  6. Background erythema with pustules on the  nose  7. Thickened toenails with subungual debris          Except as noted above, no other signs of skin cancer or melanoma.     ASSESSMENT/PLAN:   Benign Full skin cancer screening today. . Patient with history of BCC  Advised on monthly self exams and 1 year  Patient Education: Appropriate brochures given.    Multiple benign appearing melanocytic nevi on arms, legs and trunk. Discussed ABCDEs of melanoma and sunscreen.   Multiple lentigos on arms, legs and trunk. Advised benign, no treatment needed.  Multiple scattered angiomas. Advised benign, no treatment needed.   Seborrheic keratosis on arms, legs and trunk. Advised benign, no treatment needed.  Recurrent nevus on the left 3rd toe; pathology showed mild/moderate atypia. Will monitor for stability. Photograph taken for reference. Advised if changing to contact clinic immediately for evaluation.   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 Discussed rare risk of drug induced liver injury. Will check LFTs today and half way through course.               Follow-up: yearly/PRN sooner    1.) Patient was asked about new and changing moles. YES  2.) Patient received a complete physical skin examination: YES  3.) Patient was counseled to perform a monthly self skin examination: YES  Scribed By: Kaycee Brewer MS, PAANGEL

## 2024-08-15 ENCOUNTER — LAB (OUTPATIENT)
Dept: LAB | Facility: CLINIC | Age: 64
End: 2024-08-15
Payer: COMMERCIAL

## 2024-08-15 DIAGNOSIS — E78.5 HYPERLIPIDEMIA LDL GOAL <100: ICD-10-CM

## 2024-08-15 DIAGNOSIS — I25.10 CORONARY ARTERY DISEASE INVOLVING NATIVE HEART WITHOUT ANGINA PECTORIS, UNSPECIFIED VESSEL OR LESION TYPE: ICD-10-CM

## 2024-08-15 DIAGNOSIS — Z51.81 THERAPEUTIC DRUG MONITORING: ICD-10-CM

## 2024-08-15 DIAGNOSIS — E11.9 TYPE 2 DIABETES MELLITUS WITHOUT COMPLICATION, WITHOUT LONG-TERM CURRENT USE OF INSULIN (H): ICD-10-CM

## 2024-08-15 LAB
ALBUMIN SERPL BCG-MCNC: 4.2 G/DL (ref 3.5–5.2)
ALP SERPL-CCNC: 63 U/L (ref 40–150)
ALT SERPL W P-5'-P-CCNC: 14 U/L (ref 0–70)
ANION GAP SERPL CALCULATED.3IONS-SCNC: 12 MMOL/L (ref 7–15)
AST SERPL W P-5'-P-CCNC: 19 U/L (ref 0–45)
BILIRUB DIRECT SERPL-MCNC: 0.31 MG/DL (ref 0–0.3)
BILIRUB SERPL-MCNC: 1.2 MG/DL
BUN SERPL-MCNC: 20 MG/DL (ref 8–23)
CALCIUM SERPL-MCNC: 9.1 MG/DL (ref 8.8–10.4)
CHLORIDE SERPL-SCNC: 103 MMOL/L (ref 98–107)
CREAT SERPL-MCNC: 0.92 MG/DL (ref 0.67–1.17)
EGFRCR SERPLBLD CKD-EPI 2021: >90 ML/MIN/1.73M2
GLUCOSE SERPL-MCNC: 94 MG/DL (ref 70–99)
HBA1C MFR BLD: 5.7 % (ref 0–5.6)
HCO3 SERPL-SCNC: 26 MMOL/L (ref 22–29)
POTASSIUM SERPL-SCNC: 4.4 MMOL/L (ref 3.4–5.3)
PROT SERPL-MCNC: 6.5 G/DL (ref 6.4–8.3)
SODIUM SERPL-SCNC: 141 MMOL/L (ref 135–145)
TSH SERPL DL<=0.005 MIU/L-ACNC: 3.81 UIU/ML (ref 0.3–4.2)

## 2024-08-15 PROCEDURE — 80053 COMPREHEN METABOLIC PANEL: CPT

## 2024-08-15 PROCEDURE — 36415 COLL VENOUS BLD VENIPUNCTURE: CPT

## 2024-08-15 PROCEDURE — 84443 ASSAY THYROID STIM HORMONE: CPT | Performed by: INTERNAL MEDICINE

## 2024-08-15 PROCEDURE — 82248 BILIRUBIN DIRECT: CPT

## 2024-08-15 PROCEDURE — 83036 HEMOGLOBIN GLYCOSYLATED A1C: CPT | Performed by: INTERNAL MEDICINE

## 2024-08-20 NOTE — TELEPHONE ENCOUNTER
From a DOT standpoint, he can only state whether the patient needs cardiac workup or not.  From my side at this time, if the patient is asymptomatic no further cardiology workup is recommended.  Similarly no pacemaker changes are warranted at this time.  Final clearance for DOT however is always determined by the DOT examiner.

## 2024-08-22 ENCOUNTER — OFFICE VISIT (OUTPATIENT)
Dept: INTERNAL MEDICINE | Facility: CLINIC | Age: 64
End: 2024-08-22
Payer: COMMERCIAL

## 2024-08-22 VITALS
OXYGEN SATURATION: 100 % | BODY MASS INDEX: 30.12 KG/M2 | DIASTOLIC BLOOD PRESSURE: 68 MMHG | HEART RATE: 74 BPM | HEIGHT: 66 IN | WEIGHT: 187.4 LBS | RESPIRATION RATE: 12 BRPM | TEMPERATURE: 96.3 F | SYSTOLIC BLOOD PRESSURE: 128 MMHG

## 2024-08-22 DIAGNOSIS — I10 HTN, GOAL BELOW 140/90: ICD-10-CM

## 2024-08-22 DIAGNOSIS — E78.5 HYPERLIPIDEMIA LDL GOAL <100: ICD-10-CM

## 2024-08-22 DIAGNOSIS — E11.9 TYPE 2 DIABETES MELLITUS WITHOUT COMPLICATION, WITHOUT LONG-TERM CURRENT USE OF INSULIN (H): ICD-10-CM

## 2024-08-22 DIAGNOSIS — I25.10 CORONARY ARTERY DISEASE INVOLVING NATIVE HEART WITHOUT ANGINA PECTORIS, UNSPECIFIED VESSEL OR LESION TYPE: ICD-10-CM

## 2024-08-22 DIAGNOSIS — E11.9 TYPE 2 DIABETES MELLITUS WITHOUT COMPLICATION, WITHOUT LONG-TERM CURRENT USE OF INSULIN (H): Primary | ICD-10-CM

## 2024-08-22 DIAGNOSIS — G47.33 OSA (OBSTRUCTIVE SLEEP APNEA): ICD-10-CM

## 2024-08-22 DIAGNOSIS — R63.4 WEIGHT LOSS: ICD-10-CM

## 2024-08-22 PROCEDURE — 99207 PR FOOT EXAM NO CHARGE: CPT | Performed by: INTERNAL MEDICINE

## 2024-08-22 PROCEDURE — 99214 OFFICE O/P EST MOD 30 MIN: CPT | Performed by: INTERNAL MEDICINE

## 2024-08-22 PROCEDURE — G2211 COMPLEX E/M VISIT ADD ON: HCPCS | Performed by: INTERNAL MEDICINE

## 2024-08-22 RX ORDER — METOPROLOL SUCCINATE 25 MG/1
TABLET, EXTENDED RELEASE ORAL
Qty: 45 TABLET | Refills: 1 | Status: SHIPPED | OUTPATIENT
Start: 2024-08-22

## 2024-08-22 RX ORDER — LISINOPRIL 20 MG/1
20 TABLET ORAL 2 TIMES DAILY
Qty: 180 TABLET | Refills: 1 | Status: SHIPPED | OUTPATIENT
Start: 2024-08-22

## 2024-08-22 RX ORDER — ATORVASTATIN CALCIUM 40 MG/1
40 TABLET, FILM COATED ORAL DAILY
Qty: 90 TABLET | Refills: 1 | Status: SHIPPED | OUTPATIENT
Start: 2024-08-22

## 2024-08-22 NOTE — PATIENT INSTRUCTIONS
Plan:  Hold the Amlodipine for now  2. Monitor the blood pressure. If the blood pressure stays constantly above 135, you may resume the Amlodipine  3. Make sure you stay hydrated   4. Sleep ref done  5. Continue the other meds, same doses for now.  6. Please make a lab appointment for non fasting labs  in about 6 months  7. Please make an appointment few days after the labs to discuss about the results. -- video ok   8. If unintentional weight loss continues - please send a message and we will look for a sooner appointment

## 2024-08-22 NOTE — TELEPHONE ENCOUNTER
I called [facility] and spoke to both the RN and . 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 He also requested most recent echo, office visit, and pacemaker report be faxed to [phone]. 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 Moreno HOLLIDAY August 22, 2024, 12:06 PM

## 2024-08-22 NOTE — TELEPHONE ENCOUNTER
DOT letter, office note, 2023 echo, and pacemaker report right faxed to [facility] and  at [phone]. My chart update sent to robinson Mayer RN August 22, 2024, 3:38 PM

## 2024-08-22 NOTE — PROGRESS NOTES
Dr Jose's note      Patient's instructions / PLAN:                                                        Plan:  Hold the Amlodipine for now  2. Monitor the blood pressure. If the blood pressure stays constantly above 135, you may resume the Amlodipine  3. Make sure you stay hydrated   4. Sleep ref done  5. Continue the other meds, same doses for now.  6. Please make a lab appointment for non fasting labs  in about 6 months  7. Please make an appointment few days after the labs to discuss about the results. -- video ok   8. If unintentional weight loss continues - please send a message and we will look for a sooner appointment        ASSESSMENT & PLAN:                                                      (E11.9) Type 2 diabetes mellitus without complication, without long-term current use of insulin (H)  (primary encounter diagnosis)  (E11.9) DM 2 no insulin (H)  Comment: Controlled    Plan: FOOT EXAM, sitagliptin (JANUVIA) 50 MG tablet  Plan: metFORMIN (GLUCOPHAGE) 500 MG tablet,         Hemoglobin A1c            (I10) HTN, goal below 140/90  Comment: Controlled  but now with Ortho lightheadedness  Plan: lisinopril (ZESTRIL) 20 MG tablet        as above     (E78.5) Hyperlipidemia LDL goal <100  Comment: Controlled    Plan: atorvastatin (LIPITOR) 40 MG tablet            (I25.10) CAD, 2 stents 1999, 2018  Comment: stable   Plan: metoprolol succinate ER (TOPROL XL) 25 MG 24 hr        tablet            (G47.33) ZULEIKA (obstructive sleep apnea)  Comment:   Plan: Adult Sleep Eval & Management          Referral            (R63.4) Weight loss  Comment: possible sec healthy diet  Plan: as above          Chief complaint:                                                      Follow up chronic medical problems      SUBJECTIVE:                                                    History of present illness:    Ortho lightheadedness  -- if he bends over and stands up quickly he feels dizzy. If he stands up slowly it is  better     Hx of ZULEIKA before gastric bypass surgery  -- work required sleep study Ref made    DM:  -- he tolerates Metformin + Januvia     Weight loss since new job - very physical       Lab Results   Component Value Date    A1C 5.7 08/15/2024    A1C 7.5 04/08/2024    A1C 6.6 10/12/2023    A1C 7.5 07/13/2023    A1C 7.0 01/20/2023    A1C 7.8 05/10/2021        April 2024 LOV: Plan:  Continue Metformin same dose  2. Add Januvia 50 mg daily for the blood sugars  3. Please make a lab appointment for non fasting labs  in August 4. Please make an appointment few days after the labs to discuss about the results.     Nurse note:    Shantal Arana is a 64 year old, presenting for the following health issues:  Recheck Medication (Januvia)        8/22/2024     8:37 AM   Additional Questions   Roomed by layne   Accompanied by self         8/22/2024     8:37 AM   Patient Reported Additional Medications   Patient reports taking the following new medications none     History of Present Illness       Diabetes:   He presents for follow up of diabetes.    He is not checking blood glucose.         He has no concerns regarding his diabetes at this time.  He is having weight loss.            Vascular Disease:  He presents for follow up of vascular disease.      He takes daily aspirin.    He eats 0-1 servings of fruits and vegetables daily.He consumes 1 sweetened beverage(s) daily.He exercises with enough effort to increase his heart rate 30 to 60 minutes per day.  He exercises with enough effort to increase his heart rate 4 days per week.   He is taking medications regularly.      Review of Systems:                                                      ROS: negative for fever, chills, cough, wheezes, chest pain, shortness of breath, vomiting, abdominal pain, leg swelling       OBJECTIVE:             Physical exam:  Blood pressure 128/68, pulse 74, temperature (!) 96.3  F (35.7  C), temperature source Tympanic, resp. rate 12, height  "1.683 m (5' 6.25\"), weight 85 kg (187 lb 6.4 oz), SpO2 100%.     NAD, appears comfortable  Skin: no rashes   Neck: supple, no JVD,  No thyroidmegaly. Lymph nodes nonpalpable cervical and supraclavicular.  Chest: clear to auscultation bilaterally, good respiratory effort  Heart: S1 S2, RRR, no mgr appreciated  Abdomen: soft, not tender, no hepatosplenomegaly or masses appreciated, no abdominal bruit, present bowel sounds  Extremities: no edema, clubbing, cyanosis. Palpable pedal pulses bilaterally, Monofilament skin sensation is intact, no feet skin lesions   Neurologic: A, Ox3, no focal signs appreciated    PMHx: reviewed  Past Medical History:   Diagnosis Date    Basal cell carcinoma     CAD, 2 stents 1999, 2018 05/10/2021    Cardiac pacemaker in situ 05/10/2021    Celiac disease 05/10/2021    Complete heart block (H) -- s/p PPM 2018 05/10/2021    DM 2, no insulin (H) 05/10/2021    Gastric bypass status for obesity - 2015 05/10/2021    HTN, goal below 140/80 05/10/2021    Hyperlipidemia LDL goal <100 05/10/2021    ZULEIKA (obstructive sleep apnea) 05/10/2021      PSHx: reviewed  Past Surgical History:   Procedure Laterality Date    ANGIOPLASTY      COLONOSCOPY N/A 1/13/2015    Procedure: COLONOSCOPY;  Surgeon: Juni Craig MD;  Location: [facility];  Service:     DISTAL BICEPS TENDON REPAIR Left     ESOPHAGOSCOPY, GASTROSCOPY, DUODENOSCOPY (EGD), COMBINED N/A 1/13/2015    Procedure: UPPER ENDOSCOPY;  Surgeon: Juni Craig MD;  Location: [facility];  Service:     HERNIORRHAPHY INCISIONAL (LOCATION)      incarcerated    LAPAROSCOPIC GASTRIC BYPASS N/A 2015    LEG SURGERY Right     muscle surgery    OTHER SURGICAL HISTORY      forearm / wrist surgery    REPAIR TENDON BICEPS DISTAL UPPER EXTREMITY      UMBILICAL HERNIA REPAIR N/A     WRIST SURGERY Right         Meds: reviewed  Current Outpatient Medications   Medication Sig Dispense Refill    alcohol swab prep pads Use to swab area of " injection/joe as directed. 100 each 1    amLODIPine (NORVASC) 2.5 MG tablet Take 1 tablet (2.5 mg) by mouth 2 times daily. 180 tablet 2    aspirin (ASPIRIN LOW DOSE) 81 MG EC tablet TAKE ONE TABLET BY MOUTH EVERY DAY FOR HEART DISEASE  **DO NOT CHEW** FOR HEART DISEASE  **DO NOT CHEW** 100 tablet 2    atorvastatin (LIPITOR) 40 MG tablet Take 1 tablet (40 mg) by mouth daily 90 tablet 1    blood glucose (NO BRAND SPECIFIED) test strip Use to test blood sugar 1 times daily or as directed. 100 strip 1    blood glucose calibration (NO BRAND SPECIFIED) solution Use to calibrate blood glucose monitor as needed as directed. 1 each 1    blood glucose monitoring (NO BRAND SPECIFIED) meter device kit Use to test blood sugar 1 times daily or as directed. 1 kit 0    FEROSUL 325 (65 Fe) MG tablet Take 1 tablet (325 mg) by mouth daily 90 tablet 0    folic acid (FOLVITE) 1 MG tablet Take 1 tablet by mouth daily 90 tablet 1    ivermectin (SOOLANTRA) 1 % cream Apply to face once daily 45 g 11    lisinopril (ZESTRIL) 20 MG tablet Take 1 tablet (20 mg) by mouth 2 times daily 180 tablet 1    metFORMIN (GLUCOPHAGE) 500 MG tablet Take 2 tablets (1,000 mg) by mouth 2 times daily (with meals) 360 tablet 1    metoprolol succinate ER (TOPROL XL) 25 MG 24 hr tablet Take 0.5 tablet (12.5 mg) by mouth daily 45 tablet 1    metroNIDAZOLE (METROGEL) 0.75 % external gel Apply to face BID 45 g 11    sitagliptin (JANUVIA) 50 MG tablet Take 1 tablet (50 mg) by mouth daily 90 tablet 1    terbinafine (LAMISIL) 250 MG tablet 1 tab po daily 42 tablet 1    thin (NO BRAND SPECIFIED) lancets Use with lanceting device. 100 each 1    Vitamin D3 (CHOLECALCIFEROL) 25 mcg (1000 units) tablet Take by mouth daily      zinc gluconate 50 MG tablet Take 50 mg by mouth daily      cyclobenzaprine (FLEXERIL) 5 MG tablet Take 1 tablet (5 mg) by mouth 3 times daily as needed for muscle spasms (Patient not taking: Reported on 8/22/2024) 30 tablet 0    fluorouracil (EFUDEX)  5 % external cream Apply to scalp BID x 14 days (Patient not taking: Reported on 8/22/2024) 40 g 3    melatonin 5 MG tablet Take 1 tablet (5 mg) by mouth nightly as needed for sleep (Patient not taking: Reported on 8/22/2024)      nitroGLYcerin (NITROSTAT) 0.4 MG sublingual tablet For chest pain place 1 tablet under the tongue every 5 minutes for 3 doses. If symptoms persist 5 minutes after 1st dose call 911. (Patient not taking: Reported on 8/22/2024) 10 tablet 3    traZODone (DESYREL) 50 MG tablet Take 1 - 2 tablets ( mg) by mouth nightly as needed for sleep. (Patient not taking: Reported on 8/22/2024) 60 tablet 5       Soc Hx: reviewed  Fam Hx: reviewed      Chart documentation was completed, in part, with [employer] voice-recognition software. Even though reviewed, some grammatical, spelling, and word errors may remain.      Candis Jose MD  Internal Medicine       Signed Electronically by: Candis Altamirano MD

## 2024-08-22 NOTE — TELEPHONE ENCOUNTER
"I am sorry but there is a reason we have a \"DOT examiner\" who should clear or not clear a person to operate a commercial vehicle based on their guidelines. I can only state whether cardiac work up is recommended or not recommended clinically. Thank you.   "

## 2024-08-22 NOTE — NURSING NOTE
"/68   Pulse 74   Temp (!) 96.3  F (35.7  C) (Tympanic)   Resp 12   Ht 1.683 m (5' 6.25\")   Wt 85 kg (187 lb 6.4 oz)   SpO2 100%   BMI 30.02 kg/m      "

## 2024-09-16 ENCOUNTER — TELEPHONE (OUTPATIENT)
Dept: DERMATOLOGY | Facility: CLINIC | Age: 64
End: 2024-09-16
Payer: COMMERCIAL

## 2024-09-16 NOTE — TELEPHONE ENCOUNTER
[facility] Call Center    Phone Message    May a detailed message be left on voicemail: yes     Reason for Call: Patient went to urgent care last night - has rash over whole body - dr at  thinks it may be meds he is taking for fungus - Patient stopped taking med to see if it goes away     Action Taken: Other: OX DERM    Travel Screening: Not Applicable     Date of Service:

## 2024-09-17 ENCOUNTER — MYC MEDICAL ADVICE (OUTPATIENT)
Dept: DERMATOLOGY | Facility: CLINIC | Age: 64
End: 2024-09-17
Payer: COMMERCIAL

## 2024-09-17 DIAGNOSIS — L27.0 DRUG ERUPTION: Primary | ICD-10-CM

## 2024-09-17 RX ORDER — TRIAMCINOLONE ACETONIDE 1 MG/G
CREAM TOPICAL
Qty: 454 G | Refills: 2 | Status: SHIPPED | OUTPATIENT
Start: 2024-09-17

## 2024-09-17 RX ORDER — PREDNISONE 10 MG/1
TABLET ORAL
Qty: 40 TABLET | Refills: 0 | Status: SHIPPED | OUTPATIENT
Start: 2024-09-17 | End: 2024-10-02

## 2024-09-17 NOTE — TELEPHONE ENCOUNTER
Hard to say without seeing it. I can offer a course of oral prednisone or a topical steroid and would like to see him in the clinic.

## 2024-09-17 NOTE — TELEPHONE ENCOUNTER
Called and gave message below. Kaycee also asked that I let pt know to keep a close eye on his blood sugars while on prednisone, called pt to let him know and he voiced understanding.    Thank you,  Jyoti LEDESMA RN  Dermatology   [phone]

## 2024-09-17 NOTE — TELEPHONE ENCOUNTER
Yes that looks like a drug eruption. I'd like him to start oral and topical steroids. He should let us or the ER know if he develops fevers or if he develops any sores/blisters in the mouth.     Order pended. Ok to send to pharmacy of choice.

## 2024-09-17 NOTE — TELEPHONE ENCOUNTER
See [employer] message response. Called and let pt know    Jyoti LEDESMA RN  Dermatology   [phone]

## 2024-09-17 NOTE — TELEPHONE ENCOUNTER
Called and spoke with pt, states the rash is getting worse, and he has stopped the Terbinafine. All over body, some itching and redness and spreading. Pt taking Claritin daily. Please advise, thank you!    Jyoti LEDESMA RN  Dermatology   [phone]

## 2024-09-17 NOTE — TELEPHONE ENCOUNTER
Called and spoke with pt and scheduled appt for this Friday. Pt going to send photos on mychart. States the rash is ugly but not itchy or painful. Would be interested in starting a topical steroid, can send to   [id number] - [place], [place] - [address] [place] [address] [address]       Thank you,  Jyoti LEDESMA RN  Dermatology   [phone]

## 2024-09-20 ENCOUNTER — OFFICE VISIT (OUTPATIENT)
Dept: DERMATOLOGY | Facility: CLINIC | Age: 64
End: 2024-09-20
Payer: COMMERCIAL

## 2024-09-20 DIAGNOSIS — L27.0 DRUG ERUPTION: Primary | ICD-10-CM

## 2024-09-20 PROCEDURE — 99213 OFFICE O/P EST LOW 20 MIN: CPT | Performed by: PHYSICIAN ASSISTANT

## 2024-09-20 ASSESSMENT — PAIN SCALES - GENERAL: PAINLEVEL: NO PAIN (0)

## 2024-09-20 NOTE — LETTER
9/20/2024      Sumit Park  [address]  [place] [address]      Dear Colleague,    Thank you for referring your patient, Sumit Park, to the [facility]. Please see a copy of my visit note below.    HPI:   Chief complaints: Sumit Park is a pleasant 64 year old male who presents for likely drug eruption to terbinafine. He developed a diffuse, red raised rash about 2 weeks after starting terbinafine for onychomycosis. He started prednisone about 2 days ago. He has stopped the terbinafine. Denies any sores/lesions in the mouth, throat or eyes. He has not been checking his blood sugars as his A1C has been great.       PHYSICAL EXAM:    There were no vitals taken for this visit.  Skin exam performed as follows: Type 2 skin. Mood appropriate  Alert and Oriented X 3. Well developed, well nourished in no distress.  General appearance: Normal  Head including face: Normal  Eyes: conjunctiva and lids: Normal  Mouth: Lips, teeth, gums: Normal  Neck: Normal  Skin: Scalp and body hair: See below    Morbiliform eruption on the arms, legs, trunk,    ASSESSMENT/PLAN:     Morbiliform drug eruption to terbinafine - discussed diagnosis and treatment options  --Continue prednisone taper  --Check blood sugars at home; discussed risk of hyperglycemia  --Start using TAC cream BID to rash areas x 1-2 weeks then PRN  --Contact clinic if worsening  --Discussed need to get evaluated in the ER if developing sores/blisters in the oropharynx          Follow-up: PRN  CC:   Scribed By: Kaycee Brewer, MS, PAANGEL      Again, thank you for allowing me to participate in the care of your patient.        Sincerely,        Kaycee Brewer PA-C

## 2024-09-20 NOTE — PROGRESS NOTES
HPI:   Chief complaints: Sumit Park is a pleasant 64 year old male who presents for likely drug eruption to terbinafine. He developed a diffuse, red raised rash about 2 weeks after starting terbinafine for onychomycosis. He started prednisone about 2 days ago. He has stopped the terbinafine. Denies any sores/lesions in the mouth, throat or eyes. He has not been checking his blood sugars as his A1C has been great.       PHYSICAL EXAM:    There were no vitals taken for this visit.  Skin exam performed as follows: Type 2 skin. Mood appropriate  Alert and Oriented X 3. Well developed, well nourished in no distress.  General appearance: Normal  Head including face: Normal  Eyes: conjunctiva and lids: Normal  Mouth: Lips, teeth, gums: Normal  Neck: Normal  Skin: Scalp and body hair: See below    Morbiliform eruption on the arms, legs, trunk,    ASSESSMENT/PLAN:     Morbiliform drug eruption to terbinafine - discussed diagnosis and treatment options  --Continue prednisone taper  --Check blood sugars at home; discussed risk of hyperglycemia  --Start using TAC cream BID to rash areas x 1-2 weeks then PRN  --Contact clinic if worsening  --Discussed need to get evaluated in the ER if developing sores/blisters in the oropharynx          Follow-up: PRN  CC:   Scribed By: Kaycee Brewer, MS, PA-C

## 2024-09-27 ENCOUNTER — ANCILLARY PROCEDURE (OUTPATIENT)
Dept: CARDIOLOGY | Facility: CLINIC | Age: 64
End: 2024-09-27
Attending: INTERNAL MEDICINE
Payer: COMMERCIAL

## 2024-09-27 DIAGNOSIS — I44.1 SECOND DEGREE HEART BLOCK: ICD-10-CM

## 2024-09-27 DIAGNOSIS — Z95.0 CARDIAC PACEMAKER IN SITU: ICD-10-CM

## 2024-09-27 LAB
MDC_IDC_LEAD_CONNECTION_STATUS: NORMAL
MDC_IDC_LEAD_CONNECTION_STATUS: NORMAL
MDC_IDC_LEAD_IMPLANT_DT: NORMAL
MDC_IDC_LEAD_IMPLANT_DT: NORMAL
MDC_IDC_LEAD_LOCATION: NORMAL
MDC_IDC_LEAD_LOCATION: NORMAL
MDC_IDC_LEAD_LOCATION_DETAIL_1: NORMAL
MDC_IDC_LEAD_LOCATION_DETAIL_1: NORMAL
MDC_IDC_LEAD_MFG: NORMAL
MDC_IDC_LEAD_MFG: NORMAL
MDC_IDC_LEAD_MODEL: NORMAL
MDC_IDC_LEAD_MODEL: NORMAL
MDC_IDC_LEAD_POLARITY_TYPE: NORMAL
MDC_IDC_LEAD_POLARITY_TYPE: NORMAL
MDC_IDC_LEAD_SERIAL: NORMAL
MDC_IDC_LEAD_SERIAL: NORMAL
MDC_IDC_MSMT_BATTERY_DTM: NORMAL
MDC_IDC_MSMT_BATTERY_REMAINING_LONGEVITY: 82 MO
MDC_IDC_MSMT_BATTERY_RRT_TRIGGER: 2.62
MDC_IDC_MSMT_BATTERY_STATUS: NORMAL
MDC_IDC_MSMT_BATTERY_VOLTAGE: 2.98 V
MDC_IDC_MSMT_LEADCHNL_RA_IMPEDANCE_VALUE: 285 OHM
MDC_IDC_MSMT_LEADCHNL_RA_IMPEDANCE_VALUE: 342 OHM
MDC_IDC_MSMT_LEADCHNL_RA_PACING_THRESHOLD_AMPLITUDE: 0.5 V
MDC_IDC_MSMT_LEADCHNL_RA_PACING_THRESHOLD_PULSEWIDTH: 0.4 MS
MDC_IDC_MSMT_LEADCHNL_RA_SENSING_INTR_AMPL: 1.88 MV
MDC_IDC_MSMT_LEADCHNL_RA_SENSING_INTR_AMPL: 1.88 MV
MDC_IDC_MSMT_LEADCHNL_RV_IMPEDANCE_VALUE: 323 OHM
MDC_IDC_MSMT_LEADCHNL_RV_IMPEDANCE_VALUE: 418 OHM
MDC_IDC_MSMT_LEADCHNL_RV_PACING_THRESHOLD_AMPLITUDE: 0.88 V
MDC_IDC_MSMT_LEADCHNL_RV_PACING_THRESHOLD_PULSEWIDTH: 0.4 MS
MDC_IDC_MSMT_LEADCHNL_RV_SENSING_INTR_AMPL: 7.38 MV
MDC_IDC_MSMT_LEADCHNL_RV_SENSING_INTR_AMPL: 7.38 MV
MDC_IDC_PG_IMPLANT_DTM: NORMAL
MDC_IDC_PG_MFG: NORMAL
MDC_IDC_PG_MODEL: NORMAL
MDC_IDC_PG_SERIAL: NORMAL
MDC_IDC_PG_TYPE: NORMAL
MDC_IDC_SESS_CLINIC_NAME: NORMAL
MDC_IDC_SESS_DTM: NORMAL
MDC_IDC_SESS_TYPE: NORMAL
MDC_IDC_SET_BRADY_AT_MODE_SWITCH_RATE: 150 {BEATS}/MIN
MDC_IDC_SET_BRADY_HYSTRATE: NORMAL
MDC_IDC_SET_BRADY_LOWRATE: 50 {BEATS}/MIN
MDC_IDC_SET_BRADY_MAX_SENSOR_RATE: 130 {BEATS}/MIN
MDC_IDC_SET_BRADY_MAX_TRACKING_RATE: 130 {BEATS}/MIN
MDC_IDC_SET_BRADY_MODE: NORMAL
MDC_IDC_SET_BRADY_PAV_DELAY_LOW: 180 MS
MDC_IDC_SET_BRADY_SAV_DELAY_LOW: 150 MS
MDC_IDC_SET_LEADCHNL_RA_PACING_AMPLITUDE: 1.5 V
MDC_IDC_SET_LEADCHNL_RA_PACING_ANODE_ELECTRODE_1: NORMAL
MDC_IDC_SET_LEADCHNL_RA_PACING_ANODE_LOCATION_1: NORMAL
MDC_IDC_SET_LEADCHNL_RA_PACING_CAPTURE_MODE: NORMAL
MDC_IDC_SET_LEADCHNL_RA_PACING_CATHODE_ELECTRODE_1: NORMAL
MDC_IDC_SET_LEADCHNL_RA_PACING_CATHODE_LOCATION_1: NORMAL
MDC_IDC_SET_LEADCHNL_RA_PACING_POLARITY: NORMAL
MDC_IDC_SET_LEADCHNL_RA_PACING_PULSEWIDTH: 0.4 MS
MDC_IDC_SET_LEADCHNL_RA_SENSING_ANODE_ELECTRODE_1: NORMAL
MDC_IDC_SET_LEADCHNL_RA_SENSING_ANODE_LOCATION_1: NORMAL
MDC_IDC_SET_LEADCHNL_RA_SENSING_CATHODE_ELECTRODE_1: NORMAL
MDC_IDC_SET_LEADCHNL_RA_SENSING_CATHODE_LOCATION_1: NORMAL
MDC_IDC_SET_LEADCHNL_RA_SENSING_POLARITY: NORMAL
MDC_IDC_SET_LEADCHNL_RA_SENSING_SENSITIVITY: 0.3 MV
MDC_IDC_SET_LEADCHNL_RV_PACING_AMPLITUDE: 1.75 V
MDC_IDC_SET_LEADCHNL_RV_PACING_ANODE_ELECTRODE_1: NORMAL
MDC_IDC_SET_LEADCHNL_RV_PACING_ANODE_LOCATION_1: NORMAL
MDC_IDC_SET_LEADCHNL_RV_PACING_CAPTURE_MODE: NORMAL
MDC_IDC_SET_LEADCHNL_RV_PACING_CATHODE_ELECTRODE_1: NORMAL
MDC_IDC_SET_LEADCHNL_RV_PACING_CATHODE_LOCATION_1: NORMAL
MDC_IDC_SET_LEADCHNL_RV_PACING_POLARITY: NORMAL
MDC_IDC_SET_LEADCHNL_RV_PACING_PULSEWIDTH: 0.4 MS
MDC_IDC_SET_LEADCHNL_RV_SENSING_ANODE_ELECTRODE_1: NORMAL
MDC_IDC_SET_LEADCHNL_RV_SENSING_ANODE_LOCATION_1: NORMAL
MDC_IDC_SET_LEADCHNL_RV_SENSING_CATHODE_ELECTRODE_1: NORMAL
MDC_IDC_SET_LEADCHNL_RV_SENSING_CATHODE_LOCATION_1: NORMAL
MDC_IDC_SET_LEADCHNL_RV_SENSING_POLARITY: NORMAL
MDC_IDC_SET_LEADCHNL_RV_SENSING_SENSITIVITY: 0.9 MV
MDC_IDC_SET_ZONE_DETECTION_INTERVAL: 370 MS
MDC_IDC_SET_ZONE_DETECTION_INTERVAL: 400 MS
MDC_IDC_SET_ZONE_STATUS: NORMAL
MDC_IDC_SET_ZONE_STATUS: NORMAL
MDC_IDC_SET_ZONE_TYPE: NORMAL
MDC_IDC_SET_ZONE_VENDOR_TYPE: NORMAL
MDC_IDC_STAT_AT_BURDEN_PERCENT: 0 %
MDC_IDC_STAT_AT_DTM_END: NORMAL
MDC_IDC_STAT_AT_DTM_START: NORMAL
MDC_IDC_STAT_BRADY_AP_VP_PERCENT: 12.89 %
MDC_IDC_STAT_BRADY_AP_VS_PERCENT: 0.22 %
MDC_IDC_STAT_BRADY_AS_VP_PERCENT: 83.62 %
MDC_IDC_STAT_BRADY_AS_VS_PERCENT: 3.27 %
MDC_IDC_STAT_BRADY_DTM_END: NORMAL
MDC_IDC_STAT_BRADY_DTM_START: NORMAL
MDC_IDC_STAT_BRADY_RA_PERCENT_PACED: 13.09 %
MDC_IDC_STAT_BRADY_RV_PERCENT_PACED: 96.5 %
MDC_IDC_STAT_EPISODE_RECENT_COUNT: 0
MDC_IDC_STAT_EPISODE_RECENT_COUNT_DTM_END: NORMAL
MDC_IDC_STAT_EPISODE_RECENT_COUNT_DTM_START: NORMAL
MDC_IDC_STAT_EPISODE_TOTAL_COUNT: 0
MDC_IDC_STAT_EPISODE_TOTAL_COUNT: 0
MDC_IDC_STAT_EPISODE_TOTAL_COUNT: 2
MDC_IDC_STAT_EPISODE_TOTAL_COUNT: 84
MDC_IDC_STAT_EPISODE_TOTAL_COUNT: 9
MDC_IDC_STAT_EPISODE_TOTAL_COUNT_DTM_END: NORMAL
MDC_IDC_STAT_EPISODE_TOTAL_COUNT_DTM_START: NORMAL
MDC_IDC_STAT_EPISODE_TYPE: NORMAL
MDC_IDC_STAT_TACHYTHERAPY_RECENT_DTM_END: NORMAL
MDC_IDC_STAT_TACHYTHERAPY_RECENT_DTM_START: NORMAL
MDC_IDC_STAT_TACHYTHERAPY_TOTAL_DTM_END: NORMAL
MDC_IDC_STAT_TACHYTHERAPY_TOTAL_DTM_START: NORMAL

## 2024-09-27 PROCEDURE — 93296 REM INTERROG EVL PM/IDS: CPT | Performed by: INTERNAL MEDICINE

## 2024-09-27 PROCEDURE — 93294 REM INTERROG EVL PM/LDLS PM: CPT | Performed by: INTERNAL MEDICINE

## 2024-09-29 DIAGNOSIS — G47.9 SLEEP DIFFICULTIES: ICD-10-CM

## 2024-09-30 RX ORDER — TRAZODONE HYDROCHLORIDE 50 MG/1
TABLET, FILM COATED ORAL
Qty: 60 TABLET | Refills: 0 | Status: SHIPPED | OUTPATIENT
Start: 2024-09-30

## 2024-10-01 ENCOUNTER — TELEPHONE (OUTPATIENT)
Dept: DERMATOLOGY | Facility: CLINIC | Age: 64
End: 2024-10-01
Payer: COMMERCIAL

## 2024-10-01 ENCOUNTER — NURSE TRIAGE (OUTPATIENT)
Dept: INTERNAL MEDICINE | Facility: CLINIC | Age: 64
End: 2024-10-01
Payer: COMMERCIAL

## 2024-10-01 DIAGNOSIS — R60.0 LEG EDEMA, RIGHT: Primary | ICD-10-CM

## 2024-10-01 PROCEDURE — 99207 REFERRAL TO ACUTE AND DIAGNOSTIC SERVICES: CPT | Performed by: INTERNAL MEDICINE

## 2024-10-01 NOTE — TELEPHONE ENCOUNTER
Nurse Triage SBAR    Is this a 2nd Level Triage? NO    Situation: Right lower leg burning pain and swelling x4 days from shin into foot.  Skin is reddened, warm, shiny and tight.  He is able to wear a shoe.  Denies wound, drainage or fever.     Background: History of diabetes and heart disease.  No history of clots and he denies recent long car trips, flights or bedrest.     Assessment: Rates pain in leg 5-6 out of 10.  Pain is worse when first bearing weight on right leg.  Actually seems to improve after he has been up moving around.  Possible deep vein thrombosis or cellulitis.    Protocol Recommended Disposition:   Go To Office Now    Recommendation: Recommendation is go to office now.  Patient states he has to work, won't get off until 5 p.m. and states he will then go to ED/UC to be evaluated.  Encouraged patient to be seen as soon as possible.      Routed to provider    Does the patient meet one of the following criteria for ADS visit consideration? 16+ years old, with an [facility] PCP    Reason for Disposition   Thigh, calf, or ankle swelling in only one leg   Thigh or calf pain and only 1 side and present > 1 hour    Additional Information   Negative: Sounds like a life-threatening emergency to the triager   Negative: Chest pain   Negative: Followed an insect bite and has localized swelling (e.g., small area of puffy or swollen skin)   Negative: Followed a knee injury   Negative: Ankle or foot injury   Negative: Pregnant with leg swelling or edema   Negative: Difficulty breathing at rest   Negative: Entire foot is cool or blue in comparison to other side   Negative: SEVERE swelling (e.g., swelling extends above knee, entire leg is swollen, weeping fluid)   Negative: Cast on leg or ankle and has increasing pain   Negative: Can't walk or can barely stand (new-onset)   Negative: Fever and red area (or area very tender to touch)   Negative: Patient sounds very sick or weak to the triager   Negative: Thigh, calf, or  "ankle swelling in both legs, but one side is definitely more swollen (Exception: Longstanding difference between legs.)   Negative: Swelling of face, arm or hands  (Exception: Slight puffiness of fingers during hot weather.)   Negative: Pregnant 20 or more weeks and sudden weight gain (i.e., > 2 lbs, 1 kg in one week)    Answer Assessment - Initial Assessment Questions  1. ONSET: \"When did the swelling start?\" (e.g., minutes, hours, days)      4 days ago  2. LOCATION: \"What part of the leg is swollen?\"  \"Are both legs swollen or just one leg?\"      Right lower leg from shin into foot  3. SEVERITY: \"How bad is the swelling?\" (e.g., localized; mild, moderate, severe)    - Localized: Small area of swelling localized to one leg.    - MILD pedal edema: Swelling limited to foot and ankle, pitting edema < 1/4 inch (6 mm) deep, rest and elevation eliminate most or all swelling.    - MODERATE edema: Swelling of lower leg to knee, pitting edema > 1/4 inch (6 mm) deep, rest and elevation only partially reduce swelling.    - SEVERE edema: Swelling extends above knee, facial or hand swelling present.       Tight, shiny, painful  4. REDNESS: \"Does the swelling look red or infected?\"      Redness and warmth present  5. PAIN: \"Is the swelling painful to touch?\" If Yes, ask: \"How painful is it?\"   (Scale 1-10; mild, moderate or severe)      Yes, rates pain 5-6 out of 10  6. FEVER: \"Do you have a fever?\" If Yes, ask: \"What is it, how was it measured, and when did it start?\"       no  7. CAUSE: \"What do you think is causing the leg swelling?\"      Does not know  8. MEDICAL HISTORY: \"Do you have a history of blood clots (e.g., DVT), cancer, heart failure, kidney disease, or liver failure?\"      Denies history of blood clots, recent bedrest or recent travel. Does have a history of diabetes and heart disease  9. RECURRENT SYMPTOM: \"Have you had leg swelling before?\" If Yes, ask: \"When was the last time?\" \"What happened that time?\"      " "No  10. OTHER SYMPTOMS: \"Do you have any other symptoms?\" (e.g., chest pain, difficulty breathing)        Denies  11. PREGNANCY: \"Is there any chance you are pregnant?\" \"When was your last menstrual period?\"        NA    Protocols used: Leg Swelling and Edema-A-OH    "

## 2024-10-01 NOTE — TELEPHONE ENCOUNTER
S/w pt who states he received answers to his previous questions and is currently in UC being evaluated.    Nurse advised of Kaycee's message from 1:56 and new message from nurse at 2:34 pm.  Nurse read messages to pt and he states he will send pictures after appt.    Chari PEREZ RN  [facility]  [phone]

## 2024-10-01 NOTE — TELEPHONE ENCOUNTER
[facility] Call Center    Phone Message    May a detailed message be left on voicemail: yes     Reason for Call: Other: Pt calling back to clarify questions from [employer] message. Please call back.      Action Taken: Other: OX Derm    Travel Screening: Not Applicable

## 2024-10-02 ENCOUNTER — HOSPITAL ENCOUNTER (EMERGENCY)
Facility: CLINIC | Age: 64
Discharge: HOME | End: 2024-10-02
Attending: EMERGENCY MEDICINE | Admitting: EMERGENCY MEDICINE
Payer: COMMERCIAL

## 2024-10-02 VITALS
TEMPERATURE: 98.7 F | DIASTOLIC BLOOD PRESSURE: 89 MMHG | OXYGEN SATURATION: 99 % | BODY MASS INDEX: 30.36 KG/M2 | RESPIRATION RATE: 18 BRPM | SYSTOLIC BLOOD PRESSURE: 144 MMHG | HEART RATE: 97 BPM | WEIGHT: 188.93 LBS | HEIGHT: 66 IN

## 2024-10-02 DIAGNOSIS — L51.1: ICD-10-CM

## 2024-10-02 DIAGNOSIS — L27.0 DRUG ERUPTION: ICD-10-CM

## 2024-10-02 PROCEDURE — 99283 EMERGENCY DEPT VISIT LOW MDM: CPT

## 2024-10-02 RX ORDER — PREDNISONE 10 MG/1
TABLET ORAL
Qty: 40 TABLET | Refills: 0 | Status: SHIPPED | OUTPATIENT
Start: 2024-10-02

## 2024-10-02 ASSESSMENT — COLUMBIA-SUICIDE SEVERITY RATING SCALE - C-SSRS
2. HAVE YOU ACTUALLY HAD ANY THOUGHTS OF KILLING YOURSELF IN THE PAST MONTH?: NO
1. IN THE PAST MONTH, HAVE YOU WISHED YOU WERE DEAD OR WISHED YOU COULD GO TO SLEEP AND NOT WAKE UP?: NO
6. HAVE YOU EVER DONE ANYTHING, STARTED TO DO ANYTHING, OR PREPARED TO DO ANYTHING TO END YOUR LIFE?: NO

## 2024-10-02 ASSESSMENT — ACTIVITIES OF DAILY LIVING (ADL): ADLS_ACUITY_SCORE: 33

## 2024-10-02 NOTE — ED PROVIDER NOTES
"  Emergency Department Note      History of Present Illness     Chief Complaint   Wound Check      HPI   Sumit Park is a 64 year old male who presents for a wound check and full body rashes. Patient reports reaction to terbinafine, he has since been off it for about 16 days. He has had 4 doses of keflex starting yesterday. Patient reports his prednisone course finished today and the rash has gotten worse again, increasing in his chest, neck and left leg. He reports of a ruptured blister to his lower right leg today. Reports increased swelling to right leg and and hands. Reports rash burns like acid. Reports chronic left eye dryness but increased right eye dryness today. Endorses chills 2 days ago. Denies fever and dysuria. Reports doctors appointment next Monday    Independent Historian   None    Review of External Notes   Derm Note 9/15 - drug rash, put on prednisone taper    Past Medical History     Medical History and Problem List   Achloryhdia  CAD  Cardiac pacemaker  Celiac disease  DM 2, no insulin  Dyslipidemia  Gilbert syndrome  Hypertension  Morbid obesity  ZULEIKA  Osteoarthrosis  Vit D deficiency    Medications   Keflex  Norvasc  Aspirin  Lipitor  Vit D3  Fe  Glucophage  Nitrostat  Deltasone  Desyrel    Surgical History   Angioplasty  Colonoscopy  Esophagoscopy, gastroscopy, duodenoscopy  Herniorrhaphy incisional  Gastric bypass  Leg surgery  Repair bicep tendon  Wrist tendon    Physical Exam     Patient Vitals for the past 24 hrs:   BP Temp Temp src Pulse Resp SpO2 Height Weight   10/02/24 1251 (!) 144/89 98.7  F (37.1  C) Temporal 97 18 99 % 1.676 m (5' 6\") 85.7 kg (188 lb 15 oz)     Physical Exam  Constitutional: Alert, attentive, GCS 15   HENT:    Mouth/Throat: no tongue, lip or cheek erosions   Eyes: Clear  CV: distal extremities warm, well perfused  Chest: Non-labored breathing on RA  GI:  non tender. No distension. No guarding or rebound.    Neurological: Alert, attentive, moving all extremities " equally.   Skin: diffuse morbiliform deescalating rash, occupying approximately 10% of TBSA. No interceding erythema or tenderness with a small amount of weeping to the lower right leg        Diagnostics     Lab Results   Labs Ordered and Resulted from Time of ED Arrival to Time of ED Departure - No data to display    Imaging   No orders to display       Independent Interpretation   None    ED Course      Medications Administered   Medications - No data to display    Procedures   Procedures     Discussion of Management   None    ED Course   ED Course as of 10/02/24 1454   Wed Oct 02, 2024   1411 I obtained history and examined the patient as noted above         Additional Documentation  None    Medical Decision Making / Diagnosis     CMS Diagnoses: None    MIPS       None    MDM   Sumit Park is a 64 year old male past medical history seen for diabetes, hypertension, coronary disease presenting for diffuse drug rash.  He was seen in dermatology 2 weeks ago they thought drug eruption secondary to terbinafine treated initially conservatively then put on steroid burst after it worsens 2 days later, this was on 917, he completed the steroid burst today and noticed his rash slowly worsening yesterday into today.  On exam there is effectively head to toe does spare the eyes and mouth, he does not entirely confluent with spot blotchy sections of desquamating rash and small fluid-filled blisters on areas of friction like between the fingers and he has some weeping to his right lower leg.  Does not clearly infected secondarily at this time and he is on Keflex.  I recommended hospitalization, likely transfer to a higher level of care where bedside dermatology consultation is available as well as consideration of transfer to burn center.  Patient expressed that he is not interested in being admitted at this time settings for life events that he has to attend he does however agree to return and present directly to those  centers should his rash continues to spread or develop I involve develops fever, etc.  I will put back on steroid burst consistent with his dermatologist had a monitor that seem to be improving his symptoms.  I recommended he continue the Keflex avoid the sun and anything causing friction.  I recommended he keep his weeping areas clean dry covered with clean gauze.  And changing them frequently to avoid over soaking skin.  He expressed understanding of this and was discharged.    Disposition   The patient was discharged.     Diagnosis     ICD-10-CM    1. Drug eruption  L27.0       2. 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        Corona Heller MD  10/02/24 2429

## 2024-10-02 NOTE — ED TRIAGE NOTES
Pt here for RLE pain. Has been dealing with a sloughing drug rash from terbinafine (followed by derm and his PCP) for a few weeks. Had a large, painful, fluid filled blister on his right lower leg. Seen yesterday and told if the blister popped he needs to be seen in the ED. Was started on Keflex which he is taking as prescribed. Recently finish prednisone. Pant leg is wet with drainage. Follow-up w/ PCP is scheduled for next week.

## 2024-10-02 NOTE — DISCHARGE INSTRUCTIONS
It was recommended that you stay in the hospital for admission, as well within your right to refuse this.  Certainly you should return immediately or go to [facility], [facility] [place] [facility] or [facility] if you have worsening symptoms they will have a dermatologist that can see you at bedside and worsening drug eruptions known as Acevedo-Anish syndrome or TENS are managed typically at the burn center is due to the specific nature of dressing changes.  You should go there immediately if you develop spreading rash, more sloughing or skin, high fever, have involvement of your mouth, burning when you urinate or worsening eye irritation.

## 2024-10-03 ENCOUNTER — PATIENT OUTREACH (OUTPATIENT)
Dept: INTERNAL MEDICINE | Facility: CLINIC | Age: 64
End: 2024-10-03
Payer: COMMERCIAL

## 2024-10-03 NOTE — TELEPHONE ENCOUNTER
Transitions of Care Outreach  Chief Complaint   Patient presents with    Hospital F/U     Drug Eruption  Chang Anish Syndrome       Most Recent Admission Date: 10/2/2024   Most Recent Admission Diagnosis:      Most Recent Discharge Date: 10/2/2024   Most Recent Discharge Diagnosis: Drug eruption - L27.0  Erythema multiforme bullosum (Acevedo Anish syndrome) (H) - L51.1     Transitions of Care Assessment         Follow up Plan          Future Appointments   Date Time Provider Department Center   10/7/2024  9:30 AM Chetna Pringle APRN CNP [facility]II[facility]   1/10/2025 12:00 AM [facility]   2/14/2025  9:45 AM RI LAB [facility]ILAB[facility]   2/17/2025  1:00 PM Tra Saldana MD [facility] SLEEP   2/21/2025  9:30 AM Candis Altamirano MD RI [facility]   8/15/2025 11:00 AM Kaycee Brewer, PAANGEL OXDERM OX       Outpatient Plan as outlined on AVS reviewed with patient.    For any urgent concerns, please contact our 24 hour nurse triage line: [phone] ([phone])       Mary Ellen Vora RN

## 2024-10-07 ENCOUNTER — OFFICE VISIT (OUTPATIENT)
Dept: INTERNAL MEDICINE | Facility: CLINIC | Age: 64
End: 2024-10-07
Payer: COMMERCIAL

## 2024-10-07 VITALS
OXYGEN SATURATION: 98 % | TEMPERATURE: 97.8 F | RESPIRATION RATE: 18 BRPM | DIASTOLIC BLOOD PRESSURE: 67 MMHG | SYSTOLIC BLOOD PRESSURE: 127 MMHG | BODY MASS INDEX: 30.84 KG/M2 | WEIGHT: 191.1 LBS | HEART RATE: 67 BPM

## 2024-10-07 DIAGNOSIS — L51.1 STEVENS-JOHNSON SYNDROME (H): Primary | ICD-10-CM

## 2024-10-07 PROCEDURE — 99215 OFFICE O/P EST HI 40 MIN: CPT

## 2024-10-07 RX ORDER — CETIRIZINE HYDROCHLORIDE 10 MG/1
10 TABLET ORAL DAILY
Qty: 10 TABLET | Refills: 0 | Status: SHIPPED | OUTPATIENT
Start: 2024-10-07

## 2024-10-07 RX ORDER — FAMOTIDINE 40 MG/1
40 TABLET, FILM COATED ORAL 2 TIMES DAILY
Qty: 20 TABLET | Refills: 0 | Status: SHIPPED | OUTPATIENT
Start: 2024-10-07 | End: 2024-10-17

## 2024-10-07 RX ORDER — CEPHALEXIN 500 MG/1
500 CAPSULE ORAL 4 TIMES DAILY
Qty: 28 CAPSULE | Refills: 0 | Status: SHIPPED | OUTPATIENT
Start: 2024-10-07 | End: 2024-10-14

## 2024-10-07 ASSESSMENT — PAIN SCALES - GENERAL: PAINLEVEL: MILD PAIN (3)

## 2024-10-07 NOTE — PROGRESS NOTES
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 Pt reports that the widespread rash developed 10/5/2024 to his arms, and frontal and dorsal aspect of torso that is really itchy.  Plan: cetirizine (ZYRTEC) 10 MG tablet, famotidine         (PEPCID) 40 MG tablet, Adult Dermatology          Referral, cephALEXin (KEFLEX) 500 MG         capsule        Prescription sent to pharmacy        MED REC REQUIRED  Post Medication Reconciliation Status:     BMI  Estimated body mass index is 30.84 kg/m  as calculated from the following:    Height as of 10/2/24: 1.676 m (5' 6\").    Weight as of this encounter: 86.7 kg (191 lb 1.6 oz).             Shantal Arana is a 64 year old, presenting for the following health issues:This is a follow-up to an ED visit on 10/2/2024 for a pt who presented for diffuse drug rash identified as Acevedo-Anish syndrome.  He was seen in dermatology who thought he had a drug eruption secondary to terbinafine.  They placed him on a steroid burst.  His rash worsened.  He had head to toe sparing the eyes and mouth desquamating rash and fluid filled blisters on areas of friction between the fingers and had some weeping to his right lower leg.  He did not present with any secondary infection. He was placed on Keflex. They recommended hospitalization and the patient declined due to having necessary private obligations.    Pt presents with worsening sx of rash and pruritus. Pt reports that the widespread rash developed 10/5/2024 to his arms, and frontal and dorsal aspect of torso that is really itchy. There is wide spread desquamation on both lower extremities and feet.There is an area on the lateral aspect of lower right leg that is erythematous and swollen and tender to the touch. It is not warm to the touch. Pt has one more day left of his cephalexin and he is still on Prednisone. Pt " denies any recent fevers since the ED visit. Blisters have all resolved and there are no longer any open weeping areas since he started on the cephalexin.     Provided to patient some literature about Chang Anish syndrome.  Explained to patient to take famotidine twice a day for 10 days or until the rash subsides whichever comes first.  Also instructed patient to take cetirizine once a day for 10 days or until the rash subsides whichever comes first.  Extended his cephalexin regimen duration for another 7 days.  Gave patient instructions that if his wound on the lateral aspect of his right leg opens up  with purulent drainage or becomes more red and warm to the touch to go to the ED.  In the meantime an urgent follow-up to dermatology has been placed. Instructed patient to apply moisturizing cream to extremities to prevent cracking of the skin that would be an avenue for infection      Hospital F/U        10/7/2024     9:18 AM   Additional Questions   Roomed by hope r   Accompanied by self         10/7/2024     9:18 AM   Patient Reported Additional Medications   Patient reports taking the following new medications no     HPI               Review of Systems  Constitutional, HEENT, cardiovascular, pulmonary, gi and gu systems are negative, except as otherwise noted.      Objective    /67 (BP Location: Right arm, Patient Position: Sitting, Cuff Size: Adult Regular)   Pulse 67   Temp 97.8  F (36.6  C) (Oral)   Resp 18   Wt 86.7 kg (191 lb 1.6 oz)   SpO2 98%   BMI 30.84 kg/m    Body mass index is 30.84 kg/m .  Physical Exam   GENERAL: alert and no distress  NECK: no adenopathy, no asymmetry, masses, or scars  RESP: lungs clear to auscultation - no rales, rhonchi or wheezes  CV: regular rate and rhythm, normal S1 S2, no S3 or S4, no murmur, click or rub, no peripheral edema  ABDOMEN: soft, nontender, no hepatosplenomegaly, no masses and bowel sounds normal  MS: no gross musculoskeletal defects noted, no  edema  SKIN: Patient has wide spread hive-like rash all over the frontal and dorsal aspect of his torso and left medial/frontal aspect of both arms and on both legs.  There is no drainage, skin is intact.  There is no bulla.  There is an erythematous/purplish area on the lateral aspect of his right ankle that is tender, slightly swollen, not warm to the touch and skin is intact  NEURO: Normal strength and tone, mentation intact and speech normal  PSYCH: mentation appears normal, affect normal/bright          80 minutes spent by me on the date of the encounter doing chart review, review of outside records, review of test results, interpretation of tests, patient visit, documentation, discussion with other provider(s), and pt education and call to other providers to immediately implement further diagnostics and evaluation      Signed Electronically by: CHRISTOPHER Wang CNP

## 2024-10-07 NOTE — PATIENT INSTRUCTIONS
Take famotidine twice a day for 10 days or until the rash subsides whichever comes first.    Take cetirizine once a day for 10 days or until the rash subsides whichever comes first.    Take cephalexin with food probiotic or yogurt to reduce stomach upset.     If the wound to the lateral aspect of your right leg opens up or becomes more red and warm to the touch go to the ED

## 2024-10-08 ENCOUNTER — OFFICE VISIT (OUTPATIENT)
Dept: DERMATOLOGY | Facility: CLINIC | Age: 64
End: 2024-10-08
Payer: COMMERCIAL

## 2024-10-08 DIAGNOSIS — L27.0 DRUG ERUPTION: Primary | ICD-10-CM

## 2024-10-08 PROCEDURE — 99213 OFFICE O/P EST LOW 20 MIN: CPT | Performed by: PHYSICIAN ASSISTANT

## 2024-10-08 NOTE — PROGRESS NOTES
HPI:   Chief complaints: Sumit Park is a pleasant 64 year old male who presents for recheck drug eruption. He was seen 9/10 and was given prednisone. He reports this helped considerably but that he started to flare when he got down to 10 mg daily. He went to the ER over the weekend and was dxd with SJS and placed on another prednisone taper. He declined hospitalization at that time. Since restarting prednisone he is much improved. His skin is peeling all over but moisturizers have helped to soothe this.       PHYSICAL EXAM:    There were no vitals taken for this visit.  Skin exam performed as follows: Type 2 skin. Mood appropriate  Alert and Oriented X 3. Well developed, well nourished in no distress.  General appearance: Normal  Head including face: Normal  Eyes: conjunctiva and lids: Normal  Mouth: Lips, teeth, gums: Normal  Neck: Normal  Skin: Scalp and body hair: See below    Dermatitis on bilateral upper inner thighs without vesicles or bullae   Skin desquamation on arms, legs, trunk    ASSESSMENT/PLAN:     Drug eruption - considerable improved today with minimal active areas. He is currently taking his 2nd prednisone taper. No bullae. No lesions of oral mucosa or other mucous membranes. He will update me if he flares and will increase prednisone.   --Continue TAC BID as needed to new/active areas  --Finish prednisone taper   --Thick moisturizers daily          Follow-up: asap if worsening  CC:   Scribed By: Kaycee Brewer, MS, PA-C

## 2024-10-08 NOTE — LETTER
10/8/2024      Sumit Park  [id number]  [place] [address]      Dear Colleague,    Thank you for referring your patient, Sumit Park, to the [facility]. Please see a copy of my visit note below.    HPI:   Chief complaints: Sumit Park is a pleasant 64 year old male who presents for recheck drug eruption. He was seen 9/10 and was given prednisone. He reports this helped considerably but that he started to flare when he got down to 10 mg daily. He went to the ER over the weekend and was dxd with SJS and placed on another prednisone taper. He declined hospitalization at that time. Since restarting prednisone he is much improved. His skin is peeling all over but moisturizers have helped to soothe this.       PHYSICAL EXAM:    There were no vitals taken for this visit.  Skin exam performed as follows: Type 2 skin. Mood appropriate  Alert and Oriented X 3. Well developed, well nourished in no distress.  General appearance: Normal  Head including face: Normal  Eyes: conjunctiva and lids: Normal  Mouth: Lips, teeth, gums: Normal  Neck: Normal  Skin: Scalp and body hair: See below    Dermatitis on bilateral upper inner thighs without vesicles or bullae   Skin desquamation on arms, legs, trunk    ASSESSMENT/PLAN:     Drug eruption - considerable improved today with minimal active areas. He is currently taking his 2nd prednisone taper. No bullae. No lesions of oral mucosa or other mucous membranes. He will update me if he flares and will increase prednisone.   --Continue TAC BID as needed to new/active areas  --Finish prednisone taper   --Thick moisturizers daily          Follow-up: asap if worsening  CC:   Scribed By: Kaycee Brewer, MS, PAHipolitoC      Again, thank you for allowing me to participate in the care of your patient.        Sincerely,        Kaycee Brewer PA-C

## 2024-10-10 DIAGNOSIS — Z98.84 GASTRIC BYPASS STATUS FOR OBESITY: ICD-10-CM

## 2024-10-10 RX ORDER — FERROUS SULFATE 325(65) MG
325 TABLET ORAL DAILY
Qty: 90 TABLET | Refills: 1 | Status: SHIPPED | OUTPATIENT
Start: 2024-10-10

## 2024-10-16 ENCOUNTER — MYC MEDICAL ADVICE (OUTPATIENT)
Dept: DERMATOLOGY | Facility: CLINIC | Age: 64
End: 2024-10-16
Payer: COMMERCIAL

## 2024-10-16 DIAGNOSIS — L27.0 DRUG ERUPTION: Primary | ICD-10-CM

## 2024-10-17 RX ORDER — PREDNISONE 10 MG/1
TABLET ORAL
Qty: 30 TABLET | Refills: 0 | Status: SHIPPED | OUTPATIENT
Start: 2024-10-17

## 2024-10-17 NOTE — TELEPHONE ENCOUNTER
"Yes I\"d like him to do another, shorter taper.   Order pended. Ok to send to pharmacy of choice.     "

## 2024-10-17 NOTE — TELEPHONE ENCOUNTER
Please see Sionex message and advise.    Thank you,    Joelle SOSARN BSN  [facility]  [phone]

## 2024-10-27 DIAGNOSIS — G47.9 SLEEP DIFFICULTIES: ICD-10-CM

## 2024-10-30 RX ORDER — TRAZODONE HYDROCHLORIDE 50 MG/1
TABLET, FILM COATED ORAL
Qty: 60 TABLET | Refills: 5 | Status: SHIPPED | OUTPATIENT
Start: 2024-10-30

## 2024-12-05 ENCOUNTER — OFFICE VISIT (OUTPATIENT)
Dept: INTERNAL MEDICINE | Facility: CLINIC | Age: 64
End: 2024-12-05
Payer: COMMERCIAL

## 2024-12-05 VITALS
DIASTOLIC BLOOD PRESSURE: 78 MMHG | TEMPERATURE: 97.5 F | SYSTOLIC BLOOD PRESSURE: 125 MMHG | OXYGEN SATURATION: 100 % | HEART RATE: 76 BPM | BODY MASS INDEX: 29.04 KG/M2 | WEIGHT: 179.9 LBS | RESPIRATION RATE: 16 BRPM

## 2024-12-05 DIAGNOSIS — Z51.81 THERAPEUTIC DRUG MONITORING: ICD-10-CM

## 2024-12-05 DIAGNOSIS — L03.115 CELLULITIS OF RIGHT LOWER EXTREMITY: Primary | ICD-10-CM

## 2024-12-05 LAB
BASOPHILS # BLD AUTO: 0 10E3/UL (ref 0–0.2)
BASOPHILS NFR BLD AUTO: 0 %
EOSINOPHIL # BLD AUTO: 0.1 10E3/UL (ref 0–0.7)
EOSINOPHIL NFR BLD AUTO: 1 %
ERYTHROCYTE [DISTWIDTH] IN BLOOD BY AUTOMATED COUNT: 12.9 % (ref 10–15)
ERYTHROCYTE [SEDIMENTATION RATE] IN BLOOD BY WESTERGREN METHOD: 7 MM/HR (ref 0–20)
HCT VFR BLD AUTO: 39 % (ref 40–53)
HGB BLD-MCNC: 13.1 G/DL (ref 13.3–17.7)
IMM GRANULOCYTES # BLD: 0 10E3/UL
IMM GRANULOCYTES NFR BLD: 0 %
LYMPHOCYTES # BLD AUTO: 2.3 10E3/UL (ref 0.8–5.3)
LYMPHOCYTES NFR BLD AUTO: 26 %
MCH RBC QN AUTO: 30.4 PG (ref 26.5–33)
MCHC RBC AUTO-ENTMCNC: 33.6 G/DL (ref 31.5–36.5)
MCV RBC AUTO: 91 FL (ref 78–100)
MONOCYTES # BLD AUTO: 0.5 10E3/UL (ref 0–1.3)
MONOCYTES NFR BLD AUTO: 5 %
NEUTROPHILS # BLD AUTO: 6.2 10E3/UL (ref 1.6–8.3)
NEUTROPHILS NFR BLD AUTO: 68 %
PLATELET # BLD AUTO: 204 10E3/UL (ref 150–450)
RBC # BLD AUTO: 4.31 10E6/UL (ref 4.4–5.9)
WBC # BLD AUTO: 9 10E3/UL (ref 4–11)

## 2024-12-05 RX ORDER — CEPHALEXIN 500 MG/1
500 CAPSULE ORAL 4 TIMES DAILY
Qty: 28 CAPSULE | Refills: 0 | Status: SHIPPED | OUTPATIENT
Start: 2024-12-05 | End: 2024-12-12

## 2024-12-05 ASSESSMENT — PAIN SCALES - GENERAL: PAINLEVEL_OUTOF10: NO PAIN (0)

## 2024-12-05 NOTE — PROGRESS NOTES
"  Assessment & Plan     Cellulitis of right lower extremity  pinpoint pustule filled vesicles that are concentrated in one area and red and spreading out 2cm from the concentrated area with a centralized diameter of 1.2cm, no draining, skin intact, slight warmth to the touch of the 1.2 cm centralized area  - Adult Dermatology  Referral; Future  - ESR: Erythrocyte sedimentation rate; Future  - CRP, inflammation; Future  - CBC with platelets and differential; Future  - cephALEXin (KEFLEX) 500 MG capsule; Take 1 capsule (500 mg) by mouth 4 times daily for 7 days.  - ESR: Erythrocyte sedimentation rate  - CRP, inflammation  - CBC with platelets and differential    Therapeutic drug monitoring  - Hepatic panel          BMI  Estimated body mass index is 29.04 kg/m  as calculated from the following:    Height as of 10/2/24: 1.676 m (5' 6\").    Weight as of this encounter: 81.6 kg (179 lb 14.4 oz).             Shantal Arana is a 64 year old, presenting for the following health issues: Pt was seen 10/1/2024 at UC and again on 10/2/2024 at ED for Chang's Anish syndrome, a reaction from turbinafine.  Pt reports that the rash on lateral aspect of right calf started itching last night. The site where it itches was where it was weeping when he had Chang's Anish Syndrome but that site had completely cleared up. Now the site is starting up with itching and pinpoint pustule filled vesicles that are concentrated in one area and red and spreading out 2cm from the concentrated area with a centralized diameter of 1.2cm. Pt denies any new hygiene products or medicines recently. Pt admits to chronic night sweats. Pt denies having scratched the area. Pt just recently had tetanus shot a couple weeks ago.    Provided literature about cellulitis to patient and prescribed cephalexin and instructed patient to take with food probiotic or yogurt to reduce stomach upset.  Testing for his inflammatory markers of ESR and CRP and " explained to patient that if they are elevated I will be prescribing prednisone as well.  Referred patient to Derm for further evaluation.  Rash        12/5/2024     1:16 PM   Additional Questions   Roomed by hope r   Accompanied by self         12/5/2024     1:16 PM   Patient Reported Additional Medications   Patient reports taking the following new medications n/a     Rash  Associated symptoms include a rash.   History of Present Illness       Reason for visit:  Reoccuring rash on right leg    He eats 0-1 servings of fruits and vegetables daily.He consumes 1 sweetened beverage(s) daily.He exercises with enough effort to increase his heart rate 10 to 19 minutes per day.  He exercises with enough effort to increase his heart rate 4 days per week.   He is taking medications regularly.                 Review of Systems  Constitutional, HEENT, cardiovascular, pulmonary, gi and gu systems are negative, except as otherwise noted.      Objective    BP (!) 150/79 (BP Location: Right arm, Patient Position: Sitting, Cuff Size: Adult Regular)   Pulse 76   Temp 97.5  F (36.4  C) (Tympanic)   Resp 16   Wt 81.6 kg (179 lb 14.4 oz)   SpO2 100%   BMI 29.04 kg/m    Body mass index is 29.04 kg/m .  Physical Exam   GENERAL: alert and no distress  HENT: ear canals and TM's normal, nose and mouth without ulcers or lesions  NECK: no adenopathy, no asymmetry, masses, or scars  RESP: lungs clear to auscultation - no rales, rhonchi or wheezes  CV: regular rate and rhythm, normal S1 S2, no S3 or S4, no murmur, click or rub, no peripheral edema  ABDOMEN: soft, nontender, no hepatosplenomegaly, no masses and bowel sounds normal  MS: no gross musculoskeletal defects noted, no edema  SKIN: pinpoint pustule filled vesicles that are concentrated in one area and red and spreading out 2cm from the concentrated area with a centralized diameter of 1.2cm, no draining, skin intact, slight warmth to the touch of the 1.2 cm centralized area  NEURO:  Normal strength and tone, mentation intact and speech normal  PSYCH: mentation appears normal, affect normal/bright            Signed Electronically by: CHRISTOPHER Wang CNP

## 2024-12-09 ENCOUNTER — OFFICE VISIT (OUTPATIENT)
Dept: DERMATOLOGY | Facility: CLINIC | Age: 64
End: 2024-12-09
Payer: COMMERCIAL

## 2024-12-09 DIAGNOSIS — L30.9 DERMATITIS: ICD-10-CM

## 2024-12-09 RX ORDER — TRIAMCINOLONE ACETONIDE 1 MG/G
OINTMENT TOPICAL
Qty: 15 G | Refills: 0 | Status: SHIPPED | OUTPATIENT
Start: 2024-12-09

## 2024-12-09 NOTE — LETTER
12/9/2024      Sumit Park  [address]  [facility] [address]      Dear Colleague,    Thank you for referring your patient, Sumit Park, to the [facility]. Please see a copy of my visit note below.    [facility] Dermatology Note  Encounter Date: Dec 9, 2024  Office Visit     Reviewed patients past medical history and pertinent chart review prior to patients visit today.     Dermatology Problem List:  # Eczematous dermatitis, right lateral calf  -triamcinolone ointment  ____________________________________________    Assessment & Plan:     # Nummular dermatitis vs contact dermatitis, right lateral calf  - Start triamcinolone ointment to the affected area twice daily for up to 2 weeks at a time or until resolution. Thereafter, may use as needed for flares. Potential side effects from prolonged topical steroid use such as skin atrophy were reviewed. Do not apply topical steroid to face or skin folds.    -Encouraged regular use of an emollient such as Vanicream, Aquaphor, Cera Ve Cream or Cetaphil Cream to the entire body.     Return to clinic in 1 month if not fully resolved or if rash worsens, may return sooner PRN for new or worsening conditions.    All risks, benefits and alternatives were discussed with patient.  Patient is in agreement and understands the assessment and plan.  All questions were answered.  Laura Anderson PA-C  [facility] Dermatology  _______________________________________    CC: Rash (Right leg. )     HPI:  Mr. Sumit Park is a(n) 64 year old male who presents today as a return patient for evaluation of a rash. The rash is located on the right leg.  He was seen in urgent care on 12/5/2024 and was diagnosed with cellulitis. He was treated with Keflex 500 mg 4 times daily for 7 days.  Labs were also obtained including CBC, CRP, and ESR which were within normal limits. The patient reports the rash first started 1  week ago.  It was initially pruritic which is why he first noticed it. He then scratched at the area. Patient has been taking the Keflex with some improvement. The pruritus has improved. He is not applying any topicals to the area. He does not apply any lotions/moisturizers to his skin. He does not recall coming into contact with anything at this site.    Patient is otherwise feeling well, without additional skin concerns.    Physical Exam:  Vitals: There were no vitals taken for this visit.  SKIN: Focused examination of bilateral lower extremities was performed.  - 2 pink annular plaques with vesicles arising within and overlying scale.    - No other lesions of concern on areas examined.     Medications:  Current Outpatient Medications   Medication Sig Dispense Refill     alcohol swab prep pads Use to swab area of injection/joe as directed. 100 each 1     aspirin (ASPIRIN LOW DOSE) 81 MG EC tablet TAKE ONE TABLET BY MOUTH EVERY DAY FOR HEART DISEASE  **DO NOT CHEW** FOR HEART DISEASE  **DO NOT CHEW** 100 tablet 2     atorvastatin (LIPITOR) 40 MG tablet Take 1 tablet (40 mg) by mouth daily. 90 tablet 1     blood glucose (NO BRAND SPECIFIED) test strip Use to test blood sugar 1 times daily or as directed. 100 strip 1     blood glucose calibration (NO BRAND SPECIFIED) solution Use to calibrate blood glucose monitor as needed as directed. 1 each 1     blood glucose monitoring (NO BRAND SPECIFIED) meter device kit Use to test blood sugar 1 times daily or as directed. 1 kit 0     cephALEXin (KEFLEX) 500 MG capsule Take 1 capsule (500 mg) by mouth 4 times daily for 7 days. 28 capsule 0     cetirizine (ZYRTEC) 10 MG tablet Take 1 tablet (10 mg) by mouth daily. 10 tablet 0     ferrous sulfate (FEROSUL) 325 (65 Fe) MG tablet Take 1 tablet (325 mg) by mouth daily 90 tablet 1     folic acid (FOLVITE) 1 MG tablet Take 1 tablet by mouth daily 90 tablet 1     ivermectin (SOOLANTRA) 1 % cream Apply to face once daily 45 g 11      lisinopril (ZESTRIL) 20 MG tablet Take 1 tablet (20 mg) by mouth 2 times daily. 180 tablet 1     metFORMIN (GLUCOPHAGE) 500 MG tablet Take 2 tablets (1,000 mg) by mouth 2 times daily (with meals). 360 tablet 1     metoprolol succinate ER (TOPROL XL) 25 MG 24 hr tablet Take 0.5 tablet (12.5 mg) by mouth daily 45 tablet 1     nitroGLYcerin (NITROSTAT) 0.4 MG sublingual tablet For chest pain place 1 tablet under the tongue every 5 minutes for 3 doses. If symptoms persist 5 minutes after 1st dose call 911. 10 tablet 3     sitagliptin (JANUVIA) 50 MG tablet Take 1 tablet (50 mg) by mouth daily. 90 tablet 1     thin (NO BRAND SPECIFIED) lancets Use with lanceting device. 100 each 1     traZODone (DESYREL) 50 MG tablet Take 1 - 2 tablets ( mg) by mouth nightly as needed for sleep. 60 tablet 5     Turmeric (QC TUMERIC COMPLEX PO) Take by mouth.       Vitamin D3 (CHOLECALCIFEROL) 25 mcg (1000 units) tablet Take by mouth daily       zinc gluconate 50 MG tablet Take 50 mg by mouth daily       No current facility-administered medications for this visit.      Past Medical History:   Patient Active Problem List   Diagnosis     DM 2, no insulin (H)     Gastric bypass status for obesity - 2015     CAD, 2 stents 1999, 2018     Cardiac pacemaker in situ     Celiac disease     ZULEIKA (obstructive sleep apnea)     Complete heart block (H) -- s/p PPM 2018     HTN, goal below 140/90     Morbid obesity (H)     Achlorhydria     Dyslipidemia     Gilbert syndrome     H/O acute pancreatitis     Non-sustained ventricular tachycardia (H)     Osteoarthrosis involving lower leg     S/P cardiac pacemaker procedure     Second degree heart block     Tinea of nail     Vitamin D deficiency     Infection due to 2019 novel coronavirus - Nov 2021 - He received Monoclonal antibodies     Diarrhea     Elevated bilirubin     Jaundice     Preop general physical exam     Past Medical History:   Diagnosis Date     Basal cell carcinoma      CAD, 2 stents  1999, 2018 05/10/2021     Cardiac pacemaker in situ 05/10/2021     Celiac disease 05/10/2021     Complete heart block (H) -- s/p PPM 2018 05/10/2021     DM 2, no insulin (H) 05/10/2021     Gastric bypass status for obesity - 2015 05/10/2021     HTN, goal below 140/80 05/10/2021     Hyperlipidemia LDL goal <100 05/10/2021     ZULEIKA (obstructive sleep apnea) 05/10/2021       CC CHRISTOPHER Corbin CNP  [address] [place] [address] on close of this encounter.       Again, thank you for allowing me to participate in the care of your patient.        Sincerely,        Linette Anderson PA-C

## 2024-12-09 NOTE — PROGRESS NOTES
[facility] Dermatology Note  Encounter Date: Dec 9, 2024  Office Visit     Reviewed patients past medical history and pertinent chart review prior to patients visit today.     Dermatology Problem List:  # Eczematous dermatitis, right lateral calf  -triamcinolone ointment  ____________________________________________    Assessment & Plan:     # Nummular dermatitis vs contact dermatitis, right lateral calf  - Start triamcinolone ointment to the affected area twice daily for up to 2 weeks at a time or until resolution. Thereafter, may use as needed for flares. Potential side effects from prolonged topical steroid use such as skin atrophy were reviewed. Do not apply topical steroid to face or skin folds.    -Encouraged regular use of an emollient such as Vanicream, Aquaphor, Cera Ve Cream or Cetaphil Cream to the entire body.     Return to clinic in 1 month if not fully resolved or if rash worsens, may return sooner PRN for new or worsening conditions.    All risks, benefits and alternatives were discussed with patient.  Patient is in agreement and understands the assessment and plan.  All questions were answered.  Laura Anderson PA-C  [facility] Dermatology  _______________________________________    CC: Rash (Right leg. )     HPI:  Mr. Sumit Park is a(n) 64 year old male who presents today as a return patient for evaluation of a rash. The rash is located on the right leg.  He was seen in urgent care on 12/5/2024 and was diagnosed with cellulitis. He was treated with Keflex 500 mg 4 times daily for 7 days.  Labs were also obtained including CBC, CRP, and ESR which were within normal limits. The patient reports the rash first started 1 week ago.  It was initially pruritic which is why he first noticed it. He then scratched at the area. Patient has been taking the Keflex with some improvement. The pruritus has improved. He is not applying any topicals to the area. He does not apply any  lotions/moisturizers to his skin. He does not recall coming into contact with anything at this site.    Patient is otherwise feeling well, without additional skin concerns.    Physical Exam:  Vitals: There were no vitals taken for this visit.  SKIN: Focused examination of bilateral lower extremities was performed.  - 2 pink annular plaques with vesicles arising within and overlying scale.    - No other lesions of concern on areas examined.     Medications:  Current Outpatient Medications   Medication Sig Dispense Refill    alcohol swab prep pads Use to swab area of injection/joe as directed. 100 each 1    aspirin (ASPIRIN LOW DOSE) 81 MG EC tablet TAKE ONE TABLET BY MOUTH EVERY DAY FOR HEART DISEASE  **DO NOT CHEW** FOR HEART DISEASE  **DO NOT CHEW** 100 tablet 2    atorvastatin (LIPITOR) 40 MG tablet Take 1 tablet (40 mg) by mouth daily. 90 tablet 1    blood glucose (NO BRAND SPECIFIED) test strip Use to test blood sugar 1 times daily or as directed. 100 strip 1    blood glucose calibration (NO BRAND SPECIFIED) solution Use to calibrate blood glucose monitor as needed as directed. 1 each 1    blood glucose monitoring (NO BRAND SPECIFIED) meter device kit Use to test blood sugar 1 times daily or as directed. 1 kit 0    cephALEXin (KEFLEX) 500 MG capsule Take 1 capsule (500 mg) by mouth 4 times daily for 7 days. 28 capsule 0    cetirizine (ZYRTEC) 10 MG tablet Take 1 tablet (10 mg) by mouth daily. 10 tablet 0    ferrous sulfate (FEROSUL) 325 (65 Fe) MG tablet Take 1 tablet (325 mg) by mouth daily 90 tablet 1    folic acid (FOLVITE) 1 MG tablet Take 1 tablet by mouth daily 90 tablet 1    ivermectin (SOOLANTRA) 1 % cream Apply to face once daily 45 g 11    lisinopril (ZESTRIL) 20 MG tablet Take 1 tablet (20 mg) by mouth 2 times daily. 180 tablet 1    metFORMIN (GLUCOPHAGE) 500 MG tablet Take 2 tablets (1,000 mg) by mouth 2 times daily (with meals). 360 tablet 1    metoprolol succinate ER (TOPROL XL) 25 MG 24 hr  tablet Take 0.5 tablet (12.5 mg) by mouth daily 45 tablet 1    nitroGLYcerin (NITROSTAT) 0.4 MG sublingual tablet For chest pain place 1 tablet under the tongue every 5 minutes for 3 doses. If symptoms persist 5 minutes after 1st dose call 911. 10 tablet 3    sitagliptin (JANUVIA) 50 MG tablet Take 1 tablet (50 mg) by mouth daily. 90 tablet 1    thin (NO BRAND SPECIFIED) lancets Use with lanceting device. 100 each 1    traZODone (DESYREL) 50 MG tablet Take 1 - 2 tablets ( mg) by mouth nightly as needed for sleep. 60 tablet 5    Turmeric (QC TUMERIC COMPLEX PO) Take by mouth.      Vitamin D3 (CHOLECALCIFEROL) 25 mcg (1000 units) tablet Take by mouth daily      zinc gluconate 50 MG tablet Take 50 mg by mouth daily       No current facility-administered medications for this visit.      Past Medical History:   Patient Active Problem List   Diagnosis    DM 2, no insulin (H)    Gastric bypass status for obesity - 2015    CAD, 2 stents 1999, 2018    Cardiac pacemaker in situ    Celiac disease    ZULEIKA (obstructive sleep apnea)    Complete heart block (H) -- s/p PPM 2018    HTN, goal below 140/90    Morbid obesity (H)    Achlorhydria    Dyslipidemia    Gilbert syndrome    H/O acute pancreatitis    Non-sustained ventricular tachycardia (H)    Osteoarthrosis involving lower leg    S/P cardiac pacemaker procedure    Second degree heart block    Tinea of nail    Vitamin D deficiency    Infection due to 2019 novel coronavirus - Nov 2021 - He received Monoclonal antibodies    Diarrhea    Elevated bilirubin    Jaundice    Preop general physical exam     Past Medical History:   Diagnosis Date    Basal cell carcinoma     CAD, 2 stents 1999, 2018 05/10/2021    Cardiac pacemaker in situ 05/10/2021    Celiac disease 05/10/2021    Complete heart block (H) -- s/p PPM 2018 05/10/2021    DM 2, no insulin (H) 05/10/2021    Gastric bypass status for obesity - 2015 05/10/2021    HTN, goal below 140/80 05/10/2021    Hyperlipidemia LDL goal  <100 05/10/2021    ZULEIKA (obstructive sleep apnea) 05/10/2021       CC CHRISTOPHER Corbin CNP  [address] [place] [address] on close of this encounter.

## 2024-12-14 ENCOUNTER — HEALTH MAINTENANCE LETTER (OUTPATIENT)
Age: 64
End: 2024-12-14

## 2024-12-27 ENCOUNTER — TRANSFERRED RECORDS (OUTPATIENT)
Dept: MULTI SPECIALTY CLINIC | Facility: CLINIC | Age: 64
End: 2024-12-27

## 2024-12-27 LAB — RETINOPATHY: NORMAL

## 2025-01-07 ENCOUNTER — NURSE TRIAGE (OUTPATIENT)
Dept: INTERNAL MEDICINE | Facility: CLINIC | Age: 65
End: 2025-01-07
Payer: COMMERCIAL

## 2025-01-07 NOTE — TELEPHONE ENCOUNTER
Nurse Triage SBAR    Is this a 2nd Level Triage? NO    Situation: Pt calls to request an appointment, states that the  asked him to speak with a nurse to get in sooner. C/o left-sided rib pain, unsure if it is related to a fall that occurred weeks ago. Also c/o night sweats, loss of appetite and weight loss.      Background: Pt fell on his left side in a bus weeks ago. 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 Pt is unsure if current pain is related to this as he mentioned that he also has a history of pancreatitis.     Assessment: c/o on and off sharp pain with certain movement (twisting, bending, sitting) and usually lasts for a couple of seconds. Pain experienced this pain this morning while on his computer. Pain doesn't radiate anywhere else. No pain currently and is walking around with no issues. No shortness of breath, fever, chest pain, nausea vomiting, abdominal pain.     Also c/o night sweats, loss of appetite and weight loss of 45 lbs in 6 months (from 220 lbs to 175 lbs). Pt would like to be seen.     Protocol Recommended Disposition:   See PCP Within 3 Days    Recommendation: Scheduled with PCP.   Jan 09, 2025 10:00 AM  (Arrive by 9:40 AM)  Provider Visit with Candis Altamirano MD  [facility] ([facility] ) [phone]     Does the patient meet one of the following criteria for ADS visit consideration? 16+ years old, with an [facility] PCP     TIP  Providers, please consider if this condition is appropriate for management at one of our Acute and Diagnostic Services sites.     If patient is a good candidate, please use dotphrase <dot>triageresponse and select Refer to ADS to document.    Reason for Disposition   [1] MODERATE pain (e.g., interferes with normal activities) AND [2] present > 3 days    Additional Information   Negative: Sounds like a life-threatening emergency to the  "triager   Negative: Chest Injury from a direct blow or fall   Negative: Chest injury knife, gun shot, or other penetrating trauma   Negative: Chest injury with cut or laceration   Negative: Chest pain not from an injury   Negative: Sounds like a serious injury to the triager   Negative: SEVERE chest or rib pain (e.g., excruciating, unable to do any normal activities)   Negative: [1] MODERATE pain (e.g., interferes with normal activities) AND [2] pain is WORSE with breathing   Negative: Suspicious history for the injury   Negative: [1] MODERATE pain (e.g., interferes with normal activities) AND [2] high-risk adult (e.g., age > 60 years, osteoporosis, chronic steroid use)   Negative: Rash in same area as pain (may be described as \"small blisters\")    Answer Assessment - Initial Assessment Questions  1. MECHANISM: \"How did the injury happen?\"      Pt is unsure if current pain is related to this fall incident but pt states that he fell on his left side in a bus weeks ago. 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   2. ONSET: \"When did the injury happen?\" (e.g., minutes, hours, days ago)      Weeks ago.  3. LOCATION: \"Where on the chest is the injury located?\" \"Where does it hurt?\"      Fell on the left side. Left side of the rib cage hurts  4. CHEST OR RIB PAIN SEVERITY: \"How bad is the pain?\"  (e.g., Scale 1-10; mild, moderate, or severe)     - MILD (1-3): doesn't interfere with normal activities      - MODERATE (4-7): interferes with normal activities or awakens from sleep     - SEVERE (8-10): excruciating pain, unable to do any normal activities        8/10, comes and goes with certain movement (twisting, bending, sitting)  5. BREATHING DIFFICULTY: \"Are you having any difficulty breathing?\" If Yes, ask: \"How bad is it?\"  (e.g., none, mild, moderate, severe) No.   6: OTHER SYMPTOMS (e.g., cough, fever, rash)       Loss of appetite, night sweat, weight loss in 6 " months    Protocols used: Chest Injury - Bending, Lifting, or [id number]

## 2025-01-09 ENCOUNTER — OFFICE VISIT (OUTPATIENT)
Dept: INTERNAL MEDICINE | Facility: CLINIC | Age: 65
End: 2025-01-09
Payer: COMMERCIAL

## 2025-01-09 ENCOUNTER — ANCILLARY PROCEDURE (OUTPATIENT)
Dept: GENERAL RADIOLOGY | Facility: CLINIC | Age: 65
End: 2025-01-09
Attending: INTERNAL MEDICINE
Payer: COMMERCIAL

## 2025-01-09 VITALS
TEMPERATURE: 97.6 F | DIASTOLIC BLOOD PRESSURE: 64 MMHG | HEART RATE: 68 BPM | RESPIRATION RATE: 18 BRPM | OXYGEN SATURATION: 98 % | HEIGHT: 66 IN | WEIGHT: 177 LBS | BODY MASS INDEX: 28.45 KG/M2 | SYSTOLIC BLOOD PRESSURE: 122 MMHG

## 2025-01-09 DIAGNOSIS — R07.89 CHEST WALL PAIN: ICD-10-CM

## 2025-01-09 DIAGNOSIS — D64.9 ANEMIA, UNSPECIFIED TYPE: ICD-10-CM

## 2025-01-09 DIAGNOSIS — E11.9 TYPE 2 DIABETES MELLITUS WITHOUT COMPLICATION, WITHOUT LONG-TERM CURRENT USE OF INSULIN (H): Primary | ICD-10-CM

## 2025-01-09 RX ORDER — OMEGA-3/DHA/EPA/FISH OIL 60 MG-90MG
CAPSULE ORAL
COMMUNITY

## 2025-01-09 NOTE — PROGRESS NOTES
Dr Jose's note      Patient's instructions / PLAN:                                                        Plan:  Chest --  XRay today - [address]   2. No changes in meds  3. Make sure you stay hydrated  4. Keep the appointments in Feb          ASSESSMENT & PLAN:                                                        (R07.89) Chest wall pain  Comment: possible ms spasm   Plan: XR Ribs and Chest Left 3 Views    (E11.9) Type 2 diabetes mellitus without complication, without long-term current use of insulin (H)  (primary encounter diagnosis)  Comment: Controlled    Plan: Hemoglobin A1c        (D64.9) Anemia, unspecified type  Comment: s/p gastric bypass   Plan: CBC with platelets, Iron & Iron Binding         Capacity, Ferritin                 Chief complaint:                                                      L CP    SUBJECTIVE:                                                    History of present illness:    L CP  -- fell off the ladder 3 week ago. Not very high. Injured L lower thorax. 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 the pain was less intense but it was there for 1-2 hours  -- yesterday he also worked for [employer] doing a lot of lifting, going up and down steps and no CP      Anemia  -- no blood in urine or stools  -- recheck in Feb    Subjective   Sumit is a 64 year old, presenting for the following health issues:  Pain (Intermittent left sided rib since Monday.)      1/9/2025     9:51 AM   Additional Questions   Roomed by Louise HEADLEY     Review of Systems:                                                      ROS: negative for fever, chills, cough, wheezes, shortness of breath, vomiting, abdominal pain, leg swelling  Pos for CP, as above         OBJECTIVE:             Physical  "exam:  Blood pressure 122/64, pulse 68, temperature 97.6  F (36.4  C), temperature source Tympanic, resp. rate 18, height 1.676 m (5' 6\"), weight 80.3 kg (177 lb), SpO2 98%.     NAD, appears comfortable  Skin: no rashes   Neck: supple, no JVD,  No thyroidmegaly. Lymph nodes nonpalpable cervical and supraclavicular.  Chest: clear to auscultation bilaterally, good respiratory effort  Heart: S1 S2, RRR, no mgr appreciated  Abdomen: soft, not tender, no hepatosplenomegaly or masses appreciated, no abdominal bruit, present bowel sounds  Extremities: no edema,   Neurologic: A, Ox3, no focal signs appreciated    PMHx: reviewed  Past Medical History:   Diagnosis Date    Basal cell carcinoma     CAD, 2 stents 1999, 2018 05/10/2021    Cardiac pacemaker in situ 05/10/2021    Celiac disease 05/10/2021    Complete heart block (H) -- s/p PPM 2018 05/10/2021    DM 2, no insulin (H) 05/10/2021    Gastric bypass status for obesity - 2015 05/10/2021    HTN, goal below 140/80 05/10/2021    Hyperlipidemia LDL goal <100 05/10/2021    ZULEIKA (obstructive sleep apnea) 05/10/2021      PSHx: reviewed  Past Surgical History:   Procedure Laterality Date    ANGIOPLASTY      COLONOSCOPY N/A 1/13/2015    Procedure: COLONOSCOPY;  Surgeon: Juni Craig MD;  Location: [facility];  Service:     DISTAL BICEPS TENDON REPAIR Left     ESOPHAGOSCOPY, GASTROSCOPY, DUODENOSCOPY (EGD), COMBINED N/A 1/13/2015    Procedure: UPPER ENDOSCOPY;  Surgeon: Juni Craig MD;  Location: [facility];  Service:     HERNIORRHAPHY INCISIONAL (LOCATION)      incarcerated    LAPAROSCOPIC GASTRIC BYPASS N/A 2015    LEG SURGERY Right     muscle surgery    OTHER SURGICAL HISTORY      forearm / wrist surgery    REPAIR TENDON BICEPS DISTAL UPPER EXTREMITY      UMBILICAL HERNIA REPAIR N/A     WRIST SURGERY Right         Meds: reviewed  Current Outpatient Medications   Medication Sig Dispense Refill    alcohol swab prep pads Use to swab area of " injection/joe as directed. 100 each 1    aspirin (ASPIRIN LOW DOSE) 81 MG EC tablet TAKE ONE TABLET BY MOUTH EVERY DAY FOR HEART DISEASE  **DO NOT CHEW** FOR HEART DISEASE  **DO NOT CHEW** 100 tablet 2    atorvastatin (LIPITOR) 40 MG tablet Take 1 tablet (40 mg) by mouth daily. 90 tablet 1    blood glucose (NO BRAND SPECIFIED) test strip Use to test blood sugar 1 times daily or as directed. 100 strip 1    blood glucose calibration (NO BRAND SPECIFIED) solution Use to calibrate blood glucose monitor as needed as directed. 1 each 1    blood glucose monitoring (NO BRAND SPECIFIED) meter device kit Use to test blood sugar 1 times daily or as directed. 1 kit 0    cetirizine (ZYRTEC) 10 MG tablet Take 1 tablet (10 mg) by mouth daily. 10 tablet 0    ferrous sulfate (FEROSUL) 325 (65 Fe) MG tablet Take 1 tablet (325 mg) by mouth daily 90 tablet 1    fish oil-omega-3 fatty acids 500 MG capsule Take by mouth.      folic acid (FOLVITE) 1 MG tablet Take 1 tablet by mouth daily 90 tablet 1    ivermectin (SOOLANTRA) 1 % cream Apply to face once daily 45 g 11    lisinopril (ZESTRIL) 20 MG tablet Take 1 tablet (20 mg) by mouth 2 times daily. 180 tablet 1    metFORMIN (GLUCOPHAGE) 500 MG tablet Take 2 tablets (1,000 mg) by mouth 2 times daily (with meals). 360 tablet 1    metoprolol succinate ER (TOPROL XL) 25 MG 24 hr tablet Take 0.5 tablet (12.5 mg) by mouth daily 45 tablet 1    nitroGLYcerin (NITROSTAT) 0.4 MG sublingual tablet For chest pain place 1 tablet under the tongue every 5 minutes for 3 doses. If symptoms persist 5 minutes after 1st dose call 911. 10 tablet 3    sitagliptin (JANUVIA) 50 MG tablet Take 1 tablet (50 mg) by mouth daily. 90 tablet 1    thin (NO BRAND SPECIFIED) lancets Use with lanceting device. 100 each 1    traZODone (DESYREL) 50 MG tablet Take 1 - 2 tablets ( mg) by mouth nightly as needed for sleep. 60 tablet 5    triamcinolone (KENALOG) 0.1 % external ointment Apply to affected areas twice daily  for up to 2 weeks. Thereafter, may use as needed for flares. 15 g 0    Turmeric (QC TUMERIC COMPLEX PO) Take by mouth.      Vitamin D3 (CHOLECALCIFEROL) 25 mcg (1000 units) tablet Take by mouth daily      zinc gluconate 50 MG tablet Take 50 mg by mouth daily         Soc Hx: reviewed  Fam Hx: reviewed      Chart documentation was completed, in part, with [employer] voice-recognition software. Even though reviewed, some grammatical, spelling, and word errors may remain.      Candis Jose MD  Internal Medicine           Signed Electronically by: Candis Altamirano MD

## 2025-01-09 NOTE — PATIENT INSTRUCTIONS
Plan:  Chest --  XRay today - [address]   2. No changes in meds  3. Make sure you stay hydrated  4. Keep the appointments in Feb

## 2025-01-09 NOTE — NURSING NOTE
"Chief Complaint   Patient presents with    Pain     Intermittent left sided rib since Monday.     initial /64   Pulse 68   Temp 97.6  F (36.4  C) (Tympanic)   Resp 18   Ht 1.676 m (5' 6\")   Wt 80.3 kg (177 lb)   SpO2 98%   BMI 28.57 kg/m   Estimated body mass index is 28.57 kg/m  as calculated from the following:    Height as of this encounter: 1.676 m (5' 6\").    Weight as of this encounter: 80.3 kg (177 lb)..  bp completed using cuff size large  KAMALJIT NIELSON LPN  "

## 2025-01-10 ENCOUNTER — ANCILLARY PROCEDURE (OUTPATIENT)
Dept: CARDIOLOGY | Facility: CLINIC | Age: 65
End: 2025-01-10
Attending: INTERNAL MEDICINE
Payer: COMMERCIAL

## 2025-01-10 DIAGNOSIS — Z95.0 CARDIAC PACEMAKER IN SITU: ICD-10-CM

## 2025-01-10 DIAGNOSIS — I44.1 SECOND DEGREE HEART BLOCK: ICD-10-CM

## 2025-01-10 PROCEDURE — 93294 REM INTERROG EVL PM/LDLS PM: CPT | Performed by: INTERNAL MEDICINE

## 2025-01-10 PROCEDURE — 93296 REM INTERROG EVL PM/IDS: CPT | Performed by: INTERNAL MEDICINE

## 2025-01-13 DIAGNOSIS — E11.9 TYPE 2 DIABETES MELLITUS WITHOUT COMPLICATION, WITHOUT LONG-TERM CURRENT USE OF INSULIN (H): ICD-10-CM

## 2025-01-13 LAB
MDC_IDC_LEAD_CONNECTION_STATUS: NORMAL
MDC_IDC_LEAD_CONNECTION_STATUS: NORMAL
MDC_IDC_LEAD_IMPLANT_DT: NORMAL
MDC_IDC_LEAD_IMPLANT_DT: NORMAL
MDC_IDC_LEAD_LOCATION: NORMAL
MDC_IDC_LEAD_LOCATION: NORMAL
MDC_IDC_LEAD_LOCATION_DETAIL_1: NORMAL
MDC_IDC_LEAD_LOCATION_DETAIL_1: NORMAL
MDC_IDC_LEAD_MFG: NORMAL
MDC_IDC_LEAD_MFG: NORMAL
MDC_IDC_LEAD_MODEL: NORMAL
MDC_IDC_LEAD_MODEL: NORMAL
MDC_IDC_LEAD_POLARITY_TYPE: NORMAL
MDC_IDC_LEAD_POLARITY_TYPE: NORMAL
MDC_IDC_LEAD_SERIAL: NORMAL
MDC_IDC_LEAD_SERIAL: NORMAL
MDC_IDC_MSMT_BATTERY_DTM: NORMAL
MDC_IDC_MSMT_BATTERY_REMAINING_LONGEVITY: 78 MO
MDC_IDC_MSMT_BATTERY_RRT_TRIGGER: 2.62
MDC_IDC_MSMT_BATTERY_STATUS: NORMAL
MDC_IDC_MSMT_BATTERY_VOLTAGE: 2.97 V
MDC_IDC_MSMT_LEADCHNL_RA_IMPEDANCE_VALUE: 304 OHM
MDC_IDC_MSMT_LEADCHNL_RA_IMPEDANCE_VALUE: 361 OHM
MDC_IDC_MSMT_LEADCHNL_RA_PACING_THRESHOLD_AMPLITUDE: 0.38 V
MDC_IDC_MSMT_LEADCHNL_RA_PACING_THRESHOLD_PULSEWIDTH: 0.4 MS
MDC_IDC_MSMT_LEADCHNL_RA_SENSING_INTR_AMPL: 2 MV
MDC_IDC_MSMT_LEADCHNL_RA_SENSING_INTR_AMPL: 2 MV
MDC_IDC_MSMT_LEADCHNL_RV_IMPEDANCE_VALUE: 361 OHM
MDC_IDC_MSMT_LEADCHNL_RV_IMPEDANCE_VALUE: 456 OHM
MDC_IDC_MSMT_LEADCHNL_RV_PACING_THRESHOLD_AMPLITUDE: 0.75 V
MDC_IDC_MSMT_LEADCHNL_RV_PACING_THRESHOLD_PULSEWIDTH: 0.4 MS
MDC_IDC_MSMT_LEADCHNL_RV_SENSING_INTR_AMPL: 16.12 MV
MDC_IDC_MSMT_LEADCHNL_RV_SENSING_INTR_AMPL: 16.12 MV
MDC_IDC_PG_IMPLANT_DTM: NORMAL
MDC_IDC_PG_MFG: NORMAL
MDC_IDC_PG_MODEL: NORMAL
MDC_IDC_PG_SERIAL: NORMAL
MDC_IDC_PG_TYPE: NORMAL
MDC_IDC_SESS_CLINIC_NAME: NORMAL
MDC_IDC_SESS_DTM: NORMAL
MDC_IDC_SESS_TYPE: NORMAL
MDC_IDC_SET_BRADY_AT_MODE_SWITCH_RATE: 150 {BEATS}/MIN
MDC_IDC_SET_BRADY_HYSTRATE: NORMAL
MDC_IDC_SET_BRADY_LOWRATE: 50 {BEATS}/MIN
MDC_IDC_SET_BRADY_MAX_SENSOR_RATE: 130 {BEATS}/MIN
MDC_IDC_SET_BRADY_MAX_TRACKING_RATE: 130 {BEATS}/MIN
MDC_IDC_SET_BRADY_MODE: NORMAL
MDC_IDC_SET_BRADY_PAV_DELAY_LOW: 180 MS
MDC_IDC_SET_BRADY_SAV_DELAY_LOW: 150 MS
MDC_IDC_SET_LEADCHNL_RA_PACING_AMPLITUDE: 1.5 V
MDC_IDC_SET_LEADCHNL_RA_PACING_ANODE_ELECTRODE_1: NORMAL
MDC_IDC_SET_LEADCHNL_RA_PACING_ANODE_LOCATION_1: NORMAL
MDC_IDC_SET_LEADCHNL_RA_PACING_CAPTURE_MODE: NORMAL
MDC_IDC_SET_LEADCHNL_RA_PACING_CATHODE_ELECTRODE_1: NORMAL
MDC_IDC_SET_LEADCHNL_RA_PACING_CATHODE_LOCATION_1: NORMAL
MDC_IDC_SET_LEADCHNL_RA_PACING_POLARITY: NORMAL
MDC_IDC_SET_LEADCHNL_RA_PACING_PULSEWIDTH: 0.4 MS
MDC_IDC_SET_LEADCHNL_RA_SENSING_ANODE_ELECTRODE_1: NORMAL
MDC_IDC_SET_LEADCHNL_RA_SENSING_ANODE_LOCATION_1: NORMAL
MDC_IDC_SET_LEADCHNL_RA_SENSING_CATHODE_ELECTRODE_1: NORMAL
MDC_IDC_SET_LEADCHNL_RA_SENSING_CATHODE_LOCATION_1: NORMAL
MDC_IDC_SET_LEADCHNL_RA_SENSING_POLARITY: NORMAL
MDC_IDC_SET_LEADCHNL_RA_SENSING_SENSITIVITY: 0.3 MV
MDC_IDC_SET_LEADCHNL_RV_PACING_AMPLITUDE: 1.5 V
MDC_IDC_SET_LEADCHNL_RV_PACING_ANODE_ELECTRODE_1: NORMAL
MDC_IDC_SET_LEADCHNL_RV_PACING_ANODE_LOCATION_1: NORMAL
MDC_IDC_SET_LEADCHNL_RV_PACING_CAPTURE_MODE: NORMAL
MDC_IDC_SET_LEADCHNL_RV_PACING_CATHODE_ELECTRODE_1: NORMAL
MDC_IDC_SET_LEADCHNL_RV_PACING_CATHODE_LOCATION_1: NORMAL
MDC_IDC_SET_LEADCHNL_RV_PACING_POLARITY: NORMAL
MDC_IDC_SET_LEADCHNL_RV_PACING_PULSEWIDTH: 0.4 MS
MDC_IDC_SET_LEADCHNL_RV_SENSING_ANODE_ELECTRODE_1: NORMAL
MDC_IDC_SET_LEADCHNL_RV_SENSING_ANODE_LOCATION_1: NORMAL
MDC_IDC_SET_LEADCHNL_RV_SENSING_CATHODE_ELECTRODE_1: NORMAL
MDC_IDC_SET_LEADCHNL_RV_SENSING_CATHODE_LOCATION_1: NORMAL
MDC_IDC_SET_LEADCHNL_RV_SENSING_POLARITY: NORMAL
MDC_IDC_SET_LEADCHNL_RV_SENSING_SENSITIVITY: 0.9 MV
MDC_IDC_SET_ZONE_DETECTION_INTERVAL: 370 MS
MDC_IDC_SET_ZONE_DETECTION_INTERVAL: 400 MS
MDC_IDC_SET_ZONE_STATUS: NORMAL
MDC_IDC_SET_ZONE_STATUS: NORMAL
MDC_IDC_SET_ZONE_TYPE: NORMAL
MDC_IDC_SET_ZONE_VENDOR_TYPE: NORMAL
MDC_IDC_STAT_AT_BURDEN_PERCENT: 0 %
MDC_IDC_STAT_AT_DTM_END: NORMAL
MDC_IDC_STAT_AT_DTM_START: NORMAL
MDC_IDC_STAT_BRADY_AP_VP_PERCENT: 16.71 %
MDC_IDC_STAT_BRADY_AP_VS_PERCENT: 0.25 %
MDC_IDC_STAT_BRADY_AS_VP_PERCENT: 80.53 %
MDC_IDC_STAT_BRADY_AS_VS_PERCENT: 2.51 %
MDC_IDC_STAT_BRADY_DTM_END: NORMAL
MDC_IDC_STAT_BRADY_DTM_START: NORMAL
MDC_IDC_STAT_BRADY_RA_PERCENT_PACED: 16.91 %
MDC_IDC_STAT_BRADY_RV_PERCENT_PACED: 97.24 %
MDC_IDC_STAT_EPISODE_RECENT_COUNT: 0
MDC_IDC_STAT_EPISODE_RECENT_COUNT_DTM_END: NORMAL
MDC_IDC_STAT_EPISODE_RECENT_COUNT_DTM_START: NORMAL
MDC_IDC_STAT_EPISODE_TOTAL_COUNT: 0
MDC_IDC_STAT_EPISODE_TOTAL_COUNT: 0
MDC_IDC_STAT_EPISODE_TOTAL_COUNT: 2
MDC_IDC_STAT_EPISODE_TOTAL_COUNT: 84
MDC_IDC_STAT_EPISODE_TOTAL_COUNT: 9
MDC_IDC_STAT_EPISODE_TOTAL_COUNT_DTM_END: NORMAL
MDC_IDC_STAT_EPISODE_TOTAL_COUNT_DTM_START: NORMAL
MDC_IDC_STAT_EPISODE_TYPE: NORMAL
MDC_IDC_STAT_TACHYTHERAPY_RECENT_DTM_END: NORMAL
MDC_IDC_STAT_TACHYTHERAPY_RECENT_DTM_START: NORMAL
MDC_IDC_STAT_TACHYTHERAPY_TOTAL_DTM_END: NORMAL
MDC_IDC_STAT_TACHYTHERAPY_TOTAL_DTM_START: NORMAL

## 2025-01-14 RX ORDER — SITAGLIPTIN 50 MG/1
50 TABLET, FILM COATED ORAL DAILY
Qty: 90 TABLET | Refills: 0 | Status: SHIPPED | OUTPATIENT
Start: 2025-01-14

## 2025-01-16 ENCOUNTER — HOSPITAL ENCOUNTER (EMERGENCY)
Facility: CLINIC | Age: 65
Discharge: HOME | End: 2025-01-16
Attending: EMERGENCY MEDICINE
Payer: COMMERCIAL

## 2025-01-16 ENCOUNTER — APPOINTMENT (OUTPATIENT)
Dept: CT IMAGING | Facility: CLINIC | Age: 65
End: 2025-01-16
Attending: EMERGENCY MEDICINE
Payer: COMMERCIAL

## 2025-01-16 ENCOUNTER — HOSPITAL ENCOUNTER (EMERGENCY)
Facility: CLINIC | Age: 65
Discharge: HOME | End: 2025-01-16
Payer: COMMERCIAL

## 2025-01-16 VITALS
DIASTOLIC BLOOD PRESSURE: 86 MMHG | WEIGHT: 183.2 LBS | SYSTOLIC BLOOD PRESSURE: 156 MMHG | OXYGEN SATURATION: 100 % | HEART RATE: 62 BPM | HEIGHT: 66 IN | TEMPERATURE: 98 F | BODY MASS INDEX: 29.44 KG/M2 | RESPIRATION RATE: 18 BRPM

## 2025-01-16 VITALS
SYSTOLIC BLOOD PRESSURE: 190 MMHG | BODY MASS INDEX: 29.62 KG/M2 | WEIGHT: 184.3 LBS | DIASTOLIC BLOOD PRESSURE: 91 MMHG | TEMPERATURE: 98.8 F | HEIGHT: 66 IN | HEART RATE: 60 BPM | RESPIRATION RATE: 18 BRPM | OXYGEN SATURATION: 99 %

## 2025-01-16 DIAGNOSIS — R10.12 LEFT UPPER QUADRANT ABDOMINAL PAIN: ICD-10-CM

## 2025-01-16 DIAGNOSIS — I10 HYPERTENSION, UNSPECIFIED TYPE: ICD-10-CM

## 2025-01-16 LAB
ALBUMIN SERPL BCG-MCNC: 4.2 G/DL (ref 3.5–5.2)
ALBUMIN UR-MCNC: 20 MG/DL
ALP SERPL-CCNC: 69 U/L (ref 40–150)
ALT SERPL W P-5'-P-CCNC: 17 U/L (ref 0–70)
ANION GAP SERPL CALCULATED.3IONS-SCNC: 9 MMOL/L (ref 7–15)
APPEARANCE UR: CLEAR
AST SERPL W P-5'-P-CCNC: 20 U/L (ref 0–45)
ATRIAL RATE - MUSE: 55 BPM
BASOPHILS # BLD AUTO: 0.1 10E3/UL (ref 0–0.2)
BASOPHILS NFR BLD AUTO: 1 %
BILIRUB DIRECT SERPL-MCNC: 0.21 MG/DL (ref 0–0.3)
BILIRUB SERPL-MCNC: 1 MG/DL
BILIRUB UR QL STRIP: NEGATIVE
BUN SERPL-MCNC: 13.8 MG/DL (ref 8–23)
CALCIUM SERPL-MCNC: 8.6 MG/DL (ref 8.8–10.4)
CHLORIDE SERPL-SCNC: 102 MMOL/L (ref 98–107)
COLOR UR AUTO: YELLOW
CREAT SERPL-MCNC: 0.71 MG/DL (ref 0.67–1.17)
DIASTOLIC BLOOD PRESSURE - MUSE: NORMAL MMHG
EGFRCR SERPLBLD CKD-EPI 2021: >90 ML/MIN/1.73M2
EOSINOPHIL # BLD AUTO: 0.2 10E3/UL (ref 0–0.7)
EOSINOPHIL NFR BLD AUTO: 2 %
ERYTHROCYTE [DISTWIDTH] IN BLOOD BY AUTOMATED COUNT: 13.4 % (ref 10–15)
GLUCOSE SERPL-MCNC: 86 MG/DL (ref 70–99)
GLUCOSE UR STRIP-MCNC: 150 MG/DL
HCO3 SERPL-SCNC: 27 MMOL/L (ref 22–29)
HCT VFR BLD AUTO: 38 % (ref 40–53)
HGB BLD-MCNC: 12.5 G/DL (ref 13.3–17.7)
HGB UR QL STRIP: NEGATIVE
HOLD SPECIMEN: NORMAL
HOLD SPECIMEN: NORMAL
IMM GRANULOCYTES # BLD: 0 10E3/UL
IMM GRANULOCYTES NFR BLD: 0 %
INTERPRETATION ECG - MUSE: NORMAL
KETONES UR STRIP-MCNC: ABNORMAL MG/DL
LEUKOCYTE ESTERASE UR QL STRIP: NEGATIVE
LIPASE SERPL-CCNC: 34 U/L (ref 13–60)
LYMPHOCYTES # BLD AUTO: 2 10E3/UL (ref 0.8–5.3)
LYMPHOCYTES NFR BLD AUTO: 28 %
MCH RBC QN AUTO: 29.6 PG (ref 26.5–33)
MCHC RBC AUTO-ENTMCNC: 32.9 G/DL (ref 31.5–36.5)
MCV RBC AUTO: 90 FL (ref 78–100)
MONOCYTES # BLD AUTO: 0.4 10E3/UL (ref 0–1.3)
MONOCYTES NFR BLD AUTO: 6 %
MUCOUS THREADS #/AREA URNS LPF: PRESENT /LPF
NEUTROPHILS # BLD AUTO: 4.5 10E3/UL (ref 1.6–8.3)
NEUTROPHILS NFR BLD AUTO: 63 %
NITRATE UR QL: NEGATIVE
NRBC # BLD AUTO: 0 10E3/UL
NRBC BLD AUTO-RTO: 0 /100
P AXIS - MUSE: 82 DEGREES
PH UR STRIP: 5.5 [PH] (ref 5–7)
PLATELET # BLD AUTO: 172 10E3/UL (ref 150–450)
POTASSIUM SERPL-SCNC: 3.9 MMOL/L (ref 3.4–5.3)
PR INTERVAL - MUSE: 202 MS
PROT SERPL-MCNC: 6.3 G/DL (ref 6.4–8.3)
QRS DURATION - MUSE: 186 MS
QT - MUSE: 534 MS
QTC - MUSE: 510 MS
R AXIS - MUSE: 45 DEGREES
RBC # BLD AUTO: 4.23 10E6/UL (ref 4.4–5.9)
RBC URINE: <1 /HPF
SODIUM SERPL-SCNC: 138 MMOL/L (ref 135–145)
SP GR UR STRIP: 1.03 (ref 1–1.03)
SQUAMOUS EPITHELIAL: <1 /HPF
SYSTOLIC BLOOD PRESSURE - MUSE: NORMAL MMHG
T AXIS - MUSE: 63 DEGREES
UROBILINOGEN UR STRIP-MCNC: 2 MG/DL
VENTRICULAR RATE- MUSE: 55 BPM
WBC # BLD AUTO: 7.1 10E3/UL (ref 4–11)
WBC URINE: 1 /HPF

## 2025-01-16 PROCEDURE — 82310 ASSAY OF CALCIUM: CPT | Performed by: EMERGENCY MEDICINE

## 2025-01-16 PROCEDURE — 93005 ELECTROCARDIOGRAM TRACING: CPT

## 2025-01-16 PROCEDURE — 81001 URINALYSIS AUTO W/SCOPE: CPT | Performed by: EMERGENCY MEDICINE

## 2025-01-16 PROCEDURE — 80048 BASIC METABOLIC PNL TOTAL CA: CPT | Performed by: EMERGENCY MEDICINE

## 2025-01-16 PROCEDURE — 74177 CT ABD & PELVIS W/CONTRAST: CPT

## 2025-01-16 PROCEDURE — 82248 BILIRUBIN DIRECT: CPT | Performed by: EMERGENCY MEDICINE

## 2025-01-16 PROCEDURE — 250N000009 HC RX 250: Performed by: EMERGENCY MEDICINE

## 2025-01-16 PROCEDURE — 36415 COLL VENOUS BLD VENIPUNCTURE: CPT | Performed by: EMERGENCY MEDICINE

## 2025-01-16 PROCEDURE — 250N000011 HC RX IP 250 OP 636: Performed by: EMERGENCY MEDICINE

## 2025-01-16 PROCEDURE — 85014 HEMATOCRIT: CPT | Performed by: EMERGENCY MEDICINE

## 2025-01-16 PROCEDURE — 85004 AUTOMATED DIFF WBC COUNT: CPT | Performed by: EMERGENCY MEDICINE

## 2025-01-16 PROCEDURE — 83690 ASSAY OF LIPASE: CPT | Performed by: EMERGENCY MEDICINE

## 2025-01-16 PROCEDURE — 99285 EMERGENCY DEPT VISIT HI MDM: CPT | Mod: 25

## 2025-01-16 RX ORDER — IOPAMIDOL 755 MG/ML
500 INJECTION, SOLUTION INTRAVASCULAR ONCE
Status: COMPLETED | OUTPATIENT
Start: 2025-01-16 | End: 2025-01-16

## 2025-01-16 RX ADMIN — SODIUM CHLORIDE 63 ML: 9 INJECTION, SOLUTION INTRAVENOUS at 18:44

## 2025-01-16 RX ADMIN — IOPAMIDOL 92 ML: 755 INJECTION, SOLUTION INTRAVENOUS at 18:44

## 2025-01-16 ASSESSMENT — ACTIVITIES OF DAILY LIVING (ADL)
ADLS_ACUITY_SCORE: 41

## 2025-01-16 ASSESSMENT — COLUMBIA-SUICIDE SEVERITY RATING SCALE - C-SSRS
6. HAVE YOU EVER DONE ANYTHING, STARTED TO DO ANYTHING, OR PREPARED TO DO ANYTHING TO END YOUR LIFE?: NO
1. IN THE PAST MONTH, HAVE YOU WISHED YOU WERE DEAD OR WISHED YOU COULD GO TO SLEEP AND NOT WAKE UP?: NO
2. HAVE YOU ACTUALLY HAD ANY THOUGHTS OF KILLING YOURSELF IN THE PAST MONTH?: NO
6. HAVE YOU EVER DONE ANYTHING, STARTED TO DO ANYTHING, OR PREPARED TO DO ANYTHING TO END YOUR LIFE?: NO
2. HAVE YOU ACTUALLY HAD ANY THOUGHTS OF KILLING YOURSELF IN THE PAST MONTH?: NO
1. IN THE PAST MONTH, HAVE YOU WISHED YOU WERE DEAD OR WISHED YOU COULD GO TO SLEEP AND NOT WAKE UP?: NO

## 2025-01-16 NOTE — ED TRIAGE NOTES
"Patient presents with complaints of intermittent upper left sided abdomen pain for the past 1.5 weeks. Patient states that the pain will come on suddenly when sitting and describes it as a \"burning, stabbing pain\". ABC intact without need for intervention at this time.           "

## 2025-01-16 NOTE — ED TRIAGE NOTES
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       Triage Assessment (Adult)       Row Name 01/16/25 1634          Triage Assessment    Airway WDL WDL        Respiratory WDL    Respiratory WDL WDL        Skin Circulation/Temperature WDL    Skin Circulation/Temperature WDL WDL        Cardiac WDL    Cardiac WDL WDL        Peripheral/Neurovascular WDL    Peripheral Neurovascular WDL WDL        Cognitive/Neuro/Behavioral WDL    Cognitive/Neuro/Behavioral WDL WDL        [place] Coma Scale    Best Eye Response 4-->(E4) spontaneous     Best Motor Response 6-->(M6) obeys commands

## 2025-01-17 NOTE — ED PROVIDER NOTES
Emergency Department Note      History of Present Illness     Chief Complaint   Chest Pain      HPI   Sumit Park is a 64 year old male who presents for evaluation of left upper abdominal pain.  Patient had a fall few weeks ago and since then has been having left upper abdominal pain.  He only notices it when he is sitting at his computer working and has pain in that area but when he is not sitting at his computer working, he does not have the pain.  He currently does not have any pain.  Has not had his chest.  No shortness of breath.  No other aggravating or alleviating factors.  No fevers, chills, or cough.  No vomiting or diarrhea.  Eating and drinking normally.    Independent Historian   None    Review of External Notes       Past Medical History     Medical History and Problem List   Past Medical History:   Diagnosis Date    Basal cell carcinoma     CAD, 2 stents 1999, 2018 05/10/2021    Cardiac pacemaker in situ 05/10/2021    Celiac disease 05/10/2021    Complete heart block (H) -- s/p PPM 2018 05/10/2021    DM 2, no insulin (H) 05/10/2021    Gastric bypass status for obesity - 2015 05/10/2021    HTN, goal below 140/80 05/10/2021    Hyperlipidemia LDL goal <100 05/10/2021    ZULEIKA (obstructive sleep apnea) 05/10/2021       Medications   alcohol swab prep pads  aspirin (ASPIRIN LOW DOSE) 81 MG EC tablet  atorvastatin (LIPITOR) 40 MG tablet  blood glucose (NO BRAND SPECIFIED) test strip  blood glucose calibration (NO BRAND SPECIFIED) solution  blood glucose monitoring (NO BRAND SPECIFIED) meter device kit  cetirizine (ZYRTEC) 10 MG tablet  ferrous sulfate (FEROSUL) 325 (65 Fe) MG tablet  fish oil-omega-3 fatty acids 500 MG capsule  folic acid (FOLVITE) 1 MG tablet  ivermectin (SOOLANTRA) 1 % cream  JANUVIA 50 MG tablet  lisinopril (ZESTRIL) 20 MG tablet  metFORMIN (GLUCOPHAGE) 500 MG tablet  metoprolol succinate ER (TOPROL XL) 25 MG 24 hr tablet  nitroGLYcerin (NITROSTAT) 0.4 MG sublingual tablet  thin (NO  "BRAND SPECIFIED) lancets  traZODone (DESYREL) 50 MG tablet  triamcinolone (KENALOG) 0.1 % external ointment  Turmeric (QC TUMERIC COMPLEX PO)  Vitamin D3 (CHOLECALCIFEROL) 25 mcg (1000 units) tablet  zinc gluconate 50 MG tablet        Surgical History   Past Surgical History:   Procedure Laterality Date    ANGIOPLASTY      COLONOSCOPY N/A 1/13/2015    Procedure: COLONOSCOPY;  Surgeon: Juni Craig MD;  Location: [facility];  Service:     DISTAL BICEPS TENDON REPAIR Left     ESOPHAGOSCOPY, GASTROSCOPY, DUODENOSCOPY (EGD), COMBINED N/A 1/13/2015    Procedure: UPPER ENDOSCOPY;  Surgeon: Juni Craig MD;  Location: [facility];  Service:     HERNIORRHAPHY INCISIONAL (LOCATION)      incarcerated    LAPAROSCOPIC GASTRIC BYPASS N/A 2015    LEG SURGERY Right     muscle surgery    OTHER SURGICAL HISTORY      forearm / wrist surgery    REPAIR TENDON BICEPS DISTAL UPPER EXTREMITY      UMBILICAL HERNIA REPAIR N/A     WRIST SURGERY Right        Physical Exam     Patient Vitals for the past 24 hrs:   BP Temp Temp src Pulse Resp SpO2 Height Weight   01/16/25 2103 (!) 207/106 -- -- -- -- -- -- --   01/16/25 2102 (!) 191/97 98.8  F (37.1  C) Oral 60 18 99 % -- --   01/16/25 1633 (!) 169/91 98.3  F (36.8  C) Temporal 70 18 100 % 1.676 m (5' 6\") 83.6 kg (184 lb 4.9 oz)     Physical Exam  Constitutional: Well appearing.  HEENT: Atraumatic. Moist mucous membranes.  Neck: Soft.  Supple.   Cardiac: Regular rate and rhythm.  No murmur or rub.  Respiratory: Clear to auscultation bilaterally.  No respiratory distress.  Abdomen: Soft and nontender.  No bruising, swelling, or rash of the abdominal wall.  No rebound or guarding.  Nondistended.  Musculoskeletal: No edema.  Normal range of motion.  Neurologic: Alert and oriented x3.  Normal tone and bulk.  No facial drooping.  Normal speech.  Normal gait.  Skin: No rashes.  No edema.  Psych: Normal affect.  Normal behavior.            Diagnostics     Lab Results "   Labs Ordered and Resulted from Time of ED Arrival to Time of ED Departure   ROUTINE UA WITH MICROSCOPIC REFLEX TO CULTURE - Abnormal       Result Value    Color Urine Yellow      Appearance Urine Clear      Glucose Urine 150 (*)     Bilirubin Urine Negative      Ketones Urine Trace (*)     Specific Gravity Urine 1.034      Blood Urine Negative      pH Urine 5.5      Protein Albumin Urine 20 (*)     Urobilinogen Urine 2.0      Nitrite Urine Negative      Leukocyte Esterase Urine Negative      Mucus Urine Present (*)     RBC Urine <1      WBC Urine 1      Squamous Epithelials Urine <1     HEPATIC FUNCTION PANEL - Abnormal    Protein Total 6.3 (*)     Albumin 4.2      Bilirubin Total 1.0      Alkaline Phosphatase 69      AST 20      ALT 17      Bilirubin Direct 0.21     LIPASE - Normal    Lipase 34         Imaging   CT Abdomen Pelvis w Contrast   Final Result   IMPRESSION:    1.  No acute abnormality in the abdomen or pelvis. No definitive cause for left upper quadrant pain.   2.  Moderate amount of stool throughout the colon.          EKG   No results found for this or any previous visit.      Independent Interpretation   None    ED Course      Medications Administered   Medications   iopamidol (ISOVUE-370) solution 500 mL (92 mLs Intravenous $Given 1/16/25 1844)   CT Scan Flush (63 mLs Intravenous $Given 1/16/25 1844)       Procedures   Procedures     Discussion of Management   None    ED Course        Additional Documentation  None    Medical Decision Making / Diagnosis     CMS Diagnoses: None    MIPS       None    MDM   Sumit Park is a 64 year old male who is afebrile and hemodynamically stable.  His abdomen is soft and benign on exam.  Has not had chest pain and is not short of breath.  No evidence of PE as there is no shortness of breath, tachycardia, or pleuritic chest pain.  Pain is all located underneath the rib cage on the left.  CT abdomen pelvis without abnormality.  Blood work noted as above.  He  does have some hypertension but no evidence of ischemia.  He recently stopped his amlodipine due to blood pressures and start that again if he has high blood pressure and will start that at home.  Declined a dose here.  We discussed the need for close primary care follow-up for all of his symptoms and his high blood pressure and he is understanding.  This point, no emergent condition found that would necessitate hospitalization I think he is safe for discharge home and he feels comfortable's plan.  Questions were answered and he was in no distress at time of discharge.        Disposition   The patient was discharged.     Diagnosis     ICD-10-CM    1. Left upper quadrant abdominal pain  R10.12       2. Hypertension, unspecified type  I10            Discharge Medications   New Prescriptions    No medications on file         MD Gris Multani Nicholas J, MD  01/22/25 1552

## 2025-01-17 NOTE — DISCHARGE INSTRUCTIONS
Discharge Instructions  Abdominal Pain    Abdominal pain (belly pain) can be caused by many things. Your evaluation today does not show the exact cause for your pain. Your provider today has decided that it is unlikely your pain is due to a life threatening problem, or a problem requiring surgery or hospital admission. Sometimes those problems cannot be found right away, so it is very important that you follow up as directed.  Sometimes only the changes which occur over time allow the cause of your pain to be found.    Generally, every Emergency Department visit should have a follow-up clinic visit with either a primary or a specialty clinic/provider. Please follow-up as instructed by your emergency provider today. With abdominal pain, we often recommend very close follow-up, such as the following day.    ADULTS:  Return to the Emergency Department right away if:    You get an oral temperature above 102oF or as directed by your provider.  You have blood in your stools. This may be bright red or appear as black, tarry stools.    You keep vomiting (throwing up) or cannot drink liquids.  You see blood when you vomit.   You cannot have a bowel movement or you cannot pass gas.  Your stomach gets bloated or bigger.  Your skin or the whites of your eyes look yellow.  You faint.  You have bloody, frequent or painful urination (peeing).  You have new symptoms or anything that worries you.    CHILDREN:  Return to the Emergency Department right away if your child has any of the above-listed symptoms or the following:    Pushes your hand away or screams/cries when his/her belly is touched.  You notice your child is very fussy or weak.  Your child is very tired and is too tired to eat or drink.  Your child is dehydrated.  Signs of dehydration can be:  Significant change in the amount of wet diapers/urine.  Your infant or child starts to have dry mouth and lips, or no saliva (spit) or tears.    PREGNANT WOMEN:  Return to the  Emergency Department right away if you have any of the above-listed symptoms or the following:    You have bleeding, leaking fluid or passing tissue from the vagina.  You have worse pain or cramping, or pain in your shoulder or back.  You have vomiting that will not stop.  You have a temperature of 100oF or more.  Your baby is not moving as much as usual.  You faint.  You get a bad headache with or without eye problems and abdominal pain.  You have a seizure.  You have unusual discharge from your vagina and abdominal pain.    Abdominal pain is pretty common during pregnancy.  Your pain may or may not be related to your pregnancy. You should follow-up closely with your OB provider so they can evaluate you and your baby.  Until you follow-up with your regular provider, do the following:     Avoid sex and do not put anything in your vagina.  Drink clear fluids.  Only take medications approved by your provider.    MORE INFORMATION:    Appendicitis:  A possible cause of abdominal pain in any person who still has their appendix is acute appendicitis. Appendicitis is often hard to diagnose.  Testing does not always rule out early appendicitis or other causes of abdominal pain. Close follow-up with your provider and re-evaluations may be needed to figure out the reason for your abdominal pain.    Follow-up:  It is very important that you make an appointment with your clinic and go to the appointment.  If you do not follow-up with your primary provider, it may result in missing an important development which could result in permanent injury or disability and/or lasting pain.  If there is any problem keeping your appointment, call your provider or return to the Emergency Department.    Medications:  Take your medications as directed by your provider today.  Before using over-the-counter medications, ask your provider and make sure to take the medications as directed.  If you have any questions about medications, ask your  "provider.    Diet:  Resume your normal diet as much as possible, but do not eat fried, fatty or spicy foods while you have pain.  Do not drink alcohol or have caffeine.  Do not smoke tobacco.    Probiotics: If you have been given an antibiotic, you may want to also take a probiotic pill or eat yogurt with live cultures. Probiotics have \"good bacteria\" to help your intestines stay healthy. Studies have shown that probiotics help prevent diarrhea (loose stools) and other intestine problems (including C. diff infection) when you take antibiotics. You can buy these without a prescription in the pharmacy section of the store.     If you were given a prescription for medicine here today, be sure to read all of the information (including the package insert) that comes with your prescription.  This will include important information about the medicine, its side effects, and any warnings that you need to know about.  The pharmacist who fills the prescription can provide more information and answer questions you may have about the medicine.  If you have questions or concerns that the pharmacist cannot address, please call or return to the Emergency Department.       Remember that you can always come back to the Emergency Department if you are not able to see your regular provider in the amount of time listed above, if you get any new symptoms, or if there is anything that worries you.    Discharge Instructions  Hypertension - High Blood Pressure    During you visit to the Emergency Department, your blood pressure was higher than the recommended blood pressure.  This may be related to stress, pain, medication or other temporary conditions. In these cases, your blood pressure may return to normal on its own. If you have a history of high blood pressure, you may need to have your provider adjust your medications. Sometimes, your high measurement here may indicate that you have developed high blood pressure that will stay high " unless it is treated. As a general rule, high blood pressure causes problems over years rather than days, weeks, or months. So, while it is important to treat blood pressure, it is rarely important to treat blood pressure immediately. Occasionally we will begin a medication in the Emergency Department; more often we will recommend close follow-up for medications with a primary doctor/clinic.    Generally, every Emergency Department visit should have a follow-up clinic visit with either a primary or a specialty clinic/provider. Please follow-up as instructed by your emergency provider today.    Return to the Emergency Department if you start to have:  A severe headache.  Chest pain.  Shortness of breath.  Weakness or numbness that affects one part of the body.  Confusion.  Vision changes.  Significant swelling of legs and/or eyes.  A reaction to any medication started in the Emergency Department.    What can I do to help myself?  Avoid alcohol.  Take any blood pressure medicine that you are prescribed.  Get a good night s sleep.  Lower your salt intake.  Exercise.  Lose weight.  Manage stress.  See your doctor regularly    If blood pressure medication was started in the Emergency Department:  The medicine may not have an immediate effect. The body and brain determine what blood pressure you have. The medicine s job is to retrain the body s  thermostat  to a lower blood pressure.  You will need to follow up with your provider to see how this medicine is working for you.  If you were given a prescription for medicine here today, be sure to read all of the information (including the package insert) that comes with your prescription.  This will include important information about the medicine, its side effects, and any warnings that you need to know about.  The pharmacist who fills the prescription can provide more information and answer questions you may have about the medicine.  If you have questions or concerns that  the pharmacist cannot address, please call or return to the Emergency Department.   Remember that you can always come back to the Emergency Department if you are not able to see your regular provider in the amount of time listed above, if you get any new symptoms, or if there is anything that worries you.

## 2025-01-27 DIAGNOSIS — Z98.84 GASTRIC BYPASS STATUS FOR OBESITY: ICD-10-CM

## 2025-01-28 RX ORDER — FOLIC ACID 1 MG/1
TABLET ORAL
Qty: 90 TABLET | Refills: 2 | Status: SHIPPED | OUTPATIENT
Start: 2025-01-28

## 2025-02-21 ENCOUNTER — OFFICE VISIT (OUTPATIENT)
Dept: INTERNAL MEDICINE | Facility: CLINIC | Age: 65
End: 2025-02-21
Payer: COMMERCIAL

## 2025-02-21 VITALS
HEART RATE: 73 BPM | WEIGHT: 174 LBS | HEIGHT: 66 IN | OXYGEN SATURATION: 100 % | RESPIRATION RATE: 16 BRPM | TEMPERATURE: 97.2 F | BODY MASS INDEX: 27.97 KG/M2 | DIASTOLIC BLOOD PRESSURE: 71 MMHG | SYSTOLIC BLOOD PRESSURE: 126 MMHG

## 2025-02-21 DIAGNOSIS — I47.29 NON-SUSTAINED VENTRICULAR TACHYCARDIA (H): ICD-10-CM

## 2025-02-21 DIAGNOSIS — E66.01 MORBID OBESITY (H): ICD-10-CM

## 2025-02-21 DIAGNOSIS — D64.9 ANEMIA, UNSPECIFIED TYPE: ICD-10-CM

## 2025-02-21 DIAGNOSIS — E11.9 TYPE 2 DIABETES MELLITUS WITHOUT COMPLICATION, WITHOUT LONG-TERM CURRENT USE OF INSULIN (H): ICD-10-CM

## 2025-02-21 DIAGNOSIS — I10 HTN, GOAL BELOW 140/90: ICD-10-CM

## 2025-02-21 DIAGNOSIS — G47.9 SLEEP DIFFICULTIES: ICD-10-CM

## 2025-02-21 DIAGNOSIS — E11.69 TYPE 2 DIABETES MELLITUS WITH OTHER SPECIFIED COMPLICATION, WITHOUT LONG-TERM CURRENT USE OF INSULIN (H): ICD-10-CM

## 2025-02-21 DIAGNOSIS — E78.5 HYPERLIPIDEMIA LDL GOAL <100: ICD-10-CM

## 2025-02-21 DIAGNOSIS — E53.8 VITAMIN B 12 DEFICIENCY: Primary | ICD-10-CM

## 2025-02-21 DIAGNOSIS — I50.9 HEART FAILURE, UNSPECIFIED HF CHRONICITY, UNSPECIFIED HEART FAILURE TYPE (H): ICD-10-CM

## 2025-02-21 DIAGNOSIS — R21 RASH AND NONSPECIFIC SKIN ERUPTION: ICD-10-CM

## 2025-02-21 DIAGNOSIS — I25.10 CORONARY ARTERY DISEASE INVOLVING NATIVE HEART WITHOUT ANGINA PECTORIS, UNSPECIFIED VESSEL OR LESION TYPE: ICD-10-CM

## 2025-02-21 PROCEDURE — 99207 PR FOOT EXAM NO CHARGE: CPT | Performed by: INTERNAL MEDICINE

## 2025-02-21 PROCEDURE — 96372 THER/PROPH/DIAG INJ SC/IM: CPT | Performed by: INTERNAL MEDICINE

## 2025-02-21 PROCEDURE — 99214 OFFICE O/P EST MOD 30 MIN: CPT | Mod: 25 | Performed by: INTERNAL MEDICINE

## 2025-02-21 RX ORDER — CYANOCOBALAMIN 1000 UG/ML
1000 INJECTION, SOLUTION INTRAMUSCULAR; SUBCUTANEOUS
Status: ACTIVE | OUTPATIENT
Start: 2025-02-21 | End: 2026-02-16

## 2025-02-21 RX ORDER — LISINOPRIL 20 MG/1
20 TABLET ORAL 2 TIMES DAILY
Qty: 180 TABLET | Refills: 1 | Status: SHIPPED | OUTPATIENT
Start: 2025-02-21

## 2025-02-21 RX ORDER — ATORVASTATIN CALCIUM 40 MG/1
40 TABLET, FILM COATED ORAL DAILY
Qty: 90 TABLET | Refills: 1 | Status: SHIPPED | OUTPATIENT
Start: 2025-02-21

## 2025-02-21 RX ORDER — TRAZODONE HYDROCHLORIDE 50 MG/1
100 TABLET ORAL AT BEDTIME
Qty: 180 TABLET | Refills: 1 | Status: SHIPPED | OUTPATIENT
Start: 2025-02-21

## 2025-02-21 RX ORDER — METOPROLOL SUCCINATE 25 MG/1
TABLET, EXTENDED RELEASE ORAL
Qty: 45 TABLET | Refills: 1 | Status: SHIPPED | OUTPATIENT
Start: 2025-02-21

## 2025-02-21 RX ORDER — VALACYCLOVIR HYDROCHLORIDE 1 G/1
1000 TABLET, FILM COATED ORAL 2 TIMES DAILY
Qty: 14 TABLET | Refills: 0 | Status: SHIPPED | OUTPATIENT
Start: 2025-02-21 | End: 2025-02-28

## 2025-02-21 RX ORDER — NITROGLYCERIN 0.4 MG/1
TABLET SUBLINGUAL
Qty: 10 TABLET | Refills: 3 | Status: SHIPPED | OUTPATIENT
Start: 2025-02-21

## 2025-02-21 RX ORDER — ACYCLOVIR 50 MG/G
OINTMENT TOPICAL
Qty: 15 G | Refills: 3 | Status: SHIPPED | OUTPATIENT
Start: 2025-02-21

## 2025-02-21 RX ADMIN — CYANOCOBALAMIN 1000 MCG: 1000 INJECTION, SOLUTION INTRAMUSCULAR; SUBCUTANEOUS at 11:08

## 2025-02-21 NOTE — PROGRESS NOTES
Dr Jose's note      Patient's instructions / PLAN:                                                        Plan:  B12 injection every month  2. Diabetic educator referral   3. Acyclovir ointment to the lesion 6 times a day  4. If the Acyclovir doesn't help, take Valtrex 500 mg twice a day  5. Dermatology referral   6.  Next ANNUAL EXAM after -- Aug 2025        ASSESSMENT & PLAN:                                                      (E53.8) Vitamin B 12 deficiency  (primary encounter diagnosis)  Comment:   Possible secondary metformin treatment  new dx. he agrees with B12 injections  Plan: cyanocobalamin injection 1,000 mcg, CBC with         platelets, Iron & Iron Binding Capacity,         Ferritin, Folate, Reticulocyte count, Lipid         panel reflex to direct LDL Fasting,         Comprehensive metabolic panel, Hemoglobin A1c,         TSH with free T4 reflex, Albumin Random Urine         Quantitative with Creat Ratio            (R21) Rash and nonspecific skin eruption  Comment: Evaluated by dermatology.  Not improving.  Very tiny vesicles around.  Suspicious for herpetic simplex  Discussed trial with Zovirax and Valtrex before he sees dermatology.  He agrees  Plan: valACYclovir (VALTREX) 1000 mg tablet, Adult         Dermatology  Referral, acyclovir         (ZOVIRAX) 5 % external ointment, CBC with         platelets, Iron & Iron Binding Capacity,         Ferritin, Folate, Reticulocyte count, Lipid         panel reflex to direct LDL Fasting,         Comprehensive metabolic panel, Hemoglobin A1c,         TSH with free T4 reflex, Albumin Random Urine         Quantitative with Creat Ratio            (E78.5) Hyperlipidemia LDL goal <100  Comment: Controlled based on prior numbers  Plan: atorvastatin (LIPITOR) 40 MG tablet,         nitroGLYcerin (NITROSTAT) 0.4 MG sublingual         tablet, CBC with platelets, Iron & Iron Binding        Capacity, Ferritin, Folate, Reticulocyte count,        Lipid panel reflex  to direct LDL Fasting,         Comprehensive metabolic panel, Hemoglobin A1c,         TSH with free T4 reflex, Albumin Random Urine         Quantitative with Creat Ratio            (I10) HTN, goal below 140/90  Comment: Controlled  Plan: lisinopril (ZESTRIL) 20 MG tablet, CBC with         platelets, Iron & Iron Binding Capacity,         Ferritin, Folate, Reticulocyte count, Lipid         panel reflex to direct LDL Fasting,         Comprehensive metabolic panel, Hemoglobin A1c,         TSH with free T4 reflex, Albumin Random Urine         Quantitative with Creat Ratio            (D64.9) Anemia, unspecified type  Comment: Chronic.  Normal ferritin.  Possible secondary B12 deficiency  Plan: CBC with platelets, Iron & Iron Binding         Capacity, Ferritin, Folate, Reticulocyte count,        Lipid panel reflex to direct LDL Fasting,         Comprehensive metabolic panel, Hemoglobin A1c,         TSH with free T4 reflex, Albumin Random Urine         Quantitative with Creat Ratio            (I25.10) CAD, 2 stents 1999, 2018  Comment: Stable  Plan: metoprolol succinate ER (TOPROL XL) 25 MG 24 hr        tablet, nitroGLYcerin (NITROSTAT) 0.4 MG         sublingual tablet, CBC with platelets, Iron &         Iron Binding Capacity, Ferritin, Folate,         Reticulocyte count, Lipid panel reflex to         direct LDL Fasting, Comprehensive metabolic         panel, Hemoglobin A1c, TSH with free T4 reflex,        Albumin Random Urine Quantitative with Creat         Ratio            (G47.9) Sleep difficulties  Comment: Controlled with trazodone  Plan: traZODone (DESYREL) 50 MG tablet, CBC with         platelets, Iron & Iron Binding Capacity,         Ferritin, Folate, Reticulocyte count, Lipid         panel reflex to direct LDL Fasting,         Comprehensive metabolic panel, Hemoglobin A1c,         TSH with free T4 reflex, Albumin Random Urine         Quantitative with Creat Ratio            (E11.9) DM 2 no insulin (H)  Comment:  Controlled  Plan: metFORMIN (GLUCOPHAGE) 500 MG tablet, FOOT EXAM            (E11.9) Type 2 diabetes mellitus without complication, without long-term current use of insulin (H)  Comment:   Plan: Adult Diabetes Education  Referral,         sitagliptin (JANUVIA) 50 MG tablet, CBC with         platelets, Iron & Iron Binding Capacity,         Ferritin, Folate, Reticulocyte count, Lipid         panel reflex to direct LDL Fasting,         Comprehensive metabolic panel, Hemoglobin A1c,         TSH with free T4 reflex, Albumin Random Urine         Quantitative with Creat Ratio                   (I50.9) Heart failure, unspecified HF chronicity, unspecified heart failure type (H)  (I47.29) Non-sustained ventricular tachycardia (H)  Comment: Not sure if this diagnoses are accurate.  Most likely added by the computer  Plan: Follow-up with cardiology    (E11.69) Type 2 diabetes mellitus with other specified complication, without long-term current use of insulin (H)  Comment:   Plan:         (E66.01) Morbid obesity (H)  Comment: Not accurate dx.   He lost weight BMI 28  Plan: Continue diet and exercise           Chief complaint:                                                      Follow up chronic medical problems      SUBJECTIVE:                                                    History of present illness:    Chr Anemia  -- hx of celiac dz  -- ferritin - nl  -- B 12 -- 385     R lower leg skin rash with blisters around. ? Herpes simplex     CareEverywhere/Patient reports:    -- eye exam done on Dec 27, 2024 with [facility] . It was normal . Info sent to abstraction      Subjective   Sumit is a 64 year old, presenting for the following health issues:  Abdominal Pain and Results      2/21/2025     8:56 AM   Additional Questions   Roomed by Louise HEADLEY     Via the Health Maintenance questionnaire, the patient has reported the following services have been completed -Eye Exam: [facility] 2024-12-27, this  "information has been sent to the abstraction team.  History of Present Illness       Reason for visit:  Lab follow up/ pain left side mid abdominal.pain   He is taking medications regularly.         Review of Systems:                                                      ROS: negative for fever, chills, cough, wheezes, chest pain, shortness of breath, vomiting, abdominal pain, leg swelling       OBJECTIVE:             Physical exam:  Blood pressure 126/71, pulse 73, temperature 97.2  F (36.2  C), temperature source Tympanic, resp. rate 16, height 1.676 m (5' 6\"), weight 78.9 kg (174 lb), SpO2 100%.     NAD, appears comfortable  Skin: no rashes   Neck: supple, no JVD,  No thyroidmegaly. Lymph nodes nonpalpable cervical and supraclavicular.  Chest: clear to auscultation bilaterally, good respiratory effort  Heart: S1 S2, RRR, no mgr appreciated  Abdomen: soft, not tender, no hepatosplenomegaly or masses appreciated, no abdominal bruit, present bowel sounds  Extremities: no edema,   Neurologic: A, Ox3, no focal signs appreciated    PMHx: reviewed  Past Medical History:   Diagnosis Date    Basal cell carcinoma     CAD, 2 stents 1999, 2018 05/10/2021    Cardiac pacemaker in situ 05/10/2021    Celiac disease 05/10/2021    Complete heart block (H) -- s/p PPM 2018 05/10/2021    DM 2, no insulin (H) 05/10/2021    Gastric bypass status for obesity - 2015 05/10/2021    HTN, goal below 140/80 05/10/2021    Hyperlipidemia LDL goal <100 05/10/2021    ZULEIKA (obstructive sleep apnea) 05/10/2021      PSHx: reviewed  Past Surgical History:   Procedure Laterality Date    ANGIOPLASTY      COLONOSCOPY N/A 1/13/2015    Procedure: COLONOSCOPY;  Surgeon: Juni Craig MD;  Location: [facility];  Service:     DISTAL BICEPS TENDON REPAIR Left     ESOPHAGOSCOPY, GASTROSCOPY, DUODENOSCOPY (EGD), COMBINED N/A 1/13/2015    Procedure: UPPER ENDOSCOPY;  Surgeon: Juni Craig MD;  Location: [facility];  Service:     " HERNIORRHAPHY INCISIONAL (LOCATION)      incarcerated    LAPAROSCOPIC GASTRIC BYPASS N/A 2015    LEG SURGERY Right     muscle surgery    OTHER SURGICAL HISTORY      forearm / wrist surgery    REPAIR TENDON BICEPS DISTAL UPPER EXTREMITY      UMBILICAL HERNIA REPAIR N/A     WRIST SURGERY Right         Meds: reviewed  Current Outpatient Medications   Medication Sig Dispense Refill    alcohol swab prep pads Use to swab area of injection/joe as directed. 100 each 1    aspirin (ASPIRIN LOW DOSE) 81 MG EC tablet TAKE ONE TABLET BY MOUTH EVERY DAY FOR HEART DISEASE  **DO NOT CHEW** FOR HEART DISEASE  **DO NOT CHEW** 100 tablet 2    atorvastatin (LIPITOR) 40 MG tablet Take 1 tablet (40 mg) by mouth daily. 90 tablet 1    blood glucose (NO BRAND SPECIFIED) test strip Use to test blood sugar 1 times daily or as directed. 100 strip 1    blood glucose calibration (NO BRAND SPECIFIED) solution Use to calibrate blood glucose monitor as needed as directed. 1 each 1    blood glucose monitoring (NO BRAND SPECIFIED) meter device kit Use to test blood sugar 1 times daily or as directed. 1 kit 0    cetirizine (ZYRTEC) 10 MG tablet Take 1 tablet (10 mg) by mouth daily. 10 tablet 0    ferrous sulfate (FEROSUL) 325 (65 Fe) MG tablet Take 1 tablet (325 mg) by mouth daily 90 tablet 1    fish oil-omega-3 fatty acids 500 MG capsule Take by mouth.      folic acid (FOLVITE) 1 MG tablet Take 1 tablet by mouth daily 90 tablet 2    ivermectin (SOOLANTRA) 1 % cream Apply to face once daily 45 g 11    JANUVIA 50 MG tablet Take 1 tablet (50 mg) by mouth daily. 90 tablet 0    lisinopril (ZESTRIL) 20 MG tablet Take 1 tablet (20 mg) by mouth 2 times daily. 180 tablet 1    metFORMIN (GLUCOPHAGE) 500 MG tablet Take 2 tablets (1,000 mg) by mouth 2 times daily (with meals). 360 tablet 1    metoprolol succinate ER (TOPROL XL) 25 MG 24 hr tablet Take 0.5 tablet (12.5 mg) by mouth daily 45 tablet 1    nitroGLYcerin (NITROSTAT) 0.4 MG sublingual tablet For  chest pain place 1 tablet under the tongue every 5 minutes for 3 doses. If symptoms persist 5 minutes after 1st dose call 911. 10 tablet 3    thin (NO BRAND SPECIFIED) lancets Use with lanceting device. 100 each 1    traZODone (DESYREL) 50 MG tablet Take 1 - 2 tablets ( mg) by mouth nightly as needed for sleep. 60 tablet 5    triamcinolone (KENALOG) 0.1 % external ointment Apply to affected areas twice daily for up to 2 weeks. Thereafter, may use as needed for flares. 15 g 0    Turmeric (QC TUMERIC COMPLEX PO) Take by mouth.      Vitamin D3 (CHOLECALCIFEROL) 25 mcg (1000 units) tablet Take by mouth daily      zinc gluconate 50 MG tablet Take 50 mg by mouth daily         Soc Hx: reviewed  Fam Hx: reviewed      Chart documentation was completed, in part, with [employer] voice-recognition software. Even though reviewed, some grammatical, spelling, and word errors may remain.    The longitudinal plan of care for the diagnosis(es)/condition(s) as documented were addressed during this visit. Due to the added complexity in care, I will continue to support Sumit in the subsequent management and with ongoing continuity of care.11 hours        Candis Jose MD  Internal Medicine       Signed Electronically by: Candis Altamirano MD

## 2025-02-21 NOTE — NURSING NOTE
Clinic Administered Medication Documentation        Patient was given Vitamin B12. Prior to medication administration, verified patient's identity using patient s name and date of birth. Please see MAR and medication order for additional information. Patient instructed to remain in clinic for 15 minutes and report any adverse reaction to staff immediately.    Vial/Syringe: Single dose vial. Was entire vial of medication used? Yes  .KAMALJIT NIELSON LPN

## 2025-02-21 NOTE — PATIENT INSTRUCTIONS
Plan:  B12 injection every month  2. Diabetic educator referral   3. Acyclovir ointment to the lesion 6 times a day  4. If the Acyclovir doesn't help, take Valtrex 500 mg twice a day  5. Dermatology referral   6.  Next ANNUAL EXAM after -- Aug 2025

## 2025-02-21 NOTE — Clinical Note
CareEverywhere/Patient reports:  -- eye exam done on Dec 27, 2024 with [facility] . It was normal . Info sent to abstraction

## 2025-02-21 NOTE — NURSING NOTE
"Chief Complaint   Patient presents with    Abdominal Pain    Results     initial /71   Pulse 73   Temp 97.2  F (36.2  C) (Tympanic)   Resp 16   Ht 1.676 m (5' 6\")   Wt 78.9 kg (174 lb)   SpO2 100%   BMI 28.08 kg/m   Estimated body mass index is 28.08 kg/m  as calculated from the following:    Height as of this encounter: 1.676 m (5' 6\").    Weight as of this encounter: 78.9 kg (174 lb)..  bp completed using cuff size large  KAMALJIT NIELSON LPN  "

## 2025-02-23 PROBLEM — E11.69 TYPE 2 DIABETES MELLITUS WITH OTHER SPECIFIED COMPLICATION, WITHOUT LONG-TERM CURRENT USE OF INSULIN (H): Status: ACTIVE | Noted: 2025-02-23

## 2025-02-23 PROBLEM — I50.9 HEART FAILURE, UNSPECIFIED HF CHRONICITY, UNSPECIFIED HEART FAILURE TYPE (H): Status: ACTIVE | Noted: 2025-02-23

## 2025-03-04 ENCOUNTER — TRANSFERRED RECORDS (OUTPATIENT)
Dept: HEALTH INFORMATION MANAGEMENT | Facility: CLINIC | Age: 65
End: 2025-03-04
Payer: COMMERCIAL

## 2025-03-20 ENCOUNTER — ALLIED HEALTH/NURSE VISIT (OUTPATIENT)
Dept: EDUCATION SERVICES | Facility: CLINIC | Age: 65
End: 2025-03-20
Attending: INTERNAL MEDICINE
Payer: COMMERCIAL

## 2025-03-20 DIAGNOSIS — E11.9 TYPE 2 DIABETES MELLITUS WITHOUT COMPLICATION, WITHOUT LONG-TERM CURRENT USE OF INSULIN (H): ICD-10-CM

## 2025-03-20 RX ORDER — HYDROCHLOROTHIAZIDE 12.5 MG/1
CAPSULE ORAL
Qty: 6 EACH | Refills: 1 | OUTPATIENT
Start: 2025-03-20 | End: 2025-03-20

## 2025-03-20 RX ORDER — HYDROCHLOROTHIAZIDE 12.5 MG/1
CAPSULE ORAL
Qty: 1 EACH | Refills: 1 | Status: SHIPPED | OUTPATIENT
Start: 2025-03-20

## 2025-03-20 NOTE — PROGRESS NOTES
Diabetes Self-Management Education & Support    Presents for: Individual review    Type of Service: In Person Visit      Assessment  Patient would like a diabetes review and to discuss diet for his diabetes and celiac dx, he also had gastric bypass several years ago.  He has followed a gluten free diet for the last 10 years since he was diagnosed with celiac. He reports recent changes that include low hgb and 50 lbs weight loss in the last 10 months. 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 He is working with his PCP to rule out other causes of the weight loss and low hgb. He also reports having symptoms of low blood sugar overnight that include night sweats, anxious and confused. He does not test his blood sugar due to aversion to needle/fingerstick glucose testing.     Reviewed pathophysiology of type 2 DM. Discussed current A1c and relationship to a low hgb.  Reviewed carbohydrate sources, portion control and the benefit of spreading intake throughout the day. Discussed activity and exercise and the impact on glucose and weight. Discussed SBG monitoring vs CGM. He is not interested in SBG monitoring at the time. Recommend he do a trial of the Akash sensor to evaluate glucose patterns and determine if he is experiencing nocturnal hypoglycemia. He is interested in this option. Discuss hypoglycemia signs and symptoms and treatment.   Recommend he follow up with [facility] to discuss his dietary needs.     Patient's most recent   Lab Results   Component Value Date    A1C 5.7 02/14/2025    A1C 7.8 05/10/2021     is meeting goal of <7.0      Care Plan and Education Provided:  Monitoring: CGM instruction: Patient was instructed on Freestyle Akash System: Freestyle Akash sensor: insertion technique, sensor site location and rotation, sensor wear, reasons to remove sensor, assisted with setting up the wilson on his phone, Taking Medication: Action of prescribed  medication(s) and Side effects of prescribed medication(s), Problem Solving: Low glucose - causes, signs/symptoms, treatment and prevention, and Reducing Risks: Goal for A1c, how it relates to glucose and how often to check    Patient verbalized understanding of diabetes self-management education concepts discussed, opportunities for ongoing education and support, and recommendations provided today.    Plan  Make sure to eat 3 meals and add snacks between meals if you are hungry.    Start the Akash 3+ sensor- let Franca know if you would like an ongoing prescription     Follow up:  Follow-up diabetes education appointment scheduled on 4/2/25 at 7:30am with Franca at the [facility]. Bring a detailed food record.    Follow-up:  Upcoming Diabetes Ed Appointments     Visit Type Date Time Department    DIABETES ED 3/20/2025  8:00 AM CR DIABETES ED    DIABETES ED 4/2/2025  7:30 AM RV DIABETES ED        See Care Plan for co-developed, patient-state behavior change goals.    Education Materials Provided:  - [employer] Understanding Diabetes Booklet       Subjective/Objective  Sumit is an 64 year old year old, presenting for the following diabetes education related to: Presents for: Individual review  Accompanied by: Self  Diabetes education in the past 24mo: No  Focus of Visit: Healthy Eating, Other (review of DM2)  Diabetes type: Type 2  Disease course: Stable  How confident are you filling out medical forms by yourself:: Extremely  Transportation concerns: No  Difficulty affording diabetes medication?: No  Difficulty affording diabetes testing supplies?: No  Other concerns:: None  Cultural Influences/Ethnic Background:  Not  or     Diabetes Symptoms & Complications:  Diabetes Related Symptoms: Neuropathy  Weight trend: Decreasing  Symptom course: Stable  Disease course: Stable  Complications assessed today?: Yes  Autonomic neuropathy: No  CVA: No  Heart disease: Yes  Nephropathy:  "No  Peripheral neuropathy: No  Peripheral Vascular Disease: No  Retinopathy: No    Patient Problem List and Family Medical History reviewed for relevant medical history, current medical status, and diabetes risk factors.    Vitals:  There were no vitals taken for this visit.  Estimated body mass index is 28.08 kg/m  as calculated from the following:    Height as of 2/21/25: 1.676 m (5' 6\").    Weight as of 2/21/25: 78.9 kg (174 lb).   Last 3 BP:   BP Readings from Last 3 Encounters:   02/21/25 126/71   01/16/25 (!) 190/91   01/09/25 122/64       History   Smoking Status    Never   Smokeless Tobacco    Never       Labs:  Lab Results   Component Value Date    A1C 5.7 02/14/2025    A1C 7.8 05/10/2021     Lab Results   Component Value Date    GLC 86 01/16/2025     09/01/2022     05/10/2021     Lab Results   Component Value Date    LDL 69 04/08/2024    LDL 92 05/10/2021     HDL Cholesterol   Date Value Ref Range Status   05/10/2021 35 (L) >39 mg/dL Final     Direct Measure HDL   Date Value Ref Range Status   04/08/2024 34 (L) >=40 mg/dL Final   ]  GFR Estimate   Date Value Ref Range Status   01/16/2025 >90 >60 mL/min/1.73m2 Final     Comment:     eGFR calculated using 2021 CKD-EPI equation.   05/10/2021 >90 >60 mL/min/[1.73_m2] Final     Comment:     Non  GFR Calc  Starting 12/18/2018, serum creatinine based estimated GFR (eGFR) will be   calculated using the Chronic Kidney Disease Epidemiology Collaboration   (CKD-EPI) equation.       GFR Estimate If Black   Date Value Ref Range Status   05/10/2021 >90 >60 mL/min/[1.73_m2] Final     Comment:      GFR Calc  Starting 12/18/2018, serum creatinine based estimated GFR (eGFR) will be   calculated using the Chronic Kidney Disease Epidemiology Collaboration   (CKD-EPI) equation.       Lab Results   Component Value Date    CR 0.71 01/16/2025    CR 0.76 05/10/2021     No results found for: \"MICROALBUMIN\"    Healthy Eating:  Healthy " Eating Assessed Today: Yes  Cultural/Protestant diet restrictions?: No  How many times a week on average do you eat food made away from home (restaurant/take-out)?: 1  Meals include: Lunch, Dinner, Morning Snack  Breakfast: banana and GF toast with butter, coffee with sweet cream  Lunch: 4-5 oz burger, cheese on gluten free bun, ketchup Or shrimp cocktail with sauce, 2 peppermint patties, granola bar  Dinner: turkey and cheese on a GF bun OR burger on GF bun  OR salad with chicken  Snacks: GF pretzels, GF chips ahoy  Beverages: Tea, Coffee (sweet and low in  tea)    Being Active:  Being Active Assessed Today: Yes  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 : No  Blood Glucose Meter: Unknown  Times checking blood sugar at home (number): Never    Taking Medications:  Diabetes Medication(s)       Biguanides       metFORMIN (GLUCOPHAGE) 500 MG tablet Take 2 tablets (1,000 mg) by mouth 2 times daily (with meals).       Dipeptidyl Peptidase-4 (DPP-4) Inhibitors       sitagliptin (JANUVIA) 50 MG tablet Take 1 tablet (50 mg) by mouth daily.          Taking Medication Assessed Today: Yes  Current Treatments: Oral Medication (taken by mouth)  Problems taking diabetes medications regularly?: No  Diabetes medication side effects?: No    Problem Solving:  Problem Solving Assessed Today: Yes  Is the patient at risk for hypoglycemia?: Yes  Hypoglycemia Frequency: Weekly  Hypoglycemia: Dizziness/Lightheadedness, Nervousness/Anxiety, Sweats, Confusion  Hypoglycemia Complications: Nocturnal hypoglycemia  Reducing Risks:  Reducing Risks Assessed Today: Yes  Diabetes Risks: Age over 45 years, Family History, Hyperlipidemia  CAD Risks: Diabetes Mellitus, Male sex, Hypertension, Family history, Dyslipidemia  Has dilated eye exam at least once a year?: Yes  Sees dentist every 6 months?: Yes  Feet  checked by healthcare provider in the last year?: Yes    Healthy Coping:  Healthy Coping Assessed Today: Yes  Emotional response to diabetes: Ready to learn  Informal Support system:: Children, Paula based, Family, Spouse  Stage of change: ACTION (Actively working towards change)    Allyn Dennison RN  Time Spent: 60 minutes  Encounter Type: Individual    Any diabetes medication dose changes were made via the CDCES Standing Orders under the patient's referring provider.

## 2025-03-20 NOTE — PATIENT INSTRUCTIONS
"Care Plan:  Make sure to eat 3 meals and add snacks between meals if you are hungry.    Hypoglycemia:  Signs/symptoms of low blood sugar include (but are not limited to): sweating, shaking, feeling dizzy or weak, extreme hunger, headache, feeling crabby or confused, numbness or tingling of mouth/lips.  If you have these symptoms check your blood sugar if you can and then consume carbohydrate (sugar)  If your blood sugar is < 70 mg/dL, consume 15 grams of fast-acting carbohydrate. Wait 15 minutes. Check blood sugar again and if not rising, repeat.    Examples of 15 grams of carbohydrate:  - 4 glucose tabs   - 4 oz juice or non-diet pop   - 2 Tbsp dried fruit   - 5-7 lifesavers  - 1 Tbsp sugar or honey   If your blood sugar is < 50 mg/dL, consume 30 grams of fast-acting carbohydrate (8 glucose tabs OR 8 oz juice or non-diet pop OR 4 Tbsp dried fruit OR 10-14 lifesavers OR 2 Tbsp sugar) and wait 15 minutes. Check blood sugar again and if not rising, repeat.    Please make sure to always keep a source of fast acting carbohydrate with you at all times (as listed above, but not limited to).  Place something in your car, at your bedside, in your purse/pocket etc.    Freestyle Akash 3 or 3+    SENSOR BASICS:  Understand that your glucose sensor measures your glucose in your interstitial fluid and your blood sugar measures your glucose in your blood stream. The readings are not meant to be the same.        Your sensor glucose will lag behind your blood glucose.  \"Remember the roller coaster\".  Your blood sugar is always the front car and your sensor glucose is always the last car.  When blood sugar is moving up or down, the more difference there will be.          Take it easy while wearing the sensor.  Take extra care while bathing and getting dressed.  Wear loose fitting clothes on your sensor and try to avoid laying on your sensor.  You can shower/bathe with the sensor on.  But avoid submerging the sensor more than 3 feet " "for more than 30 minutes.  Gently pat to dry.    INITIAL SET UP:  Download the Akash 3 wilson if using your smartphone and create an account.  The wilson will walk you through set up.  If you are using the Akash 3 , turn it on and follow instructions for set up.      Akash 3 wilson:       There will be a 60 minute warm up for each new sensor.  Each sensor should last 14 days.  Do not take more than 500mg of vitamin C (Akash 3+ more than 1000mg) per day or this can affect sensor accuracy.    The first 12 hours of every new sensor often a little \"off\" as it gets to know your body fluids.  Set glucose alarms for highs and lows per your preference or at the recommendations of your diabetes educator.  You will not be able to silence or turn off the urgent low alert at 54 mg/dL.  If an alarm goes off, go to your wilson and acknowledge it or you will continue to get alarmed every 5 minutes.  Your Akash 3 wilson or  will notify you when it is time to change your sensor.  Just remove it like a band aid and throw it in the trash, then place a new sensor.  It is recommended to switch arms with every sensor.    DAILY ROUTINE:  Keep your phone or  within 33 feet of you to keep data communicating with your device.  If you are away from your device for more than 20 minutes, your device will alarm.  Be curious with what the sensor data shows.  How do your food choices impact your glucose?  Exercise?  Complete a fingerstick glucose check at these times:                          -when you feel differently than the sensor indicates                          -when you are not wearing a sensor                          -when you see this symbol:     DATA SHARING:  If you want to share your sensor data with a loved one (for phone users only), send them an invitation through the Akash 3 wilson.  They will use the [place] wilson and accept your invitation.      [place] wilson:   Our clinic will link to your data as well (phone users " "only). Under the menu (3 lines in the upper left corner), go to connected apps, then touch \"connect\" next to Be[facility], and then \"connect to a practice\".  Enter our practice ID \"[id number]\" and hit connect.  Patients using the reader can upload from home via desktop or laptop computer on [employer] or will have the  downloaded in clinic.     OTHER IMPORTANT NOTES:  If the sensor is often falling off before the 14 days are up, there are products than can help.  Talk to your diabetes educator.  You can leave your Akash sensor on for radiology scans (Xray, CT scan or MRI), however it is recommended that you use fingersticks for accurate readings during and 1 hour after an MRI as the accuracy of the sensor is questionable during this time.  If you need help with setting up your wilson, starting a new sensor or other training, you can call [phone] or sign up at: [employer] for iPhone and Android  They can help you 1:1 by phone or virtual visit.  Contact the  if the sensor does not last the full 14 days for any reason, or if you have any other technical problems. Keep your Akash sensor box with the lot and serial numbers as they often ask for this information.  -[employer] customer service phone number: [phone]     -Website for akash replacement due to sensor failure or falling off: https://www.DineInTime/us-en/support/sensor-support-form-questions.html  If your copay is more than $75 a month, call the copay assistance line at [phone] and they will work with your pharmacy to get your cost down.  Or, you can give the pharmacy the following information:      [id number], Group: [id number], Static ID: [id number], EXP: 12/31/25     Turn off automatic OS updates and do not update your phone's OS until you check your diabetes device 's website to verify that the medical apps you use are compatible with the new OS version. Turn off automatic OS updates by navigating to your " "system settings, usually accessible through a gear icon, and find the \"software update\" option; within this section, look for a toggle switch labeled \"automatic updates\" or similar, and disable it.    After updating your OS or adding a new accessory such as wireless headphones, confirm alert settings and then carefully monitor your medical device wilson to make sure you can receive and hear alerts as expected.    At least once a month, check that your smartphone alerts are configured as expected. Ensure your volume, vibration, notifications, and other relevant settings still work.      Follow up:  Follow-up diabetes education appointment scheduled on 4/2/25 at 7:30am with Franca at the [facility]. Bring a detailed food record.     Bring blood glucose meter and logbook with you to all doctor and follow-up appointments.     [facility] for the [facility]:  For Your Diabetes or Nutrition Education Appointments Call:  [phone]   For Diabetes or Nutrition Related Questions:   [phone]  Send [facility] [employer] [facility] Message   If you need a medication refill please contact your pharmacy. Please allow 3 business days for your refills to be completed.    "

## 2025-03-20 NOTE — Clinical Note
3/20/2025         RE: Sumit Park  [address]  [place] [address]        Dear Colleague,    Thank you for referring your patient, Sumit Park, to the [facility]. Please see a copy of my visit note below.    Diabetes Self-Management Education & Support    Presents for: Individual review    Type of Service: In Person Visit      Assessment  ***      Lost 50lbs in 10 months,     Discussed lower hgb may reflect a lower A1c ***    Waking with signs and symptoms of hypoglycmemia, night sweats- anxiety, confusion,    Recommend a Akash sensor to evaluate glucose and possible nocturnal hypoglycemia  ***    Patient's most recent   Lab Results   Component Value Date    A1C 5.7 02/14/2025    A1C 7.8 05/10/2021     is meeting goal of <7.0      Care Plan and Education Provided:  Monitoring: CGM instruction: Patient was instructed on Freestyle Akash System: Freestyle Akash sensor: insertion technique, sensor site location and rotation, sensor wear, reasons to remove sensor, assisted with setting up the wilson on his phone, Taking Medication: Action of prescribed medication(s) and Side effects of prescribed medication(s), Problem Solving: Low glucose - causes, signs/symptoms, treatment and prevention, and Reducing Risks: Goal for A1c, how it relates to glucose and how often to check    Patient verbalized understanding of diabetes self-management education concepts discussed, opportunities for ongoing education and support, and recommendations provided today.    Plan  Make sure to eat 3 meals and add snacks between meals if you are hungry.    Start the Akash 3+ sensor- let Franca know if you would like an ongoing prescription     Follow up:  Follow-up diabetes education appointment scheduled on 4/2/25 at 7:30am with Franca at the [facility]. Bring a detailed food record.    Follow-up:  Upcoming Diabetes Ed Appointments     Visit Type Date Time Department    DIABETES ED  "3/20/2025  8:00 AM CR DIABETES ED    DIABETES ED 4/2/2025  7:30 AM RV DIABETES ED        See Care Plan for co-developed, patient-state behavior change goals.    Education Materials Provided:  {CDE EDUCATION MATERIALS:395882}      Subjective/Objective  Sumit is an 64 year old year old, presenting for the following diabetes education related to: Presents for: Individual review  Accompanied by: Self  Diabetes education in the past 24mo: No  Focus of Visit: Healthy Eating, Other (review of DM2)  Diabetes type: Type 2  Disease course: Stable  How confident are you filling out medical forms by yourself:: Extremely  Transportation concerns: No  Difficulty affording diabetes medication?: No  Difficulty affording diabetes testing supplies?: No  Other concerns:: None  Cultural Influences/Ethnic Background:  Not  or   ***    Diabetes Symptoms & Complications:  Diabetes Related Symptoms: Neuropathy  Weight trend: Decreasing  Symptom course: Stable  Disease course: Stable  Complications assessed today?: Yes  Autonomic neuropathy: No  CVA: No  Heart disease: Yes  Nephropathy: No  Peripheral neuropathy: No  Peripheral Vascular Disease: No  Retinopathy: No    Patient Problem List and Family Medical History reviewed for relevant medical history, current medical status, and diabetes risk factors.    Vitals:  There were no vitals taken for this visit.  Estimated body mass index is 28.08 kg/m  as calculated from the following:    Height as of 2/21/25: 1.676 m (5' 6\").    Weight as of 2/21/25: 78.9 kg (174 lb).   Last 3 BP:   BP Readings from Last 3 Encounters:   02/21/25 126/71   01/16/25 (!) 190/91   01/09/25 122/64       History   Smoking Status    Never   Smokeless Tobacco    Never       Labs:  Lab Results   Component Value Date    A1C 5.7 02/14/2025    A1C 7.8 05/10/2021     Lab Results   Component Value Date    GLC 86 01/16/2025     09/01/2022     05/10/2021     Lab Results   Component Value Date    " "LDL 69 04/08/2024    LDL 92 05/10/2021     HDL Cholesterol   Date Value Ref Range Status   05/10/2021 35 (L) >39 mg/dL Final     Direct Measure HDL   Date Value Ref Range Status   04/08/2024 34 (L) >=40 mg/dL Final   ]  GFR Estimate   Date Value Ref Range Status   01/16/2025 >90 >60 mL/min/1.73m2 Final     Comment:     eGFR calculated using 2021 CKD-EPI equation.   05/10/2021 >90 >60 mL/min/[1.73_m2] Final     Comment:     Non  GFR Calc  Starting 12/18/2018, serum creatinine based estimated GFR (eGFR) will be   calculated using the Chronic Kidney Disease Epidemiology Collaboration   (CKD-EPI) equation.       GFR Estimate If Black   Date Value Ref Range Status   05/10/2021 >90 >60 mL/min/[1.73_m2] Final     Comment:      GFR Calc  Starting 12/18/2018, serum creatinine based estimated GFR (eGFR) will be   calculated using the Chronic Kidney Disease Epidemiology Collaboration   (CKD-EPI) equation.       Lab Results   Component Value Date    CR 0.71 01/16/2025    CR 0.76 05/10/2021     No results found for: \"MICROALBUMIN\"    {Core Behaviors Assessed Today:511933}    Allyn Dennison RN  Time Spent: {:627467} minutes  Encounter Type: Individual    Any diabetes medication dose changes were made via the CDCES Standing Orders under the patient's referring provider.      Diabetes Self-Management Education & Support    Presents for: Individual review    Type of Service: In Person Visit      Assessment  Patient would like a diabetes review and to discuss diet for his diabetes and celiac dx, he also had gastric bypass several years ago.  He has followed a gluten free diet for the last 10 years since he was diagnosed with celiac. He reports recent changes that include low hgb and 50 lbs weight loss in the last 10 months. 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 He is working with his PCP to rule out other causes of the weight loss and " low hgb. He also reports having symptoms of low blood sugar overnight that include night sweats, anxious and confused. He does not test his blood sugar due to aversion to needle/fingerstick glucose testing.     Reviewed pathophysiology of type 2 DM. Discussed current A1c and relationship to a low hgb.  Reviewed carbohydrate sources, portion control and the benefit of spreading intake throughout the day. discussed activity and exercise and the impact on glucose. Discuss hypoglycemia signs and symptoms and treatment.   Discussed SBG monitoring vs CGM. He is not interested in SBG monitoring at the time. Recommend he do a trial of the Akash sensor to evaluate glucose patterns and determine if he is experiencing nocturnal hypoglycemia. He is interested in this option.  Recommend he follow up with [facility] to discuss his dietary needs.     Patient's most recent   Lab Results   Component Value Date    A1C 5.7 02/14/2025    A1C 7.8 05/10/2021     is meeting goal of <7.0      Care Plan and Education Provided:  Monitoring: CGM instruction: Patient was instructed on Freestyle Akash System: Freestyle Akash sensor: insertion technique, sensor site location and rotation, sensor wear, reasons to remove sensor, assisted with setting up the wilson on his phone, Taking Medication: Action of prescribed medication(s) and Side effects of prescribed medication(s), Problem Solving: Low glucose - causes, signs/symptoms, treatment and prevention, and Reducing Risks: Goal for A1c, how it relates to glucose and how often to check    Patient verbalized understanding of diabetes self-management education concepts discussed, opportunities for ongoing education and support, and recommendations provided today.    Plan  Make sure to eat 3 meals and add snacks between meals if you are hungry.    Start the Akash 3+ sensor- let Franca know if you would like an ongoing prescription     Follow up:  Follow-up diabetes education appointment scheduled on  "4/2/25 at 7:30am with Franca at the [facility]. Bring a detailed food record.    Follow-up:  Upcoming Diabetes Ed Appointments     Visit Type Date Time Department    DIABETES ED 3/20/2025  8:00 AM CR DIABETES ED    DIABETES ED 4/2/2025  7:30 AM RV DIABETES ED        See Care Plan for co-developed, patient-state behavior change goals.    Education Materials Provided:  - M Any[employer] Understanding Diabetes Booklet       Subjective/Objective  Sumit is an 64 year old year old, presenting for the following diabetes education related to: Presents for: Individual review  Accompanied by: Self  Diabetes education in the past 24mo: No  Focus of Visit: Healthy Eating, Other (review of DM2)  Diabetes type: Type 2  Disease course: Stable  How confident are you filling out medical forms by yourself:: Extremely  Transportation concerns: No  Difficulty affording diabetes medication?: No  Difficulty affording diabetes testing supplies?: No  Other concerns:: None  Cultural Influences/Ethnic Background:  Not  or     Diabetes Symptoms & Complications:  Diabetes Related Symptoms: Neuropathy  Weight trend: Decreasing  Symptom course: Stable  Disease course: Stable  Complications assessed today?: Yes  Autonomic neuropathy: No  CVA: No  Heart disease: Yes  Nephropathy: No  Peripheral neuropathy: No  Peripheral Vascular Disease: No  Retinopathy: No    Patient Problem List and Family Medical History reviewed for relevant medical history, current medical status, and diabetes risk factors.    Vitals:  There were no vitals taken for this visit.  Estimated body mass index is 28.08 kg/m  as calculated from the following:    Height as of 2/21/25: 1.676 m (5' 6\").    Weight as of 2/21/25: 78.9 kg (174 lb).   Last 3 BP:   BP Readings from Last 3 Encounters:   02/21/25 126/71   01/16/25 (!) 190/91   01/09/25 122/64       History   Smoking Status     Never   Smokeless Tobacco     Never       Labs:  Lab Results   Component " "Value Date    A1C 5.7 02/14/2025    A1C 7.8 05/10/2021     Lab Results   Component Value Date    GLC 86 01/16/2025     09/01/2022     05/10/2021     Lab Results   Component Value Date    LDL 69 04/08/2024    LDL 92 05/10/2021     HDL Cholesterol   Date Value Ref Range Status   05/10/2021 35 (L) >39 mg/dL Final     Direct Measure HDL   Date Value Ref Range Status   04/08/2024 34 (L) >=40 mg/dL Final   ]  GFR Estimate   Date Value Ref Range Status   01/16/2025 >90 >60 mL/min/1.73m2 Final     Comment:     eGFR calculated using 2021 CKD-EPI equation.   05/10/2021 >90 >60 mL/min/[1.73_m2] Final     Comment:     Non  GFR Calc  Starting 12/18/2018, serum creatinine based estimated GFR (eGFR) will be   calculated using the Chronic Kidney Disease Epidemiology Collaboration   (CKD-EPI) equation.       GFR Estimate If Black   Date Value Ref Range Status   05/10/2021 >90 >60 mL/min/[1.73_m2] Final     Comment:      GFR Calc  Starting 12/18/2018, serum creatinine based estimated GFR (eGFR) will be   calculated using the Chronic Kidney Disease Epidemiology Collaboration   (CKD-EPI) equation.       Lab Results   Component Value Date    CR 0.71 01/16/2025    CR 0.76 05/10/2021     No results found for: \"MICROALBUMIN\"    Healthy Eating:  Healthy Eating Assessed Today: Yes  Cultural/Religion diet restrictions?: No  How many times a week on average do you eat food made away from home (restaurant/take-out)?: 1  Meals include: Lunch, Dinner, Morning Snack  Breakfast: banana and GF toast with butter, coffee with sweet cream  Lunch: 4-5 oz burger, cheese on gluten free bun, ketchup Or shrimp cocktail with sauce, 2 peppermint patties, granola bar  Dinner: turkey and cheese on a GF bun OR burger on GF bun  OR salad with chicken  Snacks: GF pretzels, GF chips ahoy  Beverages: Tea, Coffee (sweet and low in  tea)    Being Active:  Being Active Assessed Today: Yes  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 : No  Blood Glucose Meter: Unknown  Times checking blood sugar at home (number): Never    Taking Medications:  Diabetes Medication(s)       Biguanides       metFORMIN (GLUCOPHAGE) 500 MG tablet Take 2 tablets (1,000 mg) by mouth 2 times daily (with meals).       Dipeptidyl Peptidase-4 (DPP-4) Inhibitors       sitagliptin (JANUVIA) 50 MG tablet Take 1 tablet (50 mg) by mouth daily.          Taking Medication Assessed Today: Yes  Current Treatments: Oral Medication (taken by mouth)  Problems taking diabetes medications regularly?: No  Diabetes medication side effects?: No    Problem Solving:  Problem Solving Assessed Today: Yes  Is the patient at risk for hypoglycemia?: Yes  Hypoglycemia Frequency: Weekly  Hypoglycemia: Dizziness/Lightheadedness, Nervousness/Anxiety, Sweats, Confusion  Hypoglycemia Complications: Nocturnal hypoglycemia  Reducing Risks:  Reducing Risks Assessed Today: Yes  Diabetes Risks: Age over 45 years, Family History, Hyperlipidemia  CAD Risks: Diabetes Mellitus, Male sex, Hypertension, Family history, Dyslipidemia  Has dilated eye exam at least once a year?: Yes  Sees dentist every 6 months?: Yes  Feet checked by healthcare provider in the last year?: Yes    Healthy Coping:  Healthy Coping Assessed Today: Yes  Emotional response to diabetes: Ready to learn  Informal Support system:: Children, Paula based, Family, Spouse  Stage of change: ACTION (Actively working towards change)    Allyn Dennison RN  Time Spent: 60 minutes  Encounter Type: Individual    Any diabetes medication dose changes were made via the CDCES Standing Orders under the patient's referring provider.

## 2025-03-25 ENCOUNTER — ALLIED HEALTH/NURSE VISIT (OUTPATIENT)
Dept: INTERNAL MEDICINE | Facility: CLINIC | Age: 65
End: 2025-03-25
Payer: COMMERCIAL

## 2025-03-25 DIAGNOSIS — E53.8 VITAMIN B 12 DEFICIENCY: Primary | ICD-10-CM

## 2025-03-25 PROCEDURE — 96372 THER/PROPH/DIAG INJ SC/IM: CPT | Performed by: INTERNAL MEDICINE

## 2025-03-25 PROCEDURE — 99207 PR NO CHARGE NURSE ONLY: CPT

## 2025-03-25 RX ADMIN — CYANOCOBALAMIN 1000 MCG: 1000 INJECTION, SOLUTION INTRAMUSCULAR; SUBCUTANEOUS at 08:33

## 2025-03-25 NOTE — PROGRESS NOTES
Clinic Administered Medication Documentation      Injectable Medication Documentation    Is there an active order (written within the past 365 days, with administrations remaining, not ) in the chart? Yes.     Patient was given Cyanocobalamin (B-12). Prior to medication administration, verified patient's identity using patient s name and date of birth. Please see MAR and medication order for additional information. Patient instructed to remain in clinic for 15 minutes and report any adverse reaction to staff immediately.    Vial/Syringe: Single dose vial. Was entire vial of medication used? Yes  Was this medication supplied by the patient? No  Is this a medication the patient will need to receive again? Yes. Verified that the patient has refills remaining in their prescription.    Linaa Walden MA

## 2025-03-26 ENCOUNTER — TELEPHONE (OUTPATIENT)
Dept: GASTROENTEROLOGY | Facility: CLINIC | Age: 65
End: 2025-03-26
Payer: COMMERCIAL

## 2025-03-26 DIAGNOSIS — Z12.11 SPECIAL SCREENING FOR MALIGNANT NEOPLASMS, COLON: Primary | ICD-10-CM

## 2025-03-26 RX ORDER — BISACODYL 5 MG
TABLET, DELAYED RELEASE (ENTERIC COATED) ORAL
Qty: 4 TABLET | Refills: 0 | Status: SHIPPED | OUTPATIENT
Start: 2025-03-26

## 2025-03-26 NOTE — TELEPHONE ENCOUNTER
"Endoscopy Scheduling Screen    Have you had any respiratory illness or flu-like symptoms in the last 10 days?  No    What is your communication preference for Instructions and/or Bowel Prep?   MyChart    What insurance is in the chart?  Other:  [facility]    Ordering/Referring Provider: TERRY HERNANDEZ   (If ordering provider performs procedure, schedule with ordering provider unless otherwise instructed. )    BMI: Estimated body mass index is 28.08 kg/m  as calculated from the following:    Height as of 2/21/25: 1.676 m (5' 6\").    Weight as of 2/21/25: 78.9 kg (174 lb).     Sedation Ordered  moderate sedation.   If patient BMI > 50 do not schedule in [facility].    If patient BMI > 45 do not schedule at [facility].    Are you taking methadone or Suboxone?  NO, No RN review required.    Have you been diagnosed and are being treated for severe PTSD or severe anxiety?  NO, No RN review required.    Are you taking any prescription medications for pain 3 or more times per week?   NO, No RN review required.    Do you have a history of malignant hyperthermia?  No    (Females) Are you currently pregnant?        Have you been diagnosed or told you have pulmonary hypertension?   No    Do you have an LVAD?  No    Have you been told you have moderate to severe sleep apnea?  No.    Have you been told you have COPD, asthma, or any other lung disease?  No - lost 180 lbs and it's no longer an issue    Has your doctor ordered any cardiac tests like echo, angiogram, stress test, ablation, or EKG, that you have not completed yet?  No    Do you  have a history of any heart conditions?  Yes     Have you had any hospitalizations  in the last year for heart related issues, for example a stent placement, heart attack, or cardiomyopathy?  No    Do you have any implantable devices in your body (pacemaker, ICD)?  Pacemaker (schedule colonoscopy in Hospital Only)    Do you take nitroglycerine?  No - has rx but has not used in years    Have " "you ever had or are you waiting for an organ transplant?  No. Continue scheduling, no site restrictions.    Have you had a stroke or transient ischemic attack (TIA aka \"mini stroke\") in the last 2 years?   No.    Have you been diagnosed with or been told you have cirrhosis of the liver?   No.    Are you currently on dialysis?   No    Do you need assistance transferring?   No    BMI: Estimated body mass index is 28.08 kg/m  as calculated from the following:    Height as of 2/21/25: 1.676 m (5' 6\").    Weight as of 2/21/25: 78.9 kg (174 lb).     Is patients BMI > 40 and scheduling location [facility]?  No    Do you take an injectable or oral medication for weight loss or diabetes (excluding insulin)?  No    Do you take the medication Naltrexone?  No    Do you take blood thinners?  No       Prep   Are you currently on dialysis or do you have chronic kidney disease?  No    Do you have a diagnosis of diabetes?  Yes (Golytely Prep)    Do you have a diagnosis of cystic fibrosis (CF)?  No    On a regular basis do you go 3 -5 days between bowel movements?  No    BMI > 40?  No    Preferred Pharmacy:    [facility][id number] [address] [address]  Phone: [phone] Fax: [phone]      Final Scheduling Details     Procedure scheduled  Colonoscopy    Surgeon:  JAMARI     Date of procedure:  4/4/2025     Pre-OP / PAC:   No - Not required for this site.    Location  [facility] - Per order.    Sedation   Moderate Sedation  Confirmed via secure chat that patient is ok for CS.       Patient Reminders:   You will receive a call from a Nurse to review instructions and health history.  This assessment must be completed prior to your procedure.  Failure to complete the Nurse assessment may result in the procedure being cancelled.      On the day of your procedure, please designate an adult(s) who can drive you home stay with you for the next 24 hours. The medicines used in the " exam will make you sleepy. You will not be able to drive.      You cannot take public transportation, ride share services, or non-medical taxi service without a responsible caregiver.  Medical transport services are allowed with the requirement that a responsible caregiver will receive you at your destination.  We require that drivers and caregivers are confirmed prior to your procedure.

## 2025-03-26 NOTE — TELEPHONE ENCOUNTER
Pre visit planning completed.      Procedure details:    Patient scheduled for Colonoscopy on 04.04.2025.     Arrival time: 1100. Procedure time 1145    Facility location: [facility]; [address] [place] [address]. Check in location: Main entrance, door #1 on the North side of the building under roundabout awning. DO NOT GO TO SURGERY/ED ENTRANCE.     Sedation type: Conscious sedation     Pre op exam needed? No.    Indication for procedure: Special screening for malignant neoplasms, colon       Chart review:     Electronic implanted devices? Yes Pacemaker    Recent diagnosis of diverticulitis within the last 6 weeks? No      Medication review:    Diabetic? Yes. Oral diabetic medications: Januvia (sitagliptin): HOLD day of procedure.  Metformin (glucophage): HOLD day of procedure.    Anticoagulants? No    Weight loss medication/injectable? No GLP-1 medication per patient's medication list. Nursing to verify with pre-assessment call.    Other medication HOLDING recommendations:  Ferrous sulfate (iron supplements): HOLD 7 days before procedure.      Prep for procedure:     Bowel prep recommendation: Standard Golytely. Bowel prep [facility] to [id number] - [place], [place] - [address] [place] [address] [facility]  Due to: diabetes    Procedure information and instructions sent via [employer]         Joelle Bethea RN  Endoscopy Procedure Pre Assessment   [phone] option 3

## 2025-03-26 NOTE — TELEPHONE ENCOUNTER
Pre assessment completed for upcoming procedure.   (Please see previous telephone encounter notes for complete details)          Procedure details:    Arrival time and facility location reviewed.    Pre op exam needed? No.    Designated  policy reviewed. Instructed to have someone stay 6  hours post procedure.       Medication review:    Medications reviewed. Please see supporting documentation below. Holding recommendations discussed (if applicable).   Oral diabetic medication(s): Januvia (sitagliptin): HOLD day of procedure.  Metformin (glucophage): HOLD day of procedure.  Ferrous Sulfate (iron supplement): HOLD 7 days before procedure.      Prep for procedure:     Procedure prep instructions reviewed.        Any additional information needed:  N/A      Patient verbalized understanding and had no questions or concerns at this time.      Rocio Hernandez LPN  Endoscopy Procedure Pre Assessment   [phone] option 3

## 2025-03-26 NOTE — TELEPHONE ENCOUNTER
Pre Assessment RN Review    Received inquiry from  requesting to check if the patient would need MAC if scheduling at .     Writer reviewed chart and did not find any indication for MAC.  OK to proceed with moderate as ordered.       Scheduling Status & Recommendations    Sedation: Moderate Sedation - Per order.  Location Type: Hospital - Per exclusion criteria.      Linda Abudl RN  Endoscopy Procedure Pre-Assessment RN  [phone], option 3

## 2025-04-02 ENCOUNTER — ALLIED HEALTH/NURSE VISIT (OUTPATIENT)
Dept: EDUCATION SERVICES | Facility: CLINIC | Age: 65
End: 2025-04-02
Payer: COMMERCIAL

## 2025-04-02 ENCOUNTER — TELEPHONE (OUTPATIENT)
Dept: EDUCATION SERVICES | Facility: CLINIC | Age: 65
End: 2025-04-02

## 2025-04-02 DIAGNOSIS — E11.9 TYPE 2 DIABETES MELLITUS WITHOUT COMPLICATION, WITHOUT LONG-TERM CURRENT USE OF INSULIN (H): ICD-10-CM

## 2025-04-02 PROCEDURE — G0108 DIAB MANAGE TRN  PER INDIV: HCPCS | Performed by: DIETITIAN, REGISTERED

## 2025-04-02 RX ORDER — HYDROCHLOROTHIAZIDE 12.5 MG/1
CAPSULE ORAL
Qty: 1 EACH | Refills: 6 | Status: SHIPPED | OUTPATIENT
Start: 2025-04-02

## 2025-04-02 RX ORDER — LANCETS
EACH MISCELLANEOUS
Qty: 100 EACH | Refills: 6 | Status: SHIPPED | OUTPATIENT
Start: 2025-04-02

## 2025-04-02 NOTE — PROGRESS NOTES
Diabetes Self-Management Education & Support  Presents for: Follow-up    Type of Service: In Person Visit    Assessment  Sumit states he would like to wear the Akash sensor consistently now. Reports he noticed low glucose readings when he first put it on so he stopped his Metformin completely. Since then he has not had low glucose, though he has had more after meal glucose spikes. He also had questions about increasing iron due to low Hgb. Reviewed concepts from manual of clinical nutrition including iron sources, iron goal for men and tips to incorporate vitamin C to increase absorption. He is receptive to all education.    Patient's most recent   Lab Results   Component Value Date    A1C 5.7 02/14/2025    A1C 7.8 05/10/2021     is meeting goal of <7.0    Diabetes knowledge and skills assessment:   Patient is knowledgeable in diabetes management concepts related to: Monitoring, Taking Medication, and Problem Solving    Based on learning assessment above, most appropriate setting for further diabetes education would be: Individual setting.    Care Plan and Education Provided:  Healthy Eating: Balanced meals and increasing hemoglobin, Being Active: Precautions to take with exercise and Relationship of activity to glucose, Monitoring: Blood glucose versus Continuous Glucose Monitoring and CGM instruction: Patient was instructed on Freestyle Akash System: Use of trends and graphs for pattern management and problem solving, Taking Medication: Action of prescribed medication(s), Side effects of prescribed medication(s), and When to take medication(s), Problem Solving: High glucose - causes, signs/symptoms, treatment and prevention, Low glucose - causes, signs/symptoms, treatment and prevention, Rule of 15 and carrying a carbohydrate source at all times in case of low glucose, and When to call a health care provider, and Reducing Risks: Goal for A1c, how it relates to glucose and how often to check    Patient verbalized  understanding of diabetes self-management education concepts discussed, opportunities for ongoing education and support, and recommendations provided today.    Plan  Follow meal plan tips to increase iron as discussed. Printed information for him today.   Follow meal plan tips to increase protein and balance with carbohydrate to prevent glucose spikes after meals. Protein/carbohydrate handout given.   Updated glucose meter and supply prescriptions. Recommend he test glucose with meter if notices any low glucose with sensor.   Enc to put glucose tablets in vehicles.  Updated Freestlye Akash prescription. Gave Akash 1-800 number if concerns with cost or prescription.     Follow-up:  Upcoming Diabetes Ed Appointments     Visit Type Date Time Department    DIABETES ED 4/2/2025  7:30 AM [facility]    DIABETES ED 4/24/2025  7:30 AM [facility]        See Care Plan for co-developed, patient-state behavior change goals.    Subjective/Objective  Sumit is an 64 year old year old, presenting for the following diabetes education related to: Presents for: Follow-up  Accompanied by: Self  Diabetes education in the past 24mo: No  Focus of Visit: Healthy Eating, Other (review of DM2)  Diabetes type: Type 2  Disease course: Stable  How confident are you filling out medical forms by yourself:: Extremely  Transportation concerns: No  Difficulty affording diabetes medication?: No  Difficulty affording diabetes testing supplies?: No  Other concerns:: None  Cultural Influences/Ethnic Background:  Not  or     Diabetes Symptoms & Complications:  Diabetes Related Symptoms: Neuropathy  Weight trend: Decreasing  Symptom course: Stable  Disease course: Stable  Complications assessed today?: Yes  Autonomic neuropathy: No  CVA: No  Heart disease: Yes  Nephropathy: No  Peripheral neuropathy: No  Peripheral Vascular Disease: No  Retinopathy: No    Patient Problem List and Family Medical History reviewed for relevant  "medical history, current medical status, and diabetes risk factors.    Vitals:  There were no vitals taken for this visit.  Estimated body mass index is 28.08 kg/m  as calculated from the following:    Height as of 2/21/25: 1.676 m (5' 6\").    Weight as of 2/21/25: 78.9 kg (174 lb).   Last 3 BP:   BP Readings from Last 3 Encounters:   02/21/25 126/71   01/16/25 (!) 190/91   01/09/25 122/64       History   Smoking Status    Never   Smokeless Tobacco    Never       Labs:  Lab Results   Component Value Date    A1C 5.7 02/14/2025    A1C 7.8 05/10/2021     Lab Results   Component Value Date    GLC 86 01/16/2025     09/01/2022     05/10/2021     Lab Results   Component Value Date    LDL 69 04/08/2024    LDL 92 05/10/2021     HDL Cholesterol   Date Value Ref Range Status   05/10/2021 35 (L) >39 mg/dL Final     Direct Measure HDL   Date Value Ref Range Status   04/08/2024 34 (L) >=40 mg/dL Final   ]  GFR Estimate   Date Value Ref Range Status   01/16/2025 >90 >60 mL/min/1.73m2 Final     Comment:     eGFR calculated using 2021 CKD-EPI equation.   05/10/2021 >90 >60 mL/min/[1.73_m2] Final     Comment:     Non  GFR Calc  Starting 12/18/2018, serum creatinine based estimated GFR (eGFR) will be   calculated using the Chronic Kidney Disease Epidemiology Collaboration   (CKD-EPI) equation.       GFR Estimate If Black   Date Value Ref Range Status   05/10/2021 >90 >60 mL/min/[1.73_m2] Final     Comment:      GFR Calc  Starting 12/18/2018, serum creatinine based estimated GFR (eGFR) will be   calculated using the Chronic Kidney Disease Epidemiology Collaboration   (CKD-EPI) equation.       Lab Results   Component Value Date    CR 0.71 01/16/2025    CR 0.76 05/10/2021     No results found for: \"MICROALBUMIN\"    Healthy Eating:  Healthy Eating Assessed Today: Yes  Cultural/Nondenominational diet restrictions?: No  Do you have any food allergies or intolerances?: Yes  Please list your food " allergies / intolerances: gluten free, history of gastric bypass so small meals/snacks during day  Meal planning/habits: Low carb, Smaller portions  Who cooks/prepares meals for you?: Spouse  Who purchases food in  your home?: Spouse  How many times a week on average do you eat food made away from home (restaurant/take-out)?: 1  Meals include: Lunch, Dinner, Morning Snack  Breakfast: banana and GF toast with butter, coffee with sweet cream  Lunch: 4-5 oz burger, cheese on gluten free bun, ketchup Or shrimp cocktail with sauce, 2 peppermint patties, granola bar  Dinner: turkey and cheese on a GF bun OR burger on GF bun  OR salad with chicken  Snacks: GF pretzels, GF chips ahoy  Beverages: Tea, Coffee (sweet and low in  tea)  Has patient met with a dietitian in the past?: No    Being Active:  Being Active Assessed Today: Yes  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 : Moderate (like brisk walking)  Barrier to exercise: None    Monitoring:  Monitoring Assessed Today: Yes  Did patient bring glucose meter to appointment? : No  Blood Glucose Meter: CGM (14 day trial of Freestyle Akash. Plans to  prescription)  Times checking blood sugar at home (number): 5+  Times checking blood sugar at home (per): Day  Blood glucose trend: Fluctuating        Taking Medications:  Diabetes Medication(s)       Biguanides       metFORMIN (GLUCOPHAGE) 500 MG tablet Take 2 tablets (1,000 mg) by mouth 2 times daily (with meals).       Dipeptidyl Peptidase-4 (DPP-4) Inhibitors       sitagliptin (JANUVIA) 50 MG tablet Take 1 tablet (50 mg) by mouth daily.          Taking Medication Assessed Today: Yes  Current Treatments: Oral Medication (taken by mouth)  Problems taking diabetes medications regularly?: No  Diabetes medication side effects?: No    Problem Solving:  Problem Solving Assessed Today: Yes  Is the patient at risk for hypoglycemia?: Yes  Hypoglycemia Frequency:  Weekly  Hypoglycemia Treatment: Candy  Hypoglycemia: Dizziness/Lightheadedness, Nervousness/Anxiety, Sweats, Confusion  Hypoglycemia Complications: Nocturnal hypoglycemia  Reducing Risks:  Reducing Risks Assessed Today: Yes  Diabetes Risks: Age over 45 years, Family History, Hyperlipidemia  CAD Risks: Diabetes Mellitus, Male sex, Hypertension, Family history, Dyslipidemia  Has dilated eye exam at least once a year?: Yes  Sees dentist every 6 months?: Yes  Feet checked by healthcare provider in the last year?: Yes    Healthy Coping:  Healthy Coping Assessed Today: Yes  Emotional response to diabetes: Ready to learn  Informal Support system:: Children, Paula based, Family, Spouse  Stage of change: ACTION (Actively working towards change)  Support resources: Websites    Franca Ward RDN, HUSAM, [facility]    Time Spent: 30 minutes  Encounter Type: Individual    Any diabetes medication dose changes were made via the [facility] Standing Orders under the patient's referring provider.

## 2025-04-02 NOTE — LETTER
4/2/2025         RE: Sumit Pakr  [id number]  [facility] [address]        Dear Colleague,    Thank you for referring your patient, Sumit Park, to the [facility]. Please see a copy of my visit note below.    Diabetes Self-Management Education & Support  Presents for: Follow-up    Type of Service: In Person Visit    Assessment  Sumit states he would like to wear the Akash sensor consistently now. Reports he noticed low glucose readings when he first put it on so he stopped his Metformin completely. Since then he has not had low glucose, though he has had more after meal glucose spikes. He also had questions about increasing iron due to low Hgb. Reviewed concepts from manual of clinical nutrition including iron sources, iron goal for men and tips to incorporate vitamin C to increase absorption. He is receptive to all education.    Patient's most recent   Lab Results   Component Value Date    A1C 5.7 02/14/2025    A1C 7.8 05/10/2021     is meeting goal of <7.0    Diabetes knowledge and skills assessment:   Patient is knowledgeable in diabetes management concepts related to: Monitoring, Taking Medication, and Problem Solving    Based on learning assessment above, most appropriate setting for further diabetes education would be: Individual setting.    Care Plan and Education Provided:  Healthy Eating: Balanced meals and increasing hemoglobin, Being Active: Precautions to take with exercise and Relationship of activity to glucose, Monitoring: Blood glucose versus Continuous Glucose Monitoring and CGM instruction: Patient was instructed on Freestyle Akash System: Use of trends and graphs for pattern management and problem solving, Taking Medication: Action of prescribed medication(s), Side effects of prescribed medication(s), and When to take medication(s), Problem Solving: High glucose - causes, signs/symptoms, treatment and prevention, Low glucose - causes,  signs/symptoms, treatment and prevention, Rule of 15 and carrying a carbohydrate source at all times in case of low glucose, and When to call a health care provider, and Reducing Risks: Goal for A1c, how it relates to glucose and how often to check    Patient verbalized understanding of diabetes self-management education concepts discussed, opportunities for ongoing education and support, and recommendations provided today.    Plan  Follow meal plan tips to increase iron as discussed. Printed information for him today.   Follow meal plan tips to increase protein and balance with carbohydrate to prevent glucose spikes after meals. Protein/carbohydrate handout given.   Updated glucose meter and supply prescriptions. Recommend he test glucose with meter if notices any low glucose with sensor.   Enc to put glucose tablets in vehicles.  Updated Freestlye Akash prescription. Gave Akash 1-800 number if concerns with cost or prescription.     Follow-up:  Upcoming Diabetes Ed Appointments     Visit Type Date Time Department    DIABETES ED 4/2/2025  7:30 AM  DIABETES ED    DIABETES ED 4/24/2025  7:30 AM [facility]        See Care Plan for co-developed, patient-state behavior change goals.    Subjective/Objective  Sumit is an 64 year old year old, presenting for the following diabetes education related to: Presents for: Follow-up  Accompanied by: Self  Diabetes education in the past 24mo: No  Focus of Visit: Healthy Eating, Other (review of DM2)  Diabetes type: Type 2  Disease course: Stable  How confident are you filling out medical forms by yourself:: Extremely  Transportation concerns: No  Difficulty affording diabetes medication?: No  Difficulty affording diabetes testing supplies?: No  Other concerns:: None  Cultural Influences/Ethnic Background:  Not  or     Diabetes Symptoms & Complications:  Diabetes Related Symptoms: Neuropathy  Weight trend: Decreasing  Symptom course: Stable  Disease  "course: Stable  Complications assessed today?: Yes  Autonomic neuropathy: No  CVA: No  Heart disease: Yes  Nephropathy: No  Peripheral neuropathy: No  Peripheral Vascular Disease: No  Retinopathy: No    Patient Problem List and Family Medical History reviewed for relevant medical history, current medical status, and diabetes risk factors.    Vitals:  There were no vitals taken for this visit.  Estimated body mass index is 28.08 kg/m  as calculated from the following:    Height as of 2/21/25: 1.676 m (5' 6\").    Weight as of 2/21/25: 78.9 kg (174 lb).   Last 3 BP:   BP Readings from Last 3 Encounters:   02/21/25 126/71   01/16/25 (!) 190/91   01/09/25 122/64       History   Smoking Status     Never   Smokeless Tobacco     Never       Labs:  Lab Results   Component Value Date    A1C 5.7 02/14/2025    A1C 7.8 05/10/2021     Lab Results   Component Value Date    GLC 86 01/16/2025     09/01/2022     05/10/2021     Lab Results   Component Value Date    LDL 69 04/08/2024    LDL 92 05/10/2021     HDL Cholesterol   Date Value Ref Range Status   05/10/2021 35 (L) >39 mg/dL Final     Direct Measure HDL   Date Value Ref Range Status   04/08/2024 34 (L) >=40 mg/dL Final   ]  GFR Estimate   Date Value Ref Range Status   01/16/2025 >90 >60 mL/min/1.73m2 Final     Comment:     eGFR calculated using 2021 CKD-EPI equation.   05/10/2021 >90 >60 mL/min/[1.73_m2] Final     Comment:     Non  GFR Calc  Starting 12/18/2018, serum creatinine based estimated GFR (eGFR) will be   calculated using the Chronic Kidney Disease Epidemiology Collaboration   (CKD-EPI) equation.       GFR Estimate If Black   Date Value Ref Range Status   05/10/2021 >90 >60 mL/min/[1.73_m2] Final     Comment:      GFR Calc  Starting 12/18/2018, serum creatinine based estimated GFR (eGFR) will be   calculated using the Chronic Kidney Disease Epidemiology Collaboration   (CKD-EPI) equation.       Lab Results   Component " "Value Date    CR 0.71 01/16/2025    CR 0.76 05/10/2021     No results found for: \"MICROALBUMIN\"    Healthy Eating:  Healthy Eating Assessed Today: Yes  Cultural/Mu-ism diet restrictions?: No  Do you have any food allergies or intolerances?: Yes  Please list your food allergies / intolerances: gluten free, history of gastric bypass so small meals/snacks during day  Meal planning/habits: Low carb, Smaller portions  Who cooks/prepares meals for you?: Spouse  Who purchases food in  your home?: Spouse  How many times a week on average do you eat food made away from home (restaurant/take-out)?: 1  Meals include: Lunch, Dinner, Morning Snack  Breakfast: banana and GF toast with butter, coffee with sweet cream  Lunch: 4-5 oz burger, cheese on gluten free bun, ketchup Or shrimp cocktail with sauce, 2 peppermint patties, granola bar  Dinner: turkey and cheese on a GF bun OR burger on GF bun  OR salad with chicken  Snacks: GF pretzels, GF chips ahoy  Beverages: Tea, Coffee (sweet and low in  tea)  Has patient met with a dietitian in the past?: No    Being Active:  Being Active Assessed Today: Yes  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 : Moderate (like brisk walking)  Barrier to exercise: None    Monitoring:  Monitoring Assessed Today: Yes  Did patient bring glucose meter to appointment? : No  Blood Glucose Meter: CGM (14 day trial of Freestyle Akash. Plans to  prescription)  Times checking blood sugar at home (number): 5+  Times checking blood sugar at home (per): Day  Blood glucose trend: Fluctuating        Taking Medications:  Diabetes Medication(s)       Biguanides       metFORMIN (GLUCOPHAGE) 500 MG tablet Take 2 tablets (1,000 mg) by mouth 2 times daily (with meals).       Dipeptidyl Peptidase-4 (DPP-4) Inhibitors       sitagliptin (JANUVIA) 50 MG tablet Take 1 tablet (50 mg) by mouth daily.          Taking Medication Assessed Today: Yes  Current " Treatments: Oral Medication (taken by mouth)  Problems taking diabetes medications regularly?: No  Diabetes medication side effects?: No    Problem Solving:  Problem Solving Assessed Today: Yes  Is the patient at risk for hypoglycemia?: Yes  Hypoglycemia Frequency: Weekly  Hypoglycemia Treatment: Candy  Hypoglycemia: Dizziness/Lightheadedness, Nervousness/Anxiety, Sweats, Confusion  Hypoglycemia Complications: Nocturnal hypoglycemia  Reducing Risks:  Reducing Risks Assessed Today: Yes  Diabetes Risks: Age over 45 years, Family History, Hyperlipidemia  CAD Risks: Diabetes Mellitus, Male sex, Hypertension, Family history, Dyslipidemia  Has dilated eye exam at least once a year?: Yes  Sees dentist every 6 months?: Yes  Feet checked by healthcare provider in the last year?: Yes    Healthy Coping:  Healthy Coping Assessed Today: Yes  Emotional response to diabetes: Ready to learn  Informal Support system:: Children, Paula based, Family, Spouse  Stage of change: ACTION (Actively working towards change)  Support resources: Websites    Franca Ward RDN, HUSAM, ORIN    Time Spent: 30 minutes  Encounter Type: Individual    Any diabetes medication dose changes were made via the [facility] Standing Orders under the patient's referring provider.

## 2025-04-02 NOTE — TELEPHONE ENCOUNTER
Patient calling for Franca Ward, who he saw earlier today. Reports he went to  the Akash prescription and there was a cost. He states in message that he was told not to pick it up if there was a cost and to reach out to Franca. Requesting call back from her today.     Eleonora Bynum, RD, LD, [facility]

## 2025-04-03 ENCOUNTER — TELEPHONE (OUTPATIENT)
Dept: EDUCATION SERVICES | Facility: CLINIC | Age: 65
End: 2025-04-03
Payer: COMMERCIAL

## 2025-04-03 NOTE — TELEPHONE ENCOUNTER
Called Sumit to discuss cost of Akash sensor. He did pick it up from pharmacy yesterday as it was $38 dollars with his insurance. He plans to use this moving forward for monitoring glucose. All questions answered.     Next diabetes education visit 4/24/25.    Franca Ward RDN, LD, [facility]

## 2025-04-04 ENCOUNTER — HOSPITAL ENCOUNTER (OUTPATIENT)
Facility: CLINIC | Age: 65
Discharge: HOME | End: 2025-04-04
Attending: STUDENT IN AN ORGANIZED HEALTH CARE EDUCATION/TRAINING PROGRAM | Admitting: STUDENT IN AN ORGANIZED HEALTH CARE EDUCATION/TRAINING PROGRAM
Payer: COMMERCIAL

## 2025-04-04 VITALS
BODY MASS INDEX: 27.97 KG/M2 | SYSTOLIC BLOOD PRESSURE: 163 MMHG | RESPIRATION RATE: 16 BRPM | TEMPERATURE: 97.2 F | HEIGHT: 66 IN | OXYGEN SATURATION: 96 % | DIASTOLIC BLOOD PRESSURE: 85 MMHG | WEIGHT: 174 LBS | HEART RATE: 53 BPM

## 2025-04-04 LAB
COLONOSCOPY: NORMAL
GLUCOSE BLDC GLUCOMTR-MCNC: 113 MG/DL (ref 70–99)

## 2025-04-04 PROCEDURE — G0500 MOD SEDAT ENDO SERVICE >5YRS: HCPCS | Performed by: STUDENT IN AN ORGANIZED HEALTH CARE EDUCATION/TRAINING PROGRAM

## 2025-04-04 PROCEDURE — 82962 GLUCOSE BLOOD TEST: CPT

## 2025-04-04 PROCEDURE — 45378 DIAGNOSTIC COLONOSCOPY: CPT | Performed by: STUDENT IN AN ORGANIZED HEALTH CARE EDUCATION/TRAINING PROGRAM

## 2025-04-04 PROCEDURE — 250N000011 HC RX IP 250 OP 636: Mod: JZ | Performed by: STUDENT IN AN ORGANIZED HEALTH CARE EDUCATION/TRAINING PROGRAM

## 2025-04-04 PROCEDURE — G0121 COLON CA SCRN NOT HI RSK IND: HCPCS | Performed by: STUDENT IN AN ORGANIZED HEALTH CARE EDUCATION/TRAINING PROGRAM

## 2025-04-04 RX ORDER — SIMETHICONE 40MG/0.6ML
133 SUSPENSION, DROPS(FINAL DOSAGE FORM)(ML) ORAL
Status: DISCONTINUED | OUTPATIENT
Start: 2025-04-04 | End: 2025-04-04 | Stop reason: HOSPADM

## 2025-04-04 RX ORDER — NALOXONE HYDROCHLORIDE 0.4 MG/ML
0.4 INJECTION, SOLUTION INTRAMUSCULAR; INTRAVENOUS; SUBCUTANEOUS
Status: DISCONTINUED | OUTPATIENT
Start: 2025-04-04 | End: 2025-04-04 | Stop reason: HOSPADM

## 2025-04-04 RX ORDER — FENTANYL CITRATE 50 UG/ML
25-100 INJECTION, SOLUTION INTRAMUSCULAR; INTRAVENOUS EVERY 5 MIN PRN
Status: DISCONTINUED | OUTPATIENT
Start: 2025-04-04 | End: 2025-04-04 | Stop reason: HOSPADM

## 2025-04-04 RX ORDER — ONDANSETRON 2 MG/ML
4 INJECTION INTRAMUSCULAR; INTRAVENOUS EVERY 6 HOURS PRN
Status: DISCONTINUED | OUTPATIENT
Start: 2025-04-04 | End: 2025-04-04 | Stop reason: HOSPADM

## 2025-04-04 RX ORDER — ONDANSETRON 4 MG/1
4 TABLET, ORALLY DISINTEGRATING ORAL EVERY 6 HOURS PRN
Status: DISCONTINUED | OUTPATIENT
Start: 2025-04-04 | End: 2025-04-04 | Stop reason: HOSPADM

## 2025-04-04 RX ORDER — NALOXONE HYDROCHLORIDE 0.4 MG/ML
0.2 INJECTION, SOLUTION INTRAMUSCULAR; INTRAVENOUS; SUBCUTANEOUS
Status: DISCONTINUED | OUTPATIENT
Start: 2025-04-04 | End: 2025-04-04 | Stop reason: HOSPADM

## 2025-04-04 RX ORDER — FLUMAZENIL 0.1 MG/ML
0.2 INJECTION, SOLUTION INTRAVENOUS
Status: DISCONTINUED | OUTPATIENT
Start: 2025-04-04 | End: 2025-04-04 | Stop reason: HOSPADM

## 2025-04-04 RX ORDER — EPINEPHRINE 1 MG/ML
0.1 INJECTION, SOLUTION, CONCENTRATE INTRAVENOUS
Status: DISCONTINUED | OUTPATIENT
Start: 2025-04-04 | End: 2025-04-04 | Stop reason: HOSPADM

## 2025-04-04 RX ORDER — ATROPINE SULFATE 0.1 MG/ML
1 INJECTION INTRAVENOUS
Status: DISCONTINUED | OUTPATIENT
Start: 2025-04-04 | End: 2025-04-04 | Stop reason: HOSPADM

## 2025-04-04 RX ORDER — DIPHENHYDRAMINE HYDROCHLORIDE 50 MG/ML
25-50 INJECTION, SOLUTION INTRAMUSCULAR; INTRAVENOUS
Status: DISCONTINUED | OUTPATIENT
Start: 2025-04-04 | End: 2025-04-04 | Stop reason: HOSPADM

## 2025-04-04 RX ORDER — ONDANSETRON 2 MG/ML
4 INJECTION INTRAMUSCULAR; INTRAVENOUS
Status: DISCONTINUED | OUTPATIENT
Start: 2025-04-04 | End: 2025-04-04 | Stop reason: HOSPADM

## 2025-04-04 RX ORDER — LIDOCAINE 40 MG/G
CREAM TOPICAL
Status: DISCONTINUED | OUTPATIENT
Start: 2025-04-04 | End: 2025-04-04 | Stop reason: HOSPADM

## 2025-04-04 RX ORDER — PROCHLORPERAZINE MALEATE 10 MG
10 TABLET ORAL EVERY 6 HOURS PRN
Status: DISCONTINUED | OUTPATIENT
Start: 2025-04-04 | End: 2025-04-04 | Stop reason: HOSPADM

## 2025-04-04 RX ADMIN — MIDAZOLAM 2 MG: 1 INJECTION INTRAMUSCULAR; INTRAVENOUS at 12:03

## 2025-04-04 RX ADMIN — FENTANYL CITRATE 100 MCG: 50 INJECTION, SOLUTION INTRAMUSCULAR; INTRAVENOUS at 12:03

## 2025-04-04 ASSESSMENT — ACTIVITIES OF DAILY LIVING (ADL)
ADLS_ACUITY_SCORE: 41

## 2025-04-04 NOTE — H&P
Pre-Endoscopy History and Physical     Sumit Park MRN# [id number]   YOB: 1960 Age: 64 year old     Date of Procedure: 04/04/25  Primary care provider: Candis Altamirano  Type of Endoscopy: Colonoscopy  Reason for Procedure: Screening  Type of Anesthesia Anticipated: Moderate Sedation    HPI:    Sumit is a 64 year old male who will be undergoing the above procedure.      Prior colonoscopy: 2015    A history and physical has been performed, notable for none. The patient's medications and allergies have been reviewed. The risks and benefits of the procedure and the sedation options and risks were discussed with the patient.  All questions were answered and informed consent was obtained.      Symptoms:  Rectal bleeding: No  Irregular bowel habits: No  Abnormal weight loss: No  Changes in stool: No    He denies a personal or family history of anesthesia complications or bleeding disorders. Denies family history of CRC or IBD.    Allergies   Allergen Reactions    Terbinafine Rash     Drug eruption       Allergy to Latex: NO   Allergy to tape: NO   Intolerances: NO     Current Facility-Administered Medications   Medication Dose Route Frequency Provider Last Rate Last Admin    atropine injection 1 mg  1 mg Intravenous Once PRN Sydney Escobedo MD        benzocaine 20% (HURRICAINE/TOPEX) 20 % spray 0.5 mL  1 spray Mouth/Throat Once PRN Sydney Escobeod MD        diphenhydrAMINE (BENADRYL) injection 25-50 mg  25-50 mg Intravenous Once PRN Sydney Escobedo MD        EPINEPHrine PF (ADRENALIN) injection 0.1 mg  0.1 mg Submucosal Once PRN Sydney Escobedo MD        fentaNYL (PF) (SUBLIMAZE) injection  mcg   mcg Intravenous Q5 Min PRN Sydney Escobedo MD        flumazenil (ROMAZICON) injection 0.2 mg  0.2 mg Intravenous q1 min prn Sydney Escobedo MD        glucagon injection 0.5 mg  0.5 mg Intravenous Once PRN Sydney Escobedo MD        midazolam  (VERSED) injection 0.5-2 mg  0.5-2 mg Intravenous Q4 Min PRN Sydney Escobedo MD        naloxone (NARCAN) injection 0.2 mg  0.2 mg Intravenous Q2 Min PRN Sydney Escobedo MD        Or    naloxone (NARCAN) injection 0.4 mg  0.4 mg Intravenous Q2 Min PRN Sydney Escobedo MD        Or    naloxone (NARCAN) injection 0.2 mg  0.2 mg Intramuscular Q2 Min PRN Sydney Escobedo MD        Or    naloxone (NARCAN) injection 0.4 mg  0.4 mg Intramuscular Q2 Min PRN Sydney Escobedo MD        simethicone (MYLICON) suspension 133 mg  133 mg Oral Once PRN Sydney Escobedo MD        sodium chloride (PF) 0.9% PF flush 3 mL  3 mL Intravenous q1 min prn Sydney Escobedo MD        sodium chloride 0.9% BOLUS 500 mL  500 mL Intravenous Once PRN Sydney Escobedo MD           Patient Active Problem List   Diagnosis    DM 2, no insulin (H)    Gastric bypass status for obesity - 2015    CAD, 2 stents 1999, 2018    Cardiac pacemaker in situ    Celiac disease    ZULEIKA (obstructive sleep apnea)    Complete heart block (H) -- s/p PPM 2018    HTN, goal below 140/90    Morbid obesity (H)    Achlorhydria    Dyslipidemia    Gilbert syndrome    H/O acute pancreatitis    Non-sustained ventricular tachycardia (H)    Osteoarthrosis involving lower leg    S/P cardiac pacemaker procedure    Second degree heart block    Tinea of nail    Vitamin D deficiency    Infection due to 2019 novel coronavirus - Nov 2021 - He received Monoclonal antibodies    Diarrhea    Elevated bilirubin    Jaundice    Preop general physical exam    Heart failure, unspecified HF chronicity, unspecified heart failure type (H)    Type 2 diabetes mellitus with other specified complication, without long-term current use of insulin (H)        Past Medical History:   Diagnosis Date    Basal cell carcinoma     CAD, 2 stents 1999, 2018 05/10/2021    Cardiac pacemaker in situ 05/10/2021    Celiac disease 05/10/2021    Complete heart block (H) -- s/p PPM  "2018 05/10/2021    DM 2, no insulin (H) 05/10/2021    Gastric bypass status for obesity - 2015 05/10/2021    HTN, goal below 140/80 05/10/2021    Hyperlipidemia LDL goal <100 05/10/2021    ZULEIKA (obstructive sleep apnea) 05/10/2021        Past Surgical History:   Procedure Laterality Date    ANGIOPLASTY      COLONOSCOPY N/A 1/13/2015    Procedure: COLONOSCOPY;  Surgeon: Juni Craig MD;  Location: [facility];  Service:     DISTAL BICEPS TENDON REPAIR Left     ESOPHAGOSCOPY, GASTROSCOPY, DUODENOSCOPY (EGD), COMBINED N/A 1/13/2015    Procedure: UPPER ENDOSCOPY;  Surgeon: Juni Craig MD;  Location: [facility] OR;  Service:     HERNIORRHAPHY INCISIONAL (LOCATION)      incarcerated    LAPAROSCOPIC GASTRIC BYPASS N/A 2015    LEG SURGERY Right     muscle surgery    OTHER SURGICAL HISTORY      forearm / wrist surgery    REPAIR TENDON BICEPS DISTAL UPPER EXTREMITY      UMBILICAL HERNIA REPAIR N/A     WRIST SURGERY Right        Social History     Tobacco Use    Smoking status: Never     Passive exposure: Never    Smokeless tobacco: Never   Substance Use Topics    Alcohol use: Never       Family History   Problem Relation Age of Onset    Breast Cancer Mother 58    Heart Disease Father 71    Lymphoma Brother     Colon Cancer No family hx of        REVIEW OF SYSTEMS:     5 point ROS negative except as noted above in HPI, including Gen., Resp., CV, GI &  system review.      PHYSICAL EXAM:   Ht 1.676 m (5' 6\")   Wt 78.9 kg (174 lb)   BMI 28.08 kg/m   Estimated body mass index is 28.08 kg/m  as calculated from the following:    Height as of this encounter: 1.676 m (5' 6\").    Weight as of this encounter: 78.9 kg (174 lb).   All Vitals have been reviewed.    GENERAL APPEARANCE: healthy and alert  MENTAL STATUS: alert  AIRWAY EXAM: Mallampatti Class II (visualization of the soft palate, fauces, and uvula)  RESP: lungs clear to auscultation - no rales, rhonchi or wheezes  CV: regular rates and " rhythm      IMPRESSION   ASA Class 2 - Mild systemic disease        PLAN:     Plan for colonoscopy. We discussed the risks, benefits and alternatives and the patient wished to proceed.    The above has been forwarded to the consulting provider.      CELESTINA KAUFFMAN MD  [facility]  Phone: [phone]  Fax: [phone]  April 4, 2025

## 2025-04-04 NOTE — DISCHARGE INSTRUCTIONS
Hemorrhoids: Care Instructions  Overview     Hemorrhoids are swollen veins that develop in the anal canal. Bleeding during bowel movements, itching, and rectal pain are the most common symptoms. Hemorrhoids can be uncomfortable at times, but rarely are they a serious problem.  Most of the time, you can treat them with simple changes to your diet and bowel habits. These changes include eating more fiber and not straining to pass stools. Most hemorrhoids don't need surgery or other treatment unless they are very large and painful or bleed a lot.  Follow-up care is a key part of your treatment and safety. Be sure to make and go to all appointments, and call your doctor if you are having problems. It's also a good idea to know your test results and keep a list of the medicines you take.  How can you care for yourself at home?  Sit in a few inches of warm water (sitz bath) 3 times a day and after bowel movements. The warm water helps with pain and itching.  Put ice on your anal area several times a day for 10 minutes at a time. Put a thin cloth between the ice and your skin. Follow this by placing a warm, wet towel on the area for another 10 to 20 minutes.  Take pain medicines exactly as directed.  If the doctor gave you a prescription medicine for pain, take it as prescribed.  If you are not taking a prescription pain medicine, ask your doctor if you can take an over-the-counter medicine.  Keep the anal area clean, but be gentle. Use water and a fragrance-free soap, or use baby wipes or medicated pads such as Tucks.  Wear cotton underwear and loose clothing to decrease moisture in the anal area.  Eat more fiber. Include foods such as whole-grain breads and cereals, raw vegetables, raw and dried fruits, and beans.  Drink plenty of fluids. If you have kidney, heart, or liver disease and have to limit fluids, talk with your doctor before you increase the amount of fluids you drink.  Use a stool softener that contains bran  "or psyllium. You can save money by buying bran or psyllium (available in bulk at most health food stores) and sprinkling it on foods or stirring it into fruit juice. Or you can use a product such as Metamucil or Hydrocil.  Practice healthy bowel habits.  Go to the bathroom as soon as you have the urge.  Avoid straining to pass stools. Relax and give yourself time to let things happen naturally.  Do not hold your breath while passing stools.  Do not read while sitting on the toilet. Get off the toilet as soon as you have finished.  Take your medicines exactly as prescribed. Call your doctor if you think you are having a problem with your medicine.  When should you call for help?   Call 911 anytime you think you may need emergency care. For example, call if:    You pass maroon or very bloody stools.   Call your doctor now or seek immediate medical care if:    You have increased pain.     You have increased bleeding.   Watch closely for changes in your health, and be sure to contact your doctor if:    Your symptoms have not improved after 3 or 4 days.   Where can you learn more?  Go to https://www.[employer].net/patiented  Enter F228 in the search box to learn more about \"Hemorrhoids: Care Instructions.\"  Current as of: October 19, 2024  Content Version: 14.4    8282-9299 [employer].   Care instructions adapted under license by your healthcare professional. If you have questions about a medical condition or this instruction, always ask your healthcare professional. [employer] disclaims any warranty or liability for your use of this information.    "

## 2025-04-10 DIAGNOSIS — Z98.84 GASTRIC BYPASS STATUS FOR OBESITY: ICD-10-CM

## 2025-04-10 RX ORDER — FERROUS SULFATE 325(65) MG
325 TABLET ORAL DAILY
Qty: 90 TABLET | Refills: 1 | Status: SHIPPED | OUTPATIENT
Start: 2025-04-10

## 2025-04-24 ENCOUNTER — ANCILLARY PROCEDURE (OUTPATIENT)
Dept: CARDIOLOGY | Facility: CLINIC | Age: 65
End: 2025-04-24
Attending: INTERNAL MEDICINE
Payer: COMMERCIAL

## 2025-04-24 ENCOUNTER — ALLIED HEALTH/NURSE VISIT (OUTPATIENT)
Dept: EDUCATION SERVICES | Facility: CLINIC | Age: 65
End: 2025-04-24
Payer: COMMERCIAL

## 2025-04-24 DIAGNOSIS — Z95.0 CARDIAC PACEMAKER IN SITU: ICD-10-CM

## 2025-04-24 DIAGNOSIS — E11.9 TYPE 2 DIABETES MELLITUS WITHOUT COMPLICATION, WITHOUT LONG-TERM CURRENT USE OF INSULIN (H): Primary | ICD-10-CM

## 2025-04-24 DIAGNOSIS — I44.1 SECOND DEGREE HEART BLOCK: ICD-10-CM

## 2025-04-24 LAB
MDC_IDC_LEAD_CONNECTION_STATUS: NORMAL
MDC_IDC_LEAD_CONNECTION_STATUS: NORMAL
MDC_IDC_LEAD_IMPLANT_DT: NORMAL
MDC_IDC_LEAD_IMPLANT_DT: NORMAL
MDC_IDC_LEAD_LOCATION: NORMAL
MDC_IDC_LEAD_LOCATION: NORMAL
MDC_IDC_LEAD_LOCATION_DETAIL_1: NORMAL
MDC_IDC_LEAD_LOCATION_DETAIL_1: NORMAL
MDC_IDC_LEAD_MFG: NORMAL
MDC_IDC_LEAD_MFG: NORMAL
MDC_IDC_LEAD_MODEL: NORMAL
MDC_IDC_LEAD_MODEL: NORMAL
MDC_IDC_LEAD_POLARITY_TYPE: NORMAL
MDC_IDC_LEAD_POLARITY_TYPE: NORMAL
MDC_IDC_LEAD_SERIAL: NORMAL
MDC_IDC_LEAD_SERIAL: NORMAL
MDC_IDC_MSMT_BATTERY_DTM: NORMAL
MDC_IDC_MSMT_BATTERY_REMAINING_LONGEVITY: 68 MO
MDC_IDC_MSMT_BATTERY_RRT_TRIGGER: 2.62
MDC_IDC_MSMT_BATTERY_STATUS: NORMAL
MDC_IDC_MSMT_BATTERY_VOLTAGE: 2.97 V
MDC_IDC_MSMT_LEADCHNL_RA_IMPEDANCE_VALUE: 304 OHM
MDC_IDC_MSMT_LEADCHNL_RA_IMPEDANCE_VALUE: 361 OHM
MDC_IDC_MSMT_LEADCHNL_RA_PACING_THRESHOLD_AMPLITUDE: 0.5 V
MDC_IDC_MSMT_LEADCHNL_RA_PACING_THRESHOLD_PULSEWIDTH: 0.4 MS
MDC_IDC_MSMT_LEADCHNL_RA_SENSING_INTR_AMPL: 1.88 MV
MDC_IDC_MSMT_LEADCHNL_RA_SENSING_INTR_AMPL: 1.88 MV
MDC_IDC_MSMT_LEADCHNL_RV_IMPEDANCE_VALUE: 285 OHM
MDC_IDC_MSMT_LEADCHNL_RV_IMPEDANCE_VALUE: 399 OHM
MDC_IDC_MSMT_LEADCHNL_RV_PACING_THRESHOLD_AMPLITUDE: 0.88 V
MDC_IDC_MSMT_LEADCHNL_RV_PACING_THRESHOLD_PULSEWIDTH: 0.4 MS
MDC_IDC_MSMT_LEADCHNL_RV_SENSING_INTR_AMPL: 10 MV
MDC_IDC_MSMT_LEADCHNL_RV_SENSING_INTR_AMPL: 10 MV
MDC_IDC_PG_IMPLANT_DTM: NORMAL
MDC_IDC_PG_MFG: NORMAL
MDC_IDC_PG_MODEL: NORMAL
MDC_IDC_PG_SERIAL: NORMAL
MDC_IDC_PG_TYPE: NORMAL
MDC_IDC_SESS_CLINIC_NAME: NORMAL
MDC_IDC_SESS_DTM: NORMAL
MDC_IDC_SESS_TYPE: NORMAL
MDC_IDC_SET_BRADY_AT_MODE_SWITCH_RATE: 150 {BEATS}/MIN
MDC_IDC_SET_BRADY_HYSTRATE: NORMAL
MDC_IDC_SET_BRADY_LOWRATE: 50 {BEATS}/MIN
MDC_IDC_SET_BRADY_MAX_SENSOR_RATE: 130 {BEATS}/MIN
MDC_IDC_SET_BRADY_MAX_TRACKING_RATE: 130 {BEATS}/MIN
MDC_IDC_SET_BRADY_MODE: NORMAL
MDC_IDC_SET_BRADY_PAV_DELAY_LOW: 180 MS
MDC_IDC_SET_BRADY_SAV_DELAY_LOW: 150 MS
MDC_IDC_SET_LEADCHNL_RA_PACING_AMPLITUDE: 1.5 V
MDC_IDC_SET_LEADCHNL_RA_PACING_ANODE_ELECTRODE_1: NORMAL
MDC_IDC_SET_LEADCHNL_RA_PACING_ANODE_LOCATION_1: NORMAL
MDC_IDC_SET_LEADCHNL_RA_PACING_CAPTURE_MODE: NORMAL
MDC_IDC_SET_LEADCHNL_RA_PACING_CATHODE_ELECTRODE_1: NORMAL
MDC_IDC_SET_LEADCHNL_RA_PACING_CATHODE_LOCATION_1: NORMAL
MDC_IDC_SET_LEADCHNL_RA_PACING_POLARITY: NORMAL
MDC_IDC_SET_LEADCHNL_RA_PACING_PULSEWIDTH: 0.4 MS
MDC_IDC_SET_LEADCHNL_RA_SENSING_ANODE_ELECTRODE_1: NORMAL
MDC_IDC_SET_LEADCHNL_RA_SENSING_ANODE_LOCATION_1: NORMAL
MDC_IDC_SET_LEADCHNL_RA_SENSING_CATHODE_ELECTRODE_1: NORMAL
MDC_IDC_SET_LEADCHNL_RA_SENSING_CATHODE_LOCATION_1: NORMAL
MDC_IDC_SET_LEADCHNL_RA_SENSING_POLARITY: NORMAL
MDC_IDC_SET_LEADCHNL_RA_SENSING_SENSITIVITY: 0.3 MV
MDC_IDC_SET_LEADCHNL_RV_PACING_AMPLITUDE: 1.75 V
MDC_IDC_SET_LEADCHNL_RV_PACING_ANODE_ELECTRODE_1: NORMAL
MDC_IDC_SET_LEADCHNL_RV_PACING_ANODE_LOCATION_1: NORMAL
MDC_IDC_SET_LEADCHNL_RV_PACING_CAPTURE_MODE: NORMAL
MDC_IDC_SET_LEADCHNL_RV_PACING_CATHODE_ELECTRODE_1: NORMAL
MDC_IDC_SET_LEADCHNL_RV_PACING_CATHODE_LOCATION_1: NORMAL
MDC_IDC_SET_LEADCHNL_RV_PACING_POLARITY: NORMAL
MDC_IDC_SET_LEADCHNL_RV_PACING_PULSEWIDTH: 0.4 MS
MDC_IDC_SET_LEADCHNL_RV_SENSING_ANODE_ELECTRODE_1: NORMAL
MDC_IDC_SET_LEADCHNL_RV_SENSING_ANODE_LOCATION_1: NORMAL
MDC_IDC_SET_LEADCHNL_RV_SENSING_CATHODE_ELECTRODE_1: NORMAL
MDC_IDC_SET_LEADCHNL_RV_SENSING_CATHODE_LOCATION_1: NORMAL
MDC_IDC_SET_LEADCHNL_RV_SENSING_POLARITY: NORMAL
MDC_IDC_SET_LEADCHNL_RV_SENSING_SENSITIVITY: 0.9 MV
MDC_IDC_SET_ZONE_DETECTION_INTERVAL: 370 MS
MDC_IDC_SET_ZONE_DETECTION_INTERVAL: 400 MS
MDC_IDC_SET_ZONE_STATUS: NORMAL
MDC_IDC_SET_ZONE_STATUS: NORMAL
MDC_IDC_SET_ZONE_TYPE: NORMAL
MDC_IDC_SET_ZONE_VENDOR_TYPE: NORMAL
MDC_IDC_STAT_AT_BURDEN_PERCENT: 0 %
MDC_IDC_STAT_AT_DTM_END: NORMAL
MDC_IDC_STAT_AT_DTM_START: NORMAL
MDC_IDC_STAT_BRADY_AP_VP_PERCENT: 42.44 %
MDC_IDC_STAT_BRADY_AP_VS_PERCENT: 0.02 %
MDC_IDC_STAT_BRADY_AS_VP_PERCENT: 57.49 %
MDC_IDC_STAT_BRADY_AS_VS_PERCENT: 0.06 %
MDC_IDC_STAT_BRADY_DTM_END: NORMAL
MDC_IDC_STAT_BRADY_DTM_START: NORMAL
MDC_IDC_STAT_BRADY_RA_PERCENT_PACED: 42.41 %
MDC_IDC_STAT_BRADY_RV_PERCENT_PACED: 99.93 %
MDC_IDC_STAT_EPISODE_RECENT_COUNT: 0
MDC_IDC_STAT_EPISODE_RECENT_COUNT_DTM_END: NORMAL
MDC_IDC_STAT_EPISODE_RECENT_COUNT_DTM_START: NORMAL
MDC_IDC_STAT_EPISODE_TOTAL_COUNT: 0
MDC_IDC_STAT_EPISODE_TOTAL_COUNT: 0
MDC_IDC_STAT_EPISODE_TOTAL_COUNT: 2
MDC_IDC_STAT_EPISODE_TOTAL_COUNT: 84
MDC_IDC_STAT_EPISODE_TOTAL_COUNT: 9
MDC_IDC_STAT_EPISODE_TOTAL_COUNT_DTM_END: NORMAL
MDC_IDC_STAT_EPISODE_TOTAL_COUNT_DTM_START: NORMAL
MDC_IDC_STAT_EPISODE_TYPE: NORMAL
MDC_IDC_STAT_TACHYTHERAPY_RECENT_DTM_END: NORMAL
MDC_IDC_STAT_TACHYTHERAPY_RECENT_DTM_START: NORMAL
MDC_IDC_STAT_TACHYTHERAPY_TOTAL_DTM_END: NORMAL
MDC_IDC_STAT_TACHYTHERAPY_TOTAL_DTM_START: NORMAL

## 2025-04-24 PROCEDURE — 93296 REM INTERROG EVL PM/IDS: CPT | Performed by: INTERNAL MEDICINE

## 2025-04-24 PROCEDURE — 93294 REM INTERROG EVL PM/LDLS PM: CPT | Performed by: INTERNAL MEDICINE

## 2025-04-24 NOTE — PROGRESS NOTES
Diabetes Self-Management Education & Support  Presents for: Follow-up    Type of Service: In Person Visit    Assessment  Sumit is doing well with diabetes management. He continues to wear Akash 3 sensor. Per AGP report below, GMI at 6.3%. Time in target at 88%. He does have low glucose about 1% of the time and states he is symptomatic at times. Note that he has a very significant response of endogenous insulin as neither Metformin or Januvia should cause glucose to drop below 70 mg/dL. Reviewed what he is eating and drinking when he has the significant spikes and drops and it is usually cookies, oreos, cupcakes or regular soda. When he eats a meal with protein and/or vegetables and carbohydrate, he has significantly less fluctuation. Reviewed ways to incorporate more of a balanced carbohydrate, protein, healthy fat and/or fiber to his meals and snacks. Sumit plans to work on this. Reviewed hypoglycemia sign, symptoms and treatment.    Patient's most recent   Lab Results   Component Value Date    A1C 5.7 02/14/2025    A1C 7.8 05/10/2021     is meeting goal of <7.0    Diabetes knowledge and skills assessment:   Patient is knowledgeable in diabetes management concepts related to: Being Active, Monitoring, Taking Medication, and Problem Solving    Based on learning assessment above, most appropriate setting for further diabetes education would be: Individual setting.    Care Plan and Education Provided:  Healthy Eating: Balanced meals, Consistency in amount and timing of carbohydrate intake, Plate planning method, and Portion control  Being Active: Precautions to take with exercise and Relationship of activity to glucose  Monitoring: Blood glucose versus Continuous Glucose Monitoring, Frequency of monitoring, and Individual glucose targets, CGM interpretation  Taking Medication: Action of prescribed medication(s), Side effects of prescribed medication(s), and When to take medication(s)  Problem Solving: High glucose  - causes, signs/symptoms, treatment and prevention, Low glucose - causes, signs/symptoms, treatment and prevention, Rule of 15 and carrying a carbohydrate source at all times in case of low glucose, and When to call a health care provider  Reducing Risks: Goal for A1c, how it relates to glucose and how often to check    Patient verbalized understanding of diabetes self-management education concepts discussed, opportunities for ongoing education and support, and recommendations provided today.    Plan  Diabetes Medications:  Continue Metformin 500 mg AM and 500 mg at lunch. Continue Januvia as prescribed.     Glucose monitoring:  Continue using Akash 3 sensors    Meal Plan Recommendation:   Use portion control and plate planning method.  Work towards increasing protein, healthy fat and / or fiber at meals and snacks, especially if concentrated sweets like regular soda, cookie, cupcake, etc.  Work towards decreasing portions of sweets and sweetened beverages    Exercise / activity plan:   Recommend starting slow and increasing as tolerated to goal of 150 minutes per week. Include strength training 2-3 times per week.    Topics to cover at upcoming visits: Healthy Eating, Being Active, Monitoring, Taking Medication, Problem Solving, Reducing Risks, and Healthy Coping    Follow-up:  Upcoming Diabetes Ed Appointments     Visit Type Date Time Department    DIABETES ED 4/24/2025  7:30 AM [facility]    DIABETES ED 7/29/2025  7:30 AM [facility]      See Care Plan for co-developed, patient-state behavior change goals.    Subjective/Objective  Sumit is an 64 year old, presenting for the following diabetes education related to: Follow-up  Cultural Influences/Ethnic Background:  Not  or     Diabetes Symptoms & Complications:  Diabetes Related Symptoms: (Patient-Rptd) None  Weight trend: (Patient-Rptd) Stable  Symptom course: (Patient-Rptd) Stable  Disease course: (Patient-Rptd) Stable    "    Patient Problem List and Family Medical History reviewed for relevant medical history, current medical status, and diabetes risk factors.    Vitals:  There were no vitals taken for this visit.  Estimated body mass index is 28.08 kg/m  as calculated from the following:    Height as of 4/4/25: 1.676 m (5' 6\").    Weight as of 4/4/25: 78.9 kg (174 lb).   Last 3 BP:   BP Readings from Last 3 Encounters:   04/04/25 (!) 163/85   02/21/25 126/71   01/16/25 (!) 190/91       History   Smoking Status    Never   Smokeless Tobacco    Never       Labs:  Lab Results   Component Value Date    A1C 5.7 02/14/2025    A1C 7.8 05/10/2021     Lab Results   Component Value Date     04/04/2025     09/01/2022     05/10/2021     Lab Results   Component Value Date    LDL 69 04/08/2024    LDL 92 05/10/2021     HDL Cholesterol   Date Value Ref Range Status   05/10/2021 35 (L) >39 mg/dL Final     Direct Measure HDL   Date Value Ref Range Status   04/08/2024 34 (L) >=40 mg/dL Final   ]  GFR Estimate   Date Value Ref Range Status   01/16/2025 >90 >60 mL/min/1.73m2 Final     Comment:     eGFR calculated using 2021 CKD-EPI equation.   05/10/2021 >90 >60 mL/min/[1.73_m2] Final     Comment:     Non  GFR Calc  Starting 12/18/2018, serum creatinine based estimated GFR (eGFR) will be   calculated using the Chronic Kidney Disease Epidemiology Collaboration   (CKD-EPI) equation.       GFR Estimate If Black   Date Value Ref Range Status   05/10/2021 >90 >60 mL/min/[1.73_m2] Final     Comment:      GFR Calc  Starting 12/18/2018, serum creatinine based estimated GFR (eGFR) will be   calculated using the Chronic Kidney Disease Epidemiology Collaboration   (CKD-EPI) equation.       Lab Results   Component Value Date    CR 0.71 01/16/2025    CR 0.76 05/10/2021     Lab Results   Component Value Date    MICROL 24.2 10/12/2023    UMALCR 10.21 10/12/2023    UCRR 237.0 10/12/2023           4/19/2025   Healthy " Eating   Healthy Eating Assessed Today Yes   Cultural/Voodoo diet restrictions? No   Meal planning/habits Smaller portions   Who cooks/prepares meals for you? Self   Who purchases food in  your home? Self   How many times a week on average do you eat food made away from home (restaurant/take-out)? 1   Meals include Breakfast;Lunch;Dinner;Evening Snack   Beverages Tea;Coffee;Soda   Has patient met with a dietitian in the past? Yes         4/19/2025   Being Active   Being Active Assessed Today Yes   Exercise: --   Barrier to exercise None         4/19/2025   Monitoring   Monitoring Assessed Today Yes   Did patient bring glucose meter to appointment?  Yes   Blood Glucose Meter CGM   Times checking blood sugar at home (number) 5+   Times checking blood sugar at home (per) Day   Blood glucose trend Fluctuating           Diabetes Medication(s)       Biguanides       metFORMIN (GLUCOPHAGE) 500 MG tablet Take 2 tablets (1,000 mg) by mouth 2 times daily (with meals).       Dipeptidyl Peptidase-4 (DPP-4) Inhibitors       sitagliptin (JANUVIA) 50 MG tablet Take 1 tablet (50 mg) by mouth daily.              4/19/2025   Taking Medications   Taking Medication Assessed Today Yes   Current Treatments Oral Medication (taken by mouth)   Problems taking diabetes medications regularly? No   Diabetes medication side effects? No         4/19/2025   Problem Solving   Problem Solving Assessed Today Yes   Is the patient at risk for hypoglycemia? Yes   Hypoglycemia Frequency Daily   Hypoglycemia Treatment Glucose (tablets or gel);Candy;Juice;Other food           4/19/2025   Reducing Risks   Has dilated eye exam at least once a year? Yes   Sees dentist every 6 months? Yes   Feet checked by healthcare provider in the last year? Yes         4/19/2025   Healthy Coping: Diabetes Distress Assessment   Healthy Coping Assessed Today Yes   I feel burned out by all of the attention and effort that diabetes demands of me. 1 - Not a Problem   It  bothers me that diabetes seems to control my life. 1 - Not a Problem   I am frustrated that even when I do what I am supposed to for my diabetes, it doesn't seem to make a difference. 1 - Not a Problem   No matter how hard I try with my diabetes, it feels like it will never be good enough. 1 - Not a Problem   I am so tired of having to worry about diabetes all the time. 1 - Not a Problem   When it comes to my diabetes, I often feel like a failure. 1 - Not a Problem   It depresses me when I realize that my diabetes will likely never go away. 1 - Not a Problem   Living with diabetes is overwhelming for me. 1 - Not a Problem   T2 DDAS Total Score (0 - 1.9 Little or no DD, 2.0 - 2.9 Moderate DD,  3.0+ High DD) 1    Informal Support system: Children;Paula based;Spouse       Patient-reported     Franca Ward RDN, [facility]USAM, [facility]    Time Spent: 30 minutes  Encounter Type: Individual    Any diabetes medication dose changes were made via the [facility] under the patient's referring provider.

## 2025-04-24 NOTE — LETTER
4/24/2025         RE: Sumit Park  [id number]  [place] [address]        Dear Colleague,    Thank you for referring your patient, Sumit Park, to the [facility]. Please see a copy of my visit note below.    Diabetes Self-Management Education & Support  Presents for: Follow-up    Type of Service: In Person Visit    Assessment  Sumit is doing well with diabetes management. He continues to wear Akash 3 sensor. Per AGP report below, GMI at 6.3%. Time in target at 88%. He does have low glucose about 1% of the time and states he is symptomatic at times. Note that he has a very significant response of endogenous insulin as neither Metformin or Januvia should cause glucose to drop below 70 mg/dL. Reviewed what he is eating and drinking when he has the significant spikes and drops and it is usually cookies, oreos, cupcakes or regular soda. When he eats a meal with protein and/or vegetables and carbohydrate, he has significantly less fluctuation. Reviewed ways to incorporate more of a balanced carbohydrate, protein, healthy fat and/or fiber to his meals and snacks. Sumit plans to work on this. Reviewed hypoglycemia sign, symptoms and treatment.    Patient's most recent   Lab Results   Component Value Date    A1C 5.7 02/14/2025    A1C 7.8 05/10/2021     is meeting goal of <7.0    Diabetes knowledge and skills assessment:   Patient is knowledgeable in diabetes management concepts related to: Being Active, Monitoring, Taking Medication, and Problem Solving    Based on learning assessment above, most appropriate setting for further diabetes education would be: Individual setting.    Care Plan and Education Provided:  Healthy Eating: Balanced meals, Consistency in amount and timing of carbohydrate intake, Plate planning method, and Portion control  Being Active: Precautions to take with exercise and Relationship of activity to glucose  Monitoring: Blood glucose versus  Continuous Glucose Monitoring, Frequency of monitoring, and Individual glucose targets, CGM interpretation  Taking Medication: Action of prescribed medication(s), Side effects of prescribed medication(s), and When to take medication(s)  Problem Solving: High glucose - causes, signs/symptoms, treatment and prevention, Low glucose - causes, signs/symptoms, treatment and prevention, Rule of 15 and carrying a carbohydrate source at all times in case of low glucose, and When to call a health care provider  Reducing Risks: Goal for A1c, how it relates to glucose and how often to check    Patient verbalized understanding of diabetes self-management education concepts discussed, opportunities for ongoing education and support, and recommendations provided today.    Plan  Diabetes Medications:  Continue Metformin 500 mg AM and 500 mg at lunch. Continue Januvia as prescribed.     Glucose monitoring:  Continue using Akash 3 sensors    Meal Plan Recommendation:   Use portion control and plate planning method.  Work towards increasing protein, healthy fat and / or fiber at meals and snacks, especially if concentrated sweets like regular soda, cookie, cupcake, etc.  Work towards decreasing portions of sweets and sweetened beverages    Exercise / activity plan:   Recommend starting slow and increasing as tolerated to goal of 150 minutes per week. Include strength training 2-3 times per week.    Topics to cover at upcoming visits: Healthy Eating, Being Active, Monitoring, Taking Medication, Problem Solving, Reducing Risks, and Healthy Coping    Follow-up:  Upcoming Diabetes Ed Appointments     Visit Type Date Time Department    DIABETES ED 4/24/2025  7:30 AM [facility]    DIABETES ED 7/29/2025  7:30 AM [facility]      See Care Plan for co-developed, patient-state behavior change goals.    Subjective/Objective  Sumit is an 64 year old, presenting for the following diabetes education related to:  "Follow-up  Cultural Influences/Ethnic Background:  Not  or     Diabetes Symptoms & Complications:  Diabetes Related Symptoms: (Patient-Rptd) None  Weight trend: (Patient-Rptd) Stable  Symptom course: (Patient-Rptd) Stable  Disease course: (Patient-Rptd) Stable       Patient Problem List and Family Medical History reviewed for relevant medical history, current medical status, and diabetes risk factors.    Vitals:  There were no vitals taken for this visit.  Estimated body mass index is 28.08 kg/m  as calculated from the following:    Height as of 4/4/25: 1.676 m (5' 6\").    Weight as of 4/4/25: 78.9 kg (174 lb).   Last 3 BP:   BP Readings from Last 3 Encounters:   04/04/25 (!) 163/85   02/21/25 126/71   01/16/25 (!) 190/91       History   Smoking Status     Never   Smokeless Tobacco     Never       Labs:  Lab Results   Component Value Date    A1C 5.7 02/14/2025    A1C 7.8 05/10/2021     Lab Results   Component Value Date     04/04/2025     09/01/2022     05/10/2021     Lab Results   Component Value Date    LDL 69 04/08/2024    LDL 92 05/10/2021     HDL Cholesterol   Date Value Ref Range Status   05/10/2021 35 (L) >39 mg/dL Final     Direct Measure HDL   Date Value Ref Range Status   04/08/2024 34 (L) >=40 mg/dL Final   ]  GFR Estimate   Date Value Ref Range Status   01/16/2025 >90 >60 mL/min/1.73m2 Final     Comment:     eGFR calculated using 2021 CKD-EPI equation.   05/10/2021 >90 >60 mL/min/[1.73_m2] Final     Comment:     Non  GFR Calc  Starting 12/18/2018, serum creatinine based estimated GFR (eGFR) will be   calculated using the Chronic Kidney Disease Epidemiology Collaboration   (CKD-EPI) equation.       GFR Estimate If Black   Date Value Ref Range Status   05/10/2021 >90 >60 mL/min/[1.73_m2] Final     Comment:      GFR Calc  Starting 12/18/2018, serum creatinine based estimated GFR (eGFR) will be   calculated using the Chronic Kidney Disease " Epidemiology Collaboration   (CKD-EPI) equation.       Lab Results   Component Value Date    CR 0.71 01/16/2025    CR 0.76 05/10/2021     Lab Results   Component Value Date    MICROL 24.2 10/12/2023    UMALCR 10.21 10/12/2023    UCRR 237.0 10/12/2023           4/19/2025   Healthy Eating   Healthy Eating Assessed Today Yes   Cultural/Hoahaoism diet restrictions? No   Meal planning/habits Smaller portions   Who cooks/prepares meals for you? Self   Who purchases food in  your home? Self   How many times a week on average do you eat food made away from home (restaurant/take-out)? 1   Meals include Breakfast;Lunch;Dinner;Evening Snack   Beverages Tea;Coffee;Soda   Has patient met with a dietitian in the past? Yes         4/19/2025   Being Active   Being Active Assessed Today Yes   Exercise: --   Barrier to exercise None         4/19/2025   Monitoring   Monitoring Assessed Today Yes   Did patient bring glucose meter to appointment?  Yes   Blood Glucose Meter CGM   Times checking blood sugar at home (number) 5+   Times checking blood sugar at home (per) Day   Blood glucose trend Fluctuating           Diabetes Medication(s)       Biguanides       metFORMIN (GLUCOPHAGE) 500 MG tablet Take 2 tablets (1,000 mg) by mouth 2 times daily (with meals).       Dipeptidyl Peptidase-4 (DPP-4) Inhibitors       sitagliptin (JANUVIA) 50 MG tablet Take 1 tablet (50 mg) by mouth daily.              4/19/2025   Taking Medications   Taking Medication Assessed Today Yes   Current Treatments Oral Medication (taken by mouth)   Problems taking diabetes medications regularly? No   Diabetes medication side effects? No         4/19/2025   Problem Solving   Problem Solving Assessed Today Yes   Is the patient at risk for hypoglycemia? Yes   Hypoglycemia Frequency Daily   Hypoglycemia Treatment Glucose (tablets or gel);Candy;Juice;Other food           4/19/2025   Reducing Risks   Has dilated eye exam at least once a year? Yes   Sees dentist every 6  months? Yes   Feet checked by healthcare provider in the last year? Yes         4/19/2025   Healthy Coping: Diabetes Distress Assessment   Healthy Coping Assessed Today Yes   I feel burned out by all of the attention and effort that diabetes demands of me. 1 - Not a Problem   It bothers me that diabetes seems to control my life. 1 - Not a Problem   I am frustrated that even when I do what I am supposed to for my diabetes, it doesn't seem to make a difference. 1 - Not a Problem   No matter how hard I try with my diabetes, it feels like it will never be good enough. 1 - Not a Problem   I am so tired of having to worry about diabetes all the time. 1 - Not a Problem   When it comes to my diabetes, I often feel like a failure. 1 - Not a Problem   It depresses me when I realize that my diabetes will likely never go away. 1 - Not a Problem   Living with diabetes is overwhelming for me. 1 - Not a Problem   T2 DDAS Total Score (0 - 1.9 Little or no DD, 2.0 - 2.9 Moderate DD,  3.0+ High DD) 1    Informal Support system: Children;Paula based;Spouse       Patient-reported     Franca Ward RDN, [facility], [facility]    Time Spent: 30 minutes  Encounter Type: Individual    Any diabetes medication dose changes were made via the [facility] under the patient's referring provider.

## 2025-05-01 ASSESSMENT — SLEEP AND FATIGUE QUESTIONNAIRES
HOW LIKELY ARE YOU TO NOD OFF OR FALL ASLEEP IN A CAR, WHILE STOPPED FOR A FEW MINUTES IN TRAFFIC: WOULD NEVER DOZE
HOW LIKELY ARE YOU TO NOD OFF OR FALL ASLEEP WHILE SITTING AND TALKING TO SOMEONE: WOULD NEVER DOZE
HOW LIKELY ARE YOU TO NOD OFF OR FALL ASLEEP WHILE SITTING AND READING: SLIGHT CHANCE OF DOZING
HOW LIKELY ARE YOU TO NOD OFF OR FALL ASLEEP WHILE SITTING INACTIVE IN A PUBLIC PLACE: WOULD NEVER DOZE
HOW LIKELY ARE YOU TO NOD OFF OR FALL ASLEEP WHILE SITTING QUIETLY AFTER LUNCH WITHOUT ALCOHOL: WOULD NEVER DOZE
HOW LIKELY ARE YOU TO NOD OFF OR FALL ASLEEP WHILE LYING DOWN TO REST IN THE AFTERNOON WHEN CIRCUMSTANCES PERMIT: SLIGHT CHANCE OF DOZING
HOW LIKELY ARE YOU TO NOD OFF OR FALL ASLEEP WHILE WATCHING TV: SLIGHT CHANCE OF DOZING
HOW LIKELY ARE YOU TO NOD OFF OR FALL ASLEEP WHEN YOU ARE A PASSENGER IN A CAR FOR AN HOUR WITHOUT A BREAK: WOULD NEVER DOZE

## 2025-05-05 ENCOUNTER — OFFICE VISIT (OUTPATIENT)
Dept: SLEEP MEDICINE | Facility: CLINIC | Age: 65
End: 2025-05-05
Attending: INTERNAL MEDICINE
Payer: COMMERCIAL

## 2025-05-05 VITALS
SYSTOLIC BLOOD PRESSURE: 136 MMHG | BODY MASS INDEX: 29.52 KG/M2 | OXYGEN SATURATION: 100 % | WEIGHT: 182.9 LBS | HEART RATE: 66 BPM | DIASTOLIC BLOOD PRESSURE: 79 MMHG

## 2025-05-05 DIAGNOSIS — I50.9 HEART FAILURE, UNSPECIFIED HF CHRONICITY, UNSPECIFIED HEART FAILURE TYPE (H): ICD-10-CM

## 2025-05-05 DIAGNOSIS — Z72.821 INADEQUATE SLEEP HYGIENE: ICD-10-CM

## 2025-05-05 DIAGNOSIS — G47.33 OSA (OBSTRUCTIVE SLEEP APNEA): Primary | ICD-10-CM

## 2025-05-05 DIAGNOSIS — Z95.0 STATUS POST PLACEMENT OF CARDIAC PACEMAKER: ICD-10-CM

## 2025-05-05 DIAGNOSIS — I25.10 CORONARY ARTERY DISEASE INVOLVING NATIVE HEART WITHOUT ANGINA PECTORIS, UNSPECIFIED VESSEL OR LESION TYPE: ICD-10-CM

## 2025-05-05 DIAGNOSIS — F51.5 NIGHTMARES: ICD-10-CM

## 2025-05-05 PROCEDURE — 99214 OFFICE O/P EST MOD 30 MIN: CPT | Performed by: INTERNAL MEDICINE

## 2025-05-05 PROCEDURE — 1126F AMNT PAIN NOTED NONE PRSNT: CPT | Performed by: INTERNAL MEDICINE

## 2025-05-05 PROCEDURE — 3075F SYST BP GE 130 - 139MM HG: CPT | Performed by: INTERNAL MEDICINE

## 2025-05-05 PROCEDURE — 3078F DIAST BP <80 MM HG: CPT | Performed by: INTERNAL MEDICINE

## 2025-05-05 NOTE — PROGRESS NOTES
Outpatient Sleep Medicine Consultation:      Name: Sumit Park MRN# [id number]   Age: 64 year old YOB: 1960     Date of Consultation: May 5, 2025  Consultation is requested by: Candis Altamirano MD  [facility] [place] [address] Candis Altamirano  Primary care provider: Candis Altamirano       Reason for Sleep Consult:     Sumit Park is sent by Candis Altamirano MD for a sleep consultation regarding evaluation/follow-up of previously diagnosed obstructive sleep apnea, treated with mandibular advancement device, after significant weight loss.    Patient s Reason for visit  Sumit E Corinamoris main reason for visit: (Patient-Rptd) Regerral to home sleep study  Patient states problem(s) started: (Patient-Rptd) Na  Sumit ARLEEN Vivian's goals for this visit: (Patient-Rptd) Get a referral so i can be considered sleep apnea free           Assessment and Plan:     Summary Sleep Diagnoses and Summary Recommendations:  Previously diagnosed obstructive sleep apnea, treated with mandibular advancement device.  Patient reports using the mandibular advancement device regularly during sleep and denies reports of snoring or apneas with the device use.  Patient lost 38 pounds of weight since the most recent sleep study in August 2021 and  wants to determine if he still has a sleep apnea or not since he lost significant weight.   Home sleep study and polysomnography reviewed.  Patient interested in home sleep study.  Orders generated for [facility] HST to evaluate the current status of the sleep disordered breathing.  Patient was instructed to to stop using the mandibular advancement device for 3 consecutive nights preceding the home sleep study and  not to use the mandibular advancement device on the night of the home sleep study.  He may resume the device use the following night after he completes the home sleep study until the results become  available via [facility] in approximately 10 to 14 days from the time the study is completed.  Follow-up clinical visit will be determined based on the results of the home sleep study.    Nightmares, trauma related. He was not diagnosed with PTSD.  He was recommended to follow-up with his therapist    Inadequate sleep hygiene: we discussed optimizing sleep hygiene measures including avoiding activities such as reading, eating, watching TV in bed, using phone/computer/other electronic devices in bed and avoiding napping during the day.    BMI 29.52 kg/m  encouraged to follow healthy diet and exercise regularly.    Coronary artery disease, status post stent placement, now status post cardiac pacemaker in situ, heart failure with preserved ejection fraction: He will continue follow-up with cardiology    Patient was strongly advised to avoid driving, operating any heavy machinery or other hazardous situations while drowsy or sleepy. Patient was counseled on the importance of driving while alert, to pull over if drowsy, or nap before getting into the vehicle if sleepy.       Comorbid Diagnoses:   Gastric bypass status for obesity - 2015    CAD, 2 stents 1999, 2018    Cardiac pacemaker in situ    Celiac disease    Complete heart block (H) -- s/p PPM 2018    HTN, goal below 140/90    Morbid obesity (H)    Gilbert syndrome    Non-sustained ventricular tachycardia (H)    Osteoarthrosis involving lower leg    Second degree heart block    Vitamin D deficiency    Infection due to 2019 novel coronavirus - Nov 2021 - He received Monoclonal antibodies    Heart failure, unspecified HF chronicity, unspecified heart failure type (H)    Type 2 diabetes mellitus with other specified complication, without long-term current use of insulin (H)       Orders Placed This Encounter   Procedures    HST - Home Sleep Apnea Test  - [facility] NonReturnable     Summary Counseling:    Sleep Testing Reviewed  Obstructive Sleep Apnea Reviewed    Medical  "Decision-making:   Educational materials provided in instructions    CC: Candis Altamirano MD    The above note was dictated using voice recognition software. Although reviewed after completion, some word and grammatical error may remain . Please contact the author for any clarifications.     \" Total time spent was 40 minutes for this appointment on this date of service which include time spent before, during and after the visit for chart review, patient care, counseling and coordination of care including documentation.\"      Tra Saldana MD  [facility]  [address] [place] [address]           History of Present Illness:   Sumit Park is a 64-year-old male with medical history significant for type 2 diabetes mellitus, status post gastric bypass 2015, hyperlipidemia, CAD, celiac disease, heart block w/pacemaker, hypertension, mild heart failure with preserved ejection fraction. Patient was previosuly diagnosed with very severe ZULEIKA (/hr) in 2008 pre-gastric bypass.  He used CPAP for some time and discontinued due to recurrent infections.  He underwent diagnostic polysomnography on 8/25/2021 that showed moderate ZULEIKA, pronounced during REM sleep without hypoxemia.  His last sleep clinic visit was on September 8, 2021 with my colleague Pamela Salas PA-C when referral to dentistry was provided to obtain mandibular advancement device for the treatment of sleep apnea.  Since then, patient obtained mandibular advancement device through dentistry and has been using it regularly during sleep.      Breathing/Snoring (with the use of mandibular advancement device)  Patient snores:(Patient-Rptd) No  Other people complain about his snoring: (Patient-Rptd) No  Patient has been told he stops breathing in his sleep:(Patient-Rptd) No    Patient lost almost 38 pounds since the last sleep study from August 2021.  He presents to sleep clinic to "   determine if he still has the sleep apnea or not since he lost significant weight.    Past Sleep Evaluations:  #1 Patient was previosuly diagnosed with very severe ZULEIKA (/hr) in 2008 pre-gastric bypass.     #2 Second sleep study(PSG)  Date of sleep study: 8/25/20221  Weight at the time of the sleep study: 220 pounds   Moderate obstructive sleep apnea with associated oxygen desaturations were seen, with predominance of ZULEIKA events in REM sleep - AHI 21.7, RDI 23.1, Supine AHI 21.7, REM AHI 52.4, Low O2 83.9%, Time Spent <=88% 1.8 minutes / Time Spent <=89% 3.8 minutes. No sustained hypoxemia. No abnormal movements or behaviors. Paced rhythm on cardiac monitoring.          SLEEP-WAKE SCHEDULE:     Work/School Days: Patient goes to school/work: (Patient-Rptd) Yes   Usually gets into bed at (Patient-Rptd) 10  Takes patient about (Patient-Rptd) 5 min to fall asleep  Has trouble falling asleep (Patient-Rptd) None nights per week  Wakes up in the middle of the night (Patient-Rptd) 1 times.  Wakes up due to (Patient-Rptd) Use the bathroom;Uncertain  He has trouble falling back asleep (Patient-Rptd) 0 times a week (With 2 tabs of trazodone 50mg)  It usually takes (Patient-Rptd) 5 min to get back to sleep  Patient is usually up at (Patient-Rptd) 7am  Uses alarm: (Patient-Rptd) Yes    Weekends/Non-work Days/All Other Days:  Usually gets into bed at (Patient-Rptd) 10pm   Takes patient about (Patient-Rptd) 5min to fall asleep  Patient is usually up at (Patient-Rptd) 7am  Uses alarm: (Patient-Rptd) Yes    He denies restorative sleep, fatigue/EDS  Sleep Need  Patient gets  (Patient-Rptd) 8-9 hour a night sleep on average   Patient thinks he needs about (Patient-Rptd) 6 hours sleep    Sumit ARLEEN Vivian prefers to sleep in this position(s): (Patient-Rptd) Stomach   Patient states they do the following activities in bed: (Patient-Rptd) Read;Eat;Watch TV;Use phone, computer, or tablet    Naps  Patient takes a purposeful nap  (Patient-Rptd) 2 times a week and naps are usually (Patient-Rptd) Hour in duration  He feels better after a nap: (Patient-Rptd) No  He dozes off unintentionally (Patient-Rptd) 2 days per week  Patient has had a driving accident or near-miss due to sleepiness/drowsiness: (Patient-Rptd) No      SLEEP DISRUPTIONS:    Movement:  Patient gets pain, discomfort, with an urge to move:  (Patient-Rptd) No  It happens when he is resting:  (Patient-Rptd) No  It happens more at night:  (Patient-Rptd) No  Patient has been told he kicks his legs at night:  (Patient-Rptd) No     Behaviors in Sleep:  Sumit Park has experienced the following behaviors while sleeping: (Patient-Rptd) Recurring Nightmares;Teeth grinding   There are no reports of sleepwalking, sleep eating, sleep talking or dream enactment behavior.    He has experienced sudden muscle weakness during the day: (Patient-Rptd) No      Is there anything else you would like your sleep provider to know:          CAFFEINE AND OTHER SUBSTANCES:    Patient consumes caffeinated beverages per day:  (Patient-Rptd) 1  Last caffeine use is usually: (Patient-Rptd) Varies  List of any prescribed or over the counter stimulants that patient takes: (Patient-Rptd) 0  List of any prescribed or over the counter sleep medication patient takes: (Patient-Rptd) Trazadone  List of previous sleep medications that patient has tried:    Patient drinks alcohol to help them sleep: (Patient-Rptd) No  Patient drinks alcohol near bedtime: (Patient-Rptd) No    Family History:  Patient has a family member been diagnosed with a sleep disorder: (Patient-Rptd) No        SCALES:    EPWORTH SLEEPINESS SCALE         5/1/2025     5:38 PM    [place] Sleepiness Scale ( JASON Choi  [id number]<br>ESS - [place]/English - Final version - 21 Nov 07 - [facility] [place].)   Sitting and reading Slight chance of dozing   Watching TV Slight chance of dozing   Sitting, inactive in a public place (e.g. a theatre or a  meeting) Would never doze   As a passenger in a car for an hour without a break Would never doze   Lying down to rest in the afternoon when circumstances permit Slight chance of dozing   Sitting and talking to someone Would never doze   Sitting quietly after a lunch without alcohol Would never doze   In a car, while stopped for a few minutes in traffic Would never doze   [place] Score (MC) 3   [place] Score (Sleep) 3        Patient-reported         INSOMNIA SEVERITY INDEX (ZHENG)          5/1/2025     5:26 PM   Insomnia Severity Index (ZHENG)   Difficulty falling asleep 0   Difficulty staying asleep 1   Problems waking up too early 1   How SATISFIED/DISSATISFIED are you with your CURRENT sleep pattern? 0   How NOTICEABLE to others do you think your sleep problem is in terms of impairing the quality of your life? 0   How WORRIED/DISTRESSED are you about your current sleep problem? 0   To what extent do you consider your sleep problem to INTERFERE with your daily functioning (e.g. daytime fatigue, mood, ability to function at work/daily chores, concentration, memory, mood, etc.) CURRENTLY? 0   ZHENG Total Score 2        Patient-reported       Guidelines for Scoring/Interpretation:  Total score categories:  0-7 = No clinically significant insomnia   8-14 = Subthreshold insomnia   15-21 = Clinical insomnia (moderate severity)  22-28 = Clinical insomnia (severe)  Used via courtesy of www.[employer].va.gov with permission from Chilo Arnold PhD., [facility]      GAD7        5/10/2021     9:19 AM   VINCENT-7    1. Feeling nervous, anxious, or on edge 0   2. Not being able to stop or control worrying 0   3. Worrying too much about different things 0   4. Trouble relaxing 0   5. Being so restless that it is hard to sit still 0   6. Becoming easily annoyed or irritable 1   7. Feeling afraid, as if something awful might happen 0   VINCENT-7 Total Score 1         CAGE-AID         No data to display                CAGE-AID reprinted  "with permission from the [facility] Journal, SHAWNEE Beltran. and BENITO Garcia, \"Conjoint screening questionnaires for alcohol and drug abuse\" [place] Medical Journal 94: 135-140, 1995.      PATIENT HEALTH QUESTIONNAIRE-9 (PHQ - 9)        5/10/2021     9:19 AM   PHQ-9 ([employer])   1.  Little interest or pleasure in doing things 0   2.  Feeling down, depressed, or hopeless 0   3.  Trouble falling or staying asleep, or sleeping too much 0   4.  Feeling tired or having little energy 0   5.  Poor appetite or overeating 0   6.  Feeling bad about yourself - or that you are a failure or have let yourself or your family down 0   7.  Trouble concentrating on things, such as reading the newspaper or watching television 0   8.  Moving or speaking so slowly that other people could have noticed. Or the opposite - being so fidgety or restless that you have been moving around a lot more than usual 0   9.  Thoughts that you would be better off dead, or of hurting yourself in some way 0   PHQ-9 Total Score 0   6.  Feeling bad about yourself 0   7.  Trouble concentrating 0   8.  Moving slowly or restless 0   9.  Suicidal or self-harm thoughts 0       Developed by Kristopher Villa, Rhea Clarke, Gage Bacon and colleagues, with an educational annalisa from [employer]. No permission required to reproduce, translate, display or distribute.        Allergies:    Allergies   Allergen Reactions    Terbinafine Rash     Drug eruption        Medications:    Current Outpatient Medications   Medication Sig Dispense Refill    acyclovir (ZOVIRAX) 5 % external ointment Apply topically 6 times daily. 15 g 3    alcohol swab prep pads Use to swab area of injection/joe as directed. 100 each 1    amLODIPine (NORVASC) 2.5 MG tablet Take 1 tablet (2.5 mg) by mouth daily as needed (SBP> 160). 90 tablet 0    aspirin (ASPIRIN LOW DOSE) 81 MG EC tablet TAKE ONE TABLET BY MOUTH EVERY DAY FOR HEART DISEASE  **DO NOT CHEW** FOR HEART DISEASE  **DO " NOT CHEW** 100 tablet 2    atorvastatin (LIPITOR) 40 MG tablet Take 1 tablet (40 mg) by mouth daily. 90 tablet 1    bisacodyl (DULCOLAX) 5 MG EC tablet Take 2 tablets at 3 pm the day before your procedure. If your procedure is before 11 am, take 2 additional tablets at 11 pm. If your procedure is after 11 am, take 2 additional tablets at 6 am. For additional instructions refer to your colonoscopy prep instructions. 4 tablet 0    blood glucose (NO BRAND SPECIFIED) test strip Use to test blood sugar 1 times daily or as directed. 100 strip 6    blood glucose monitoring (NO BRAND SPECIFIED) meter device kit Use to test blood sugar 1 times daily or as directed. 1 kit 0    cetirizine (ZYRTEC) 10 MG tablet Take 1 tablet (10 mg) by mouth daily. 10 tablet 0    Continuous Glucose Sensor (FREESTYLE MACK 3 PLUS SENSOR) MISC Change every 15 days. 1 each 6    ferrous sulfate (FEROSUL) 325 (65 Fe) MG tablet Take 1 tablet (325 mg) by mouth daily 90 tablet 1    fish oil-omega-3 fatty acids 500 MG capsule Take by mouth.      folic acid (FOLVITE) 1 MG tablet Take 1 tablet by mouth daily 90 tablet 2    ivermectin (SOOLANTRA) 1 % cream Apply to face once daily 45 g 11    lisinopril (ZESTRIL) 20 MG tablet Take 1 tablet (20 mg) by mouth 2 times daily. 180 tablet 1    metFORMIN (GLUCOPHAGE) 500 MG tablet Take 2 tablets (1,000 mg) by mouth 2 times daily (with meals). 360 tablet 1    metoprolol succinate ER (TOPROL XL) 25 MG 24 hr tablet Take 0.5 tablet (12.5 mg) by mouth daily 45 tablet 1    nitroGLYcerin (NITROSTAT) 0.4 MG sublingual tablet For chest pain place 1 tablet under the tongue every 5 minutes for 3 doses. If symptoms persist 5 minutes after 1st dose call 911. 10 tablet 3    polyethylene glycol (GOLYTELY) 236 g suspension The night before the exam at 6 pm drink an 8-ounce glass every 15 minutes until the jug is half empty. If you arrive before 11 AM: Drink the other half of the [employer] jug at 11 PM night before procedure. If you  arrive after 11 AM: Drink the other half of the [employer] jug at 6 AM day of procedure. For additional instructions refer to your colonoscopy prep instructions. 4000 mL 0    sitagliptin (JANUVIA) 50 MG tablet Take 1 tablet (50 mg) by mouth daily. 90 tablet 1    thin (NO BRAND SPECIFIED) lancets Use with lanceting device. 100 each 6    traZODone (DESYREL) 50 MG tablet Take 2 tablets (100 mg) by mouth at bedtime. 180 tablet 1    triamcinolone (KENALOG) 0.1 % external ointment Apply to affected areas twice daily for up to 2 weeks. Thereafter, may use as needed for flares. 15 g 0    Turmeric (QC TUMERIC COMPLEX PO) Take by mouth.      Vitamin D3 (CHOLECALCIFEROL) 25 mcg (1000 units) tablet Take by mouth daily      zinc gluconate 50 MG tablet Take 50 mg by mouth daily      valACYclovir (VALTREX) 1000 mg tablet Take 1 tablet (1,000 mg) by mouth 2 times daily for 7 days. 14 tablet 0       Problem List:  Patient Active Problem List    Diagnosis Date Noted    Heart failure, unspecified HF chronicity, unspecified heart failure type (H) 02/23/2025     Priority: Medium    Type 2 diabetes mellitus with other specified complication, without long-term current use of insulin (H) 02/23/2025     Priority: Medium    Preop general physical exam 04/19/2024     Priority: Medium    Diarrhea 02/18/2022     Priority: Medium    Elevated bilirubin 02/18/2022     Priority: Medium    Infection due to 2019 novel coronavirus - Nov 2021 - He received Monoclonal antibodies 12/23/2021     Priority: Medium    DM 2, no insulin (H) 05/10/2021     Priority: Medium    Gastric bypass status for obesity - 2015 05/10/2021     Priority: Medium    CAD, 2 stents 1999, 2018 05/10/2021     Priority: Medium    Cardiac pacemaker in situ 05/10/2021     Priority: Medium    Celiac disease 05/10/2021     Priority: Medium    ZULEIKA (obstructive sleep apnea) 05/10/2021     Priority: Medium    Complete heart block (H) -- s/p PPM 2018 05/10/2021     Priority: Medium    HTN,  goal below 140/90 05/10/2021     Priority: Medium    Morbid obesity (H) 05/10/2021     Priority: Medium    Non-sustained ventricular tachycardia (H) 03/07/2019     Priority: Medium     Formatting of this note might be different from the original.  Noted on device check Mar 2019 - will monitor      Jaundice 01/25/2019     Priority: Medium    Achlorhydria 06/28/2018     Priority: Medium    S/P cardiac pacemaker procedure 01/16/2018     Priority: Medium    Second degree heart block 11/06/2017     Priority: Medium    Tinea of nail 10/19/2017     Priority: Medium    Gilbert syndrome 04/04/2016     Priority: Medium    Vitamin D deficiency 03/13/2015     Priority: Medium     Formatting of this note might be different from the original.  chronic vit D deficiency  s/p krishna-[id number] 8/2015  Takes vit D supplements    3/13/15: vit D 23.7  10/1/15: vit D 20.4  3/11/16: vit D 41.1  9/23/16: vit D 40.5, cont vit D3 5000 IUnits TID      H/O acute pancreatitis 12/19/2014     Priority: Medium    Osteoarthrosis involving lower leg 06/11/2004     Priority: Medium     Formatting of this note might be different from the original.  bilateral  Replacing diagnoses that were inactivated after the 10/1/2021 regulatory import.      Dyslipidemia 05/11/2004     Priority: Medium     Formatting of this note might be different from the original.  Jan 2014: TChol 98; TG 68; HDL 32; LDL 52 On Zocor and Lopid          Past Medical/Surgical History:  Past Medical History:   Diagnosis Date    Basal cell carcinoma     CAD, 2 stents 1999, 2018 05/10/2021    Cardiac pacemaker in situ 05/10/2021    Celiac disease 05/10/2021    Complete heart block (H) -- s/p PPM 2018 05/10/2021    DM 2, no insulin (H) 05/10/2021    Gastric bypass status for obesity - 2015 05/10/2021    HTN, goal below 140/80 05/10/2021    Hyperlipidemia LDL goal <100 05/10/2021    ZULEIKA (obstructive sleep apnea) 05/10/2021     Past Surgical History:   Procedure Laterality Date    ANGIOPLASTY       COLONOSCOPY N/A 1/13/2015    Procedure: COLONOSCOPY;  Surgeon: Juni Craig MD;  Location: [facility] OR;  Service:     COLONOSCOPY N/A 4/4/2025    Procedure: Colonoscopy;  Surgeon: Sydney Escobedo MD;  Location: [facility]    DISTAL BICEPS TENDON REPAIR Left     ESOPHAGOSCOPY, GASTROSCOPY, DUODENOSCOPY (EGD), COMBINED N/A 1/13/2015    Procedure: UPPER ENDOSCOPY;  Surgeon: Juni Craig MD;  Location: [facility] OR;  Service:     HERNIORRHAPHY INCISIONAL (LOCATION)      incarcerated    LAPAROSCOPIC GASTRIC BYPASS N/A 2015    LEG SURGERY Right     muscle surgery    OTHER SURGICAL HISTORY      forearm / wrist surgery    REPAIR TENDON BICEPS DISTAL UPPER EXTREMITY      UMBILICAL HERNIA REPAIR N/A     WRIST SURGERY Right        Social History:  Social History     Socioeconomic History    Marital status:      Spouse name: Not on file    Number of children: Not on file    Years of education: Not on file    Highest education level: Not on file   Occupational History    Not on file   Tobacco Use    Smoking status: Never     Passive exposure: Never    Smokeless tobacco: Never   Vaping Use    Vaping status: Never Used   Substance and Sexual Activity    Alcohol use: Never    Drug use: Never    Sexual activity: Yes     Partners: Female   Other Topics Concern    Not on file   Social History Narrative    Not on file     Social Drivers of Health     Financial Resource Strain: Low Risk  (4/19/2024)    Financial Resource Strain     Within the past 12 months, have you or your family members you live with been unable to get utilities (heat, electricity) when it was really needed?: No   Food Insecurity: Low Risk  (4/19/2024)    Food Insecurity     Within the past 12 months, did you worry that your food would run out before you got money to buy more?: No     Within the past 12 months, did the food you bought just not last and you didn t have money to get more?: No   Transportation Needs: Low Risk   (4/19/2024)    Transportation Needs     Within the past 12 months, has lack of transportation kept you from medical appointments, getting your medicines, non-medical meetings or appointments, work, or from getting things that you need?: No   Physical Activity: Insufficiently Active (4/19/2024)    Exercise Vital Sign     Days of Exercise per Week: 1 day     Minutes of Exercise per Session: 30 min   Stress: No Stress Concern Present (4/19/2024)    [employer] [place] [employer] - Occupational Stress Questionnaire     Feeling of Stress : Only a little   Social Connections: Unknown (4/19/2024)    Social Connection and Isolation Panel [NHANES]     Frequency of Communication with Friends and Family: Not on file     Frequency of Social Gatherings with Friends and Family: Never     Attends Baptism Services: Not on file     Active Member of Clubs or Organizations: Not on file     Attends Club or Organization Meetings: Not on file     Marital Status: Not on file   Interpersonal Safety: Low Risk  (2/21/2025)    Interpersonal Safety     Do you feel physically and emotionally safe where you currently live?: Yes     Within the past 12 months, have you been hit, slapped, kicked or otherwise physically hurt by someone?: No     Within the past 12 months, have you been humiliated or emotionally abused in other ways by your partner or ex-partner?: No   Housing Stability: Low Risk  (4/19/2024)    Housing Stability     Do you have housing? : Yes     Are you worried about losing your housing?: No       Family History:  Family History   Problem Relation Age of Onset    Breast Cancer Mother 58    Heart Disease Father 71    Lymphoma Brother     Colon Cancer No family hx of        Review of Systems:  A complete review of systems reviewed by me is negative with the exeption of what has been mentioned in the history of present illness.  In the last TWO WEEKS have you experienced any of the following symptoms?  Fevers:  (Patient-Rptd) No  Night Sweats: (Patient-Rptd) Yes  Weight Gain: (Patient-Rptd) No  Pain at Night: (Patient-Rptd) No  Double Vision: (Patient-Rptd) No  Changes in Vision: (Patient-Rptd) No  Difficulty Breathing through Nose: (Patient-Rptd) No  Sore Throat in Morning: (Patient-Rptd) No  Dry Mouth in the Morning: (Patient-Rptd) No  Shortness of Breath Lying Flat: (Patient-Rptd) No  Shortness of Breath With Activity: (Patient-Rptd) No  Awakening with Shortness of Breath: (Patient-Rptd) No  Increased Cough: (Patient-Rptd) No  Heart Racing at Night: (Patient-Rptd) No  Swelling in Feet or Legs: (Patient-Rptd) No  Diarrhea at Night: (Patient-Rptd) No  Heartburn at Night: (Patient-Rptd) No  Urinating More than Once at Night: (Patient-Rptd) No  Losing Control of Urine at Night: (Patient-Rptd) No  Joint Pains at Night: (Patient-Rptd) No  Headaches in Morning: (Patient-Rptd) No  Weakness in Arms or Legs: (Patient-Rptd) No  Depressed Mood: (Patient-Rptd) No  Anxiety: (Patient-Rptd) No     Physical Examination:  Vitals: /79   Pulse 66   Wt 83 kg (182 lb 14.4 oz)   SpO2 100%   BMI 29.52 kg/m    BMI= Body mass index is 29.52 kg/m .    Neck Cir (cm): 40 cm  General: No apparent distress, appropriately groomed  Head: Normocephalic, atraumatic  Neck:Circumference: 40 cm  Chest:  clear to auscultation - no rales, rhonchi or wheezes  CV: regular rates and rhythm, normal S1 S2, no S3 or S4, and no murmurs  Psych: Mood and affect normal   Neuro:  Mental status: Alert and  Oriented X 3  Speech: normal            Data: All pertinent previous laboratory data reviewed     Recent Labs   Lab Test 04/04/25  1055 01/16/25  1049 08/15/24  0713   NA  --  138 141   POTASSIUM  --  3.9 4.4   CHLORIDE  --  102 103   CO2  --  27 26   ANIONGAP  --  9 12   * 86 94   BUN  --  13.8 20.0   CR  --  0.71 0.92   JADYN  --  8.6* 9.1       Recent Labs   Lab Test 02/14/25  0944   WBC 6.2   RBC 4.27*   HGB 13.0*   HCT 38.6*   MCV 90   MCH 30.4  "  MCHC 33.7   RDW 13.2          Recent Labs   Lab Test 01/16/25  1831   PROTTOTAL 6.3*   ALBUMIN 4.2   BILITOTAL 1.0   ALKPHOS 69   AST 20   ALT 17       TSH   Date Value   08/15/2024 3.81 uIU/mL   04/08/2024 5.48 uIU/mL (H)   09/01/2022 3.46 mU/L   05/10/2021 3.97 mU/L       No results found for: \"UAMP\", \"UBARB\", \"BENZODIAZEUR\", \"UCANN\", \"UCOC\", \"OPIT\", \"UPCP\"    Iron Saturation Index   Date/Time Value Ref Range Status   05/10/2021 08:03 AM 35 15 - 46 % Final     Iron Sat Index   Date/Time Value Ref Range Status   02/14/2025 09:44 AM 30 15 - 46 % Final   09/01/2022 07:30 AM 26 15 - 46 % Final     Ferritin   Date/Time Value Ref Range Status   02/14/2025 09:44 AM 46 31 - 409 ng/mL Final   05/10/2021 08:03 AM 48 26 - 388 ng/mL Final       No results found for: \"PH\", \"PHARTERIAL\", \"PO2\", \"MG0SPGTTFPG\", \"SAT\", \"PCO2\", \"HCO3\", \"BASEEXCESS\", \"MARLY\", \"BEB\"    Echocardiology:Interpretation Summary     Technically difficult study limited views obtained due to body habitus  Contrast not used due to patient declined.  Proximal septal thickening is noted.  Echo findings are not consistent with left ventricular outflow obstruction.  The visual ejection fraction is 50-55%.  Grade I or early diastolic dysfunction.  There is mild inferior wall hypokinesis.  There is an \"echogenic\" structure in the LA. Suspect this is artifact/reverb  from the mitral annulus but cannot exclude actual structure such as myxoma.  Consider MR or LEYDA to improve imaging of this area  The ascending aorta is Borderline dilated.      Chest x-ray:   XR Ribs and Chest Left 3 Views 01/09/2025    Narrative  EXAM: XR RIBS AND CHEST LEFT 3 VIEWS  LOCATION: [facility]  DATE: 1/9/2025    INDICATION: Pain on the L lower chest. Fall 3 w ago  COMPARISON: None.    Impression  IMPRESSION: The visualized heart and lungs are negative. No rib fractures.      Chest CT:   No results found for this or any previous visit from the past 365 " days.      PFT: Most Recent [place] Pulmonary Function Testing: No results found      Tra Saldana MD 5/5/2025

## 2025-05-05 NOTE — NURSING NOTE
"Chief Complaint   Patient presents with    Sleep Problem     Pt has CDL and drives school bus, needs to report to physician he does not have severe sleep apnea anymore. Would like an HST.       Initial /79   Pulse 66   Wt 83 kg (182 lb 14.4 oz)   SpO2 100%   BMI 29.52 kg/m   Estimated body mass index is 29.52 kg/m  as calculated from the following:    Height as of 4/4/25: 1.676 m (5' 6\").    Weight as of this encounter: 83 kg (182 lb 14.4 oz).    Medication Reconciliation: complete    Neck circumference: 15.75 inches / 40 centimeters.    ESS 3    ZHENG 2    Clifford Trejo MA   "

## 2025-05-05 NOTE — PATIENT INSTRUCTIONS
We recommend you to stop using the mandibular advancement device for 3 consecutive nights preceding the home sleep study.  We also do not want you to use the mandibular advancement device on the night of the home sleep study.  You may resume the device use the following night after you have completed the home sleep study until the results become available via [employer] which is in approximately 10 to 14 days from the time the study is completed.    Am I having a home sleep study?  --->Watch the video for the device you are using:    -Disposable device sent out require phone/computer application-   https://www.[employer].com/watch?v=BCce_vbiwxE  Electronic reminder  http://nightshifttherapy.com/      Remember to Drive Safe... Drive Alive     Sleep health profoundly affects your health, mood, and your safety.  Thirty three percent of the population (one in three of us) is not getting enough sleep and many have a sleep disorder. Not getting enough sleep or having an untreated / undertreated sleep condition may make us sleepy without even knowing it. In fact, our driving could be dramatically impaired due to our sleep health. As your provider, here are some things I would like you to know about driving:     Here are some warning signs for impairment and dangerous drowsy driving:              -Having been awake more than 16 hours               -Looking tired               -Eyelid drooping              -Head nodding (it could be too late at this point)              -Driving for more than 30 minutes     Some things you could do to make the driving safer if you are experiencing some drowsiness:              -Stop driving and rest              -Call for transportation              -Make sure your sleep disorder is adequately treated     Some things that have been shown NOT to work when experiencing drowsiness while driving:              -Turning on the radio              -Opening windows              -Eating any  distracting   /  entertaining  foods (e.g., sunflower seeds, candy, or any other)              -Talking on the phone      Your decision may not only impact your life, but also the life of others. Please, remember to drive safe for yourself and all of us.

## 2025-05-18 ENCOUNTER — HEALTH MAINTENANCE LETTER (OUTPATIENT)
Age: 65
End: 2025-05-18

## 2025-06-09 NOTE — PROGRESS NOTES
"[facility]   Mental Health & Addiction Services     Progress Note - Initial Visit    Patient  Name:  Maddy Ortiz Date: 2025         Service Type: Individual     Visit Start Time: 08:14  Visit End Time: 09:03    Visit #:  1    Attendees: Client    Service Modality:  Video Visit:      Provider verified identity through the following two step process.  Patient provided:  Patient  and Patient address    Telemedicine Visit: The patient's condition can be safely assessed and treated via synchronous audio and visual telemedicine encounter.      Reason for Telemedicine Visit: Patient has requested telehealth visit    Originating Site (Patient Location): Patient's home    Distant Site (Provider Location): Provider Remote Setting- Home Office    Consent:  The patient/guardian has verbally consented to: the potential risks and benefits of telemedicine (video visit) versus in person care; bill my insurance or make self-payment for services provided; and responsibility for payment of non-covered services.     Patient would like the video invitation sent by:  My Chart    Mode of Communication:  Video Conference via Amwell    Distant Location (Provider):  Off-site    As the provider I attest to compliance with applicable laws and regulations related to telemedicine.       DATA:   Interactive Complexity: No   Crisis: No     Presenting Concerns/  Current Stressors:   Patient and provider met for an individual intake for therapy today. Patient  was oriented to informed consent for therapy, limitations for informed consent, and provided consent for expectations for today's session and reasonable expectations for the process of therapy. Patient began the diagnostic assessment including presenting problems, safety risk assessment, safety planning and identified current stressors. Patient reports she is wanting \"to do day treatment virtually.\" In terms of social supports, patient has individual therapy at " Dr Jose's note      Patient's instructions / PLAN:                                                        Plan:  1. Continue same meds, same doses for now   2. Please make a lab appointment for non fasting labs in 3 months - for A1c only  3. Please make a lab appointment for fasting labs in 6 months  4. Please make an appointment few days after the labs for ANNUAL EXAM ( check with insurance if you can come before Nov 12)      ASSESSMENT & PLAN:                                                      (I25.10) Coronary artery disease involving native heart without angina pectoris, unspecified vessel or lesion type  (primary encounter diagnosis)  Comment: stable   Plan: atorvastatin (LIPITOR) 40 MG tablet, CBC with         platelets, CK total, Hemoglobin A1c,         Comprehensive metabolic panel, Lipid panel         reflex to direct LDL Fasting, Albumin Random         Urine Quantitative with Creat Ratio, TSH with         free T4 reflex, Hepatitis C Screen Reflex to         HCV RNA Quant and Genotype, Ferritin, Folate,         Iron & Iron Binding Capacity, Parathyroid         Hormone Intact, Vitamin B1 whole blood, Vitamin        B12, Vitamin D Deficiency, Tissue         transglutaminase ewdin IgA and IgG            (I10) HTN, goal below 140/80  Comment: Controlled    Plan: lisinopril (ZESTRIL) 20 MG tablet, CBC with         platelets, CK total, Hemoglobin A1c,         Comprehensive metabolic panel, Lipid panel         reflex to direct LDL Fasting, Albumin Random         Urine Quantitative with Creat Ratio, TSH with         free T4 reflex, Hepatitis C Screen Reflex to         HCV RNA Quant and Genotype, Ferritin, Folate,         Iron & Iron Binding Capacity, Parathyroid         Hormone Intact, Vitamin B1 whole blood, Vitamin        B12, Vitamin D Deficiency, Tissue         transglutaminase edwin IgA and IgG            (E11.9) DM 2 no insulin (H)  Comment: Controlled    Plan: metFORMIN (GLUCOPHAGE) 500 MG tablet, CBC with          platelets, CK total, Hemoglobin A1c,         Comprehensive metabolic panel, Lipid panel         reflex to direct LDL Fasting, Albumin Random         Urine Quantitative with Creat Ratio, TSH with         free T4 reflex, Hepatitis C Screen Reflex to         HCV RNA Quant and Genotype, Ferritin, Folate,         Iron & Iron Binding Capacity, Parathyroid         Hormone Intact, Vitamin B1 whole blood, Vitamin        B12, Vitamin D Deficiency, Tissue         transglutaminase edwin IgA and IgG, Hemoglobin         A1c            (I25.10) CAD, 2 stents 1999, 2018  Comment: stable   Plan: metoprolol succinate ER (TOPROL-XL) 25 MG 24 hr        tablet            (Z11.59) Encounter for hepatitis C screening test for low risk patient  Comment:   Plan: CBC with platelets, CK total, Hemoglobin A1c,         Comprehensive metabolic panel, Lipid panel         reflex to direct LDL Fasting, Albumin Random         Urine Quantitative with Creat Ratio, TSH with         free T4 reflex, Hepatitis C Screen Reflex to         HCV RNA Quant and Genotype, Ferritin, Folate,         Iron & Iron Binding Capacity, Parathyroid         Hormone Intact, Vitamin B1 whole blood, Vitamin        B12, Vitamin D Deficiency, Tissue         transglutaminase edwin IgA and IgG            (Z98.84) Gastric bypass status for obesity - 2015  Comment:   Plan: CBC with platelets, CK total, Hemoglobin A1c,         Comprehensive metabolic panel, Lipid panel         reflex to direct LDL Fasting, Albumin Random         Urine Quantitative with Creat Ratio, TSH with         free T4 reflex, Hepatitis C Screen Reflex to         HCV RNA Quant and Genotype, Ferritin, Folate,         Iron & Iron Binding Capacity, Parathyroid         Hormone Intact, Vitamin B1 whole blood, Vitamin        B12, Vitamin D Deficiency, Tissue         transglutaminase edwin IgA and IgG            (I44.2) Complete heart block (H) -- s/p PPM 2018  Comment:   Plan: CBC with platelets, CK total, Hemoglobin  "Canvas. \"I had Rafael,\" for anger management at [facility], couples therapy, COLLEEN roldan, home nurse, named Callie. Patient explains \"I wasn't able to stay in DBT for very long.\" Patient agreed to meet to continue the diagnostic assessment on Thursday 6/12/2025.      ASSESSMENT:  Mental Status Assessment:  Appearance:   Appropriate   Eye Contact:   Poor  Psychomotor Behavior: Restless   Attitude:   Cooperative  Pleasant Attentive  Orientation:   All  Speech   Rate / Production: Emotional   Volume:  Soft   Mood:    Anxious   Affect:    Worrisome   Thought Content:  Clear  Referential Thinking   Thought Form:  Tangential   Insight:    Good     Assessments completed prior to this visit:  The following assessments were completed by patient for this visit:  PHQ9:       4/9/2024    11:32 AM 7/22/2024     8:47 AM 8/2/2024    11:01 AM 9/24/2024    10:16 AM 4/7/2025     3:25 PM 6/5/2025    12:16 PM 6/9/2025     7:50 AM   PHQ-9 SCORE   PHQ-9 Total Score MyChart 9 (Mild depression)  9 (Mild depression) 5 (Mild depression) 16 (Moderately severe depression)  9 (Mild depression)   PHQ-9 Total Score 9 5 9 5 16  7 9        Patient-reported     GAD7:       2/2/2023    11:31 AM 5/8/2023    10:01 AM 6/14/2023    11:36 AM 7/19/2023    12:44 PM 11/15/2024    10:57 AM 1/24/2025     8:00 AM 6/5/2025    12:16 PM   AYAKA-7 SCORE   Total Score 21 (severe anxiety) 20 (severe anxiety) 21 (severe anxiety) 21 (severe anxiety) 11 (moderate anxiety) 19 (severe anxiety)    Total Score 21 20 21 21 11  19  10       Patient-reported     CAGE-AID:       9/7/2022     8:38 PM 1/30/2023    12:30 PM   CAGE-AID Total Score   Total Score     Total Score MyChart         Information is confidential and restricted. Go to Review Flowsheets to unlock data.     PROMIS 10-Global Health (all questions and answers displayed):       9/7/2022     8:38 PM 1/13/2023    10:48 AM 1/29/2023     2:00 AM 2/2/2023    11:32 AM 6/12/2024     1:16 PM 10/9/2024     " 1:36 PM 5/5/2025    10:17 AM   PROMIS 10   In general, would you say your health is:    Fair Fair Poor Poor   In general, would you say your quality of life is:    Poor Poor Very good Poor   In general, how would you rate your physical health?    Poor Poor Poor Poor   In general, how would you rate your mental health, including your mood and your ability to think?    Poor Poor Very good Fair   In general, how would you rate your satisfaction with your social activities and relationships?    Poor Poor Poor Good   In general, please rate how well you carry out your usual social activities and roles    Poor Fair Poor Fair   To what extent are you able to carry out your everyday physical activities such as walking, climbing stairs, carrying groceries, or moving a chair?    Mostly A little Completely A little   In the past 7 days, how often have you been bothered by emotional problems such as feeling anxious, depressed, or irritable?    Always Often Rarely Often   In the past 7 days, how would you rate your fatigue on average?    Moderate Severe None Moderate   In the past 7 days, how would you rate your pain on average, where 0 means no pain, and 10 means worst imaginable pain?    5 8 0 7   In general, would you say your health is:    2 2 1  1   In general, would you say your quality of life is:    1 1 4  1   In general, how would you rate your physical health?    1 1 1  1   In general, how would you rate your mental health, including your mood and your ability to think?    1 1 4  2   In general, how would you rate your satisfaction with your social activities and relationships?    1 1 1  3   In general, please rate how well you carry out your usual social activities and roles. (This includes activities at home, at work and in your community, and responsibilities as a parent, child, spouse, employee, friend, etc.)    1 2 1  2   To what extent are you able to carry out your everyday physical activities such as walking,  A1c,         Comprehensive metabolic panel, Lipid panel         reflex to direct LDL Fasting, Albumin Random         Urine Quantitative with Creat Ratio, TSH with         free T4 reflex, Hepatitis C Screen Reflex to         HCV RNA Quant and Genotype, Ferritin, Folate,         Iron & Iron Binding Capacity, Parathyroid         Hormone Intact, Vitamin B1 whole blood, Vitamin        B12, Vitamin D Deficiency, Tissue         transglutaminase edwin IgA and IgG            (K90.0) Celiac disease  Comment:   Plan: CBC with platelets, CK total, Hemoglobin A1c,         Comprehensive metabolic panel, Lipid panel         reflex to direct LDL Fasting, Albumin Random         Urine Quantitative with Creat Ratio, TSH with         free T4 reflex, Hepatitis C Screen Reflex to         HCV RNA Quant and Genotype, Ferritin, Folate,         Iron & Iron Binding Capacity, Parathyroid         Hormone Intact, Vitamin B1 whole blood, Vitamin        B12, Vitamin D Deficiency, Tissue         transglutaminase edwin IgA and IgG               Chief complaint:                                                      Follow up chronic medical problems       SUBJECTIVE:                                                    History of present illness:    Recent labs Discussed    No low BS  No acute c/o           Diabetes Follow-up    How often are you checking your blood sugar? A few times a month  What time of day are you checking your blood sugars (select all that apply)?  Before meals  Have you had any blood sugars above 200?  No  Have you had any blood sugars below 70?  No    What symptoms do you notice when your blood sugar is low?  Shaky    What concerns do you have today about your diabetes? None     Do you have any of these symptoms? (Select all that apply)  No numbness or tingling in feet.  No redness, sores or blisters on feet.  No complaints of excessive thirst.  No reports of blurry vision.  No significant changes to weight.    Have you had a  "climbing stairs, carrying groceries, or moving a chair?    4 2 5  2   In the past 7 days, how often have you been bothered by emotional problems such as feeling anxious, depressed, or irritable?    5 4 2  4   In the past 7 days, how would you rate your fatigue on average?    3 4 1  3   In the past 7 days, how would you rate your pain on average, where 0 means no pain, and 10 means worst imaginable pain?    5 8 0  7   Global Mental Health Score    4 5 13 8    Global Physical Health Score    11 7 16 8    PROMIS TOTAL - SUBSCORES    15 12 29 16        Information is confidential and restricted. Go to Review Flowsheets to unlock data.    Patient-reported    Proxy-reported     S[place]cer Suicide Severity Rating Scale (Lifetime/Recent)      6/16/2023    10:00 PM 7/30/2023     7:45 PM 7/30/2023     8:57 PM 7/30/2023     8:58 PM 6/8/2024     4:01 PM 7/11/2024     6:34 AM 6/9/2025     8:26 AM   S[place]cer Suicide Severity Rating (Lifetime/Recent)   Q1 Wish to be Dead (Lifetime)   Yes       Q2 Non-Specific Active Suicidal Thoughts (Lifetime)   Yes       Q1 Wished to be Dead (Past Month)  no   no  0-->no 0-->no    Q2 Suicidal Thoughts (Past Month)  no   no  0-->no 0-->no    Q4 Suicidal Intent without Specific Plan    no       Q5 Suicide Intent with Specific Plan    no       Q6 Suicide Behavior (Lifetime)   no   1-->yes 0-->no    If yes to Q6, within past 3 months?     0-->no      Level of Risk per Screen    low risk  moderate risk no risks indicated    1. Wish to be Dead (Lifetime) Y      Y   Wish to be Dead Description (Lifetime)       Wish to be dead in teens and in 2016- 2018. Reports she was hospitalized \"quite a few times.\" Usually during conflict with others.   1. Wish to be Dead (Past 1 Month) N      N   2. Non-Specific Active Suicidal Thoughts (Lifetime) Y      Y   2. Non-Specific Active Suicidal Thoughts (Past 1 Month) N      N   3. Active Suicidal Ideation with any Methods (Not Plan) Without Intent to Act (Lifetime) Y  Y " "diabetic eye exam in the last 12 months?         BP Readings from Last 2 Encounters:   12/23/21 124/72   11/12/21 138/76     Hemoglobin A1C POCT (%)   Date Value   05/10/2021 7.8 (H)     Hemoglobin A1C (%)   Date Value   02/11/2022 6.7 (H)   11/05/2021 7.4 (H)     LDL Cholesterol Calculated (mg/dL)   Date Value   08/12/2021 66   05/10/2021 92         Hypertension Follow-up      Do you check your blood pressure regularly outside of the clinic? Yes     Are you following a low salt diet? Yes    Are your blood pressures ever more than 140 on the top number (systolic) OR more   than 90 on the bottom number (diastolic), for example 140/90? Yes    Review of Systems:                                                      ROS: negative for fever, chills, cough, wheezes, chest pain, shortness of breath, vomiting, abdominal pain, leg swelling          OBJECTIVE:             Physical exam:  Blood pressure 135/81, pulse 76, temperature 98.6  F (37  C), temperature source Tympanic, resp. rate 18, height 1.683 m (5' 6.25\"), weight 96.8 kg (213 lb 4.8 oz), SpO2 99 %.     NAD, appears comfortable    Neurologic: A, Ox3, no focal signs appreciated    PMHx: reviewed  Past Medical History:   Diagnosis Date     CAD, 2 stents 1999, 2018 5/10/2021     Cardiac pacemaker in situ 5/10/2021     Celiac disease 5/10/2021     Complete heart block (H) -- s/p PPM 2018 5/10/2021     DM 2, no insulin (H) 5/10/2021     Gastric bypass status for obesity - 2015 5/10/2021     HTN, goal below 140/80 5/10/2021     Hyperlipidemia LDL goal <100 5/10/2021     ZULEIKA (obstructive sleep apnea) 5/10/2021      PSHx: reviewed  Past Surgical History:   Procedure Laterality Date     ANGIOPLASTY       COLONOSCOPY N/A 1/13/2015    Procedure: COLONOSCOPY;  Surgeon: Juni Craig MD;  Location: [facility];  Service:      DISTAL BICEPS TENDON REPAIR Left      ESOPHAGOSCOPY, GASTROSCOPY, DUODENOSCOPY (EGD), COMBINED N/A 1/13/2015    Procedure: UPPER ENDOSCOPY;  "    N   3. Active Suicidal Ideation with any Methods (Not Plan) Without Intent to Act (Past 1 Month) N         4. Active Suicidal Ideation with Some Intent to Act, Without Specific Plan (Lifetime) Y  Y    N   4. Active Suicidal Ideation with Some Intent to Act, Without Specific Plan (Past 1 Month) N         5. Active Suicidal Ideation with Specific Plan and Intent (Lifetime) N  N    N   Most Severe Ideation Rating (Lifetime) 2      5   Most Severe Ideation Rating (Past 1 Month)       --   Frequency (Lifetime) 2      2   Frequency (Past 1 Month)       --   Duration (Lifetime) 2      1   Duration (Past 1 Month)       --   Controllability (Lifetime) 2      2   Controllability (Past 1 Month)       0   Deterrents (Lifetime) 2      1   Deterrents (Past 1 Month)       0   Reasons for Ideation (Lifetime) 4      1   Reasons for Ideation (Past 1 Month)       0   Actual Attempt (Lifetime) N  N    N   Actual Attempt (Past 3 Months)    N      Has subject engaged in non-suicidal self-injurious behavior? (Lifetime) N  N    Y   Has subject engaged in non-suicidal self-injurious behavior? (Past 3 Months)    N   N   Interrupted Attempts (Lifetime) N  N    Y   Total Number of Interrupted Attempts (Lifetime)       1   Interrupted Attempt Description (Lifetime)       Parents brought me to the hospital   Interrupted Attempts (Past 3 Months)    N   N   Aborted or Self-Interrupted Attempt (Lifetime) N  N    N   Aborted or Self-Interrupted Attempt (Past 3 Months)    N      Preparatory Acts or Behavior (Lifetime) N  N    N   Preparatory Acts or Behavior (Past 3 Months)    N      Calculated C-SSRS Risk Score (Lifetime/Recent) Moderate Risk  Moderate Risk No Risk Indicated   Moderate Risk       Data saved with a previous flowsheet row definition         Safety Issues and Plan for Safety and Risk Management:     Patient denies current fears or concerns for personal safety.  Patient denies current or recent suicidal ideation or  Surgeon: Juni Craig MD;  Location: [facility] OR;  Service:      HERNIORRHAPHY INCISIONAL (LOCATION)      incarcerated     LAPAROSCOPIC GASTRIC BYPASS N/A 2015     LEG SURGERY Right     muscle surgery     OTHER SURGICAL HISTORY      forearm / wrist surgery     REPAIR TENDON BICEPS DISTAL UPPER EXTREMITY       UMBILICAL HERNIA REPAIR N/A      WRIST SURGERY Right         Meds: reviewed  Current Outpatient Medications   Medication Sig Dispense Refill     alcohol swab prep pads Use to swab area of injection/joe as directed. 100 each 1     ASPIRIN LOW DOSE 81 MG EC tablet TAKE ONE TABLET BY MOUTH EVERY DAY FOR HEART DISEASE  **DO NOT CHEW** FOR HEART DISEASE  **DO NOT CHEW** 100 tablet 0     atorvastatin (LIPITOR) 40 MG tablet Take 1 tablet (40 mg) by mouth daily 90 tablet 0     blood glucose (NO BRAND SPECIFIED) test strip Use to test blood sugar 1 times daily or as directed. 100 strip 1     blood glucose calibration (NO BRAND SPECIFIED) solution Use to calibrate blood glucose monitor as needed as directed. 1 each 1     blood glucose monitoring (NO BRAND SPECIFIED) meter device kit Use to test blood sugar 1 times daily or as directed. 1 kit 0     cyclobenzaprine (FLEXERIL) 5 MG tablet Take 1 tablet (5 mg) by mouth 3 times daily as needed for muscle spasms 30 tablet 0     FEROSUL 325 (65 Fe) MG tablet Take 1 tablet (325 mg) by mouth daily 90 tablet 0     folic acid (FOLVITE) 1 MG tablet Take 1 tablet (1,000 mcg) by mouth daily 30 tablet 11     furosemide (LASIX) 20 MG tablet Take 1 tablet (20 mg) by mouth daily. 90 tablet 0     lisinopril (ZESTRIL) 20 MG tablet Take 1 tablet (20 mg) by mouth 2 times daily 180 tablet 1     metFORMIN (GLUCOPHAGE) 500 MG tablet Take 2 tablets (1,000 mg) by mouth 2 times daily (with meals) 360 tablet 1     metoprolol succinate ER (TOPROL-XL) 25 MG 24 hr tablet Take 0.5 tablets (12.5 mg) by mouth daily 45 tablet 3     nitroGLYcerin (NITROSTAT) 0.4 MG sublingual tablet For  behaviors.  Patient denies current or recent homicidal ideation or behaviors.  Patient denies current or recent self injurious behavior or ideation.  Patient denies other safety concerns.  A safety and risk management plan has been developed including: Patient consented to co-developed safety plan.  Safety and risk management plan was completed - see below.  Patient agreed to use safety plan should any safety concerns arise.  A copy was given to the patient.  Patient reports there are no firearms in the house.     Diagnostic Criteria:  Generalized Anxiety Disorder  A. Excessive anxiety and worry about a number of events or activities (such as work or school performance).   B. The person finds it difficult to control the worry.  C. Select 3 or more symptoms (required for diagnosis). Only one item is required in children.   - Restlessness or feeling keyed up or on edge.    - Difficulty concentrating or mind going blank.    - Irritability.    - Sleep disturbance (difficulty falling or staying asleep, or restless unsatisfying sleep).   D. The focus of the anxiety and worry is not confined to features of an Axis I disorder.  E. The anxiety, worry, or physical symptoms cause clinically significant distress or impairment in social, occupational, or other important areas of functioning.   F. The disturbance is not due to the direct physiological effects of a substance (e.g., a drug of abuse, a medication) or a general medical condition (e.g., hyperthyroidism) and does not occur exclusively during a Mood Disorder, a Psychotic Disorder, or a Pervasive Developmental Disorder.    - The aformentioned symptoms began in adolescence and occurs 7 days per week and is experienced as moderate.      DSM5 Diagnoses: (Sustained by DSM5 Criteria Listed Above)  Diagnoses: 300.02 (F41.1) Generalized Anxiety Disorder  Psychosocial & Contextual Factors: Parenting concerns, interpersonal difficulties, and persistent mental health  chest pain place 1 tablet under the tongue every 5 minutes for 3 doses. If symptoms persist 5 minutes after 1st dose call 911. 10 tablet 3     thin (NO BRAND SPECIFIED) lancets Use with lanceting device. 100 each 1     Vitamin D3 (CHOLECALCIFEROL) 25 mcg (1000 units) tablet Take by mouth daily       zinc gluconate 50 MG tablet Take 50 mg by mouth daily         Soc Hx: reviewed  Fam Hx: reviewed          Candis Jose MD  Internal Medicine     Answers for HPI/ROS submitted by the patient on 2/12/2022  Frequency of checking blood sugars:: a few times a week  What time of day are you checking your blood sugars : before meals  Have you had any blood sugars above 200?: No  Have you had any blood sugars below 70?: No  Hypoglycemia symptoms:: shakiness, weakness  Diabetic concerns:: other  Paraesthesia present:: none of these symptoms  Have you had a diabetic eye exam within the last year?: Yes  Date of last eye exam:: 12/18/21  Where was this eye exam done?: [facility], Dr. Woo  How many servings of fruits and vegetables do you eat daily?: 0-1  On average, how many sweetened beverages do you drink each day (Examples: soda, juice, sweet tea, etc.  Do NOT count diet or artificially sweetened beverages)?: 2  How many minutes a day do you exercise enough to make your heart beat faster?: 9 or less  How many days a week do you exercise enough to make your heart beat faster?: 3 or less  How many days per week do you miss taking your medication?: 0       "concerns.  Intervention:   Completed through review of safety issues and safety interventions and Completed Safety plan  Collateral Reports Completed:  Not Applicable      PLAN: (Homework, other):  1. Provider will continue Diagnostic Assessment.  Patient was given the following to do until next session:  Practice using safety plan.    2. Provider recommended the following referrals: None at this time..      3.  Suicide Risk and Safety Concerns were assessed for Maddy Ortiz.    Patient meets the following risk assessment and triage:   [employer] Safety Plan      Creation Date: 6/8/24 Last Update Date: 6/9/25      Step 1: Warning signs:    Warning Signs    \"Excessive worry\"    Feeling like something is wrong    Overthinking about my daugther    Emotional pain    Wanting to avoid people      Step 2: Internal coping strategies - Things I can do to take my mind off my problems without contacting another person:    Strategies    Avoid caffine    Exercise    Go out in nature    Exercise video    Take a short walk      Step 3: People and social settings that provide distraction:    Name Contact Information    Family     Dad        Places    My apartment    Go out to eat with my family    Go outside on the apartment property      Step 4: People whom I can ask for help during a crisis:    Name Contact Information    Dad     , Shira, [phone].       Step 5: Professionals or agencies I can contact during a crisis:    Clinician/Agency Name Phone Emergency Contact    [facility] crisis [phone]       Suicide Prevention Lifeline Phone: Call or Text 484  Crisis Text Line: Text HOME to 423619     Step 6: Making the environment safer (plan for lethal means safety):   Did not identify any lethal methods     Optional: What is most important to me and worth living for?:   Therapy and support, including \"my family, all of my workers, medical care, my daughter and boyfriend.\"       [employer] Safety " Plan. Sonam Lester and Oziel Estrada. Used with permission of the authors.            Mary Baum MA [facility]  June 9, 2025

## 2025-06-18 ENCOUNTER — RESULTS FOLLOW-UP (OUTPATIENT)
Dept: CARDIOLOGY | Facility: CLINIC | Age: 65
End: 2025-06-18

## 2025-06-18 ENCOUNTER — HOSPITAL ENCOUNTER (OUTPATIENT)
Dept: CARDIOLOGY | Facility: CLINIC | Age: 65
Discharge: HOME | End: 2025-06-18
Attending: INTERNAL MEDICINE
Payer: COMMERCIAL

## 2025-06-18 DIAGNOSIS — I25.10 CORONARY ARTERY DISEASE INVOLVING NATIVE HEART WITHOUT ANGINA PECTORIS, UNSPECIFIED VESSEL OR LESION TYPE: ICD-10-CM

## 2025-06-18 DIAGNOSIS — I42.9 SECONDARY CARDIOMYOPATHY (H): ICD-10-CM

## 2025-06-18 LAB — LVEF ECHO: NORMAL

## 2025-06-18 PROCEDURE — 93306 TTE W/DOPPLER COMPLETE: CPT

## 2025-06-18 PROCEDURE — 93306 TTE W/DOPPLER COMPLETE: CPT | Mod: 26 | Performed by: INTERNAL MEDICINE

## 2025-06-25 ENCOUNTER — ALLIED HEALTH/NURSE VISIT (OUTPATIENT)
Dept: INTERNAL MEDICINE | Facility: CLINIC | Age: 65
End: 2025-06-25
Payer: COMMERCIAL

## 2025-06-25 DIAGNOSIS — E53.8 VITAMIN B 12 DEFICIENCY: Primary | ICD-10-CM

## 2025-06-25 DIAGNOSIS — I10 HTN, GOAL BELOW 140/90: ICD-10-CM

## 2025-06-25 PROCEDURE — 99207 PR NO CHARGE NURSE ONLY: CPT

## 2025-06-25 PROCEDURE — 96372 THER/PROPH/DIAG INJ SC/IM: CPT | Performed by: INTERNAL MEDICINE

## 2025-06-25 RX ORDER — AMLODIPINE BESYLATE 2.5 MG/1
2.5 TABLET ORAL DAILY PRN
Qty: 90 TABLET | Refills: 0 | Status: SHIPPED | OUTPATIENT
Start: 2025-06-25

## 2025-06-25 RX ADMIN — CYANOCOBALAMIN 1000 MCG: 1000 INJECTION, SOLUTION INTRAMUSCULAR; SUBCUTANEOUS at 08:26

## 2025-07-07 NOTE — PROGRESS NOTES
"HISTORY OF PRESENT ILLNESS:    This is a 64 year old male who follows with Dr Rolle at [facility]  His past medical history includes:  Coronary artery disease, high-grade AV block s/p PPM, sleep apnea, hypertension, hyperlipidemia, sleep apnea, and obesity    Mr Park  underwent stenting in 1999  He was found to have high-grade AV block (2018) and received a dual-chamber PPM  Since then, he has been ventricular paced 95% of the time    He suffered a NSTEMI (2018)  and underwent stenting to his distal CFX    [facility] NUC (2022) was negative for inducible myocardial ischemia/infarction.    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  He denies any chest pain or significant shortness of breath  His energy is good  He is working  with DOT to get cleared to drive  He states that they are requesting stress test since it has been many years for the testing  He uses a dental appliance for previous hx of sleep apnea, but since he has lost weight, he no longer thinks he has sleep apnea  He has an upcoming sleep study  He denies palpitations, orthopnea, or peripheral edema      /72 (BP Location: Right arm, Patient Position: Sitting, Cuff Size: Adult Regular)   Pulse 72   Ht 1.676 m (5' 6\")   Wt 83.6 kg (184 lb 3.2 oz)   SpO2 96%   BMI 29.73 kg/m       IMPRESSION AND PLAN:    Coronary Artery Disease  -s/p stenting (1999)  -s/p NSTEMI and stenting to distal CFX (2018)  -denies angina  -will arrange stress echo due to DOT purposes  -ASA, statin    S/p PPM (2018) for high-grade AV block  -19% A-paced  99% V-paced    Hypertension:  -on Amlodipine 2.5 mg BID, Lisinopril 20 mg " BID, Metoprolol XR 12.5 mg  -BP controlled    Hyperlipidemia:  -on Atorvastatin 40 mg  -has fasting labs scheduled soon    Type II Diabetes:  -Hgb A1C  5.7    The total time for the visit today was 30 minutes which includes patient visit, reviewing of records, discussion, and placing of orders of the outpatient coordination of cardiovascular care as described.  The level of medical decision making during this visit was of moderate complexity.  Thank you for allowing me to participate in their care.    The longitudinal plan of care for the diagnosis(es)/condition(s) as documented were addressed during this visit. Due to the added complexity in care, I will continue to support Sumit in the subsequent management and with ongoing continuity of care.      Orders Placed This Encounter   Procedures    Follow-Up with Cardiology    Exercise Stress Echocardiogram       No orders of the defined types were placed in this encounter.      There are no discontinued medications.      Encounter Diagnoses   Name Primary?    Cardiac pacemaker in situ     Coronary artery disease involving native coronary artery of native heart without angina pectoris Yes       CURRENT MEDICATIONS:  Current Outpatient Medications   Medication Sig Dispense Refill    acyclovir (ZOVIRAX) 5 % external ointment Apply topically 6 times daily. 15 g 3    alcohol swab prep pads Use to swab area of injection/joe as directed. 100 each 1    amLODIPine (NORVASC) 2.5 MG tablet Take 1 tablet (2.5 mg) by mouth daily as needed (SBP> 160). 90 tablet 0    aspirin (ASPIRIN LOW DOSE) 81 MG EC tablet TAKE ONE TABLET BY MOUTH EVERY DAY FOR HEART DISEASE  **DO NOT CHEW** FOR HEART DISEASE  **DO NOT CHEW** 100 tablet 2    atorvastatin (LIPITOR) 40 MG tablet Take 1 tablet (40 mg) by mouth daily. 90 tablet 1    bisacodyl (DULCOLAX) 5 MG EC tablet Take 2 tablets at 3 pm the day before your procedure. If your procedure is before 11 am, take 2 additional tablets at 11 pm. If  your procedure is after 11 am, take 2 additional tablets at 6 am. For additional instructions refer to your colonoscopy prep instructions. 4 tablet 0    blood glucose (NO BRAND SPECIFIED) test strip Use to test blood sugar 1 times daily or as directed. 100 strip 6    blood glucose monitoring (NO BRAND SPECIFIED) meter device kit Use to test blood sugar 1 times daily or as directed. 1 kit 0    cetirizine (ZYRTEC) 10 MG tablet Take 1 tablet (10 mg) by mouth daily. 10 tablet 0    Continuous Glucose Sensor (FREESTYLE MACK 3 PLUS SENSOR) MISC Change every 15 days. 1 each 6    ferrous sulfate (FEROSUL) 325 (65 Fe) MG tablet Take 1 tablet (325 mg) by mouth daily 90 tablet 1    fish oil-omega-3 fatty acids 500 MG capsule Take by mouth.      folic acid (FOLVITE) 1 MG tablet Take 1 tablet by mouth daily 90 tablet 2    ivermectin (SOOLANTRA) 1 % cream Apply to face once daily 45 g 11    lisinopril (ZESTRIL) 20 MG tablet Take 1 tablet (20 mg) by mouth 2 times daily. 180 tablet 1    metFORMIN (GLUCOPHAGE) 500 MG tablet Take 2 tablets (1,000 mg) by mouth 2 times daily (with meals). 360 tablet 1    metoprolol succinate ER (TOPROL XL) 25 MG 24 hr tablet Take 0.5 tablet (12.5 mg) by mouth daily 45 tablet 1    nitroGLYcerin (NITROSTAT) 0.4 MG sublingual tablet For chest pain place 1 tablet under the tongue every 5 minutes for 3 doses. If symptoms persist 5 minutes after 1st dose call 911. 10 tablet 3    polyethylene glycol (GOLYTELY) 236 g suspension The night before the exam at 6 pm drink an 8-ounce glass every 15 minutes until the jug is half empty. If you arrive before 11 AM: Drink the other half of the Golytely jug at 11 PM night before procedure. If you arrive after 11 AM: Drink the other half of the Golytely jug at 6 AM day of procedure. For additional instructions refer to your colonoscopy prep instructions. 4000 mL 0    sitagliptin (JANUVIA) 50 MG tablet Take 1 tablet (50 mg) by mouth daily. 90 tablet 1    thin (NO BRAND  SPECIFIED) lancets Use with lanceting device. 100 each 6    traZODone (DESYREL) 50 MG tablet Take 2 tablets (100 mg) by mouth at bedtime. 180 tablet 1    triamcinolone (KENALOG) 0.1 % external ointment Apply to affected areas twice daily for up to 2 weeks. Thereafter, may use as needed for flares. 15 g 0    Turmeric (QC TUMERIC COMPLEX PO) Take by mouth.      Vitamin D3 (CHOLECALCIFEROL) 25 mcg (1000 units) tablet Take by mouth daily      zinc gluconate 50 MG tablet Take 50 mg by mouth daily      valACYclovir (VALTREX) 1000 mg tablet Take 1 tablet (1,000 mg) by mouth 2 times daily for 7 days. (Patient not taking: Reported on 7/8/2025) 14 tablet 0       ALLERGIES     Allergies   Allergen Reactions    Terbinafine Rash     Drug eruption        PAST MEDICAL HISTORY:  Past Medical History:   Diagnosis Date    Basal cell carcinoma     CAD, 2 stents 1999, 2018 05/10/2021    Cardiac pacemaker in situ 05/10/2021    Celiac disease 05/10/2021    Complete heart block (H) -- s/p PPM 2018 05/10/2021    DM 2, no insulin (H) 05/10/2021    Gastric bypass status for obesity - 2015 05/10/2021    HTN, goal below 140/80 05/10/2021    Hyperlipidemia LDL goal <100 05/10/2021    ZULEIKA (obstructive sleep apnea) 05/10/2021       PAST SURGICAL HISTORY:  Past Surgical History:   Procedure Laterality Date    ANGIOPLASTY      COLONOSCOPY N/A 1/13/2015    Procedure: COLONOSCOPY;  Surgeon: Juni Craig MD;  Location: [facility];  Service:     COLONOSCOPY N/A 4/4/2025    Procedure: Colonoscopy;  Surgeon: Sydney Escobedo MD;  Location:  GI    DISTAL BICEPS TENDON REPAIR Left     ESOPHAGOSCOPY, GASTROSCOPY, DUODENOSCOPY (EGD), COMBINED N/A 1/13/2015    Procedure: UPPER ENDOSCOPY;  Surgeon: Juni rCaig MD;  Location: [facility];  Service:     HERNIORRHAPHY INCISIONAL (LOCATION)      incarcerated    LAPAROSCOPIC GASTRIC BYPASS N/A 2015    LEG SURGERY Right     muscle surgery    OTHER SURGICAL HISTORY      forearm /  wrist surgery    REPAIR TENDON BICEPS DISTAL UPPER EXTREMITY      UMBILICAL HERNIA REPAIR N/A     WRIST SURGERY Right        FAMILY HISTORY:  Family History   Problem Relation Age of Onset    Breast Cancer Mother 58    Heart Disease Father 71    Lymphoma Brother     Colon Cancer No family hx of        SOCIAL HISTORY:  Social History     Socioeconomic History    Marital status:      Spouse name: None    Number of children: None    Years of education: None    Highest education level: None   Tobacco Use    Smoking status: Never     Passive exposure: Never    Smokeless tobacco: Never   Vaping Use    Vaping status: Never Used   Substance and Sexual Activity    Alcohol use: Never    Drug use: Never    Sexual activity: Yes     Partners: Female     Social Drivers of Health     Financial Resource Strain: Low Risk  (4/19/2024)    Financial Resource Strain     Within the past 12 months, have you or your family members you live with been unable to get utilities (heat, electricity) when it was really needed?: No   Food Insecurity: Low Risk  (4/19/2024)    Food Insecurity     Within the past 12 months, did you worry that your food would run out before you got money to buy more?: No     Within the past 12 months, did the food you bought just not last and you didn t have money to get more?: No   Transportation Needs: Low Risk  (4/19/2024)    Transportation Needs     Within the past 12 months, has lack of transportation kept you from medical appointments, getting your medicines, non-medical meetings or appointments, work, or from getting things that you need?: No   Physical Activity: Insufficiently Active (4/19/2024)    Exercise Vital Sign     Days of Exercise per Week: 1 day     Minutes of Exercise per Session: 30 min   Stress: No Stress Concern Present (4/19/2024)    [employer] [place] [employer] - Occupational Stress Questionnaire     Feeling of Stress : Only a little   Social Connections: Unknown (4/19/2024)     "Social Connection and Isolation Panel [NHANES]     Frequency of Social Gatherings with Friends and Family: Never   Interpersonal Safety: Low Risk  (2/21/2025)    Interpersonal Safety     Do you feel physically and emotionally safe where you currently live?: Yes     Within the past 12 months, have you been hit, slapped, kicked or otherwise physically hurt by someone?: No     Within the past 12 months, have you been humiliated or emotionally abused in other ways by your partner or ex-partner?: No   Housing Stability: Low Risk  (4/19/2024)    Housing Stability     Do you have housing? : Yes     Are you worried about losing your housing?: No       Review of Systems:  Skin:  not assessed       Eyes:  Positive for glasses    ENT:  not assessed      Respiratory:  Positive for sleep apnea wears a dental device (mouthguard)   Cardiovascular:  Negative      Gastroenterology: not assessed      Genitourinary:  not assessed      Musculoskeletal:  not assessed      Neurologic:  not assessed      Psychiatric:  not assessed      Heme/Lymph/Imm:  not assessed      Endocrine:  Positive for diabetes Type II    Physical Exam:  Vitals: /72 (BP Location: Right arm, Patient Position: Sitting, Cuff Size: Adult Regular)   Pulse 72   Ht 1.676 m (5' 6\")   Wt 83.6 kg (184 lb 3.2 oz)   SpO2 96%   BMI 29.73 kg/m      Constitutional:  cooperative        Skin:  warm and dry to the touch   pacemaker incision in the left infraclavicular area was well-healed      Head:  normocephalic        Eyes:  pupils equal and round        Lymph:      ENT:  no pallor or cyanosis        Neck:  JVP normal, no carotid bruit        Respiratory:  clear to auscultation         Cardiac: regular rhythm, normal S1 and S2     no presence of murmur          pulses full and equal                                        GI:  abdomen soft        Extremities and Muscular Skeletal:  no edema   varicose vein          Neurological:  no gross motor deficits, affect " appropriate        Psych:  Alert and Oriented x 3          CC  Sae Rolle MD  [id number]  [place] [id number]

## 2025-07-08 ENCOUNTER — OFFICE VISIT (OUTPATIENT)
Dept: CARDIOLOGY | Facility: CLINIC | Age: 65
End: 2025-07-08
Attending: INTERNAL MEDICINE
Payer: COMMERCIAL

## 2025-07-08 VITALS
SYSTOLIC BLOOD PRESSURE: 124 MMHG | HEIGHT: 66 IN | DIASTOLIC BLOOD PRESSURE: 72 MMHG | OXYGEN SATURATION: 96 % | HEART RATE: 72 BPM | BODY MASS INDEX: 29.6 KG/M2 | WEIGHT: 184.2 LBS

## 2025-07-08 DIAGNOSIS — Z95.0 CARDIAC PACEMAKER IN SITU: ICD-10-CM

## 2025-07-08 DIAGNOSIS — I25.10 CORONARY ARTERY DISEASE INVOLVING NATIVE CORONARY ARTERY OF NATIVE HEART WITHOUT ANGINA PECTORIS: Primary | ICD-10-CM

## 2025-07-08 NOTE — LETTER
"7/8/2025    Candis Altamirano MD  [address] [facility] [address]    RE: Sumit Park       Dear Colleague,     I had the pleasure of seeing Sumit Park in the [facility].  HISTORY OF PRESENT ILLNESS:    This is a 64 year old male who follows with Dr Rolle at [facility]  His past medical history includes:  Coronary artery disease, high-grade AV block s/p PPM, sleep apnea, hypertension, hyperlipidemia, sleep apnea, and obesity    Mr Park  underwent stenting in 1999  He was found to have high-grade AV block (2018) and received a dual-chamber PPM  Since then, he has been ventricular paced 95% of the time    He suffered a NSTEMI (2018)  and underwent stenting to his distal CFX    Nury NUC (2022) was negative for inducible myocardial ischemia/infarction.    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  He denies any chest pain or significant shortness of breath  His energy is good  He is working  with DOT to get cleared to drive  He states that they are requesting stress test since it has been many years for the testing  He uses a dental appliance for previous hx of sleep apnea, but since he has lost weight, he no longer thinks he has sleep apnea  He has an upcoming sleep study  He denies palpitations, orthopnea, or peripheral edema      /72 (BP Location: Right arm, Patient Position: Sitting, Cuff Size: Adult Regular)   Pulse 72   Ht 1.676 m (5' 6\")   Wt 83.6 kg (184 lb 3.2 oz)   SpO2 96%   BMI 29.73 kg/m       IMPRESSION AND PLAN:    Coronary Artery Disease  -s/p stenting (1999)  -s/p NSTEMI and stenting to " distal CFX (2018)  -denies angina  -will arrange stress echo due to DOT purposes  -ASA, statin    S/p PPM (2018) for high-grade AV block  -19% A-paced  99% V-paced    Hypertension:  -on Amlodipine 2.5 mg BID, Lisinopril 20 mg BID, Metoprolol XR 12.5 mg  -BP controlled    Hyperlipidemia:  -on Atorvastatin 40 mg  -has fasting labs scheduled soon    Type II Diabetes:  -Hgb A1C  5.7    The total time for the visit today was 30 minutes which includes patient visit, reviewing of records, discussion, and placing of orders of the outpatient coordination of cardiovascular care as described.  The level of medical decision making during this visit was of moderate complexity.  Thank you for allowing me to participate in their care.    The longitudinal plan of care for the diagnosis(es)/condition(s) as documented were addressed during this visit. Due to the added complexity in care, I will continue to support Sumit in the subsequent management and with ongoing continuity of care.      Orders Placed This Encounter   Procedures     Follow-Up with Cardiology     Exercise Stress Echocardiogram       No orders of the defined types were placed in this encounter.      There are no discontinued medications.      Encounter Diagnoses   Name Primary?     Cardiac pacemaker in situ      Coronary artery disease involving native coronary artery of native heart without angina pectoris Yes       CURRENT MEDICATIONS:  Current Outpatient Medications   Medication Sig Dispense Refill     acyclovir (ZOVIRAX) 5 % external ointment Apply topically 6 times daily. 15 g 3     alcohol swab prep pads Use to swab area of injection/joe as directed. 100 each 1     amLODIPine (NORVASC) 2.5 MG tablet Take 1 tablet (2.5 mg) by mouth daily as needed (SBP> 160). 90 tablet 0     aspirin (ASPIRIN LOW DOSE) 81 MG EC tablet TAKE ONE TABLET BY MOUTH EVERY DAY FOR HEART DISEASE  **DO NOT CHEW** FOR HEART DISEASE  **DO NOT CHEW** 100 tablet 2     atorvastatin  (LIPITOR) 40 MG tablet Take 1 tablet (40 mg) by mouth daily. 90 tablet 1     bisacodyl (DULCOLAX) 5 MG EC tablet Take 2 tablets at 3 pm the day before your procedure. If your procedure is before 11 am, take 2 additional tablets at 11 pm. If your procedure is after 11 am, take 2 additional tablets at 6 am. For additional instructions refer to your colonoscopy prep instructions. 4 tablet 0     blood glucose (NO BRAND SPECIFIED) test strip Use to test blood sugar 1 times daily or as directed. 100 strip 6     blood glucose monitoring (NO BRAND SPECIFIED) meter device kit Use to test blood sugar 1 times daily or as directed. 1 kit 0     cetirizine (ZYRTEC) 10 MG tablet Take 1 tablet (10 mg) by mouth daily. 10 tablet 0     Continuous Glucose Sensor (FREESTYLE MACK 3 PLUS SENSOR) MISC Change every 15 days. 1 each 6     ferrous sulfate (FEROSUL) 325 (65 Fe) MG tablet Take 1 tablet (325 mg) by mouth daily 90 tablet 1     fish oil-omega-3 fatty acids 500 MG capsule Take by mouth.       folic acid (FOLVITE) 1 MG tablet Take 1 tablet by mouth daily 90 tablet 2     ivermectin (SOOLANTRA) 1 % cream Apply to face once daily 45 g 11     lisinopril (ZESTRIL) 20 MG tablet Take 1 tablet (20 mg) by mouth 2 times daily. 180 tablet 1     metFORMIN (GLUCOPHAGE) 500 MG tablet Take 2 tablets (1,000 mg) by mouth 2 times daily (with meals). 360 tablet 1     metoprolol succinate ER (TOPROL XL) 25 MG 24 hr tablet Take 0.5 tablet (12.5 mg) by mouth daily 45 tablet 1     nitroGLYcerin (NITROSTAT) 0.4 MG sublingual tablet For chest pain place 1 tablet under the tongue every 5 minutes for 3 doses. If symptoms persist 5 minutes after 1st dose call 911. 10 tablet 3     polyethylene glycol (GOLYTELY) 236 g suspension The night before the exam at 6 pm drink an 8-ounce glass every 15 minutes until the jug is half empty. If you arrive before 11 AM: Drink the other half of the [employer] jug at 11 PM night before procedure. If you arrive after 11 AM: Drink  the other half of the Golytely jug at 6 AM day of procedure. For additional instructions refer to your colonoscopy prep instructions. 4000 mL 0     sitagliptin (JANUVIA) 50 MG tablet Take 1 tablet (50 mg) by mouth daily. 90 tablet 1     thin (NO BRAND SPECIFIED) lancets Use with lanceting device. 100 each 6     traZODone (DESYREL) 50 MG tablet Take 2 tablets (100 mg) by mouth at bedtime. 180 tablet 1     triamcinolone (KENALOG) 0.1 % external ointment Apply to affected areas twice daily for up to 2 weeks. Thereafter, may use as needed for flares. 15 g 0     Turmeric (QC TUMERIC COMPLEX PO) Take by mouth.       Vitamin D3 (CHOLECALCIFEROL) 25 mcg (1000 units) tablet Take by mouth daily       zinc gluconate 50 MG tablet Take 50 mg by mouth daily       valACYclovir (VALTREX) 1000 mg tablet Take 1 tablet (1,000 mg) by mouth 2 times daily for 7 days. (Patient not taking: Reported on 7/8/2025) 14 tablet 0       ALLERGIES     Allergies   Allergen Reactions     Terbinafine Rash     Drug eruption        PAST MEDICAL HISTORY:  Past Medical History:   Diagnosis Date     Basal cell carcinoma      CAD, 2 stents 1999, 2018 05/10/2021     Cardiac pacemaker in situ 05/10/2021     Celiac disease 05/10/2021     Complete heart block (H) -- s/p PPM 2018 05/10/2021     DM 2, no insulin (H) 05/10/2021     Gastric bypass status for obesity - 2015 05/10/2021     HTN, goal below 140/80 05/10/2021     Hyperlipidemia LDL goal <100 05/10/2021     ZULEIKA (obstructive sleep apnea) 05/10/2021       PAST SURGICAL HISTORY:  Past Surgical History:   Procedure Laterality Date     ANGIOPLASTY       COLONOSCOPY N/A 1/13/2015    Procedure: COLONOSCOPY;  Surgeon: Juni Craig MD;  Location: [facility];  Service:      COLONOSCOPY N/A 4/4/2025    Procedure: Colonoscopy;  Surgeon: Sydney Escobedo MD;  Location:  GI     DISTAL BICEPS TENDON REPAIR Left      ESOPHAGOSCOPY, GASTROSCOPY, DUODENOSCOPY (EGD), COMBINED N/A 1/13/2015     Procedure: UPPER ENDOSCOPY;  Surgeon: Juni Craig MD;  Location: [facility] OR;  Service:      HERNIORRHAPHY INCISIONAL (LOCATION)      incarcerated     LAPAROSCOPIC GASTRIC BYPASS N/A 2015     LEG SURGERY Right     muscle surgery     OTHER SURGICAL HISTORY      forearm / wrist surgery     REPAIR TENDON BICEPS DISTAL UPPER EXTREMITY       UMBILICAL HERNIA REPAIR N/A      WRIST SURGERY Right        FAMILY HISTORY:  Family History   Problem Relation Age of Onset     Breast Cancer Mother 58     Heart Disease Father 71     Lymphoma Brother      Colon Cancer No family hx of        SOCIAL HISTORY:  Social History     Socioeconomic History     Marital status:      Spouse name: None     Number of children: None     Years of education: None     Highest education level: None   Tobacco Use     Smoking status: Never     Passive exposure: Never     Smokeless tobacco: Never   Vaping Use     Vaping status: Never Used   Substance and Sexual Activity     Alcohol use: Never     Drug use: Never     Sexual activity: Yes     Partners: Female     Social Drivers of Health     Financial Resource Strain: Low Risk  (4/19/2024)    Financial Resource Strain      Within the past 12 months, have you or your family members you live with been unable to get utilities (heat, electricity) when it was really needed?: No   Food Insecurity: Low Risk  (4/19/2024)    Food Insecurity      Within the past 12 months, did you worry that your food would run out before you got money to buy more?: No      Within the past 12 months, did the food you bought just not last and you didn t have money to get more?: No   Transportation Needs: Low Risk  (4/19/2024)    Transportation Needs      Within the past 12 months, has lack of transportation kept you from medical appointments, getting your medicines, non-medical meetings or appointments, work, or from getting things that you need?: No   Physical Activity: Insufficiently Active (4/19/2024)     "Exercise Vital Sign      Days of Exercise per Week: 1 day      Minutes of Exercise per Session: 30 min   Stress: No Stress Concern Present (4/19/2024)    [employer] - Occupational Stress Questionnaire      Feeling of Stress : Only a little   Social Connections: Unknown (4/19/2024)    Social Connection and Isolation Panel [NHANES]      Frequency of Social Gatherings with Friends and Family: Never   Interpersonal Safety: Low Risk  (2/21/2025)    Interpersonal Safety      Do you feel physically and emotionally safe where you currently live?: Yes      Within the past 12 months, have you been hit, slapped, kicked or otherwise physically hurt by someone?: No      Within the past 12 months, have you been humiliated or emotionally abused in other ways by your partner or ex-partner?: No   Housing Stability: Low Risk  (4/19/2024)    Housing Stability      Do you have housing? : Yes      Are you worried about losing your housing?: No       Review of Systems:  Skin:  not assessed       Eyes:  Positive for glasses    ENT:  not assessed      Respiratory:  Positive for sleep apnea wears a dental device (mouthguard)   Cardiovascular:  Negative      Gastroenterology: not assessed      Genitourinary:  not assessed      Musculoskeletal:  not assessed      Neurologic:  not assessed      Psychiatric:  not assessed      Heme/Lymph/Imm:  not assessed      Endocrine:  Positive for diabetes Type II    Physical Exam:  Vitals: /72 (BP Location: Right arm, Patient Position: Sitting, Cuff Size: Adult Regular)   Pulse 72   Ht 1.676 m (5' 6\")   Wt 83.6 kg (184 lb 3.2 oz)   SpO2 96%   BMI 29.73 kg/m      Constitutional:  cooperative        Skin:  warm and dry to the touch   pacemaker incision in the left infraclavicular area was well-healed      Head:  normocephalic        Eyes:  pupils equal and round        Lymph:      ENT:  no pallor or cyanosis        Neck:  JVP normal, no carotid bruit    "     Respiratory:  clear to auscultation         Cardiac: regular rhythm, normal S1 and S2     no presence of murmur          pulses full and equal                                        GI:  abdomen soft        Extremities and Muscular Skeletal:  no edema   varicose vein          Neurological:  no gross motor deficits, affect appropriate        Psych:  Alert and Oriented x 3          CC  Sae Rolle MD  [address]  [place] [id number]                      Thank you for allowing me to participate in the care of your patient.      Sincerely,     Kaya Astudillo, [facility]  cc:   Sae Rolle MD  [address]  [place] [id number]

## 2025-07-13 ASSESSMENT — SLEEP AND FATIGUE QUESTIONNAIRES
HOW LIKELY ARE YOU TO NOD OFF OR FALL ASLEEP WHEN YOU ARE A PASSENGER IN A CAR FOR AN HOUR WITHOUT A BREAK: WOULD NEVER DOZE
HOW LIKELY ARE YOU TO NOD OFF OR FALL ASLEEP WHILE LYING DOWN TO REST IN THE AFTERNOON WHEN CIRCUMSTANCES PERMIT: SLIGHT CHANCE OF DOZING
HOW LIKELY ARE YOU TO NOD OFF OR FALL ASLEEP WHILE SITTING AND TALKING TO SOMEONE: WOULD NEVER DOZE
HOW LIKELY ARE YOU TO NOD OFF OR FALL ASLEEP IN A CAR, WHILE STOPPED FOR A FEW MINUTES IN TRAFFIC: WOULD NEVER DOZE
HOW LIKELY ARE YOU TO NOD OFF OR FALL ASLEEP WHILE WATCHING TV: SLIGHT CHANCE OF DOZING
HOW LIKELY ARE YOU TO NOD OFF OR FALL ASLEEP WHILE SITTING INACTIVE IN A PUBLIC PLACE: WOULD NEVER DOZE
HOW LIKELY ARE YOU TO NOD OFF OR FALL ASLEEP WHILE SITTING QUIETLY AFTER LUNCH WITHOUT ALCOHOL: WOULD NEVER DOZE
HOW LIKELY ARE YOU TO NOD OFF OR FALL ASLEEP WHILE SITTING AND READING: SLIGHT CHANCE OF DOZING

## 2025-07-15 ENCOUNTER — HOSPITAL ENCOUNTER (OUTPATIENT)
Dept: CARDIOLOGY | Facility: CLINIC | Age: 65
Discharge: HOME | End: 2025-07-15
Attending: NURSE PRACTITIONER
Payer: COMMERCIAL

## 2025-07-15 DIAGNOSIS — I25.10 CORONARY ARTERY DISEASE INVOLVING NATIVE CORONARY ARTERY OF NATIVE HEART WITHOUT ANGINA PECTORIS: ICD-10-CM

## 2025-07-15 PROCEDURE — 255N000002 HC RX 255 OP 636: Performed by: NURSE PRACTITIONER

## 2025-07-15 PROCEDURE — 93325 DOPPLER ECHO COLOR FLOW MAPG: CPT | Mod: TC

## 2025-07-15 RX ADMIN — HUMAN ALBUMIN MICROSPHERES AND PERFLUTREN 3 ML (DILUTED): 10; .22 INJECTION, SOLUTION INTRAVENOUS at 13:31

## 2025-07-16 ENCOUNTER — TRANSFERRED RECORDS (OUTPATIENT)
Dept: HEALTH INFORMATION MANAGEMENT | Facility: CLINIC | Age: 65
End: 2025-07-16
Payer: COMMERCIAL

## 2025-07-17 ENCOUNTER — TRANSFERRED RECORDS (OUTPATIENT)
Dept: HEALTH INFORMATION MANAGEMENT | Facility: CLINIC | Age: 65
End: 2025-07-17
Payer: COMMERCIAL

## 2025-07-18 ENCOUNTER — VIRTUAL VISIT (OUTPATIENT)
Dept: SLEEP MEDICINE | Facility: CLINIC | Age: 65
End: 2025-07-18
Attending: INTERNAL MEDICINE
Payer: COMMERCIAL

## 2025-07-18 DIAGNOSIS — G47.33 OSA (OBSTRUCTIVE SLEEP APNEA): ICD-10-CM

## 2025-07-24 NOTE — PROGRESS NOTES
Device has been registered and shipped via [employer] on 07/18/25. Patient was notified that package was mailed out.

## 2025-07-25 ENCOUNTER — HOSPITAL ENCOUNTER (OUTPATIENT)
Dept: NUCLEAR MEDICINE | Facility: CLINIC | Age: 65
Setting detail: NUCLEAR MEDICINE
Discharge: HOME | End: 2025-07-25
Attending: NURSE PRACTITIONER
Payer: COMMERCIAL

## 2025-07-25 ENCOUNTER — HOSPITAL ENCOUNTER (OUTPATIENT)
Dept: CARDIOLOGY | Facility: CLINIC | Age: 65
Setting detail: NUCLEAR MEDICINE
Discharge: HOME | End: 2025-07-25
Attending: NURSE PRACTITIONER
Payer: COMMERCIAL

## 2025-07-25 DIAGNOSIS — I25.10 CORONARY ARTERY DISEASE INVOLVING NATIVE CORONARY ARTERY OF NATIVE HEART WITHOUT ANGINA PECTORIS: ICD-10-CM

## 2025-07-25 LAB
CV STRESS MAX HR HE: 75
NUC STRESS EJECTION FRACTION: 55 %
RATE PRESSURE PRODUCT: NORMAL
STRESS ECHO BASELINE DIASTOLIC HE: 74
STRESS ECHO BASELINE HR: 57 BPM
STRESS ECHO BASELINE SYSTOLIC BP: 146
STRESS ECHO CALCULATED PERCENT HR: 48 %
STRESS ECHO LAST STRESS DIASTOLIC BP: 82
STRESS ECHO LAST STRESS SYSTOLIC BP: 169
STRESS ECHO TARGET HR: 155

## 2025-07-25 PROCEDURE — 343N000001 HC RX 343 MED OP 636: Performed by: NURSE PRACTITIONER

## 2025-07-25 PROCEDURE — 93016 CV STRESS TEST SUPVJ ONLY: CPT | Performed by: INTERNAL MEDICINE

## 2025-07-25 PROCEDURE — 78452 HT MUSCLE IMAGE SPECT MULT: CPT

## 2025-07-25 PROCEDURE — 93017 CV STRESS TEST TRACING ONLY: CPT

## 2025-07-25 PROCEDURE — A9500 TC99M SESTAMIBI: HCPCS | Performed by: NURSE PRACTITIONER

## 2025-07-25 PROCEDURE — 93018 CV STRESS TEST I&R ONLY: CPT | Performed by: INTERNAL MEDICINE

## 2025-07-25 PROCEDURE — 78452 HT MUSCLE IMAGE SPECT MULT: CPT | Mod: 26 | Performed by: INTERNAL MEDICINE

## 2025-07-25 PROCEDURE — 250N000011 HC RX IP 250 OP 636: Performed by: NURSE PRACTITIONER

## 2025-07-25 RX ORDER — REGADENOSON 0.08 MG/ML
INJECTION, SOLUTION INTRAVENOUS
Status: COMPLETED
Start: 2025-07-25 | End: 2025-07-25

## 2025-07-25 RX ADMIN — REGADENOSON 0.4 MG: 0.08 INJECTION, SOLUTION INTRAVENOUS at 10:22

## 2025-07-25 RX ADMIN — TECHNETIUM TC 99M SESTAMIBI 29.5 MILLICURIE: 1 INJECTION INTRAVENOUS at 11:36

## 2025-07-25 RX ADMIN — TECHNETIUM TC 99M SESTAMIBI 10.1 MILLICURIE: 1 INJECTION INTRAVENOUS at 09:28

## 2025-07-26 ENCOUNTER — DOCUMENTATION ONLY (OUTPATIENT)
Dept: SLEEP MEDICINE | Facility: CLINIC | Age: 65
End: 2025-07-26
Payer: COMMERCIAL

## 2025-07-29 ENCOUNTER — ALLIED HEALTH/NURSE VISIT (OUTPATIENT)
Dept: EDUCATION SERVICES | Facility: CLINIC | Age: 65
End: 2025-07-29
Payer: COMMERCIAL

## 2025-07-29 ENCOUNTER — TELEPHONE (OUTPATIENT)
Dept: CARDIOLOGY | Facility: CLINIC | Age: 65
End: 2025-07-29

## 2025-07-29 DIAGNOSIS — E11.9 TYPE 2 DIABETES MELLITUS WITHOUT COMPLICATION, WITHOUT LONG-TERM CURRENT USE OF INSULIN (H): Primary | ICD-10-CM

## 2025-07-29 DIAGNOSIS — I25.10 CORONARY ARTERY DISEASE INVOLVING NATIVE CORONARY ARTERY OF NATIVE HEART WITHOUT ANGINA PECTORIS: Primary | ICD-10-CM

## 2025-07-29 DIAGNOSIS — R94.39 ABNORMAL STRESS TEST: ICD-10-CM

## 2025-07-29 NOTE — TELEPHONE ENCOUNTER
There are 2 tests that are showing similar findings of anteroseptal ischemia.  I would proceed with coronary angiography given known LAD and left main disease to define coronary anatomy and guide us for revascularization indications.

## 2025-07-29 NOTE — PROGRESS NOTES
Diabetes Self-Management Education & Support  Presents for: Follow-up    Type of Service: In Person Visit    Assessment  Sumit is doing a great job with diabetes management. Noted glucose was running higher when he was wearing a steroid pack for back pain. The last 7 days, GMI at 6.0%. Positive reinforcement given. Discussed diabetes risk reduction today and he has a good understanding.     Patient's most recent   Lab Results   Component Value Date    A1C 5.7 02/14/2025    A1C 7.8 05/10/2021     is meeting goal of <7.0    Diabetes knowledge and skills assessment:   Patient is knowledgeable in diabetes management concepts related to: Healthy Eating, Being Active, Monitoring, Taking Medication, Problem Solving, and Reducing Risks    Based on learning assessment above, most appropriate setting for further diabetes education would be: Individual setting.    Care Plan and Education Provided:  Healthy Eating: Portion control  Monitoring: CGM interpretation  Reducing Risks: Complications of diabetes, Dental care, Eye care, Foot care, Goal for A1c, how it relates to glucose and how often to check, Preventing cardiovascular disease, including blood pressure goals, lipid goals, recommendations for cardioprotective medications, statins, and aspirin, and Prevention, early diagnostic measures and treatment of complications  Healthy Coping: Benefits of making appropriate lifestyle changes, Identifying helpful resources, and Utilize support systems    Patient verbalized understanding of diabetes self-management education concepts discussed, opportunities for ongoing education and support, and recommendations provided today.    Plan  Diabetes Medications:  Continue Metformin and Januvia as prescribed.      Glucose monitoring:  Continue using Akash 3+ sensors. He downloaded and connected new appt today.     Meal Plan Recommendation:   Use portion control and plate planning method.  Work towards increasing protein, healthy fat and  "/ or fiber at meals and snacks, especially if concentrated sweets like regular soda, cookie, cupcake, etc.  Work towards decreasing portions of sweets and sweetened beverages     Exercise / activity plan:   Continue current plan    Topics to cover at upcoming visits: Education complete    See Care Plan for co-developed, patient-state behavior change goals.    Education Materials Provided:  Diabetes risk reduction     Subjective/Objective  Sumit is an 65 year old, presenting for the following diabetes education related to: Follow-up  Accompanied by: Self  Diabetes education in the past 24mo: Yes  Focus of Visit: Healthy Eating  Diabetes type: Type 2  Disease course: Stable  How confident are you filling out medical forms by yourself:: Extremely  Diabetes management related comments/concerns: I was taking a week off in between using sensors, but now using all the time. I was on a steroid pack for 6 days last week.  Transportation concerns: No  Difficulty affording diabetes medication?: No  Difficulty affording diabetes testing supplies?: No  Other concerns: None  Cultural Influences/Ethnic Background:  Not  or     Diabetes Symptoms & Complications:  Diabetes Related Symptoms: None  Weight trend: Stable  Symptom course: Stable  Disease course: Stable       Patient Problem List and Family Medical History reviewed for relevant medical history, current medical status, and diabetes risk factors.    Vitals:  There were no vitals taken for this visit.  Estimated body mass index is 29.73 kg/m  as calculated from the following:    Height as of 7/8/25: 1.676 m (5' 6\").    Weight as of 7/8/25: 83.6 kg (184 lb 3.2 oz).   Last 3 BP:   BP Readings from Last 3 Encounters:   07/08/25 124/72   05/05/25 136/79   04/04/25 (!) 163/85       History   Smoking Status    Never   Smokeless Tobacco    Never       Labs:  Lab Results   Component Value Date    A1C 5.7 02/14/2025    A1C 7.8 05/10/2021     Lab Results   Component " Value Date     04/04/2025     09/01/2022     05/10/2021     Lab Results   Component Value Date    LDL 69 04/08/2024    LDL 92 05/10/2021     HDL Cholesterol   Date Value Ref Range Status   05/10/2021 35 (L) >39 mg/dL Final     Direct Measure HDL   Date Value Ref Range Status   04/08/2024 34 (L) >=40 mg/dL Final     GFR Estimate   Date Value Ref Range Status   01/16/2025 >90 >60 mL/min/1.73m2 Final     Comment:     eGFR calculated using 2021 CKD-EPI equation.   05/10/2021 >90 >60 mL/min/[1.73_m2] Final     Comment:     Non  GFR Calc  Starting 12/18/2018, serum creatinine based estimated GFR (eGFR) will be   calculated using the Chronic Kidney Disease Epidemiology Collaboration   (CKD-EPI) equation.       GFR Estimate If Black   Date Value Ref Range Status   05/10/2021 >90 >60 mL/min/[1.73_m2] Final     Comment:      GFR Calc  Starting 12/18/2018, serum creatinine based estimated GFR (eGFR) will be   calculated using the Chronic Kidney Disease Epidemiology Collaboration   (CKD-EPI) equation.       Lab Results   Component Value Date    CR 0.71 01/16/2025    CR 0.76 05/10/2021     Lab Results   Component Value Date    MICROL 24.2 10/12/2023    UMALCR 10.21 10/12/2023    UCRR 237.0 10/12/2023           7/29/2025   Healthy Eating   Healthy Eating Assessed Today Yes   Cultural/Catholic diet restrictions? No   Meal planning/habits Smaller portions   Who cooks/prepares meals for you? Self   Who purchases food in  your home? Self   How many times a week on average do you eat food made away from home (restaurant/take-out)? 1   Meals include Breakfast;Lunch;Dinner;Evening Snack   Other 7/29 - he is working on avoiding sweetened beverages and smaller portions of rice   Beverages Tea;Coffee;Soda   Has patient met with a dietitian in the past? 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  Moderate (like brisk walking)   Barrier to exercise None         7/29/2025   Monitoring   Monitoring Assessed Today Yes   Did patient bring glucose meter to appointment?  Yes   Blood Glucose Meter CGM   Times checking blood sugar at home (number) 5+   Times checking blood sugar at home (per) Day   Blood glucose trend Decreasing         Diabetes Medication(s)       Biguanides       metFORMIN (GLUCOPHAGE) 500 MG tablet Take 2 tablets (1,000 mg) by mouth 2 times daily (with meals).       Dipeptidyl Peptidase-4 (DPP-4) Inhibitors       sitagliptin (JANUVIA) 50 MG tablet Take 1 tablet (50 mg) by mouth daily.              7/29/2025   Taking Medications   Taking Medication Assessed Today Yes   Current Treatments Oral Medication (taken by mouth)   Problems taking diabetes medications regularly? No   Diabetes medication side effects? No         7/29/2025   Problem Solving   Problem Solving Assessed Today Yes   Is the patient at risk for hypoglycemia? Yes   Hypoglycemia Frequency Other       notices low glucose in the first 12 hours of a new sensor placed   Hypoglycemia Treatment Glucose (tablets or gel);Candy;Juice;Other food           7/29/2025   Reducing Risks   Reducing Risks Assessed Today Yes   Diabetes Risks Age over 45 years   CAD Risks Diabetes Mellitus;Dyslipidemia;Hypertension;Male sex   Has dilated eye exam at least once a year? Yes   Sees dentist every 6 months? Yes   Feet checked by healthcare provider in the last year? Yes         7/29/2025   Healthy Coping: Diabetes Distress Assessment   Healthy Coping Assessed Today Yes   I feel burned out by all of the attention and effort that diabetes demands of me. 1 - Not a Problem   It bothers me that diabetes seems to control my life. 1 - Not a Problem   I am frustrated that even when I do what I am supposed to for my diabetes, it doesn't seem to make a difference. 1 - Not a Problem   No matter how hard I try with my diabetes, it  feels like it will never be good enough. 1 - Not a Problem   I am so tired of having to worry about diabetes all the time. 1 - Not a Problem   When it comes to my diabetes, I often feel like a failure. 1 - Not a Problem   It depresses me when I realize that my diabetes will likely never go away. 1 - Not a Problem   Living with diabetes is overwhelming for me. 1 - Not a Problem   T2 DDAS Total Score (0 - 1.9 Little or no DD, 2.0 - 2.9 Moderate DD,  3.0+ High DD) 1   Informal Support system: Children;Paula based;Spouse       Franca Sylvia ROTO, LD, [facility]    Time Spent: 20 minutes  Encounter Type: Individual    Any diabetes medication dose changes were made via [facility] under the patient's referring provider.

## 2025-07-29 NOTE — TELEPHONE ENCOUNTER
Stress test done for DOT purposes  no angina  NUC shows mild anteroseptal ischemia  Could you review test?  They may want coronary angiography then  Pls advise  Thanks JS

## 2025-07-29 NOTE — TELEPHONE ENCOUNTER
Pls see dr Rolle recommendation for coronary angiography  Advise patient that he needs to come in to discuss this and go over risks and benefits for coronary angiography  Thanks JS

## 2025-07-29 NOTE — LETTER
7/29/2025         RE: Sumit Park  [id number]  [facility] [address]        Dear Colleague,    Thank you for referring your patient, Sumit Park, to the [facility]. Please see a copy of my visit note below.    Diabetes Self-Management Education & Support  Presents for: Follow-up    Type of Service: In Person Visit    Assessment  Sumit is doing a great job with diabetes management. Noted glucose was running higher when he was wearing a steroid pack for back pain. The last 7 days, GMI at 6.0%. Positive reinforcement given. Discussed diabetes risk reduction today and he has a good understanding.     Patient's most recent   Lab Results   Component Value Date    A1C 5.7 02/14/2025    A1C 7.8 05/10/2021     is meeting goal of <7.0    Diabetes knowledge and skills assessment:   Patient is knowledgeable in diabetes management concepts related to: Healthy Eating, Being Active, Monitoring, Taking Medication, Problem Solving, and Reducing Risks    Based on learning assessment above, most appropriate setting for further diabetes education would be: Individual setting.    Care Plan and Education Provided:  Healthy Eating: Portion control  Monitoring: CGM interpretation  Reducing Risks: Complications of diabetes, Dental care, Eye care, Foot care, Goal for A1c, how it relates to glucose and how often to check, Preventing cardiovascular disease, including blood pressure goals, lipid goals, recommendations for cardioprotective medications, statins, and aspirin, and Prevention, early diagnostic measures and treatment of complications  Healthy Coping: Benefits of making appropriate lifestyle changes, Identifying helpful resources, and Utilize support systems    Patient verbalized understanding of diabetes self-management education concepts discussed, opportunities for ongoing education and support, and recommendations provided today.    Plan  Diabetes Medications:  Continue  "Metformin and Januvia as prescribed.      Glucose monitoring:  Continue using Akash 3+ sensors. He downloaded and connected new appt today.     Meal Plan Recommendation:   Use portion control and plate planning method.  Work towards increasing protein, healthy fat and / or fiber at meals and snacks, especially if concentrated sweets like regular soda, cookie, cupcake, etc.  Work towards decreasing portions of sweets and sweetened beverages     Exercise / activity plan:   Continue current plan    Topics to cover at upcoming visits: Education complete    See Care Plan for co-developed, patient-state behavior change goals.    Education Materials Provided:  Diabetes risk reduction     Subjective/Objective  Sumit is an 65 year old, presenting for the following diabetes education related to: Follow-up  Accompanied by: Self  Diabetes education in the past 24mo: Yes  Focus of Visit: Healthy Eating  Diabetes type: Type 2  Disease course: Stable  How confident are you filling out medical forms by yourself:: Extremely  Diabetes management related comments/concerns: I was taking a week off in between using sensors, but now using all the time. I was on a steroid pack for 6 days last week.  Transportation concerns: No  Difficulty affording diabetes medication?: No  Difficulty affording diabetes testing supplies?: No  Other concerns: None  Cultural Influences/Ethnic Background:  Not  or     Diabetes Symptoms & Complications:  Diabetes Related Symptoms: None  Weight trend: Stable  Symptom course: Stable  Disease course: Stable       Patient Problem List and Family Medical History reviewed for relevant medical history, current medical status, and diabetes risk factors.    Vitals:  There were no vitals taken for this visit.  Estimated body mass index is 29.73 kg/m  as calculated from the following:    Height as of 7/8/25: 1.676 m (5' 6\").    Weight as of 7/8/25: 83.6 kg (184 lb 3.2 oz).   Last 3 BP:   BP Readings from " Last 3 Encounters:   07/08/25 124/72   05/05/25 136/79   04/04/25 (!) 163/85       History   Smoking Status     Never   Smokeless Tobacco     Never       Labs:  Lab Results   Component Value Date    A1C 5.7 02/14/2025    A1C 7.8 05/10/2021     Lab Results   Component Value Date     04/04/2025     09/01/2022     05/10/2021     Lab Results   Component Value Date    LDL 69 04/08/2024    LDL 92 05/10/2021     HDL Cholesterol   Date Value Ref Range Status   05/10/2021 35 (L) >39 mg/dL Final     Direct Measure HDL   Date Value Ref Range Status   04/08/2024 34 (L) >=40 mg/dL Final     GFR Estimate   Date Value Ref Range Status   01/16/2025 >90 >60 mL/min/1.73m2 Final     Comment:     eGFR calculated using 2021 CKD-EPI equation.   05/10/2021 >90 >60 mL/min/[1.73_m2] Final     Comment:     Non  GFR Calc  Starting 12/18/2018, serum creatinine based estimated GFR (eGFR) will be   calculated using the Chronic Kidney Disease Epidemiology Collaboration   (CKD-EPI) equation.       GFR Estimate If Black   Date Value Ref Range Status   05/10/2021 >90 >60 mL/min/[1.73_m2] Final     Comment:      GFR Calc  Starting 12/18/2018, serum creatinine based estimated GFR (eGFR) will be   calculated using the Chronic Kidney Disease Epidemiology Collaboration   (CKD-EPI) equation.       Lab Results   Component Value Date    CR 0.71 01/16/2025    CR 0.76 05/10/2021     Lab Results   Component Value Date    MICROL 24.2 10/12/2023    UMALCR 10.21 10/12/2023    UCRR 237.0 10/12/2023           7/29/2025   Healthy Eating   Healthy Eating Assessed Today Yes   Cultural/Mu-ism diet restrictions? No   Meal planning/habits Smaller portions   Who cooks/prepares meals for you? Self   Who purchases food in  your home? Self   How many times a week on average do you eat food made away from home (restaurant/take-out)? 1   Meals include Breakfast;Lunch;Dinner;Evening Snack   Other 7/29 - he is working on  avoiding sweetened beverages and smaller portions of rice   Beverages Tea;Coffee;Soda   Has patient met with a dietitian in the past? Yes         7/29/2025   Being Active   Being Active Assessed Today Yes   Exercise: --        active at [employer] job   How intense was your typical exercise?  Moderate (like brisk walking)   Barrier to exercise None         7/29/2025   Monitoring   Monitoring Assessed Today Yes   Did patient bring glucose meter to appointment?  Yes   Blood Glucose Meter CGM   Times checking blood sugar at home (number) 5+   Times checking blood sugar at home (per) Day   Blood glucose trend Decreasing         Diabetes Medication(s)       Biguanides       metFORMIN (GLUCOPHAGE) 500 MG tablet Take 2 tablets (1,000 mg) by mouth 2 times daily (with meals).       Dipeptidyl Peptidase-4 (DPP-4) Inhibitors       sitagliptin (JANUVIA) 50 MG tablet Take 1 tablet (50 mg) by mouth daily.              7/29/2025   Taking Medications   Taking Medication Assessed Today Yes   Current Treatments Oral Medication (taken by mouth)   Problems taking diabetes medications regularly? No   Diabetes medication side effects? No         7/29/2025   Problem Solving   Problem Solving Assessed Today Yes   Is the patient at risk for hypoglycemia? Yes   Hypoglycemia Frequency Other       notices low glucose in the first 12 hours of a new sensor placed   Hypoglycemia Treatment Glucose (tablets or gel);Candy;Juice;Other food           7/29/2025   Reducing Risks   Reducing Risks Assessed Today Yes   Diabetes Risks Age over 45 years   CAD Risks Diabetes Mellitus;Dyslipidemia;Hypertension;Male sex   Has dilated eye exam at least once a year? Yes   Sees dentist every 6 months? Yes   Feet checked by healthcare provider in the last year? Yes         7/29/2025   Healthy Coping: Diabetes Distress Assessment   Healthy Coping Assessed Today Yes   I feel burned out by all of the attention and effort that diabetes demands of me. 1 - Not a  Problem   It bothers me that diabetes seems to control my life. 1 - Not a Problem   I am frustrated that even when I do what I am supposed to for my diabetes, it doesn't seem to make a difference. 1 - Not a Problem   No matter how hard I try with my diabetes, it feels like it will never be good enough. 1 - Not a Problem   I am so tired of having to worry about diabetes all the time. 1 - Not a Problem   When it comes to my diabetes, I often feel like a failure. 1 - Not a Problem   It depresses me when I realize that my diabetes will likely never go away. 1 - Not a Problem   Living with diabetes is overwhelming for me. 1 - Not a Problem   T2 DDAS Total Score (0 - 1.9 Little or no DD, 2.0 - 2.9 Moderate DD,  3.0+ High DD) 1   Informal Support system: Children;Paula based;Spouse       Franca Ward RDN, LD, [facility]    Time Spent: 20 minutes  Encounter Type: Individual    Any diabetes medication dose changes were [facility] under the patient's referring provider.

## 2025-07-30 DIAGNOSIS — I25.10 CORONARY ARTERY DISEASE INVOLVING NATIVE CORONARY ARTERY OF NATIVE HEART WITHOUT ANGINA PECTORIS: Primary | ICD-10-CM

## 2025-07-30 DIAGNOSIS — I42.9 SECONDARY CARDIOMYOPATHY (H): ICD-10-CM

## 2025-07-30 DIAGNOSIS — I51.89 LEFT ATRIAL MASS: ICD-10-CM

## 2025-07-30 DIAGNOSIS — R94.39 ABNORMAL STRESS TEST: ICD-10-CM

## 2025-07-30 NOTE — TELEPHONE ENCOUNTER
Patient notified of results and recommendations from Dr. Rolle to proceed with an angiogram.    Orders placed and also for SEBASTIAN to review Risks and benefits.  Patient verbalized understanding.  Renate Cardona RN, [phone]

## 2025-08-04 ENCOUNTER — CARE COORDINATION (OUTPATIENT)
Dept: CARDIOLOGY | Facility: CLINIC | Age: 65
End: 2025-08-04
Payer: COMMERCIAL

## 2025-08-04 ENCOUNTER — MYC MEDICAL ADVICE (OUTPATIENT)
Dept: SLEEP MEDICINE | Facility: CLINIC | Age: 65
End: 2025-08-04
Payer: COMMERCIAL

## 2025-08-04 DIAGNOSIS — I25.10 CORONARY ARTERY DISEASE INVOLVING NATIVE CORONARY ARTERY OF NATIVE HEART WITHOUT ANGINA PECTORIS: Primary | ICD-10-CM

## 2025-08-04 DIAGNOSIS — R94.39 ABNORMAL STRESS TEST: ICD-10-CM

## 2025-08-04 RX ORDER — POTASSIUM CHLORIDE 1500 MG/1
20 TABLET, EXTENDED RELEASE ORAL
OUTPATIENT
Start: 2025-08-04

## 2025-08-04 RX ORDER — ASPIRIN 81 MG/1
243 TABLET, CHEWABLE ORAL ONCE
OUTPATIENT
Start: 2025-08-04

## 2025-08-04 RX ORDER — SODIUM CHLORIDE 9 MG/ML
INJECTION, SOLUTION INTRAVENOUS CONTINUOUS
OUTPATIENT
Start: 2025-08-04

## 2025-08-04 RX ORDER — LIDOCAINE 40 MG/G
CREAM TOPICAL
OUTPATIENT
Start: 2025-08-04

## 2025-08-04 RX ORDER — ASPIRIN 325 MG
325 TABLET ORAL ONCE
OUTPATIENT
Start: 2025-08-04 | End: 2025-08-04

## 2025-08-08 ENCOUNTER — HOSPITAL ENCOUNTER (OUTPATIENT)
Facility: CLINIC | Age: 65
Discharge: HOME | End: 2025-08-08
Attending: INTERNAL MEDICINE | Admitting: INTERNAL MEDICINE
Payer: COMMERCIAL

## 2025-08-08 VITALS
DIASTOLIC BLOOD PRESSURE: 84 MMHG | RESPIRATION RATE: 16 BRPM | HEART RATE: 49 BPM | TEMPERATURE: 98.1 F | OXYGEN SATURATION: 97 % | SYSTOLIC BLOOD PRESSURE: 141 MMHG

## 2025-08-08 DIAGNOSIS — R94.39 ABNORMAL STRESS TEST: ICD-10-CM

## 2025-08-08 DIAGNOSIS — I25.10 CORONARY ARTERY DISEASE INVOLVING NATIVE CORONARY ARTERY OF NATIVE HEART WITHOUT ANGINA PECTORIS: ICD-10-CM

## 2025-08-08 LAB
ANION GAP SERPL CALCULATED.3IONS-SCNC: 10 MMOL/L (ref 7–15)
APTT PPP: 30 SECONDS (ref 22–38)
ATRIAL RATE - MUSE: 58 BPM
BUN SERPL-MCNC: 15.1 MG/DL (ref 8–23)
CALCIUM SERPL-MCNC: 8.7 MG/DL (ref 8.8–10.4)
CHLORIDE SERPL-SCNC: 100 MMOL/L (ref 98–107)
CREAT SERPL-MCNC: 0.69 MG/DL (ref 0.67–1.17)
DIASTOLIC BLOOD PRESSURE - MUSE: NORMAL MMHG
EGFRCR SERPLBLD CKD-EPI 2021: >90 ML/MIN/1.73M2
ERYTHROCYTE [DISTWIDTH] IN BLOOD BY AUTOMATED COUNT: 13.3 % (ref 10–15)
GLUCOSE SERPL-MCNC: 97 MG/DL (ref 70–99)
HCO3 SERPL-SCNC: 25 MMOL/L (ref 22–29)
HCT VFR BLD AUTO: 38.5 % (ref 40–53)
HGB BLD-MCNC: 13.4 G/DL (ref 13.3–17.7)
INR PPP: 0.99 (ref 0.85–1.15)
INTERPRETATION ECG - MUSE: NORMAL
MCH RBC QN AUTO: 29.6 PG (ref 26.5–33)
MCHC RBC AUTO-ENTMCNC: 34.8 G/DL (ref 31.5–36.5)
MCV RBC AUTO: 85 FL (ref 78–100)
P AXIS - MUSE: NORMAL DEGREES
PLATELET # BLD AUTO: 155 10E3/UL (ref 150–450)
POTASSIUM SERPL-SCNC: 4.8 MMOL/L (ref 3.4–5.3)
PR INTERVAL - MUSE: 158 MS
PROTHROMBIN TIME: 13.2 SECONDS (ref 11.8–14.8)
QRS DURATION - MUSE: 202 MS
QT - MUSE: 518 MS
QTC - MUSE: 486 MS
R AXIS - MUSE: 4 DEGREES
RBC # BLD AUTO: 4.52 10E6/UL (ref 4.4–5.9)
SODIUM SERPL-SCNC: 135 MMOL/L (ref 135–145)
SYSTOLIC BLOOD PRESSURE - MUSE: NORMAL MMHG
T AXIS - MUSE: 70 DEGREES
VENTRICULAR RATE- MUSE: 53 BPM
WBC # BLD AUTO: 6 10E3/UL (ref 4–11)

## 2025-08-08 PROCEDURE — C1894 INTRO/SHEATH, NON-LASER: HCPCS | Performed by: INTERNAL MEDICINE

## 2025-08-08 PROCEDURE — 93454 CORONARY ARTERY ANGIO S&I: CPT | Mod: 26 | Performed by: INTERNAL MEDICINE

## 2025-08-08 PROCEDURE — 85027 COMPLETE CBC AUTOMATED: CPT | Performed by: INTERNAL MEDICINE

## 2025-08-08 PROCEDURE — 250N000009 HC RX 250: Performed by: INTERNAL MEDICINE

## 2025-08-08 PROCEDURE — 250N000011 HC RX IP 250 OP 636: Performed by: INTERNAL MEDICINE

## 2025-08-08 PROCEDURE — 80048 BASIC METABOLIC PNL TOTAL CA: CPT | Performed by: INTERNAL MEDICINE

## 2025-08-08 PROCEDURE — 99152 MOD SED SAME PHYS/QHP 5/>YRS: CPT | Performed by: INTERNAL MEDICINE

## 2025-08-08 PROCEDURE — 272N000001 HC OR GENERAL SUPPLY STERILE: Performed by: INTERNAL MEDICINE

## 2025-08-08 PROCEDURE — 258N000003 HC RX IP 258 OP 636: Performed by: INTERNAL MEDICINE

## 2025-08-08 PROCEDURE — C1887 CATHETER, GUIDING: HCPCS | Performed by: INTERNAL MEDICINE

## 2025-08-08 PROCEDURE — 85610 PROTHROMBIN TIME: CPT | Performed by: INTERNAL MEDICINE

## 2025-08-08 PROCEDURE — C1769 GUIDE WIRE: HCPCS | Performed by: INTERNAL MEDICINE

## 2025-08-08 PROCEDURE — 85730 THROMBOPLASTIN TIME PARTIAL: CPT | Performed by: INTERNAL MEDICINE

## 2025-08-08 PROCEDURE — 93454 CORONARY ARTERY ANGIO S&I: CPT | Performed by: INTERNAL MEDICINE

## 2025-08-08 RX ORDER — NALOXONE HYDROCHLORIDE 0.4 MG/ML
0.4 INJECTION, SOLUTION INTRAMUSCULAR; INTRAVENOUS; SUBCUTANEOUS
Status: DISCONTINUED | OUTPATIENT
Start: 2025-08-08 | End: 2025-08-08 | Stop reason: HOSPADM

## 2025-08-08 RX ORDER — NALOXONE HYDROCHLORIDE 0.4 MG/ML
0.2 INJECTION, SOLUTION INTRAMUSCULAR; INTRAVENOUS; SUBCUTANEOUS
Status: DISCONTINUED | OUTPATIENT
Start: 2025-08-08 | End: 2025-08-08 | Stop reason: HOSPADM

## 2025-08-08 RX ORDER — OXYCODONE HYDROCHLORIDE 5 MG/1
5 TABLET ORAL EVERY 4 HOURS PRN
Status: DISCONTINUED | OUTPATIENT
Start: 2025-08-08 | End: 2025-08-08 | Stop reason: HOSPADM

## 2025-08-08 RX ORDER — POTASSIUM CHLORIDE 1500 MG/1
20 TABLET, EXTENDED RELEASE ORAL
Status: DISCONTINUED | OUTPATIENT
Start: 2025-08-08 | End: 2025-08-08 | Stop reason: HOSPADM

## 2025-08-08 RX ORDER — HEPARIN SODIUM 1000 [USP'U]/ML
INJECTION, SOLUTION INTRAVENOUS; SUBCUTANEOUS
Status: DISCONTINUED | OUTPATIENT
Start: 2025-08-08 | End: 2025-08-08 | Stop reason: HOSPADM

## 2025-08-08 RX ORDER — VERAPAMIL HYDROCHLORIDE 2.5 MG/ML
INJECTION INTRAVENOUS
Status: DISCONTINUED | OUTPATIENT
Start: 2025-08-08 | End: 2025-08-08 | Stop reason: HOSPADM

## 2025-08-08 RX ORDER — NITROGLYCERIN 5 MG/ML
VIAL (ML) INTRAVENOUS
Status: DISCONTINUED | OUTPATIENT
Start: 2025-08-08 | End: 2025-08-08 | Stop reason: HOSPADM

## 2025-08-08 RX ORDER — FLUMAZENIL 0.1 MG/ML
0.2 INJECTION, SOLUTION INTRAVENOUS
Status: DISCONTINUED | OUTPATIENT
Start: 2025-08-08 | End: 2025-08-08 | Stop reason: HOSPADM

## 2025-08-08 RX ORDER — SODIUM CHLORIDE 9 MG/ML
INJECTION, SOLUTION INTRAVENOUS CONTINUOUS
Status: DISCONTINUED | OUTPATIENT
Start: 2025-08-08 | End: 2025-08-08 | Stop reason: HOSPADM

## 2025-08-08 RX ORDER — OXYCODONE HYDROCHLORIDE 10 MG/1
10 TABLET ORAL EVERY 4 HOURS PRN
Status: DISCONTINUED | OUTPATIENT
Start: 2025-08-08 | End: 2025-08-08 | Stop reason: HOSPADM

## 2025-08-08 RX ORDER — ACETAMINOPHEN 325 MG/1
650 TABLET ORAL EVERY 4 HOURS PRN
Status: DISCONTINUED | OUTPATIENT
Start: 2025-08-08 | End: 2025-08-08 | Stop reason: HOSPADM

## 2025-08-08 RX ORDER — LIDOCAINE 40 MG/G
CREAM TOPICAL
Status: DISCONTINUED | OUTPATIENT
Start: 2025-08-08 | End: 2025-08-08 | Stop reason: HOSPADM

## 2025-08-08 RX ORDER — ASPIRIN 81 MG/1
243 TABLET, CHEWABLE ORAL ONCE
Status: COMPLETED | OUTPATIENT
Start: 2025-08-08 | End: 2025-08-08

## 2025-08-08 RX ORDER — ATROPINE SULFATE 0.1 MG/ML
0.5 INJECTION INTRAVENOUS
Status: DISCONTINUED | OUTPATIENT
Start: 2025-08-08 | End: 2025-08-08 | Stop reason: HOSPADM

## 2025-08-08 RX ORDER — IOPAMIDOL 755 MG/ML
INJECTION, SOLUTION INTRAVASCULAR
Status: DISCONTINUED | OUTPATIENT
Start: 2025-08-08 | End: 2025-08-08 | Stop reason: HOSPADM

## 2025-08-08 RX ORDER — FENTANYL CITRATE 50 UG/ML
25 INJECTION, SOLUTION INTRAMUSCULAR; INTRAVENOUS
Refills: 0 | Status: DISCONTINUED | OUTPATIENT
Start: 2025-08-08 | End: 2025-08-08 | Stop reason: HOSPADM

## 2025-08-08 RX ORDER — FENTANYL CITRATE 50 UG/ML
INJECTION, SOLUTION INTRAMUSCULAR; INTRAVENOUS
Status: DISCONTINUED | OUTPATIENT
Start: 2025-08-08 | End: 2025-08-08 | Stop reason: HOSPADM

## 2025-08-08 RX ORDER — ASPIRIN 325 MG
325 TABLET ORAL ONCE
Status: COMPLETED | OUTPATIENT
Start: 2025-08-08 | End: 2025-08-08

## 2025-08-08 RX ADMIN — SODIUM CHLORIDE: 0.9 INJECTION, SOLUTION INTRAVENOUS at 10:37

## 2025-08-08 ASSESSMENT — ACTIVITIES OF DAILY LIVING (ADL)
ADLS_ACUITY_SCORE: 41

## 2025-08-11 ENCOUNTER — TELEPHONE (OUTPATIENT)
Dept: CARDIOLOGY | Facility: CLINIC | Age: 65
End: 2025-08-11

## 2025-08-11 ENCOUNTER — LAB (OUTPATIENT)
Dept: LAB | Facility: CLINIC | Age: 65
End: 2025-08-11
Payer: COMMERCIAL

## 2025-08-11 DIAGNOSIS — I42.9 SECONDARY CARDIOMYOPATHY (H): ICD-10-CM

## 2025-08-11 DIAGNOSIS — I25.10 CORONARY ARTERY DISEASE INVOLVING NATIVE CORONARY ARTERY OF NATIVE HEART WITHOUT ANGINA PECTORIS: ICD-10-CM

## 2025-08-11 DIAGNOSIS — I51.89 LEFT ATRIAL MASS: ICD-10-CM

## 2025-08-11 DIAGNOSIS — R94.39 ABNORMAL STRESS TEST: ICD-10-CM

## 2025-08-11 LAB
ANION GAP SERPL CALCULATED.3IONS-SCNC: 8 MMOL/L (ref 7–15)
BUN SERPL-MCNC: 11.9 MG/DL (ref 8–23)
CALCIUM SERPL-MCNC: 8.6 MG/DL (ref 8.8–10.4)
CHLORIDE SERPL-SCNC: 100 MMOL/L (ref 98–107)
CREAT SERPL-MCNC: 0.87 MG/DL (ref 0.67–1.17)
EGFRCR SERPLBLD CKD-EPI 2021: >90 ML/MIN/1.73M2
GLUCOSE SERPL-MCNC: 128 MG/DL (ref 70–99)
HCO3 SERPL-SCNC: 30 MMOL/L (ref 22–29)
POTASSIUM SERPL-SCNC: 4.2 MMOL/L (ref 3.4–5.3)
SODIUM SERPL-SCNC: 138 MMOL/L (ref 135–145)

## 2025-08-12 ENCOUNTER — TELEPHONE (OUTPATIENT)
Dept: CARDIOLOGY | Facility: CLINIC | Age: 65
End: 2025-08-12
Payer: COMMERCIAL

## 2025-08-14 ENCOUNTER — OFFICE VISIT (OUTPATIENT)
Dept: CARDIOLOGY | Facility: CLINIC | Age: 65
End: 2025-08-14
Payer: COMMERCIAL

## 2025-08-14 VITALS
HEIGHT: 66 IN | SYSTOLIC BLOOD PRESSURE: 143 MMHG | BODY MASS INDEX: 29.89 KG/M2 | WEIGHT: 186 LBS | DIASTOLIC BLOOD PRESSURE: 80 MMHG | HEART RATE: 71 BPM

## 2025-08-14 DIAGNOSIS — I25.10 CORONARY ARTERY DISEASE INVOLVING NATIVE CORONARY ARTERY OF NATIVE HEART WITHOUT ANGINA PECTORIS: Primary | ICD-10-CM

## 2025-08-14 PROCEDURE — 3079F DIAST BP 80-89 MM HG: CPT | Performed by: STUDENT IN AN ORGANIZED HEALTH CARE EDUCATION/TRAINING PROGRAM

## 2025-08-14 PROCEDURE — 3077F SYST BP >= 140 MM HG: CPT | Performed by: STUDENT IN AN ORGANIZED HEALTH CARE EDUCATION/TRAINING PROGRAM

## 2025-08-14 PROCEDURE — 99204 OFFICE O/P NEW MOD 45 MIN: CPT | Performed by: STUDENT IN AN ORGANIZED HEALTH CARE EDUCATION/TRAINING PROGRAM

## 2025-08-17 DIAGNOSIS — G47.9 SLEEP DIFFICULTIES: ICD-10-CM

## 2025-08-17 DIAGNOSIS — I10 HTN, GOAL BELOW 140/90: ICD-10-CM

## 2025-08-17 DIAGNOSIS — E11.9 TYPE 2 DIABETES MELLITUS WITHOUT COMPLICATION, WITHOUT LONG-TERM CURRENT USE OF INSULIN (H): ICD-10-CM

## 2025-08-18 RX ORDER — LISINOPRIL 20 MG/1
20 TABLET ORAL 2 TIMES DAILY
Qty: 180 TABLET | Refills: 0 | Status: SHIPPED | OUTPATIENT
Start: 2025-08-18

## 2025-08-18 RX ORDER — TRAZODONE HYDROCHLORIDE 50 MG/1
100 TABLET ORAL AT BEDTIME
Qty: 180 TABLET | Refills: 0 | Status: SHIPPED | OUTPATIENT
Start: 2025-08-18

## 2025-08-18 RX ORDER — HYDROCHLOROTHIAZIDE 12.5 MG/1
CAPSULE ORAL
Qty: 4 EACH | Refills: 1 | Status: SHIPPED | OUTPATIENT
Start: 2025-08-18

## 2025-08-19 ENCOUNTER — TELEPHONE (OUTPATIENT)
Dept: CARDIOLOGY | Facility: CLINIC | Age: 65
End: 2025-08-19
Payer: COMMERCIAL

## 2025-08-19 RX ORDER — CHLORHEXIDINE GLUCONATE ORAL RINSE 1.2 MG/ML
10 SOLUTION DENTAL ONCE
OUTPATIENT
Start: 2025-08-19 | End: 2025-08-19

## 2025-08-19 RX ORDER — LIDOCAINE 40 MG/G
CREAM TOPICAL
OUTPATIENT
Start: 2025-08-19

## 2025-08-19 RX ORDER — FAMOTIDINE 20 MG/1
20 TABLET, FILM COATED ORAL
OUTPATIENT
Start: 2025-08-19

## 2025-08-19 RX ORDER — ACETAMINOPHEN 325 MG/1
975 TABLET ORAL ONCE
OUTPATIENT
Start: 2025-08-19 | End: 2025-08-19

## 2025-08-19 RX ORDER — ASPIRIN 81 MG/1
162 TABLET, CHEWABLE ORAL
OUTPATIENT
Start: 2025-08-19

## 2025-08-19 RX ORDER — ASPIRIN 81 MG/1
81 TABLET, CHEWABLE ORAL
OUTPATIENT
Start: 2025-08-19

## 2025-08-21 LAB
ABO + RH BLD: NORMAL
BLD GP AB SCN SERPL QL: NEGATIVE
SPECIMEN EXP DATE BLD: NORMAL

## 2025-08-22 ENCOUNTER — HOSPITAL ENCOUNTER (OUTPATIENT)
Dept: ULTRASOUND IMAGING | Facility: CLINIC | Age: 65
Discharge: HOME | End: 2025-08-22
Attending: STUDENT IN AN ORGANIZED HEALTH CARE EDUCATION/TRAINING PROGRAM
Payer: COMMERCIAL

## 2025-08-22 ENCOUNTER — LAB (OUTPATIENT)
Dept: LAB | Facility: CLINIC | Age: 65
End: 2025-08-22
Attending: STUDENT IN AN ORGANIZED HEALTH CARE EDUCATION/TRAINING PROGRAM
Payer: COMMERCIAL

## 2025-08-22 ENCOUNTER — HOSPITAL ENCOUNTER (OUTPATIENT)
Dept: CT IMAGING | Facility: CLINIC | Age: 65
Discharge: HOME | End: 2025-08-22
Attending: STUDENT IN AN ORGANIZED HEALTH CARE EDUCATION/TRAINING PROGRAM
Payer: COMMERCIAL

## 2025-08-22 DIAGNOSIS — D64.9 ANEMIA, UNSPECIFIED TYPE: ICD-10-CM

## 2025-08-22 DIAGNOSIS — E11.9 TYPE 2 DIABETES MELLITUS WITHOUT COMPLICATION, WITHOUT LONG-TERM CURRENT USE OF INSULIN (H): ICD-10-CM

## 2025-08-22 DIAGNOSIS — G47.9 SLEEP DIFFICULTIES: ICD-10-CM

## 2025-08-22 DIAGNOSIS — I25.10 CORONARY ARTERY DISEASE INVOLVING NATIVE CORONARY ARTERY OF NATIVE HEART WITHOUT ANGINA PECTORIS: ICD-10-CM

## 2025-08-22 DIAGNOSIS — E53.8 VITAMIN B 12 DEFICIENCY: ICD-10-CM

## 2025-08-22 DIAGNOSIS — I25.10 CORONARY ARTERY DISEASE INVOLVING NATIVE HEART WITHOUT ANGINA PECTORIS, UNSPECIFIED VESSEL OR LESION TYPE: ICD-10-CM

## 2025-08-22 DIAGNOSIS — I10 HTN, GOAL BELOW 140/90: ICD-10-CM

## 2025-08-22 DIAGNOSIS — E78.5 HYPERLIPIDEMIA LDL GOAL <100: ICD-10-CM

## 2025-08-22 DIAGNOSIS — R21 RASH AND NONSPECIFIC SKIN ERUPTION: ICD-10-CM

## 2025-08-22 LAB
ALBUMIN SERPL BCG-MCNC: 3.9 G/DL (ref 3.5–5.2)
ALBUMIN UR-MCNC: NEGATIVE MG/DL
ALP SERPL-CCNC: 75 U/L (ref 40–150)
ALT SERPL W P-5'-P-CCNC: 13 U/L (ref 0–70)
ANION GAP SERPL CALCULATED.3IONS-SCNC: 7 MMOL/L (ref 7–15)
APPEARANCE UR: CLEAR
AST SERPL W P-5'-P-CCNC: 16 U/L (ref 0–45)
BILIRUB SERPL-MCNC: 1.5 MG/DL
BILIRUB UR QL STRIP: NEGATIVE
BLOOD BANK CHART COMMENT: NORMAL
BUN SERPL-MCNC: 16.1 MG/DL (ref 8–23)
CALCIUM SERPL-MCNC: 8.9 MG/DL (ref 8.8–10.4)
CHLORIDE SERPL-SCNC: 99 MMOL/L (ref 98–107)
CHOLEST SERPL-MCNC: 168 MG/DL
COLOR UR AUTO: YELLOW
CREAT SERPL-MCNC: 0.81 MG/DL (ref 0.67–1.17)
CREAT UR-MCNC: 195 MG/DL
EGFRCR SERPLBLD CKD-EPI 2021: >90 ML/MIN/1.73M2
ERYTHROCYTE [DISTWIDTH] IN BLOOD BY AUTOMATED COUNT: 13.4 % (ref 10–15)
EST. AVERAGE GLUCOSE BLD GHB EST-MCNC: 128 MG/DL
FASTING STATUS PATIENT QL REPORTED: YES
FERRITIN SERPL-MCNC: 58 NG/ML (ref 31–409)
FOLATE SERPL-MCNC: 16.4 NG/ML (ref 4.6–34.8)
GLUCOSE SERPL-MCNC: 109 MG/DL (ref 70–99)
GLUCOSE UR STRIP-MCNC: NEGATIVE MG/DL
HBA1C MFR BLD: 6.1 %
HCO3 SERPL-SCNC: 29 MMOL/L (ref 22–29)
HCT VFR BLD AUTO: 41.1 % (ref 40–53)
HDLC SERPL-MCNC: 42 MG/DL
HGB BLD-MCNC: 14 G/DL (ref 13.3–17.7)
HGB UR QL STRIP: NEGATIVE
IRON BINDING CAPACITY (ROCHE): 338 UG/DL (ref 240–430)
IRON SATN MFR SERPL: 43 % (ref 15–46)
IRON SERPL-MCNC: 146 UG/DL (ref 61–157)
KETONES UR STRIP-MCNC: NEGATIVE MG/DL
LDLC SERPL CALC-MCNC: 111 MG/DL
LEUKOCYTE ESTERASE UR QL STRIP: NEGATIVE
MCH RBC QN AUTO: 30.4 PG (ref 26.5–33)
MCHC RBC AUTO-ENTMCNC: 34.1 G/DL (ref 31.5–36.5)
MCV RBC AUTO: 89.2 FL (ref 78–100)
MICROALBUMIN UR-MCNC: 29 MG/L
MICROALBUMIN/CREAT UR: 14.87 MG/G CR (ref 0–17)
NITRATE UR QL: NEGATIVE
NONHDLC SERPL-MCNC: 126 MG/DL
PH UR STRIP: 6.5 [PH] (ref 5–7)
PLATELET # BLD AUTO: 150 10E3/UL (ref 150–450)
POTASSIUM SERPL-SCNC: 4.7 MMOL/L (ref 3.4–5.3)
PROT SERPL-MCNC: 6.2 G/DL (ref 6.4–8.3)
RBC # BLD AUTO: 4.61 10E6/UL (ref 4.4–5.9)
RETICS # AUTO: 0.05 10E6/UL (ref 0.03–0.1)
RETICS/RBC NFR AUTO: 1.15 % (ref 0.5–2)
SODIUM SERPL-SCNC: 135 MMOL/L (ref 135–145)
SP GR UR STRIP: 1.03 (ref 1–1.03)
SPECIMEN EXP DATE BLD: NORMAL
TRIGL SERPL-MCNC: 74 MG/DL
TSH SERPL DL<=0.005 MIU/L-ACNC: 4.15 UIU/ML (ref 0.3–4.2)
UROBILINOGEN UR STRIP-MCNC: 4 MG/DL
WBC # BLD AUTO: 5.3 10E3/UL (ref 4–11)

## 2025-08-22 PROCEDURE — 84443 ASSAY THYROID STIM HORMONE: CPT

## 2025-08-22 PROCEDURE — 36415 COLL VENOUS BLD VENIPUNCTURE: CPT

## 2025-08-22 PROCEDURE — 82746 ASSAY OF FOLIC ACID SERUM: CPT

## 2025-08-22 PROCEDURE — 82728 ASSAY OF FERRITIN: CPT

## 2025-08-22 PROCEDURE — 85014 HEMATOCRIT: CPT

## 2025-08-22 PROCEDURE — 82465 ASSAY BLD/SERUM CHOLESTEROL: CPT

## 2025-08-22 PROCEDURE — 82570 ASSAY OF URINE CREATININE: CPT

## 2025-08-22 PROCEDURE — 93880 EXTRACRANIAL BILAT STUDY: CPT

## 2025-08-22 PROCEDURE — 81003 URINALYSIS AUTO W/O SCOPE: CPT

## 2025-08-22 PROCEDURE — 85045 AUTOMATED RETICULOCYTE COUNT: CPT

## 2025-08-22 PROCEDURE — 83550 IRON BINDING TEST: CPT

## 2025-08-22 PROCEDURE — 82040 ASSAY OF SERUM ALBUMIN: CPT

## 2025-08-22 PROCEDURE — 71250 CT THORAX DX C-: CPT

## 2025-08-22 PROCEDURE — 93970 EXTREMITY STUDY: CPT

## 2025-08-22 PROCEDURE — 83036 HEMOGLOBIN GLYCOSYLATED A1C: CPT

## 2025-08-22 PROCEDURE — 86900 BLOOD TYPING SEROLOGIC ABO: CPT

## 2025-08-25 ENCOUNTER — ANESTHESIA EVENT (OUTPATIENT)
Dept: SURGERY | Facility: CLINIC | Age: 65
End: 2025-08-25
Payer: COMMERCIAL

## 2025-08-26 ENCOUNTER — ANESTHESIA (OUTPATIENT)
Dept: SURGERY | Facility: CLINIC | Age: 65
End: 2025-08-26
Payer: COMMERCIAL

## 2025-08-26 ENCOUNTER — HOSPITAL ENCOUNTER (INPATIENT)
Facility: CLINIC | Age: 65
End: 2025-08-26
Attending: STUDENT IN AN ORGANIZED HEALTH CARE EDUCATION/TRAINING PROGRAM | Admitting: STUDENT IN AN ORGANIZED HEALTH CARE EDUCATION/TRAINING PROGRAM
Payer: COMMERCIAL

## 2025-08-26 ENCOUNTER — APPOINTMENT (OUTPATIENT)
Dept: GENERAL RADIOLOGY | Facility: CLINIC | Age: 65
End: 2025-08-26
Attending: SURGERY
Payer: COMMERCIAL

## 2025-08-26 PROBLEM — I25.119 CORONARY ARTERY DISEASE INVOLVING NATIVE CORONARY ARTERY OF NATIVE HEART WITH ANGINA PECTORIS: Status: ACTIVE | Noted: 2025-08-26

## 2025-08-26 PROCEDURE — 250N000009 HC RX 250: Performed by: NURSE ANESTHETIST, CERTIFIED REGISTERED

## 2025-08-26 PROCEDURE — P9045 ALBUMIN (HUMAN), 5%, 250 ML: HCPCS | Mod: JZ | Performed by: NURSE ANESTHETIST, CERTIFIED REGISTERED

## 2025-08-26 PROCEDURE — 258N000003 HC RX IP 258 OP 636: Performed by: NURSE ANESTHETIST, CERTIFIED REGISTERED

## 2025-08-26 PROCEDURE — 250N000011 HC RX IP 250 OP 636: Performed by: STUDENT IN AN ORGANIZED HEALTH CARE EDUCATION/TRAINING PROGRAM

## 2025-08-26 PROCEDURE — 250N000011 HC RX IP 250 OP 636: Performed by: NURSE ANESTHETIST, CERTIFIED REGISTERED

## 2025-08-26 PROCEDURE — 250N000009 HC RX 250: Performed by: ANESTHESIOLOGY

## 2025-08-26 PROCEDURE — 999N000065 XR CHEST PORT 1 VIEW

## 2025-08-26 RX ORDER — HYDROMORPHONE HYDROCHLORIDE 1 MG/ML
INJECTION, SOLUTION INTRAMUSCULAR; INTRAVENOUS; SUBCUTANEOUS PRN
Status: DISCONTINUED | OUTPATIENT
Start: 2025-08-26 | End: 2025-08-26

## 2025-08-26 RX ORDER — VECURONIUM BROMIDE 1 MG/ML
INJECTION, POWDER, LYOPHILIZED, FOR SOLUTION INTRAVENOUS PRN
Status: DISCONTINUED | OUTPATIENT
Start: 2025-08-26 | End: 2025-08-26

## 2025-08-26 RX ORDER — SODIUM CHLORIDE 9 MG/ML
INJECTION, SOLUTION INTRAVENOUS CONTINUOUS PRN
Status: DISCONTINUED | OUTPATIENT
Start: 2025-08-26 | End: 2025-08-26

## 2025-08-26 RX ORDER — NITROGLYCERIN 10 MG/100ML
INJECTION INTRAVENOUS PRN
Status: DISCONTINUED | OUTPATIENT
Start: 2025-08-26 | End: 2025-08-26

## 2025-08-26 RX ORDER — LIDOCAINE HYDROCHLORIDE 20 MG/ML
INJECTION, SOLUTION INFILTRATION; PERINEURAL PRN
Status: DISCONTINUED | OUTPATIENT
Start: 2025-08-26 | End: 2025-08-26

## 2025-08-26 RX ORDER — LIDOCAINE HYDROCHLORIDE 10 MG/ML
INJECTION, SOLUTION INFILTRATION; PERINEURAL
Status: COMPLETED | OUTPATIENT
Start: 2025-08-26 | End: 2025-08-26

## 2025-08-26 RX ORDER — PROPOFOL 10 MG/ML
INJECTION, EMULSION INTRAVENOUS PRN
Status: DISCONTINUED | OUTPATIENT
Start: 2025-08-26 | End: 2025-08-26

## 2025-08-26 RX ORDER — HEPARIN SODIUM 1000 [USP'U]/ML
INJECTION, SOLUTION INTRAVENOUS; SUBCUTANEOUS PRN
Status: DISCONTINUED | OUTPATIENT
Start: 2025-08-26 | End: 2025-08-26

## 2025-08-26 RX ORDER — SODIUM CHLORIDE, SODIUM LACTATE, POTASSIUM CHLORIDE, CALCIUM CHLORIDE 600; 310; 30; 20 MG/100ML; MG/100ML; MG/100ML; MG/100ML
INJECTION, SOLUTION INTRAVENOUS CONTINUOUS PRN
Status: DISCONTINUED | OUTPATIENT
Start: 2025-08-26 | End: 2025-08-26

## 2025-08-26 RX ORDER — PROPOFOL 10 MG/ML
INJECTION, EMULSION INTRAVENOUS CONTINUOUS PRN
Status: DISCONTINUED | OUTPATIENT
Start: 2025-08-26 | End: 2025-08-26

## 2025-08-26 RX ORDER — FENTANYL CITRATE 50 UG/ML
INJECTION, SOLUTION INTRAMUSCULAR; INTRAVENOUS PRN
Status: DISCONTINUED | OUTPATIENT
Start: 2025-08-26 | End: 2025-08-26

## 2025-08-26 RX ORDER — ONDANSETRON 2 MG/ML
INJECTION INTRAMUSCULAR; INTRAVENOUS PRN
Status: DISCONTINUED | OUTPATIENT
Start: 2025-08-26 | End: 2025-08-26

## 2025-08-26 RX ORDER — PROTAMINE SULFATE 10 MG/ML
INJECTION, SOLUTION INTRAVENOUS PRN
Status: DISCONTINUED | OUTPATIENT
Start: 2025-08-26 | End: 2025-08-26

## 2025-08-26 RX ADMIN — EPINEPHRINE 0.02 MCG/KG/MIN: 1 INJECTION INTRAMUSCULAR; INTRAVENOUS; SUBCUTANEOUS at 12:00

## 2025-08-26 RX ADMIN — MIDAZOLAM HYDROCHLORIDE 2 MG: 1 INJECTION, SOLUTION INTRAMUSCULAR; INTRAVENOUS at 07:32

## 2025-08-26 RX ADMIN — VECURONIUM BROMIDE 4 MG: 1 INJECTION, POWDER, LYOPHILIZED, FOR SOLUTION INTRAVENOUS at 10:05

## 2025-08-26 RX ADMIN — NITROGLYCERIN 80 MCG: 10 INJECTION INTRAVENOUS at 12:11

## 2025-08-26 RX ADMIN — PHENYLEPHRINE HYDROCHLORIDE 100 MCG: 10 INJECTION INTRAVENOUS at 10:11

## 2025-08-26 RX ADMIN — Medication 2 G: at 07:42

## 2025-08-26 RX ADMIN — INSULIN HUMAN 2 UNITS/HR: 1 INJECTION, SOLUTION INTRAVENOUS at 10:59

## 2025-08-26 RX ADMIN — MIDAZOLAM HYDROCHLORIDE 1 MG: 1 INJECTION, SOLUTION INTRAMUSCULAR; INTRAVENOUS at 07:42

## 2025-08-26 RX ADMIN — HEPARIN SODIUM 28000 UNITS: 1000 INJECTION, SOLUTION INTRAVENOUS; SUBCUTANEOUS at 09:46

## 2025-08-26 RX ADMIN — ALBUMIN (HUMAN): 12.5 SOLUTION INTRAVENOUS at 13:40

## 2025-08-26 RX ADMIN — Medication 2 G: at 11:42

## 2025-08-26 RX ADMIN — FENTANYL CITRATE 100 MCG: 50 INJECTION INTRAMUSCULAR; INTRAVENOUS at 08:34

## 2025-08-26 RX ADMIN — HYDROMORPHONE HYDROCHLORIDE 0.5 MG: 1 INJECTION, SOLUTION INTRAMUSCULAR; INTRAVENOUS; SUBCUTANEOUS at 12:58

## 2025-08-26 RX ADMIN — NITROGLYCERIN 20 MCG: 10 INJECTION INTRAVENOUS at 09:58

## 2025-08-26 RX ADMIN — FENTANYL CITRATE 150 MCG: 50 INJECTION INTRAMUSCULAR; INTRAVENOUS at 08:31

## 2025-08-26 RX ADMIN — HEPARIN SODIUM 2000 UNITS: 1000 INJECTION, SOLUTION INTRAVENOUS; SUBCUTANEOUS at 09:21

## 2025-08-26 RX ADMIN — MIDAZOLAM HYDROCHLORIDE 1 MG: 1 INJECTION, SOLUTION INTRAMUSCULAR; INTRAVENOUS at 12:10

## 2025-08-26 RX ADMIN — FENTANYL CITRATE 100 MCG: 50 INJECTION INTRAMUSCULAR; INTRAVENOUS at 09:07

## 2025-08-26 RX ADMIN — PROPOFOL 50 MG: 10 INJECTION, EMULSION INTRAVENOUS at 09:56

## 2025-08-26 RX ADMIN — PROPOFOL 40 MCG/KG/MIN: 10 INJECTION, EMULSION INTRAVENOUS at 13:21

## 2025-08-26 RX ADMIN — VECURONIUM BROMIDE 2 MG: 1 INJECTION, POWDER, LYOPHILIZED, FOR SOLUTION INTRAVENOUS at 12:21

## 2025-08-26 RX ADMIN — VECURONIUM BROMIDE 4 MG: 1 INJECTION, POWDER, LYOPHILIZED, FOR SOLUTION INTRAVENOUS at 08:22

## 2025-08-26 RX ADMIN — VECURONIUM BROMIDE 2 MG: 1 INJECTION, POWDER, LYOPHILIZED, FOR SOLUTION INTRAVENOUS at 09:09

## 2025-08-26 RX ADMIN — LIDOCAINE HYDROCHLORIDE 2 ML: 10 INJECTION, SOLUTION INFILTRATION; PERINEURAL at 07:20

## 2025-08-26 RX ADMIN — AMINOCAPROIC ACID 1 G/HR: 250 INJECTION, SOLUTION INTRAVENOUS at 09:02

## 2025-08-26 RX ADMIN — NITROGLYCERIN 20 MCG: 10 INJECTION INTRAVENOUS at 08:39

## 2025-08-26 RX ADMIN — PROPOFOL 50 MG: 10 INJECTION, EMULSION INTRAVENOUS at 07:42

## 2025-08-26 RX ADMIN — NITROGLYCERIN 200 MCG: 10 INJECTION INTRAVENOUS at 12:13

## 2025-08-26 RX ADMIN — FENTANYL CITRATE 150 MCG: 50 INJECTION INTRAMUSCULAR; INTRAVENOUS at 08:37

## 2025-08-26 RX ADMIN — PHENYLEPHRINE HYDROCHLORIDE 50 MCG: 10 INJECTION INTRAVENOUS at 10:08

## 2025-08-26 RX ADMIN — PROPOFOL 50 MG: 10 INJECTION, EMULSION INTRAVENOUS at 08:35

## 2025-08-26 RX ADMIN — SODIUM CHLORIDE: 900 INJECTION INTRAVENOUS at 08:04

## 2025-08-26 RX ADMIN — AMINOCAPROIC ACID 5 G/HR: 250 INJECTION, SOLUTION INTRAVENOUS at 08:04

## 2025-08-26 RX ADMIN — FENTANYL CITRATE 250 MCG: 50 INJECTION INTRAMUSCULAR; INTRAVENOUS at 07:42

## 2025-08-26 RX ADMIN — PHENYLEPHRINE HYDROCHLORIDE 50 MCG: 10 INJECTION INTRAVENOUS at 10:07

## 2025-08-26 RX ADMIN — Medication 200 MG: at 13:52

## 2025-08-26 RX ADMIN — SODIUM CHLORIDE, SODIUM LACTATE, POTASSIUM CHLORIDE, AND CALCIUM CHLORIDE: .6; .31; .03; .02 INJECTION, SOLUTION INTRAVENOUS at 08:04

## 2025-08-26 RX ADMIN — FENTANYL CITRATE 100 MCG: 50 INJECTION INTRAMUSCULAR; INTRAVENOUS at 09:57

## 2025-08-26 RX ADMIN — VECURONIUM BROMIDE 10 MG: 1 INJECTION, POWDER, LYOPHILIZED, FOR SOLUTION INTRAVENOUS at 07:43

## 2025-08-26 RX ADMIN — MIDAZOLAM HYDROCHLORIDE 1 MG: 1 INJECTION, SOLUTION INTRAMUSCULAR; INTRAVENOUS at 10:09

## 2025-08-26 RX ADMIN — NITROGLYCERIN 20 MCG: 10 INJECTION INTRAVENOUS at 09:59

## 2025-08-26 RX ADMIN — ONDANSETRON 4 MG: 2 INJECTION INTRAMUSCULAR; INTRAVENOUS at 13:52

## 2025-08-26 RX ADMIN — FENTANYL CITRATE 150 MCG: 50 INJECTION INTRAMUSCULAR; INTRAVENOUS at 12:11

## 2025-08-26 RX ADMIN — PROTAMINE SULFATE 250 MG: 10 INJECTION, SOLUTION INTRAVENOUS at 12:12

## 2025-08-26 ASSESSMENT — ACTIVITIES OF DAILY LIVING (ADL)
ADLS_ACUITY_SCORE: 36
ADLS_ACUITY_SCORE: 36
ADLS_ACUITY_SCORE: 18
ADLS_ACUITY_SCORE: 18
ADLS_ACUITY_SCORE: 41
ADLS_ACUITY_SCORE: 18
ADLS_ACUITY_SCORE: 20
ADLS_ACUITY_SCORE: 18
ADLS_ACUITY_SCORE: 18
ADLS_ACUITY_SCORE: 20
ADLS_ACUITY_SCORE: 18
ADLS_ACUITY_SCORE: 20
ADLS_ACUITY_SCORE: 36
ADLS_ACUITY_SCORE: 20
ADLS_ACUITY_SCORE: 18

## 2025-08-27 ENCOUNTER — APPOINTMENT (OUTPATIENT)
Dept: PHYSICAL THERAPY | Facility: CLINIC | Age: 65
End: 2025-08-27
Attending: SURGERY
Payer: COMMERCIAL

## 2025-08-27 ENCOUNTER — APPOINTMENT (OUTPATIENT)
Dept: GENERAL RADIOLOGY | Facility: CLINIC | Age: 65
End: 2025-08-27
Attending: SURGERY
Payer: COMMERCIAL

## 2025-08-27 DIAGNOSIS — Z95.1 S/P CABG (CORONARY ARTERY BYPASS GRAFT): Primary | ICD-10-CM

## 2025-08-27 PROCEDURE — 71045 X-RAY EXAM CHEST 1 VIEW: CPT

## 2025-08-27 ASSESSMENT — ACTIVITIES OF DAILY LIVING (ADL)
DEPENDENT_IADLS:: INDEPENDENT
ADLS_ACUITY_SCORE: 45
ADLS_ACUITY_SCORE: 40
ADLS_ACUITY_SCORE: 45
ADLS_ACUITY_SCORE: 40
ADLS_ACUITY_SCORE: 40
ADLS_ACUITY_SCORE: 45
ADLS_ACUITY_SCORE: 40
ADLS_ACUITY_SCORE: 40
ADLS_ACUITY_SCORE: 45
ADLS_ACUITY_SCORE: 45
ADLS_ACUITY_SCORE: 40
ADLS_ACUITY_SCORE: 36
ADLS_ACUITY_SCORE: 40
ADLS_ACUITY_SCORE: 45
ADLS_ACUITY_SCORE: 40
ADLS_ACUITY_SCORE: 40

## 2025-08-28 ENCOUNTER — APPOINTMENT (OUTPATIENT)
Dept: PHYSICAL THERAPY | Facility: CLINIC | Age: 65
End: 2025-08-28
Attending: STUDENT IN AN ORGANIZED HEALTH CARE EDUCATION/TRAINING PROGRAM
Payer: COMMERCIAL

## 2025-08-28 ENCOUNTER — APPOINTMENT (OUTPATIENT)
Dept: GENERAL RADIOLOGY | Facility: CLINIC | Age: 65
End: 2025-08-28
Attending: PHYSICIAN ASSISTANT
Payer: COMMERCIAL

## 2025-08-28 PROCEDURE — 71045 X-RAY EXAM CHEST 1 VIEW: CPT

## 2025-08-28 ASSESSMENT — ACTIVITIES OF DAILY LIVING (ADL)
ADLS_ACUITY_SCORE: 42
ADLS_ACUITY_SCORE: 45
ADLS_ACUITY_SCORE: 44
ADLS_ACUITY_SCORE: 45
ADLS_ACUITY_SCORE: 44
ADLS_ACUITY_SCORE: 42
ADLS_ACUITY_SCORE: 44
ADLS_ACUITY_SCORE: 45
ADLS_ACUITY_SCORE: 44
ADLS_ACUITY_SCORE: 44
ADLS_ACUITY_SCORE: 45
ADLS_ACUITY_SCORE: 45
ADLS_ACUITY_SCORE: 42
ADLS_ACUITY_SCORE: 44
ADLS_ACUITY_SCORE: 45
ADLS_ACUITY_SCORE: 44

## 2025-08-29 ENCOUNTER — APPOINTMENT (OUTPATIENT)
Dept: PHYSICAL THERAPY | Facility: CLINIC | Age: 65
End: 2025-08-29
Attending: STUDENT IN AN ORGANIZED HEALTH CARE EDUCATION/TRAINING PROGRAM
Payer: COMMERCIAL

## 2025-08-29 ASSESSMENT — ACTIVITIES OF DAILY LIVING (ADL)
ADLS_ACUITY_SCORE: 42
ADLS_ACUITY_SCORE: 41
ADLS_ACUITY_SCORE: 42
ADLS_ACUITY_SCORE: 41
ADLS_ACUITY_SCORE: 42

## 2025-08-30 ENCOUNTER — APPOINTMENT (OUTPATIENT)
Dept: PHYSICAL THERAPY | Facility: CLINIC | Age: 65
End: 2025-08-30
Attending: STUDENT IN AN ORGANIZED HEALTH CARE EDUCATION/TRAINING PROGRAM
Payer: COMMERCIAL

## 2025-08-30 ASSESSMENT — ACTIVITIES OF DAILY LIVING (ADL)
ADLS_ACUITY_SCORE: 36
ADLS_ACUITY_SCORE: 41
ADLS_ACUITY_SCORE: 36
ADLS_ACUITY_SCORE: 41
ADLS_ACUITY_SCORE: 41
ADLS_ACUITY_SCORE: 38
ADLS_ACUITY_SCORE: 36
ADLS_ACUITY_SCORE: 41
ADLS_ACUITY_SCORE: 36
ADLS_ACUITY_SCORE: 36
ADLS_ACUITY_SCORE: 41
ADLS_ACUITY_SCORE: 41

## 2025-08-31 ENCOUNTER — HEALTH MAINTENANCE LETTER (OUTPATIENT)
Age: 65
End: 2025-08-31

## 2025-09-02 ENCOUNTER — TELEPHONE (OUTPATIENT)
Dept: CARDIOLOGY | Facility: CLINIC | Age: 65
End: 2025-09-02

## 2025-09-02 ENCOUNTER — PATIENT OUTREACH (OUTPATIENT)
Dept: CARE COORDINATION | Facility: CLINIC | Age: 65
End: 2025-09-02

## 2025-09-02 DIAGNOSIS — I48.91 ATRIAL FIBRILLATION (H): Primary | ICD-10-CM

## 2025-09-03 ENCOUNTER — MYC MEDICAL ADVICE (OUTPATIENT)
Dept: CARDIOLOGY | Facility: CLINIC | Age: 65
End: 2025-09-03

## (undated) DEVICE — DEFIB PRO-PADZ LVP LQD GEL ADULT 8900-2105-01

## (undated) DEVICE — KIT HAND CONTROL ANGIOTOUCH ACIST 65CM AT-P65

## (undated) DEVICE — GUIDEWIRE VASC 0.035INX150CM INQWIRE J TIP IQ35F150J3F/A

## (undated) DEVICE — WIRE GLIDE 0.035"X150CM VASC GR3506

## (undated) DEVICE — CATH DIAGNOSTIC RADIAL 5FR TIG 4.0

## (undated) DEVICE — SLEEVE TR BAND RADIAL COMPRESSION DEVICE 24CM TRB24-REG

## (undated) DEVICE — MANIFOLD KIT ANGIO AUTOMATED 014613

## (undated) DEVICE — INTRO GLIDESHEATH SLENDER 6FR 10X45CM 60-1060

## (undated) RX ORDER — VERAPAMIL HYDROCHLORIDE 2.5 MG/ML
INJECTION INTRAVENOUS
Status: DISPENSED
Start: 2025-08-08

## (undated) RX ORDER — NITROGLYCERIN 5 MG/ML
VIAL (ML) INTRAVENOUS
Status: DISPENSED
Start: 2025-08-08

## (undated) RX ORDER — HEPARIN SODIUM 1000 [USP'U]/ML
INJECTION, SOLUTION INTRAVENOUS; SUBCUTANEOUS
Status: DISPENSED
Start: 2025-08-08

## (undated) RX ORDER — FENTANYL CITRATE 50 UG/ML
INJECTION, SOLUTION INTRAMUSCULAR; INTRAVENOUS
Status: DISPENSED
Start: 2025-04-04

## (undated) RX ORDER — FENTANYL CITRATE 50 UG/ML
INJECTION, SOLUTION INTRAMUSCULAR; INTRAVENOUS
Status: DISPENSED
Start: 2025-08-08

## (undated) RX ORDER — LIDOCAINE HYDROCHLORIDE 10 MG/ML
INJECTION, SOLUTION EPIDURAL; INFILTRATION; INTRACAUDAL; PERINEURAL
Status: DISPENSED
Start: 2025-08-08

## (undated) RX ORDER — REGADENOSON 0.08 MG/ML
INJECTION, SOLUTION INTRAVENOUS
Status: DISPENSED
Start: 2022-09-27